# Patient Record
Sex: MALE | Race: WHITE | Employment: OTHER | URBAN - METROPOLITAN AREA
[De-identification: names, ages, dates, MRNs, and addresses within clinical notes are randomized per-mention and may not be internally consistent; named-entity substitution may affect disease eponyms.]

---

## 2023-03-17 ENCOUNTER — APPOINTMENT (OUTPATIENT)
Dept: GENERAL RADIOLOGY | Age: 78
DRG: 086 | End: 2023-03-17
Payer: MEDICARE

## 2023-03-17 ENCOUNTER — APPOINTMENT (OUTPATIENT)
Dept: CT IMAGING | Age: 78
DRG: 086 | End: 2023-03-17
Payer: MEDICARE

## 2023-03-17 ENCOUNTER — HOSPITAL ENCOUNTER (INPATIENT)
Age: 78
LOS: 6 days | Discharge: NURSING, REHAB OR LONG-TERM CARE | DRG: 086 | End: 2023-03-23
Attending: EMERGENCY MEDICINE | Admitting: SURGERY
Payer: MEDICARE

## 2023-03-17 DIAGNOSIS — I60.9 SAH (SUBARACHNOID HEMORRHAGE) (HCC): Primary | ICD-10-CM

## 2023-03-17 DIAGNOSIS — S01.01XA LACERATION OF SCALP, INITIAL ENCOUNTER: ICD-10-CM

## 2023-03-17 DIAGNOSIS — S00.03XA CONTUSION OF SCALP, INITIAL ENCOUNTER: ICD-10-CM

## 2023-03-17 DIAGNOSIS — R55 SYNCOPE, UNSPECIFIED SYNCOPE TYPE: ICD-10-CM
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
## 2023-03-17 DIAGNOSIS — R41.0 CONFUSION: ICD-10-CM
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
## 2023-03-17 LAB
ALBUMIN SERPL-MCNC: 4.3 G/DL (ref 3.5–5.2)
ALP SERPL-CCNC: 92 U/L (ref 40–129)
ALT SERPL-CCNC: 17 U/L (ref 0–40)
ANGLE (CLOT STRENGTH): 65.7 DEGREE (ref 59–74)
ANION GAP SERPL CALCULATED.3IONS-SCNC: 15 MMOL/L (ref 7–16)
AST SERPL-CCNC: 32 U/L (ref 0–39)
BACTERIA URNS QL MICRO: ABNORMAL /HPF
BASOPHILS # BLD: 0.03 E9/L (ref 0–0.2)
BASOPHILS NFR BLD: 0.3 % (ref 0–2)
BILIRUB SERPL-MCNC: 0.5 MG/DL (ref 0–1.2)
BILIRUB UR QL STRIP: NEGATIVE
BUN SERPL-MCNC: 31 MG/DL (ref 6–23)
BURR CELLS: ABNORMAL
CALCIUM SERPL-MCNC: 10.8 MG/DL (ref 8.6–10.2)
CHLORIDE SERPL-SCNC: 100 MMOL/L (ref 98–107)
CLARITY UR: CLEAR
CO2 SERPL-SCNC: 24 MMOL/L (ref 22–29)
COLOR UR: YELLOW
CREAT SERPL-MCNC: 1.4 MG/DL (ref 0.7–1.2)
EKG ATRIAL RATE: 88 BPM
EKG P AXIS: 48 DEGREES
EKG P-R INTERVAL: 186 MS
EKG Q-T INTERVAL: 408 MS
EKG QRS DURATION: 102 MS
EKG QTC CALCULATION (BAZETT): 493 MS
EKG R AXIS: 6 DEGREES
EKG T AXIS: 77 DEGREES
EKG VENTRICULAR RATE: 88 BPM
EOSINOPHIL # BLD: 0.01 E9/L (ref 0.05–0.5)
EOSINOPHIL NFR BLD: 0.1 % (ref 0–6)
EPL-TEG: 0.6 % (ref 0–15)
ERYTHROCYTE [DISTWIDTH] IN BLOOD BY AUTOMATED COUNT: 13.4 FL (ref 11.5–15)
G-TEG: 16.9 K D/SC (ref 4.5–11)
GLUCOSE SERPL-MCNC: 171 MG/DL (ref 74–99)
GLUCOSE UR STRIP-MCNC: NEGATIVE MG/DL
HCT VFR BLD AUTO: 38.1 % (ref 37–54)
HGB BLD-MCNC: 12.6 G/DL (ref 12.5–16.5)
HGB UR QL STRIP: ABNORMAL
HYALINE CASTS #/AREA URNS LPF: ABNORMAL /LPF (ref 0–2)
IMM GRANULOCYTES # BLD: 0.04 E9/L
IMM GRANULOCYTES NFR BLD: 0.3 % (ref 0–5)
INR BLD: 1.1
K (CLOTTING TIME): 0.9 MIN (ref 1–3)
KETONES UR STRIP-MCNC: NEGATIVE MG/DL
LEUKOCYTE ESTERASE UR QL STRIP: NEGATIVE
LY30 (FIBRINOLYSIS): 0.6 % (ref 0–8)
LYMPHOCYTES # BLD: 0.46 E9/L (ref 1.5–4)
LYMPHOCYTES NFR BLD: 3.8 % (ref 20–42)
MA (MAX AMPLITUDE): 77.1 MM (ref 50–70)
MCH RBC QN AUTO: 30.5 PG (ref 26–35)
MCHC RBC AUTO-ENTMCNC: 33.1 % (ref 32–34.5)
MCV RBC AUTO: 92.3 FL (ref 80–99.9)
MONOCYTES # BLD: 0.73 E9/L (ref 0.1–0.95)
MONOCYTES NFR BLD: 6.1 % (ref 2–12)
NEUTROPHILS # BLD: 10.69 E9/L (ref 1.8–7.3)
NEUTS SEG NFR BLD: 89.4 % (ref 43–80)
NITRITE UR QL STRIP: NEGATIVE
OVALOCYTES: ABNORMAL
PH UR STRIP: 7 [PH] (ref 5–9)
PLATELET # BLD AUTO: 184 E9/L (ref 130–450)
PMV BLD AUTO: 11.6 FL (ref 7–12)
POIKILOCYTES: ABNORMAL
POTASSIUM SERPL-SCNC: 3.8 MMOL/L (ref 3.5–5)
PROT SERPL-MCNC: 7.5 G/DL (ref 6.4–8.3)
PROT UR STRIP-MCNC: ABNORMAL MG/DL
PROTHROMBIN TIME: 12.1 SEC (ref 9.3–12.4)
R (REACTION TIME): 3.2 MIN (ref 5–10)
RBC # BLD AUTO: 4.13 E12/L (ref 3.8–5.8)
RBC #/AREA URNS HPF: ABNORMAL /HPF (ref 0–2)
SODIUM SERPL-SCNC: 139 MMOL/L (ref 132–146)
SP GR UR STRIP: 1.01 (ref 1–1.03)
TROPONIN, HIGH SENSITIVITY: 103 NG/L (ref 0–11)
TROPONIN, HIGH SENSITIVITY: 45 NG/L (ref 0–11)
TROPONIN, HIGH SENSITIVITY: 54 NG/L (ref 0–11)
TROPONIN, HIGH SENSITIVITY: 75 NG/L (ref 0–11)
UROBILINOGEN UR STRIP-ACNC: 0.2 E.U./DL
WBC # BLD: 12 E9/L (ref 4.5–11.5)
WBC #/AREA URNS HPF: ABNORMAL /HPF (ref 0–5)

## 2023-03-17 PROCEDURE — 84484 ASSAY OF TROPONIN QUANT: CPT

## 2023-03-17 PROCEDURE — 36415 COLL VENOUS BLD VENIPUNCTURE: CPT

## 2023-03-17 PROCEDURE — 72170 X-RAY EXAM OF PELVIS: CPT

## 2023-03-17 PROCEDURE — 96365 THER/PROPH/DIAG IV INF INIT: CPT

## 2023-03-17 PROCEDURE — 2500000003 HC RX 250 WO HCPCS: Performed by: EMERGENCY MEDICINE

## 2023-03-17 PROCEDURE — 71045 X-RAY EXAM CHEST 1 VIEW: CPT

## 2023-03-17 PROCEDURE — 2580000003 HC RX 258

## 2023-03-17 PROCEDURE — 2580000003 HC RX 258: Performed by: EMERGENCY MEDICINE

## 2023-03-17 PROCEDURE — 12011 RPR F/E/E/N/L/M 2.5 CM/<: CPT

## 2023-03-17 PROCEDURE — 70450 CT HEAD/BRAIN W/O DYE: CPT

## 2023-03-17 PROCEDURE — 6360000002 HC RX W HCPCS: Performed by: EMERGENCY MEDICINE

## 2023-03-17 PROCEDURE — 90471 IMMUNIZATION ADMIN: CPT | Performed by: EMERGENCY MEDICINE

## 2023-03-17 PROCEDURE — 85384 FIBRINOGEN ACTIVITY: CPT

## 2023-03-17 PROCEDURE — 6370000000 HC RX 637 (ALT 250 FOR IP)

## 2023-03-17 PROCEDURE — 85576 BLOOD PLATELET AGGREGATION: CPT

## 2023-03-17 PROCEDURE — 85347 COAGULATION TIME ACTIVATED: CPT

## 2023-03-17 PROCEDURE — 96375 TX/PRO/DX INJ NEW DRUG ADDON: CPT

## 2023-03-17 PROCEDURE — 81001 URINALYSIS AUTO W/SCOPE: CPT

## 2023-03-17 PROCEDURE — 99285 EMERGENCY DEPT VISIT HI MDM: CPT

## 2023-03-17 PROCEDURE — 93005 ELECTROCARDIOGRAM TRACING: CPT | Performed by: EMERGENCY MEDICINE

## 2023-03-17 PROCEDURE — 90714 TD VACC NO PRESV 7 YRS+ IM: CPT | Performed by: EMERGENCY MEDICINE

## 2023-03-17 PROCEDURE — 0HQ1XZZ REPAIR FACE SKIN, EXTERNAL APPROACH: ICD-10-PCS | Performed by: SURGERY

## 2023-03-17 PROCEDURE — 80053 COMPREHEN METABOLIC PANEL: CPT

## 2023-03-17 PROCEDURE — 93010 ELECTROCARDIOGRAM REPORT: CPT | Performed by: INTERNAL MEDICINE

## 2023-03-17 PROCEDURE — 85610 PROTHROMBIN TIME: CPT

## 2023-03-17 PROCEDURE — 72125 CT NECK SPINE W/O DYE: CPT

## 2023-03-17 PROCEDURE — 99222 1ST HOSP IP/OBS MODERATE 55: CPT | Performed by: NEUROLOGICAL SURGERY

## 2023-03-17 PROCEDURE — 85025 COMPLETE CBC W/AUTO DIFF WBC: CPT

## 2023-03-17 PROCEDURE — 2060000000 HC ICU INTERMEDIATE R&B

## 2023-03-17 RX ORDER — SODIUM CHLORIDE 9 MG/ML
INJECTION, SOLUTION INTRAVENOUS CONTINUOUS
Status: DISCONTINUED | OUTPATIENT
Start: 2023-03-17 | End: 2023-03-22

## 2023-03-17 RX ORDER — HYDROCHLOROTHIAZIDE 25 MG/1
25 TABLET ORAL DAILY
Status: ON HOLD | COMMUNITY
End: 2023-03-22 | Stop reason: HOSPADM

## 2023-03-17 RX ORDER — NIFEDIPINE 60 MG/1
60 TABLET, FILM COATED, EXTENDED RELEASE ORAL DAILY
Status: ON HOLD | COMMUNITY
End: 2023-03-22 | Stop reason: HOSPADM

## 2023-03-17 RX ORDER — LEVETIRACETAM 5 MG/ML
500 INJECTION INTRAVASCULAR EVERY 12 HOURS
Status: DISCONTINUED | OUTPATIENT
Start: 2023-03-18 | End: 2023-03-23 | Stop reason: HOSPADM

## 2023-03-17 RX ORDER — 0.9 % SODIUM CHLORIDE 0.9 %
1000 INTRAVENOUS SOLUTION INTRAVENOUS ONCE
Status: COMPLETED | OUTPATIENT
Start: 2023-03-17 | End: 2023-03-17

## 2023-03-17 RX ORDER — SODIUM CHLORIDE 0.9 % (FLUSH) 0.9 %
10 SYRINGE (ML) INJECTION EVERY 12 HOURS SCHEDULED
Status: DISCONTINUED | OUTPATIENT
Start: 2023-03-17 | End: 2023-03-23 | Stop reason: HOSPADM

## 2023-03-17 RX ORDER — HYDRALAZINE HYDROCHLORIDE 20 MG/ML
10 INJECTION INTRAMUSCULAR; INTRAVENOUS EVERY 4 HOURS PRN
Status: DISCONTINUED | OUTPATIENT
Start: 2023-03-17 | End: 2023-03-23 | Stop reason: HOSPADM

## 2023-03-17 RX ORDER — LABETALOL HYDROCHLORIDE 5 MG/ML
10 INJECTION, SOLUTION INTRAVENOUS EVERY 4 HOURS PRN
Status: DISCONTINUED | OUTPATIENT
Start: 2023-03-17 | End: 2023-03-23 | Stop reason: HOSPADM

## 2023-03-17 RX ORDER — TAMSULOSIN HYDROCHLORIDE 0.4 MG/1
0.4 CAPSULE ORAL DAILY
COMMUNITY

## 2023-03-17 RX ORDER — ASPIRIN 81 MG/1
81 TABLET ORAL DAILY
Status: ON HOLD | COMMUNITY
End: 2023-03-22 | Stop reason: HOSPADM

## 2023-03-17 RX ORDER — SODIUM CHLORIDE 0.9 % (FLUSH) 0.9 %
10 SYRINGE (ML) INJECTION PRN
Status: DISCONTINUED | OUTPATIENT
Start: 2023-03-17 | End: 2023-03-23 | Stop reason: HOSPADM

## 2023-03-17 RX ORDER — LOSARTAN POTASSIUM 100 MG/1
100 TABLET ORAL DAILY
COMMUNITY

## 2023-03-17 RX ORDER — SODIUM CHLORIDE 9 MG/ML
INJECTION, SOLUTION INTRAVENOUS PRN
Status: DISCONTINUED | OUTPATIENT
Start: 2023-03-17 | End: 2023-03-23 | Stop reason: HOSPADM

## 2023-03-17 RX ORDER — LEVETIRACETAM 15 MG/ML
1500 INJECTION INTRAVASCULAR ONCE
Status: COMPLETED | OUTPATIENT
Start: 2023-03-17 | End: 2023-03-17

## 2023-03-17 RX ORDER — HYDRALAZINE HYDROCHLORIDE 20 MG/ML
10 INJECTION INTRAMUSCULAR; INTRAVENOUS EVERY 6 HOURS PRN
Status: DISCONTINUED | OUTPATIENT
Start: 2023-03-17 | End: 2023-03-17

## 2023-03-17 RX ORDER — ONDANSETRON 4 MG/1
4 TABLET, ORALLY DISINTEGRATING ORAL EVERY 8 HOURS PRN
Status: DISCONTINUED | OUTPATIENT
Start: 2023-03-17 | End: 2023-03-23 | Stop reason: HOSPADM

## 2023-03-17 RX ORDER — LEVOTHYROXINE SODIUM 0.03 MG/1
25 TABLET ORAL DAILY
COMMUNITY

## 2023-03-17 RX ORDER — ACETAMINOPHEN 325 MG/1
650 TABLET ORAL EVERY 6 HOURS
Status: DISCONTINUED | OUTPATIENT
Start: 2023-03-17 | End: 2023-03-23 | Stop reason: HOSPADM

## 2023-03-17 RX ORDER — ONDANSETRON 2 MG/ML
4 INJECTION INTRAMUSCULAR; INTRAVENOUS EVERY 6 HOURS PRN
Status: DISCONTINUED | OUTPATIENT
Start: 2023-03-17 | End: 2023-03-23 | Stop reason: HOSPADM

## 2023-03-17 RX ORDER — POLYETHYLENE GLYCOL 3350 17 G/17G
17 POWDER, FOR SOLUTION ORAL DAILY
Status: DISCONTINUED | OUTPATIENT
Start: 2023-03-17 | End: 2023-03-23 | Stop reason: HOSPADM

## 2023-03-17 RX ADMIN — CLOSTRIDIUM TETANI TOXOID ANTIGEN (FORMALDEHYDE INACTIVATED) AND CORYNEBACTERIUM DIPHTHERIAE TOXOID ANTIGEN (FORMALDEHYDE INACTIVATED) 0.5 ML: 5; 2 INJECTION, SUSPENSION INTRAMUSCULAR at 12:18

## 2023-03-17 RX ADMIN — SODIUM CHLORIDE 1000 ML: 9 INJECTION, SOLUTION INTRAVENOUS at 12:19

## 2023-03-17 RX ADMIN — LEVETIRACETAM 1500 MG: 15 INJECTION INTRAVENOUS at 15:30

## 2023-03-17 RX ADMIN — SODIUM CHLORIDE: 9 INJECTION, SOLUTION INTRAVENOUS at 17:36

## 2023-03-17 RX ADMIN — SODIUM CHLORIDE 2.5 MG/HR: 9 INJECTION, SOLUTION INTRAVENOUS at 15:34

## 2023-03-17 RX ADMIN — ACETAMINOPHEN 650 MG: 325 TABLET ORAL at 19:43

## 2023-03-17 ASSESSMENT — PAIN SCALES - GENERAL: PAINLEVEL_OUTOF10: 10

## 2023-03-17 ASSESSMENT — ENCOUNTER SYMPTOMS
ABDOMINAL PAIN: 0
PHOTOPHOBIA: 0
BACK PAIN: 0
TROUBLE SWALLOWING: 0
SHORTNESS OF BREATH: 0

## 2023-03-17 ASSESSMENT — PAIN DESCRIPTION - LOCATION: LOCATION: HEAD

## 2023-03-17 ASSESSMENT — PAIN DESCRIPTION - ORIENTATION: ORIENTATION: RIGHT

## 2023-03-17 NOTE — LETTER
[address] [place] [address] Dr Department Medicaid  CERTIFICATION OF NECESSITY  FOR NON-EMERGENCY TRANSPORTATION   BY GROUND AMBULANCE      Individual Information   1. Name: Gus Osgood 2. [address] [place] [address] Dr Medicaid Billing Number:    3. Address: [address] [facility] [address]      Transportation Provider Information   4. Provider Name:    5. [address] [place] [address] Dr Medicaid Provider Number:  National Provider Identifier (NPI):     Certification  7. Criteria:  During transport, this individual requires:  [x] Medical treatment or continuous     supervision by an EMT. [] The administration or regulation of oxygen by another person. [] Supervised protective restraint. 8. Period Beginning Date:    5. Length  [x] Not more than 1 day(s)  [] One Year     Additional Information Relevant to Certification   10. Comments or Explanations, If Necessary or Appropriate   FALL, subarachnoid hemorrhage,Muscle weakness     Certifying Practitioner Information   11. Name of Practitioner: Александр Harris MD   12. [address] [place] [address] Dr Medicaid Provider Number, If Applicable:  Brian 62 Provider Identifier (NPI):      Signature Information   14. Date of Signature:  13. Name of Person Signin. Signature and Professional Designation: : Александр Harris MD     [facility] [id number]  Rev. 2015       [address] [place] [address] Encounter Date/Time: 3/17/2023 [facility]5 [facility] Account: [de-identified]    MRN: [id number]    Patient: Gus Osgood    Contact Serial #: [id number]      ENCOUNTER          Patient Class: I Private Enc? No Unit RM BD: SEYZ 4S PICU 4504/4504-B   Hospital Service: CRYSTAL   Encounter DX: Confusion [R41.0]   ADM Provider: Александр Harris MD   Procedure:     ATT Provider: Александр Harris MD   REF Provider:        Admission DX: Confusion, SAH (subarachnoid hemorrhage) ([facility] [address] Contusion of scalp, initial encounter, Fall, initial encounter, Laceration of scalp, initial encounter and DX codes: R41.0, I60.9, S00.03XA, W19. Loistine Brunner, S01. 01XA      PATIENT                 Name: Gus Osgood : 1945

## 2023-03-17 NOTE — ED PROVIDER NOTES
disagreements were addressed in the HPI. REVIEW OF EXTERNAL NOTE :       No recent contributory notes in EMR    REVIEW OF SYSTEMS :      Positives and Pertinent negatives as per HPI. SURGICAL HISTORY   No past surgical history on file. [address]       Current Discharge Medication List        CONTINUE these medications which have NOT CHANGED    Details   aspirin 81 MG EC tablet Take 81 mg by mouth daily      levothyroxine (SYNTHROID) 25 MCG tablet Take 25 mcg by mouth Daily      losartan (COZAAR) 100 MG tablet Take 100 mg by mouth daily      NIFEdipine (ADALAT CC) 60 MG extended release tablet Take 60 mg by mouth daily      tamsulosin (FLOMAX) 0.4 MG capsule Take 0.4 mg by mouth daily      hydroCHLOROthiazide (HYDRODIURIL) 25 MG tablet Take 25 mg by mouth daily             ALLERGIES     Patient has no allergy information on record. FAMILYHISTORY     No family history on file. SOCIAL HISTORY          SCREENINGS        Dolores Coma Scale  Eye Opening: Spontaneous  Best Verbal Response: Confused  Best Motor Response: Obeys commands  Dolores Coma Scale Score: 14                CIWA Assessment  BP: (!) 112/52  Heart Rate: 77           PHYSICAL EXAM  1 or more Elements     ED Triage Vitals   BP Temp Temp src Pulse Resp SpO2 Height Weight   -- -- -- -- -- -- -- --             Constitutional/General: Oriented to person, disoriented to place, time and events  Head: Normocephalic, contusion/hematoma to the right frontal scalp with 1 cm laceration to the lateral left brow  Eyes: PERRL, EOMI, conjunctiva normal, sclera non icteric  ENT:  Oropharynx clear, handling secretions, no trismus, no asymmetry of the posterior oropharynx or uvular edema  Neck: Supple, full ROM, no stridor, no meningeal signs  Respiratory: Lungs clear to auscultation bilaterally, no wheezes, rales, or rhonchi. Not in respiratory distress  Cardiovascular:  Regular rate. Regular rhythm. No murmurs, no gallops, no rubs.  2+ distal

## 2023-03-17 NOTE — ED NOTES
To ED via EMS from home, pt was in the kitchen this am making breakfast, developed an acute onset of dizziness, fell on tile floor, on his way down he hit his head on the corner of the counter. Pt does not recall if he suffered and LOC. He is on blood thinners. Pt has a large hematoma to his right forehead and a ~1cm lac to his posterior left scalp. The posterior scalp lac has a slow ooze to it. Bandage over forehead hematoma. Pt has an abrasion to his right knee and has an abrasion to his right groin as well. Pt is A&Ox3, to self, place and situation. Confused to month/year. Daughter is bedside.       Nickie Santiago RN  03/17/23 6072

## 2023-03-17 NOTE — H&P
TRAUMA HISTORY & PHYSICAL  Surgical Resident/Advance Practice Nurse  3/17/2023  3:17 PM    PRIMARY SURVEY    CHIEF COMPLAINT:  Trauma consult. Injury occurred earlier this morning. Patient is amnestic to events, but supposedly suffered a fall in the kitchen preparing breakfast.  Patient's daughter is present at bedside and admits that patient appears to have delayed cognition. Patient on ASA 81. Denies any previous cardiac history. AIRWAY:   Airway Normal  EMS ETT Absent  Noisy respirations Absent  Retractions: Absent  Vomiting/bleeding: Absent    BREATHING:    Midaxillary breath sound left:  Normal  Midaxillary breath sound right:  Normal    Cough sound intensity:  fair   FiO2:  Room air    SMI 2250 mL. CIRCULATION:   Femerol pulse intensity: Strong  Palpebral conjunctiva: Red    Vitals:    03/17/23 1503   BP: (!) 149/89   Pulse:    Resp:    Temp:    SpO2:        Vitals:    03/17/23 1343 03/17/23 1446 03/17/23 1502 03/17/23 1503   BP:  (!) 143/74  (!) 149/89   Pulse: 70  90    Resp: 20  23    Temp:       SpO2:   100%         FAST EXAM: Deferred    Central Nervous System    GCS Initial 15 minutes   Eye  Motor  Verbal 4 - Opens eyes on own  6 - Follows simple motor commands  5 - Alert and oriented 4 - Opens eyes on own  6 - Follows simple motor commands  5 - Alert and oriented     Neuromuscular blockade: No  Pupil size:  Left 3 mm    Right 3 mm  Pupil reaction: Yes    Wiggles fingers: Left Yes Right Yes  Wiggles toes: Left Yes   Right Yes    Hand grasp:   Left  Present      Right  Present  Plantar flexion: Left  Present      Right   Present    Loss of consciousness:  Yes    History Obtained From:  Patient & EMS  Private Medical Doctor: No primary care provider on file. Pre-exisiting Medical History:  yes    Conditions: No past medical history on file.     Medications: ASA 81, Synthroid, losartan, nifedipine, tamsulosin, hydrochlorothiazide    Allergies: none    Social History:   Tobacco use:

## 2023-03-18 LAB
ANION GAP SERPL CALCULATED.3IONS-SCNC: 8 MMOL/L (ref 7–16)
BASOPHILS # BLD: 0.01 E9/L (ref 0–0.2)
BASOPHILS NFR BLD: 0.2 % (ref 0–2)
BUN SERPL-MCNC: 25 MG/DL (ref 6–23)
CALCIUM SERPL-MCNC: 9.3 MG/DL (ref 8.6–10.2)
CHLORIDE SERPL-SCNC: 106 MMOL/L (ref 98–107)
CO2 SERPL-SCNC: 25 MMOL/L (ref 22–29)
CREAT SERPL-MCNC: 1 MG/DL (ref 0.7–1.2)
EKG ATRIAL RATE: 60 BPM
EKG ATRIAL RATE: 67 BPM
EKG P AXIS: 63 DEGREES
EKG P AXIS: 7 DEGREES
EKG P-R INTERVAL: 174 MS
EKG P-R INTERVAL: 200 MS
EKG Q-T INTERVAL: 444 MS
EKG Q-T INTERVAL: 444 MS
EKG QRS DURATION: 116 MS
EKG QRS DURATION: 96 MS
EKG QTC CALCULATION (BAZETT): 444 MS
EKG QTC CALCULATION (BAZETT): 469 MS
EKG R AXIS: -29 DEGREES
EKG R AXIS: -30 DEGREES
EKG T AXIS: 16 DEGREES
EKG T AXIS: 24 DEGREES
EKG VENTRICULAR RATE: 60 BPM
EKG VENTRICULAR RATE: 67 BPM
EOSINOPHIL # BLD: 0.02 E9/L (ref 0.05–0.5)
EOSINOPHIL NFR BLD: 0.4 % (ref 0–6)
ERYTHROCYTE [DISTWIDTH] IN BLOOD BY AUTOMATED COUNT: 13.6 FL (ref 11.5–15)
GLUCOSE SERPL-MCNC: 85 MG/DL (ref 74–99)
HCT VFR BLD AUTO: 28.6 % (ref 37–54)
HGB BLD-MCNC: 9.5 G/DL (ref 12.5–16.5)
IMM GRANULOCYTES # BLD: 0.01 E9/L
IMM GRANULOCYTES NFR BLD: 0.2 % (ref 0–5)
LYMPHOCYTES # BLD: 0.69 E9/L (ref 1.5–4)
LYMPHOCYTES NFR BLD: 14.6 % (ref 20–42)
MAGNESIUM SERPL-MCNC: 2 MG/DL (ref 1.6–2.6)
MCH RBC QN AUTO: 30.7 PG (ref 26–35)
MCHC RBC AUTO-ENTMCNC: 33.2 % (ref 32–34.5)
MCV RBC AUTO: 92.6 FL (ref 80–99.9)
MONOCYTES # BLD: 0.51 E9/L (ref 0.1–0.95)
MONOCYTES NFR BLD: 10.8 % (ref 2–12)
NEUTROPHILS # BLD: 3.49 E9/L (ref 1.8–7.3)
NEUTS SEG NFR BLD: 73.8 % (ref 43–80)
PLATELET # BLD AUTO: 145 E9/L (ref 130–450)
PMV BLD AUTO: 11.7 FL (ref 7–12)
POTASSIUM SERPL-SCNC: 3.3 MMOL/L (ref 3.5–5)
RBC # BLD AUTO: 3.09 E12/L (ref 3.8–5.8)
SODIUM SERPL-SCNC: 139 MMOL/L (ref 132–146)
TROPONIN, HIGH SENSITIVITY: 300 NG/L (ref 0–11)
TROPONIN, HIGH SENSITIVITY: 316 NG/L (ref 0–11)
TROPONIN, HIGH SENSITIVITY: 335 NG/L (ref 0–11)
WBC # BLD: 4.7 E9/L (ref 4.5–11.5)

## 2023-03-18 PROCEDURE — 93005 ELECTROCARDIOGRAM TRACING: CPT

## 2023-03-18 PROCEDURE — 99222 1ST HOSP IP/OBS MODERATE 55: CPT | Performed by: SURGERY

## 2023-03-18 PROCEDURE — 93010 ELECTROCARDIOGRAM REPORT: CPT | Performed by: INTERNAL MEDICINE

## 2023-03-18 PROCEDURE — 2700000000 HC OXYGEN THERAPY PER DAY

## 2023-03-18 PROCEDURE — 2060000000 HC ICU INTERMEDIATE R&B

## 2023-03-18 PROCEDURE — 99232 SBSQ HOSP IP/OBS MODERATE 35: CPT | Performed by: NEUROLOGICAL SURGERY

## 2023-03-18 PROCEDURE — 93005 ELECTROCARDIOGRAM TRACING: CPT | Performed by: STUDENT IN AN ORGANIZED HEALTH CARE EDUCATION/TRAINING PROGRAM

## 2023-03-18 PROCEDURE — 6370000000 HC RX 637 (ALT 250 FOR IP)

## 2023-03-18 PROCEDURE — 36415 COLL VENOUS BLD VENIPUNCTURE: CPT

## 2023-03-18 PROCEDURE — 83735 ASSAY OF MAGNESIUM: CPT

## 2023-03-18 PROCEDURE — 80048 BASIC METABOLIC PNL TOTAL CA: CPT

## 2023-03-18 PROCEDURE — 84484 ASSAY OF TROPONIN QUANT: CPT

## 2023-03-18 PROCEDURE — 6360000002 HC RX W HCPCS

## 2023-03-18 PROCEDURE — 94660 CPAP INITIATION&MGMT: CPT

## 2023-03-18 PROCEDURE — 85025 COMPLETE CBC W/AUTO DIFF WBC: CPT

## 2023-03-18 RX ORDER — POTASSIUM CHLORIDE 20 MEQ/1
40 TABLET, EXTENDED RELEASE ORAL ONCE
Status: COMPLETED | OUTPATIENT
Start: 2023-03-18 | End: 2023-03-18

## 2023-03-18 RX ORDER — TAMSULOSIN HYDROCHLORIDE 0.4 MG/1
0.4 CAPSULE ORAL DAILY
Status: DISCONTINUED | OUTPATIENT
Start: 2023-03-18 | End: 2023-03-23 | Stop reason: HOSPADM

## 2023-03-18 RX ORDER — LEVOTHYROXINE SODIUM 0.05 MG/1
25 TABLET ORAL DAILY
Status: DISCONTINUED | OUTPATIENT
Start: 2023-03-18 | End: 2023-03-23 | Stop reason: HOSPADM

## 2023-03-18 RX ADMIN — ACETAMINOPHEN 650 MG: 325 TABLET ORAL at 21:04

## 2023-03-18 RX ADMIN — POTASSIUM BICARBONATE 40 MEQ: 782 TABLET, EFFERVESCENT ORAL at 08:53

## 2023-03-18 RX ADMIN — ACETAMINOPHEN 650 MG: 325 TABLET ORAL at 08:53

## 2023-03-18 RX ADMIN — POTASSIUM CHLORIDE 40 MEQ: 1500 TABLET, EXTENDED RELEASE ORAL at 13:47

## 2023-03-18 RX ADMIN — LEVETIRACETAM 500 MG: 5 INJECTION INTRAVENOUS at 04:18

## 2023-03-18 RX ADMIN — POTASSIUM BICARBONATE 40 MEQ: 782 TABLET, EFFERVESCENT ORAL at 21:04

## 2023-03-18 RX ADMIN — LEVETIRACETAM 500 MG: 5 INJECTION INTRAVENOUS at 15:22

## 2023-03-18 RX ADMIN — TAMSULOSIN HYDROCHLORIDE 0.4 MG: 0.4 CAPSULE ORAL at 08:53

## 2023-03-18 RX ADMIN — POLYETHYLENE GLYCOL 3350 17 G: 17 POWDER, FOR SOLUTION ORAL at 08:53

## 2023-03-18 ASSESSMENT — PAIN SCALES - GENERAL: PAINLEVEL_OUTOF10: 5

## 2023-03-18 ASSESSMENT — PAIN DESCRIPTION - LOCATION: LOCATION: HEAD

## 2023-03-18 NOTE — DISCHARGE INSTRUCTIONS
items in your cabinets so that the things you use a lot are on the lower shelves (about waist level). Keep a cordless phone and a flashlight with new batteries by your bed. If possible, put a phone in each of the main rooms of your house, or carry a cell phone in case you fall and cannot reach a phone. Or, you can wear a device around your neck or wrist. You push a button that sends a signal for help. Wear low-heeled shoes that fit well and give your feet good support. Use footwear with non-skid soles. Check the heels and soles of your shoes for wear. Repair or replace worn heels or soles. Do not wear socks without shoes on wood floors. Walk on the grass when the sidewalks are slippery. If you live in an area that gets snow and ice in the winter, sprinkle salt on slippery steps and sidewalks. Preventing falls in the bath  Install grab bars and non-skid mats inside and outside your shower or tub and near the toilet and sinks. Use shower chairs and bath benches. Use a hand-held shower head that will allow you to sit while showering. Get into a tub or shower by putting the weaker leg in first. Get out of a tub or shower with your strong side first.  Repair loose toilet seats and consider installing a raised toilet seat to make getting on and off the toilet easier. Keep your washroom door unlocked while you are in the shower.     Follow-up:  Trauma Clinic: [phone] option [address]  [place], [address] Shivam Lovell

## 2023-03-18 NOTE — DISCHARGE SUMMARY
medication exactly as prescribed. Store medication away from children and in a safe place. Do NOT share your medication with others. Do NOT take medication unless it is prescribed for you. Do NOT drink alcohol while taking opioids (I.e., Norco, Percocet, Oxycodone, etc). Discuss with the Trauma Clinic staff if the dose of medication you are taking does not control your pain and any side effects that you may be having. CALL 911 OR YOUR LOCAL EMERGENCY SERVICE:  --If you take too much medication  --If you have trouble breathing or shortness of breath  --A child has taken this medication. WORK:  You may not return to work until you receive follow-up with the Trauma Clinic or clearance by all consultants. Call the trauma clinic for any of the following or for questions/concerns;  --fever over 101F  --redness, swelling, hardness or warmth at the wound site(s). --Unrelieved nausea/vomiting  --Foul smelling or cloudy drainage at the wound site(s)  --Unrelieved pain or increase in pain  --Increase in shortness of breath    Follow-up:  Trauma Clinic: [phone] option [address] [facility] [place]    SPECIAL CONSIDERATIONS FOR OUR PATIENTS OVER THE AGE OF 65Y    Getting around your home safely can be a challenge if you have injuries or health problems that make it easy for you to fall. Loose rugs and furniture in walkways are among the dangers for many older people who have problems walking or who have poor eyesight. People who have conditions such as arthritis, osteoporosis, or dementia also must be careful not to fall. You can make your home safer with a few simple measures. Follow-up care is a key part of your treatment and safety. Be sure to make and go to all appointments, and call your doctor or nurse call line if you are having problems.  It's also a good idea to know your test results and keep a list of the medicines you

## 2023-03-18 NOTE — PLAN OF CARE
Problem: Skin/Tissue Integrity  Goal: Absence of new skin breakdown  Description: 1. Monitor for areas of redness and/or skin breakdown  2. Assess vascular access sites hourly  3. Every 4-6 hours minimum:  Change oxygen saturation probe site  4. Every 4-6 hours:  If on nasal continuous positive airway pressure, respiratory therapy assess nares and determine need for appliance change or resting period.   3/18/2023 0219 by Matthew Copeland RN  Outcome: Progressing  3/17/2023 1841 by Harish Whittaker, RN  Outcome: Progressing     Problem: ABCDS Injury Assessment  Goal: Absence of physical injury  3/18/2023 0219 by Matthew Copeland RN  Outcome: Jacy Silk  3/17/2023 1841 by Harish Whittaker, RN  Outcome: Progressing

## 2023-03-18 NOTE — FLOWSHEET NOTE
03/17/23 1715   Vital Signs   Temp 97.8 °F (36.6 °C)   Temp Source Temporal   Heart Rate 72   Resp 28   BP (!) 101/40   MAP (Calculated) 60   Level of Consciousness 1   MEWS Score 3   Pain Assessment   Pain Assessment Face, Legs, Activity, Cry, and Consolability (FLACC)   Faces, Legs, Activity, Cry, and Consolability (FLACC)   Face (F) 0   Legs (L) 0   Activity (A) 0   Cry (C) 0   Consolability (C) 0   FLACC Score  0   Oxygen Therapy   SpO2 98 %      Patient admitted to room 4504B from ED, Pt lethargic, alert only to self. Pt arrived on Cardene drip @5, drip was stopped, d/t b/p <631 systolic. Perfect served resident to notify of pt arrival on unit and vitals. Tele monitor placed on pt, Bed locked, in lowest position, side rails up 2/4, call light within reach. Will continue to monitor.

## 2023-03-19 LAB
ANION GAP SERPL CALCULATED.3IONS-SCNC: 7 MMOL/L (ref 7–16)
BASOPHILS # BLD: 0.01 E9/L (ref 0–0.2)
BASOPHILS NFR BLD: 0.2 % (ref 0–2)
BUN SERPL-MCNC: 20 MG/DL (ref 6–23)
CALCIUM SERPL-MCNC: 9 MG/DL (ref 8.6–10.2)
CHLORIDE SERPL-SCNC: 108 MMOL/L (ref 98–107)
CO2 SERPL-SCNC: 25 MMOL/L (ref 22–29)
CREAT SERPL-MCNC: 1 MG/DL (ref 0.7–1.2)
EOSINOPHIL # BLD: 0.07 E9/L (ref 0.05–0.5)
EOSINOPHIL NFR BLD: 1.6 % (ref 0–6)
ERYTHROCYTE [DISTWIDTH] IN BLOOD BY AUTOMATED COUNT: 13.5 FL (ref 11.5–15)
GLUCOSE SERPL-MCNC: 92 MG/DL (ref 74–99)
HCT VFR BLD AUTO: 28.2 % (ref 37–54)
HGB BLD-MCNC: 9.2 G/DL (ref 12.5–16.5)
IMM GRANULOCYTES # BLD: 0.01 E9/L
IMM GRANULOCYTES NFR BLD: 0.2 % (ref 0–5)
LYMPHOCYTES # BLD: 0.8 E9/L (ref 1.5–4)
LYMPHOCYTES NFR BLD: 18.7 % (ref 20–42)
MCH RBC QN AUTO: 30.8 PG (ref 26–35)
MCHC RBC AUTO-ENTMCNC: 32.6 % (ref 32–34.5)
MCV RBC AUTO: 94.3 FL (ref 80–99.9)
MONOCYTES # BLD: 0.39 E9/L (ref 0.1–0.95)
MONOCYTES NFR BLD: 9.1 % (ref 2–12)
NEUTROPHILS # BLD: 2.99 E9/L (ref 1.8–7.3)
NEUTS SEG NFR BLD: 70.2 % (ref 43–80)
PLATELET # BLD AUTO: 121 E9/L (ref 130–450)
PMV BLD AUTO: 11.5 FL (ref 7–12)
POTASSIUM SERPL-SCNC: 4.1 MMOL/L (ref 3.5–5)
RBC # BLD AUTO: 2.99 E12/L (ref 3.8–5.8)
SODIUM SERPL-SCNC: 140 MMOL/L (ref 132–146)
TROPONIN, HIGH SENSITIVITY: 336 NG/L (ref 0–11)
WBC # BLD: 4.3 E9/L (ref 4.5–11.5)

## 2023-03-19 PROCEDURE — 2580000003 HC RX 258

## 2023-03-19 PROCEDURE — 36415 COLL VENOUS BLD VENIPUNCTURE: CPT

## 2023-03-19 PROCEDURE — 2060000000 HC ICU INTERMEDIATE R&B

## 2023-03-19 PROCEDURE — 99231 SBSQ HOSP IP/OBS SF/LOW 25: CPT | Performed by: SURGERY

## 2023-03-19 PROCEDURE — 6360000002 HC RX W HCPCS

## 2023-03-19 PROCEDURE — 84484 ASSAY OF TROPONIN QUANT: CPT

## 2023-03-19 PROCEDURE — 80048 BASIC METABOLIC PNL TOTAL CA: CPT

## 2023-03-19 PROCEDURE — 85025 COMPLETE CBC W/AUTO DIFF WBC: CPT

## 2023-03-19 PROCEDURE — 2700000000 HC OXYGEN THERAPY PER DAY

## 2023-03-19 PROCEDURE — 6370000000 HC RX 637 (ALT 250 FOR IP)

## 2023-03-19 PROCEDURE — 94660 CPAP INITIATION&MGMT: CPT

## 2023-03-19 RX ADMIN — ACETAMINOPHEN 650 MG: 325 TABLET ORAL at 16:31

## 2023-03-19 RX ADMIN — LEVOTHYROXINE SODIUM 25 MCG: 0.05 TABLET ORAL at 08:32

## 2023-03-19 RX ADMIN — POLYETHYLENE GLYCOL 3350 17 G: 17 POWDER, FOR SOLUTION ORAL at 08:32

## 2023-03-19 RX ADMIN — ACETAMINOPHEN 650 MG: 325 TABLET ORAL at 09:46

## 2023-03-19 RX ADMIN — LEVETIRACETAM 500 MG: 5 INJECTION INTRAVENOUS at 16:31

## 2023-03-19 RX ADMIN — TAMSULOSIN HYDROCHLORIDE 0.4 MG: 0.4 CAPSULE ORAL at 08:33

## 2023-03-19 RX ADMIN — ACETAMINOPHEN 650 MG: 325 TABLET ORAL at 04:22

## 2023-03-19 RX ADMIN — SODIUM CHLORIDE: 9 INJECTION, SOLUTION INTRAVENOUS at 04:27

## 2023-03-19 RX ADMIN — LEVETIRACETAM 500 MG: 5 INJECTION INTRAVENOUS at 04:28

## 2023-03-19 ASSESSMENT — PAIN DESCRIPTION - LOCATION
LOCATION: HEAD
LOCATION: HEAD

## 2023-03-19 ASSESSMENT — PAIN SCALES - GENERAL
PAINLEVEL_OUTOF10: 5
PAINLEVEL_OUTOF10: 3
PAINLEVEL_OUTOF10: 2

## 2023-03-19 ASSESSMENT — PAIN DESCRIPTION - DESCRIPTORS
DESCRIPTORS: ACHING
DESCRIPTORS: ACHING;DISCOMFORT;DULL;SORE

## 2023-03-19 NOTE — PLAN OF CARE
Problem: Skin/Tissue Integrity  Goal: Absence of new skin breakdown  Description: 1. Monitor for areas of redness and/or skin breakdown  2. Assess vascular access sites hourly  3. Every 4-6 hours minimum:  Change oxygen saturation probe site  4. Every 4-6 hours:  If on nasal continuous positive airway pressure, respiratory therapy assess nares and determine need for appliance change or resting period. Outcome: Progressing     Problem: Confusion  Goal: Confusion, delirium, dementia, or psychosis is improved or at baseline  Description: INTERVENTIONS:  1. Assess for possible contributors to thought disturbance, including medications, impaired vision or hearing, underlying metabolic abnormalities, dehydration, psychiatric diagnoses, and notify attending LIP  2. [place] high risk fall precautions, as indicated  3. Provide frequent short contacts to provide reality reorientation, refocusing and direction  4. Decrease environmental stimuli, including noise as appropriate  5. Monitor and intervene to maintain adequate nutrition, hydration, elimination, sleep and activity  6. If unable to ensure safety without constant attention obtain sitter and review sitter guidelines with assigned personnel  7.  Initiate Psychosocial CNS and Spiritual Care consult, as indicated  Outcome: Progressing     Problem: Pain  Goal: Verbalizes/displays adequate comfort level or baseline comfort level  Outcome: Progressing

## 2023-03-20 LAB
ANION GAP SERPL CALCULATED.3IONS-SCNC: 5 MMOL/L (ref 7–16)
BASOPHILS # BLD: 0.02 E9/L (ref 0–0.2)
BASOPHILS NFR BLD: 0.5 % (ref 0–2)
BUN SERPL-MCNC: 15 MG/DL (ref 6–23)
CALCIUM SERPL-MCNC: 8.8 MG/DL (ref 8.6–10.2)
CHLORIDE SERPL-SCNC: 105 MMOL/L (ref 98–107)
CO2 SERPL-SCNC: 27 MMOL/L (ref 22–29)
CREAT SERPL-MCNC: 0.9 MG/DL (ref 0.7–1.2)
EOSINOPHIL # BLD: 0.08 E9/L (ref 0.05–0.5)
EOSINOPHIL NFR BLD: 1.8 % (ref 0–6)
ERYTHROCYTE [DISTWIDTH] IN BLOOD BY AUTOMATED COUNT: 13.2 FL (ref 11.5–15)
GLUCOSE SERPL-MCNC: 100 MG/DL (ref 74–99)
HCT VFR BLD AUTO: 31.3 % (ref 37–54)
HGB BLD-MCNC: 10.1 G/DL (ref 12.5–16.5)
IMM GRANULOCYTES # BLD: 0.02 E9/L
IMM GRANULOCYTES NFR BLD: 0.5 % (ref 0–5)
LV EF: 48 %
LVEF MODALITY: NORMAL
LYMPHOCYTES # BLD: 0.43 E9/L (ref 1.5–4)
LYMPHOCYTES NFR BLD: 9.8 % (ref 20–42)
MCH RBC QN AUTO: 30.5 PG (ref 26–35)
MCHC RBC AUTO-ENTMCNC: 32.3 % (ref 32–34.5)
MCV RBC AUTO: 94.6 FL (ref 80–99.9)
MONOCYTES # BLD: 0.36 E9/L (ref 0.1–0.95)
MONOCYTES NFR BLD: 8.2 % (ref 2–12)
NEUTROPHILS # BLD: 3.49 E9/L (ref 1.8–7.3)
NEUTS SEG NFR BLD: 79.2 % (ref 43–80)
OVALOCYTES: ABNORMAL
PLATELET # BLD AUTO: 107 E9/L (ref 130–450)
PMV BLD AUTO: 12.1 FL (ref 7–12)
POIKILOCYTES: ABNORMAL
POLYCHROMASIA: ABNORMAL
POTASSIUM SERPL-SCNC: 4.2 MMOL/L (ref 3.5–5)
RBC # BLD AUTO: 3.31 E12/L (ref 3.8–5.8)
SODIUM SERPL-SCNC: 137 MMOL/L (ref 132–146)
WBC # BLD: 4.4 E9/L (ref 4.5–11.5)

## 2023-03-20 PROCEDURE — 6370000000 HC RX 637 (ALT 250 FOR IP)

## 2023-03-20 PROCEDURE — 2700000000 HC OXYGEN THERAPY PER DAY

## 2023-03-20 PROCEDURE — 2580000003 HC RX 258

## 2023-03-20 PROCEDURE — 97165 OT EVAL LOW COMPLEX 30 MIN: CPT

## 2023-03-20 PROCEDURE — 97161 PT EVAL LOW COMPLEX 20 MIN: CPT

## 2023-03-20 PROCEDURE — 93306 TTE W/DOPPLER COMPLETE: CPT

## 2023-03-20 PROCEDURE — 94660 CPAP INITIATION&MGMT: CPT

## 2023-03-20 PROCEDURE — 36415 COLL VENOUS BLD VENIPUNCTURE: CPT

## 2023-03-20 PROCEDURE — 6360000002 HC RX W HCPCS

## 2023-03-20 PROCEDURE — 97530 THERAPEUTIC ACTIVITIES: CPT

## 2023-03-20 PROCEDURE — APPSS45 APP SPLIT SHARED TIME 31-45 MINUTES: Performed by: PHYSICIAN ASSISTANT

## 2023-03-20 PROCEDURE — 2060000000 HC ICU INTERMEDIATE R&B

## 2023-03-20 PROCEDURE — 99223 1ST HOSP IP/OBS HIGH 75: CPT | Performed by: INTERNAL MEDICINE

## 2023-03-20 PROCEDURE — 97535 SELF CARE MNGMENT TRAINING: CPT

## 2023-03-20 PROCEDURE — 80048 BASIC METABOLIC PNL TOTAL CA: CPT

## 2023-03-20 PROCEDURE — 85025 COMPLETE CBC W/AUTO DIFF WBC: CPT

## 2023-03-20 RX ORDER — HEPARIN SODIUM 10000 [USP'U]/ML
5000 INJECTION, SOLUTION INTRAVENOUS; SUBCUTANEOUS EVERY 8 HOURS
Status: DISCONTINUED | OUTPATIENT
Start: 2023-03-20 | End: 2023-03-23 | Stop reason: HOSPADM

## 2023-03-20 RX ADMIN — ACETAMINOPHEN 650 MG: 325 TABLET ORAL at 16:51

## 2023-03-20 RX ADMIN — ACETAMINOPHEN 650 MG: 325 TABLET ORAL at 04:38

## 2023-03-20 RX ADMIN — LEVOTHYROXINE SODIUM 25 MCG: 0.05 TABLET ORAL at 04:41

## 2023-03-20 RX ADMIN — TAMSULOSIN HYDROCHLORIDE 0.4 MG: 0.4 CAPSULE ORAL at 09:31

## 2023-03-20 RX ADMIN — POLYETHYLENE GLYCOL 3350 17 G: 17 POWDER, FOR SOLUTION ORAL at 09:31

## 2023-03-20 RX ADMIN — HYDRALAZINE HYDROCHLORIDE 10 MG: 20 INJECTION INTRAMUSCULAR; INTRAVENOUS at 12:48

## 2023-03-20 RX ADMIN — HEPARIN SODIUM 5000 UNITS: 10000 INJECTION INTRAVENOUS; SUBCUTANEOUS at 16:51

## 2023-03-20 RX ADMIN — LEVETIRACETAM 500 MG: 5 INJECTION INTRAVENOUS at 16:51

## 2023-03-20 RX ADMIN — HEPARIN SODIUM 5000 UNITS: 10000 INJECTION INTRAVENOUS; SUBCUTANEOUS at 09:31

## 2023-03-20 RX ADMIN — ACETAMINOPHEN 650 MG: 325 TABLET ORAL at 09:31

## 2023-03-20 RX ADMIN — ACETAMINOPHEN 650 MG: 325 TABLET ORAL at 21:51

## 2023-03-20 RX ADMIN — SODIUM CHLORIDE: 9 INJECTION, SOLUTION INTRAVENOUS at 17:39

## 2023-03-20 RX ADMIN — HEPARIN SODIUM 5000 UNITS: 10000 INJECTION INTRAVENOUS; SUBCUTANEOUS at 21:51

## 2023-03-20 RX ADMIN — LEVETIRACETAM 500 MG: 5 INJECTION INTRAVENOUS at 04:40

## 2023-03-20 RX ADMIN — HYDRALAZINE HYDROCHLORIDE 10 MG: 20 INJECTION INTRAMUSCULAR; INTRAVENOUS at 17:35

## 2023-03-20 ASSESSMENT — PAIN SCALES - GENERAL
PAINLEVEL_OUTOF10: 2
PAINLEVEL_OUTOF10: 3
PAINLEVEL_OUTOF10: 0
PAINLEVEL_OUTOF10: 2
PAINLEVEL_OUTOF10: 2
PAINLEVEL_OUTOF10: 4

## 2023-03-20 ASSESSMENT — PAIN DESCRIPTION - DESCRIPTORS: DESCRIPTORS: DISCOMFORT

## 2023-03-20 ASSESSMENT — PAIN DESCRIPTION - LOCATION
LOCATION: GENERALIZED

## 2023-03-20 NOTE — PLAN OF CARE
Problem: Discharge Planning  Goal: Discharge to home or other facility with appropriate resources  Outcome: Progressing     Problem: Skin/Tissue Integrity  Goal: Absence of new skin breakdown  Description: 1. Monitor for areas of redness and/or skin breakdown  2. Assess vascular access sites hourly  3. Every 4-6 hours minimum:  Change oxygen saturation probe site  4. Every 4-6 hours:  If on nasal continuous positive airway pressure, respiratory therapy assess nares and determine need for appliance change or resting period. Outcome: Progressing     Problem: Confusion  Goal: Confusion, delirium, dementia, or psychosis is improved or at baseline  Description: INTERVENTIONS:  1. Assess for possible contributors to thought disturbance, including medications, impaired vision or hearing, underlying metabolic abnormalities, dehydration, psychiatric diagnoses, and notify attending LIP  2. [place] high risk fall precautions, as indicated  3. Provide frequent short contacts to provide reality reorientation, refocusing and direction  4. Decrease environmental stimuli, including noise as appropriate  5. Monitor and intervene to maintain adequate nutrition, hydration, elimination, sleep and activity  6. If unable to ensure safety without constant attention obtain sitter and review sitter guidelines with assigned personnel  7.  Initiate Psychosocial CNS and Spiritual Care consult, as indicated  Outcome: Progressing

## 2023-03-20 NOTE — ACP (ADVANCE CARE PLANNING)
Advance Care Planning   Healthcare Decision Maker:    Primary Decision Maker: [facility] - [phone]    Click here to complete Healthcare Decision Makers including selection of the Healthcare Decision Maker Relationship (ie \"Primary\").

## 2023-03-20 NOTE — CARE COORDINATION
Per notes- suffered a fall in the kitchen preparing breakfast.  Patient's daughter is present at bedside and admits that patient appears to have delayed cognition. ct head 3/17  Interval improvement with trace subarachnoid hemorrhage in right occipital lobe. Neurosurgery consult  and   pt/ot. Keppra IV. Met with patient who currently is alert and oriented to discuss role / transition of care. He lives in 1 story home alone. His PCP is Dr Kate Rodriguez and he uses [facility] [place]. He has cpap, walker and cane. No hhc or [facility] history await pt/ot to assist with discharge plan. Electronically signed by Nina Delgado RN on 3/20/2023 at 10:55 AM

## 2023-03-21 ENCOUNTER — APPOINTMENT (OUTPATIENT)
Dept: ULTRASOUND IMAGING | Age: 78
DRG: 086 | End: 2023-03-21
Payer: MEDICARE

## 2023-03-21 LAB
ALBUMIN SERPL-MCNC: 3 G/DL (ref 3.5–5.2)
ALP SERPL-CCNC: 64 U/L (ref 40–129)
ALT SERPL-CCNC: 18 U/L (ref 0–40)
ANION GAP SERPL CALCULATED.3IONS-SCNC: 6 MMOL/L (ref 7–16)
AST SERPL-CCNC: 31 U/L (ref 0–39)
BACTERIA URNS QL MICRO: ABNORMAL /HPF
BASOPHILS # BLD: 0 E9/L (ref 0–0.2)
BASOPHILS NFR BLD: 0.3 % (ref 0–2)
BILIRUB DIRECT SERPL-MCNC: <0.2 MG/DL (ref 0–0.3)
BILIRUB INDIRECT SERPL-MCNC: ABNORMAL MG/DL (ref 0–1)
BILIRUB SERPL-MCNC: 0.3 MG/DL (ref 0–1.2)
BILIRUB UR QL STRIP: NEGATIVE
BUN SERPL-MCNC: 18 MG/DL (ref 6–23)
CALCIUM SERPL-MCNC: 8.8 MG/DL (ref 8.6–10.2)
CHLORIDE SERPL-SCNC: 100 MMOL/L (ref 98–107)
CLARITY UR: CLEAR
CO2 SERPL-SCNC: 27 MMOL/L (ref 22–29)
COLOR UR: YELLOW
CREAT SERPL-MCNC: 0.9 MG/DL (ref 0.7–1.2)
EOSINOPHIL # BLD: 0.07 E9/L (ref 0.05–0.5)
EOSINOPHIL NFR BLD: 1.8 % (ref 0–6)
EPI CELLS #/AREA URNS HPF: ABNORMAL /HPF
ERYTHROCYTE [DISTWIDTH] IN BLOOD BY AUTOMATED COUNT: 13.2 FL (ref 11.5–15)
GLUCOSE SERPL-MCNC: 96 MG/DL (ref 74–99)
GLUCOSE UR STRIP-MCNC: NEGATIVE MG/DL
HCT VFR BLD AUTO: 34 % (ref 37–54)
HGB BLD-MCNC: 10.8 G/DL (ref 12.5–16.5)
HGB UR QL STRIP: NEGATIVE
KETONES UR STRIP-MCNC: NEGATIVE MG/DL
LEUKOCYTE ESTERASE UR QL STRIP: NEGATIVE
LYMPHOCYTES # BLD: 0.41 E9/L (ref 1.5–4)
LYMPHOCYTES NFR BLD: 10.5 % (ref 20–42)
MCH RBC QN AUTO: 30.1 PG (ref 26–35)
MCHC RBC AUTO-ENTMCNC: 31.8 % (ref 32–34.5)
MCV RBC AUTO: 94.7 FL (ref 80–99.9)
MONOCYTES # BLD: 0.11 E9/L (ref 0.1–0.95)
MONOCYTES NFR BLD: 2.6 % (ref 2–12)
NEUTROPHILS # BLD: 3.15 E9/L (ref 1.8–7.3)
NEUTS SEG NFR BLD: 85.1 % (ref 43–80)
NITRITE UR QL STRIP: NEGATIVE
PH UR STRIP: 6 [PH] (ref 5–9)
PLATELET # BLD AUTO: 115 E9/L (ref 130–450)
PMV BLD AUTO: 12 FL (ref 7–12)
POTASSIUM SERPL-SCNC: 3.9 MMOL/L (ref 3.5–5)
PROT SERPL-MCNC: 5.5 G/DL (ref 6.4–8.3)
PROT UR STRIP-MCNC: 30 MG/DL
RBC # BLD AUTO: 3.59 E12/L (ref 3.8–5.8)
RBC #/AREA URNS HPF: ABNORMAL /HPF (ref 0–2)
SODIUM SERPL-SCNC: 133 MMOL/L (ref 132–146)
SP GR UR STRIP: 1.02 (ref 1–1.03)
UROBILINOGEN UR STRIP-ACNC: 0.2 E.U./DL
WBC # BLD: 3.7 E9/L (ref 4.5–11.5)
WBC #/AREA URNS HPF: ABNORMAL /HPF (ref 0–5)

## 2023-03-21 PROCEDURE — 6370000000 HC RX 637 (ALT 250 FOR IP)

## 2023-03-21 PROCEDURE — 81001 URINALYSIS AUTO W/SCOPE: CPT

## 2023-03-21 PROCEDURE — 6360000002 HC RX W HCPCS

## 2023-03-21 PROCEDURE — 76705 ECHO EXAM OF ABDOMEN: CPT

## 2023-03-21 PROCEDURE — 97530 THERAPEUTIC ACTIVITIES: CPT

## 2023-03-21 PROCEDURE — 6370000000 HC RX 637 (ALT 250 FOR IP): Performed by: INTERNAL MEDICINE

## 2023-03-21 PROCEDURE — 36415 COLL VENOUS BLD VENIPUNCTURE: CPT

## 2023-03-21 PROCEDURE — 94664 DEMO&/EVAL PT USE INHALER: CPT

## 2023-03-21 PROCEDURE — 2580000003 HC RX 258

## 2023-03-21 PROCEDURE — 99233 SBSQ HOSP IP/OBS HIGH 50: CPT | Performed by: INTERNAL MEDICINE

## 2023-03-21 PROCEDURE — 94660 CPAP INITIATION&MGMT: CPT

## 2023-03-21 PROCEDURE — 2060000000 HC ICU INTERMEDIATE R&B

## 2023-03-21 PROCEDURE — 97535 SELF CARE MNGMENT TRAINING: CPT

## 2023-03-21 PROCEDURE — 80048 BASIC METABOLIC PNL TOTAL CA: CPT

## 2023-03-21 PROCEDURE — 85025 COMPLETE CBC W/AUTO DIFF WBC: CPT

## 2023-03-21 PROCEDURE — 2700000000 HC OXYGEN THERAPY PER DAY

## 2023-03-21 PROCEDURE — 94640 AIRWAY INHALATION TREATMENT: CPT

## 2023-03-21 PROCEDURE — 80076 HEPATIC FUNCTION PANEL: CPT

## 2023-03-21 RX ORDER — POTASSIUM CHLORIDE 20 MEQ/1
40 TABLET, EXTENDED RELEASE ORAL ONCE
Status: DISCONTINUED | OUTPATIENT
Start: 2023-03-21 | End: 2023-03-23 | Stop reason: HOSPADM

## 2023-03-21 RX ORDER — GUAIFENESIN 400 MG/1
400 TABLET ORAL 4 TIMES DAILY PRN
Status: DISCONTINUED | OUTPATIENT
Start: 2023-03-21 | End: 2023-03-23 | Stop reason: HOSPADM

## 2023-03-21 RX ORDER — METOPROLOL SUCCINATE 25 MG/1
25 TABLET, EXTENDED RELEASE ORAL DAILY
Status: DISCONTINUED | OUTPATIENT
Start: 2023-03-21 | End: 2023-03-23 | Stop reason: HOSPADM

## 2023-03-21 RX ORDER — IPRATROPIUM BROMIDE AND ALBUTEROL SULFATE 2.5; .5 MG/3ML; MG/3ML
1 SOLUTION RESPIRATORY (INHALATION)
Status: DISCONTINUED | OUTPATIENT
Start: 2023-03-21 | End: 2023-03-23 | Stop reason: HOSPADM

## 2023-03-21 RX ADMIN — LEVOTHYROXINE SODIUM 25 MCG: 0.05 TABLET ORAL at 05:31

## 2023-03-21 RX ADMIN — TAMSULOSIN HYDROCHLORIDE 0.4 MG: 0.4 CAPSULE ORAL at 12:23

## 2023-03-21 RX ADMIN — Medication 10 ML: at 09:15

## 2023-03-21 RX ADMIN — IPRATROPIUM BROMIDE AND ALBUTEROL SULFATE 1 AMPULE: .5; 2.5 SOLUTION RESPIRATORY (INHALATION) at 13:12

## 2023-03-21 RX ADMIN — IPRATROPIUM BROMIDE AND ALBUTEROL SULFATE 1 AMPULE: .5; 2.5 SOLUTION RESPIRATORY (INHALATION) at 08:35

## 2023-03-21 RX ADMIN — SODIUM CHLORIDE: 9 INJECTION, SOLUTION INTRAVENOUS at 16:49

## 2023-03-21 RX ADMIN — ACETAMINOPHEN 650 MG: 325 TABLET ORAL at 03:08

## 2023-03-21 RX ADMIN — IPRATROPIUM BROMIDE AND ALBUTEROL SULFATE 1 AMPULE: .5; 2.5 SOLUTION RESPIRATORY (INHALATION) at 19:55

## 2023-03-21 RX ADMIN — ACETAMINOPHEN 650 MG: 325 TABLET ORAL at 16:43

## 2023-03-21 RX ADMIN — ACETAMINOPHEN 650 MG: 325 TABLET ORAL at 22:17

## 2023-03-21 RX ADMIN — LEVETIRACETAM 500 MG: 5 INJECTION INTRAVENOUS at 03:07

## 2023-03-21 RX ADMIN — LEVETIRACETAM 500 MG: 5 INJECTION INTRAVENOUS at 16:22

## 2023-03-21 RX ADMIN — HEPARIN SODIUM 5000 UNITS: 10000 INJECTION INTRAVENOUS; SUBCUTANEOUS at 05:31

## 2023-03-21 RX ADMIN — METOPROLOL SUCCINATE 25 MG: 25 TABLET, EXTENDED RELEASE ORAL at 16:20

## 2023-03-21 RX ADMIN — HEPARIN SODIUM 5000 UNITS: 10000 INJECTION INTRAVENOUS; SUBCUTANEOUS at 16:00

## 2023-03-21 RX ADMIN — ACETAMINOPHEN 650 MG: 325 TABLET ORAL at 12:21

## 2023-03-21 RX ADMIN — IPRATROPIUM BROMIDE AND ALBUTEROL SULFATE 1 AMPULE: .5; 2.5 SOLUTION RESPIRATORY (INHALATION) at 17:06

## 2023-03-21 RX ADMIN — Medication 10 ML: at 22:17

## 2023-03-21 RX ADMIN — HEPARIN SODIUM 5000 UNITS: 10000 INJECTION INTRAVENOUS; SUBCUTANEOUS at 22:18

## 2023-03-21 ASSESSMENT — PAIN - FUNCTIONAL ASSESSMENT: PAIN_FUNCTIONAL_ASSESSMENT: ACTIVITIES ARE NOT PREVENTED

## 2023-03-21 ASSESSMENT — PAIN DESCRIPTION - DESCRIPTORS
DESCRIPTORS: ACHING;DISCOMFORT;NAGGING
DESCRIPTORS: ACHING

## 2023-03-21 ASSESSMENT — LIFESTYLE VARIABLES
HOW OFTEN DO YOU HAVE A DRINK CONTAINING ALCOHOL: NEVER
HOW MANY STANDARD DRINKS CONTAINING ALCOHOL DO YOU HAVE ON A TYPICAL DAY: PATIENT DOES NOT DRINK

## 2023-03-21 ASSESSMENT — PAIN SCALES - GENERAL
PAINLEVEL_OUTOF10: 2
PAINLEVEL_OUTOF10: 1

## 2023-03-21 ASSESSMENT — PAIN DESCRIPTION - LOCATION
LOCATION: GENERALIZED
LOCATION: HEAD

## 2023-03-21 ASSESSMENT — PAIN DESCRIPTION - ORIENTATION: ORIENTATION: RIGHT

## 2023-03-21 NOTE — CONSULTS
CTS Consult    Patient name: Allegra Mills    Reason for consult: AS    Referring Physician: Dr. Evelin Pelayo    Primary Care Physician: No primary care provider on file. Date of service: 3/21/2023    Chief Complaint: Fall    HPI: [de-identified]year old man who had a fall that he does not remember. Apparently this has happened a few times. He had an echo showing severe AS. He was found to have extensive bruising on his face and a SAH. He is being evaluated for ARU. Currently he denies CP, SOB at rest, BAKER, N/V, F/C, orthopnea, PND and syncope.     Allergies: Not on File    Home medications:    Current Facility-Administered Medications   Medication Dose Route Frequency Provider Last Rate Last Admin    ipratropium-albuterol (DUONEB) nebulizer solution 1 ampule  1 ampule Inhalation Q4H WA Ganga Hines MD   1 ampule at 03/21/23 6880    guaiFENesin tablet 400 mg  400 mg Oral 4x Daily PRN Ganga Hines MD        potassium chloride (KLOR-CON M) extended release tablet 40 mEq  40 mEq Oral Once Ganga Hines MD        heparin (porcine) injection 5,000 Units  5,000 Units SubCUTAneous Q8H Ganga Hines MD   5,000 Units at 03/21/23 0531    perflutren lipid microspheres (DEFINITY) injection 1.5 mL  1.5 mL IntraVENous ONCE PRN NANETTE Felix        tamsulosin (FLOMAX) capsule 0.4 mg  0.4 mg Oral Daily Ganga Hines MD   0.4 mg at 03/20/23 0931    levothyroxine (SYNTHROID) tablet 25 mcg  25 mcg Oral Daily Ganga Hines MD   25 mcg at 03/21/23 0531    0.9 % sodium chloride infusion   IntraVENous Continuous Ganga Hines MD 50 mL/hr at 03/20/23 1739 New Bag at 03/20/23 1739    sodium chloride flush 0.9 % injection 10 mL  10 mL IntraVENous 2 times per day Ehsan Bagent, DO        sodium chloride flush 0.9 % injection 10 mL  10 mL IntraVENous PRN Ehsan Bagent, DO        0.9 % sodium chloride infusion   IntraVENous PRN Ehsan Bagent, DO        ondansetron (ZOFRAN-ODT) disintegrating tablet 4 mg  4 mg Oral Q8H PRN Ehsan Bagent, DO        Or    ondansetron (ZOFRAN) injection 4 mg  4 mg
Inpatient Cardiology Consultation      Reason for Consult:  elevated troponin    Requesting Physician:  Efrain Barton CNP    Date of Consultation: 3/20/2023    HISTORY OF PRESENT ILLNESS:   Mr. Husam Worthington is an [de-identified] yo male who is new to [facility]. We are asked to see him regarding elevated troponin    PMH: HTN, hypothyroidism    He was admitted on 3/17/23 after a fall at home. He suffered a scalp laceration, SAH and SDH. Neurosurgery is seeing the patient with no plans for surgical intervention. Aspirin is on hold. Troponins were cycled showing elevation over several sets (45/54/75/103/300/316/335) prompting cardiology evaluation. EKGs show NSR with frequent PVCs, non specific ST-T changes with no prior tracings for comparison. ER: /83, HR 86, pulse ox 98% on RA. CT head showed subarachnoid and subdural blood in the right occipital and temporal lobes with 4 mm left midline shift. He was on cardene drip intially, which was then discontinued when SBP was < 140 mmHg. He was started on keppra for seizure prophylaxis. He denies any prior cardiac history. He lives alone in a single story home where he normally performs all ADLs on his own without limitation. He denies any history of chest discomfort, sob/graham, orthopnea, PND or LE edema. He does not remember the events of his fall. States he remembers entering the kitchen but does not recall feeling lightheaded, hitting his head or hitting the ground. Since admission, telemetry shows NSR with PACs; no evidence of arrhythmia, heart block, pauses, etc.       Please note: past medical records were reviewed per electronic medical record - see detailed reports under Past Medical/ Surgical History. Past Medical History:    HTN  Hypothyroidism on levothyroxine   ANGUS on CPAP per patient report    Past Surgical History:    No past surgical history on file.     Medications Prior to admit:  Prior to Admission medications    Medication Sig Start Date End Date
°C) (Temporal)   Resp 28   SpO2 98%     Review of Systems   Constitutional:  Negative for fever and unexpected weight change. HENT:  Negative for trouble swallowing. Eyes:  Negative for photophobia and visual disturbance. Respiratory:  Negative for shortness of breath. Cardiovascular:  Negative for chest pain. Gastrointestinal:  Negative for abdominal pain. Endocrine: Negative for heat intolerance. Genitourinary:  Negative for flank pain. Musculoskeletal:  Negative for back pain, gait problem, myalgias and neck pain. Skin:  Negative for wound. Neurological:  Negative for weakness, numbness and headaches. Psychiatric/Behavioral:  Negative for confusion. Physical Exam  Constitutional:       Appearance: He is well-developed. Comments: Sleepy but able to be aroused   HENT:      Head: Normocephalic. Eyes:      Pupils: Pupils are equal, round, and reactive to light. Cardiovascular:      Rate and Rhythm: Normal rate. Pulmonary:      Effort: Pulmonary effort is normal.   Abdominal:      General: There is no distension. Musculoskeletal:      Cervical back: Neck supple. Skin:     General: Skin is warm and dry. Neurological:      Comments: Alert and oriented to person and place but not to year or month  Moving all extremities to command  Sensation intact to light touch     Psychiatric:         Thought Content: Thought content normal.          Assessment:   Small acute subarachnoid hemorrhage    Plan:  -Repeat head CT scan  -No surgical intervention   -Serial neurological exams  -Hold aspirin. No AP/AC  -Keppra      Electronically signed by Luiz Asif PA-C on 3/17/2023 at 6:48 PM     I have interviewed and examined the patient and agree with above. He has tentorial SDH and SAH and No surgical intervention is planned. Will follow with serial imaging. I have spent over 50 mins on medical decision making and in discussing his condition with him.      Zaheer Justin MD

## 2023-03-21 NOTE — CARE COORDINATION
Per notes- suffered a fall in the kitchen preparing breakfast subarachnoid hemorrhage in right occipital lobe. Neurosurgery consult. Pt eval 10/24 . Met with patient and discussed pt eval and SBIRT completed. Call placed to Daughter Melba Ma if ARU does not accept 1. Carson Jones [address] 2. [place].  for [facility] @ [address]. Electronically signed by Pierre Castro RN on 3/21/2023 at 12:08 PM    Call back from [facility]- referral given await aru determination Electronically signed by Pierre Castro RN on 3/21/2023 at 12:16 PM

## 2023-03-21 NOTE — CARE COORDINATION
Reviewed chart with Dr. Prieto Parent. Patient will need 24 hour care at discharge. Will contact family to see if family can provide 24 hour care for patient. If unable to have adequate discharge plan then the recommendation will be a [facility].

## 2023-03-21 NOTE — CARE COORDINATION
Spoke with the patient at the bedside. Discussed ARU with patient. Patient is agreeable to ARU but will need to speak to patient family. Will review therapy evals and discuss with Dr. Iron Smith.

## 2023-03-22 DIAGNOSIS — I60.9 SAH (SUBARACHNOID HEMORRHAGE) (HCC): Primary | ICD-10-CM

## 2023-03-22 LAB
ANION GAP SERPL CALCULATED.3IONS-SCNC: 4 MMOL/L (ref 7–16)
BASOPHILS # BLD: 0 E9/L (ref 0–0.2)
BASOPHILS NFR BLD: 0.3 % (ref 0–2)
BUN SERPL-MCNC: 18 MG/DL (ref 6–23)
CALCIUM SERPL-MCNC: 8.6 MG/DL (ref 8.6–10.2)
CHLORIDE SERPL-SCNC: 102 MMOL/L (ref 98–107)
CO2 SERPL-SCNC: 25 MMOL/L (ref 22–29)
CREAT SERPL-MCNC: 0.8 MG/DL (ref 0.7–1.2)
EOSINOPHIL # BLD: 0.03 E9/L (ref 0.05–0.5)
EOSINOPHIL NFR BLD: 0.9 % (ref 0–6)
ERYTHROCYTE [DISTWIDTH] IN BLOOD BY AUTOMATED COUNT: 13.2 FL (ref 11.5–15)
GLUCOSE SERPL-MCNC: 102 MG/DL (ref 74–99)
HCT VFR BLD AUTO: 28 % (ref 37–54)
HGB BLD-MCNC: 9.1 G/DL (ref 12.5–16.5)
LYMPHOCYTES # BLD: 0.12 E9/L (ref 1.5–4)
LYMPHOCYTES NFR BLD: 4.4 % (ref 20–42)
MCH RBC QN AUTO: 30.5 PG (ref 26–35)
MCHC RBC AUTO-ENTMCNC: 32.5 % (ref 32–34.5)
MCV RBC AUTO: 94 FL (ref 80–99.9)
MONOCYTES # BLD: 0.12 E9/L (ref 0.1–0.95)
MONOCYTES NFR BLD: 4.3 % (ref 2–12)
NEUTROPHILS # BLD: 2.61 E9/L (ref 1.8–7.3)
NEUTS SEG NFR BLD: 90.4 % (ref 43–80)
OVALOCYTES: ABNORMAL
PLATELET # BLD AUTO: 108 E9/L (ref 130–450)
PMV BLD AUTO: 11.9 FL (ref 7–12)
POIKILOCYTES: ABNORMAL
POTASSIUM SERPL-SCNC: 3.8 MMOL/L (ref 3.5–5)
RBC # BLD AUTO: 2.98 E12/L (ref 3.8–5.8)
SODIUM SERPL-SCNC: 131 MMOL/L (ref 132–146)
WBC # BLD: 2.9 E9/L (ref 4.5–11.5)

## 2023-03-22 PROCEDURE — 6360000002 HC RX W HCPCS

## 2023-03-22 PROCEDURE — 99232 SBSQ HOSP IP/OBS MODERATE 35: CPT | Performed by: INTERNAL MEDICINE

## 2023-03-22 PROCEDURE — 2700000000 HC OXYGEN THERAPY PER DAY

## 2023-03-22 PROCEDURE — 94640 AIRWAY INHALATION TREATMENT: CPT

## 2023-03-22 PROCEDURE — 80048 BASIC METABOLIC PNL TOTAL CA: CPT

## 2023-03-22 PROCEDURE — 2580000003 HC RX 258

## 2023-03-22 PROCEDURE — 2060000000 HC ICU INTERMEDIATE R&B

## 2023-03-22 PROCEDURE — 36415 COLL VENOUS BLD VENIPUNCTURE: CPT

## 2023-03-22 PROCEDURE — 6370000000 HC RX 637 (ALT 250 FOR IP)

## 2023-03-22 PROCEDURE — 97535 SELF CARE MNGMENT TRAINING: CPT

## 2023-03-22 PROCEDURE — 6370000000 HC RX 637 (ALT 250 FOR IP): Performed by: INTERNAL MEDICINE

## 2023-03-22 PROCEDURE — 94660 CPAP INITIATION&MGMT: CPT

## 2023-03-22 PROCEDURE — 85025 COMPLETE CBC W/AUTO DIFF WBC: CPT

## 2023-03-22 RX ORDER — LEVETIRACETAM 500 MG/1
500 TABLET ORAL 2 TIMES DAILY
Qty: 8 TABLET | Refills: 0 | Status: SHIPPED | OUTPATIENT
Start: 2023-03-22 | End: 2023-03-26

## 2023-03-22 RX ORDER — METOPROLOL SUCCINATE 25 MG/1
25 TABLET, EXTENDED RELEASE ORAL DAILY
Qty: 30 TABLET | Refills: 3 | DISCHARGE
Start: 2023-03-23

## 2023-03-22 RX ADMIN — ACETAMINOPHEN 650 MG: 325 TABLET ORAL at 11:35

## 2023-03-22 RX ADMIN — METOPROLOL SUCCINATE 25 MG: 25 TABLET, EXTENDED RELEASE ORAL at 09:38

## 2023-03-22 RX ADMIN — Medication 10 ML: at 09:38

## 2023-03-22 RX ADMIN — IPRATROPIUM BROMIDE AND ALBUTEROL SULFATE 1 AMPULE: .5; 2.5 SOLUTION RESPIRATORY (INHALATION) at 15:33

## 2023-03-22 RX ADMIN — HEPARIN SODIUM 5000 UNITS: 10000 INJECTION INTRAVENOUS; SUBCUTANEOUS at 09:38

## 2023-03-22 RX ADMIN — ACETAMINOPHEN 650 MG: 325 TABLET ORAL at 15:49

## 2023-03-22 RX ADMIN — LEVETIRACETAM 500 MG: 5 INJECTION INTRAVENOUS at 03:30

## 2023-03-22 RX ADMIN — IPRATROPIUM BROMIDE AND ALBUTEROL SULFATE 1 AMPULE: .5; 2.5 SOLUTION RESPIRATORY (INHALATION) at 07:59

## 2023-03-22 RX ADMIN — LEVETIRACETAM 500 MG: 5 INJECTION INTRAVENOUS at 15:55

## 2023-03-22 RX ADMIN — HEPARIN SODIUM 5000 UNITS: 10000 INJECTION INTRAVENOUS; SUBCUTANEOUS at 15:56

## 2023-03-22 RX ADMIN — IPRATROPIUM BROMIDE AND ALBUTEROL SULFATE 1 AMPULE: .5; 2.5 SOLUTION RESPIRATORY (INHALATION) at 12:55

## 2023-03-22 RX ADMIN — LEVOTHYROXINE SODIUM 25 MCG: 0.05 TABLET ORAL at 05:20

## 2023-03-22 RX ADMIN — IPRATROPIUM BROMIDE AND ALBUTEROL SULFATE 1 AMPULE: .5; 2.5 SOLUTION RESPIRATORY (INHALATION) at 19:13

## 2023-03-22 RX ADMIN — POLYETHYLENE GLYCOL 3350 17 G: 17 POWDER, FOR SOLUTION ORAL at 09:38

## 2023-03-22 RX ADMIN — TAMSULOSIN HYDROCHLORIDE 0.4 MG: 0.4 CAPSULE ORAL at 09:38

## 2023-03-22 ASSESSMENT — PAIN DESCRIPTION - DESCRIPTORS
DESCRIPTORS: ACHING;DULL;SORE
DESCRIPTORS: ACHING;DISCOMFORT;DULL;SORE
DESCRIPTORS: DISCOMFORT;DULL;SORE
DESCRIPTORS: DISCOMFORT;DULL;SORE
DESCRIPTORS: DULL;DISCOMFORT;SORE

## 2023-03-22 ASSESSMENT — PAIN DESCRIPTION - PAIN TYPE
TYPE: ACUTE PAIN

## 2023-03-22 ASSESSMENT — PAIN SCALES - GENERAL
PAINLEVEL_OUTOF10: 10
PAINLEVEL_OUTOF10: 2
PAINLEVEL_OUTOF10: 10
PAINLEVEL_OUTOF10: 2
PAINLEVEL_OUTOF10: 0
PAINLEVEL_OUTOF10: 2
PAINLEVEL_OUTOF10: 0

## 2023-03-22 ASSESSMENT — PAIN DESCRIPTION - ORIENTATION
ORIENTATION: RIGHT;LEFT
ORIENTATION: RIGHT
ORIENTATION: RIGHT;LEFT
ORIENTATION: RIGHT

## 2023-03-22 ASSESSMENT — PAIN DESCRIPTION - ONSET
ONSET: ON-GOING

## 2023-03-22 ASSESSMENT — PAIN DESCRIPTION - LOCATION
LOCATION: HEAD
LOCATION: HEAD
LOCATION: FACE
LOCATION: FACE
LOCATION: HEAD

## 2023-03-22 ASSESSMENT — PAIN DESCRIPTION - FREQUENCY
FREQUENCY: CONTINUOUS

## 2023-03-22 ASSESSMENT — PAIN - FUNCTIONAL ASSESSMENT
PAIN_FUNCTIONAL_ASSESSMENT: ACTIVITIES ARE NOT PREVENTED

## 2023-03-22 NOTE — CARE COORDINATION
Per notes- suffered a fall - subarachnoid hemorrhage in right occipital lobe. Neurosurgery consulted. . Pt eval 10/24 ARU recommending [facility]. Spoke with Enmanuel Simon yesterday and Has been accepted to [facility]. PASRR completed and placed in envelope Envelope and ambulance form in soft chart. Will need covid day of discharge. Message to [facility] regarding precert. cm/sw to follow. Electronically signed by Dinah Trujillo RN on 3/22/2023 at 9:43 AM    Message from [facility]- she has insurance auth for [facility]. Electronically signed by Dinah Trujillo RN on 3/22/2023 at 10:21 AM    Per [facility]- precert good thru 2/33. Electronically signed by Dinah Trujillo RN on 3/22/2023 at 1:10 PM

## 2023-03-22 NOTE — CARE COORDINATION
Spoke with daughter Yolanda [facility]. Yolanda [facility] will not be able to provide 24 hour around the clock care. Yolanda [facility] would like to proceed with a [facility] for patient's rehab needs. CM on unit aware.

## 2023-03-22 NOTE — DISCHARGE INSTR - COC
Last BM: 3/22/2023    Intake/Output Summary (Last 24 hours) at 3/22/2023 1655  Last data filed at 3/22/2023 1635  Gross per 24 hour   Intake 1535.32 ml   Output 920 ml   Net 615.32 ml     I/O last 3 completed shifts: In: 500 [I.V.:400; IV Piggyback:100]  Out: 700 [Urine:250; Other:450]    Safety Concerns:     History of Falls (last 30 days)    Impairments/Disabilities:      Vision, pt has glasses, generalized weakness    Nutrition Therapy:  Current Nutrition Therapy:   ADULT DIET; Regular; 4 carb choices (60 gm/meal); High calorie, high protein oral supplement TID with meals  Routes of Feeding: Oral  Liquids: Thin Liquids  Daily Fluid Restriction: yes - amount 1800  Last Modified Barium Swallow with Video (Video Swallowing Test): not done    Treatments at the Time of Hospital Discharge:   Respiratory Treatments:   ipratropium-albuterol (DUONEB) nebulizer solution 1 ampule Dose: 1 ampule : Inhalation : EVERY 4 HOURS WHILE AWAKE  Oxygen Therapy:  is on oxygen at 2 L/min per nasal cannula.   Ventilator:    - No ventilator support    Rehab Therapies: Physical Therapy and Occupational Therapy  Weight Bearing Status/Restrictions: No weight bearing restrictions  Other Medical Equipment (for information only, NOT a DME order):  N/A  Other Treatments: Zio Patch on for 14 days    Patient's personal belongings (please select all that are sent with patient):  Glasses, shirt    RN SIGNATURE:  Electronically signed by Jessica Tripathi RN on 3/23/23 at 1:03 PM EDT    CASE MANAGEMENT/SOCIAL WORK SECTION    Inpatient Status Date: ***    Readmission Risk Assessment Score:  Readmission Risk              Risk of Unplanned Readmission:  10           Discharging to Facility/ Agency   Name: [facility]  Address:  Phone:  Fax:    Dialysis Facility (if applicable)   Name:  Address:  Dialysis Schedule:  Phone:  Fax:    / signature: {Esignature:[id number]}    PHYSICIAN SECTION    Prognosis:

## 2023-03-23 VITALS
HEART RATE: 71 BPM | RESPIRATION RATE: 18 BRPM | WEIGHT: 128 LBS | SYSTOLIC BLOOD PRESSURE: 124 MMHG | DIASTOLIC BLOOD PRESSURE: 60 MMHG | TEMPERATURE: 98.7 F | BODY MASS INDEX: 18.32 KG/M2 | HEIGHT: 70 IN | OXYGEN SATURATION: 93 %

## 2023-03-23 LAB
ANION GAP SERPL CALCULATED.3IONS-SCNC: 8 MMOL/L (ref 7–16)
ANISOCYTOSIS: ABNORMAL
BASOPHILS # BLD: 0.01 E9/L (ref 0–0.2)
BASOPHILS NFR BLD: 0.3 % (ref 0–2)
BUN SERPL-MCNC: 18 MG/DL (ref 6–23)
BURR CELLS: ABNORMAL
CALCIUM SERPL-MCNC: 8.7 MG/DL (ref 8.6–10.2)
CHLORIDE SERPL-SCNC: 98 MMOL/L (ref 98–107)
CO2 SERPL-SCNC: 25 MMOL/L (ref 22–29)
CREAT SERPL-MCNC: 0.8 MG/DL (ref 0.7–1.2)
EOSINOPHIL # BLD: 0.04 E9/L (ref 0.05–0.5)
EOSINOPHIL NFR BLD: 1.2 % (ref 0–6)
ERYTHROCYTE [DISTWIDTH] IN BLOOD BY AUTOMATED COUNT: 13.2 FL (ref 11.5–15)
GLUCOSE SERPL-MCNC: 106 MG/DL (ref 74–99)
HCT VFR BLD AUTO: 30.3 % (ref 37–54)
HGB BLD-MCNC: 10 G/DL (ref 12.5–16.5)
IMM GRANULOCYTES # BLD: 0.02 E9/L
IMM GRANULOCYTES NFR BLD: 0.6 % (ref 0–5)
LYMPHOCYTES # BLD: 0.2 E9/L (ref 1.5–4)
LYMPHOCYTES NFR BLD: 6 % (ref 20–42)
MCH RBC QN AUTO: 30.5 PG (ref 26–35)
MCHC RBC AUTO-ENTMCNC: 33 % (ref 32–34.5)
MCV RBC AUTO: 92.4 FL (ref 80–99.9)
MONOCYTES # BLD: 0.25 E9/L (ref 0.1–0.95)
MONOCYTES NFR BLD: 7.4 % (ref 2–12)
NEUTROPHILS # BLD: 2.84 E9/L (ref 1.8–7.3)
NEUTS SEG NFR BLD: 84.5 % (ref 43–80)
OVALOCYTES: ABNORMAL
PLATELET # BLD AUTO: 115 E9/L (ref 130–450)
PMV BLD AUTO: 12 FL (ref 7–12)
POIKILOCYTES: ABNORMAL
POLYCHROMASIA: ABNORMAL
POTASSIUM SERPL-SCNC: 4 MMOL/L (ref 3.5–5)
RBC # BLD AUTO: 3.28 E12/L (ref 3.8–5.8)
SARS-COV-2 RDRP RESP QL NAA+PROBE: NOT DETECTED
SODIUM SERPL-SCNC: 131 MMOL/L (ref 132–146)
WBC # BLD: 3.4 E9/L (ref 4.5–11.5)

## 2023-03-23 PROCEDURE — 6370000000 HC RX 637 (ALT 250 FOR IP)

## 2023-03-23 PROCEDURE — 87635 SARS-COV-2 COVID-19 AMP PRB: CPT

## 2023-03-23 PROCEDURE — 2580000003 HC RX 258

## 2023-03-23 PROCEDURE — 94640 AIRWAY INHALATION TREATMENT: CPT

## 2023-03-23 PROCEDURE — 80048 BASIC METABOLIC PNL TOTAL CA: CPT

## 2023-03-23 PROCEDURE — 85025 COMPLETE CBC W/AUTO DIFF WBC: CPT

## 2023-03-23 PROCEDURE — 6360000002 HC RX W HCPCS

## 2023-03-23 PROCEDURE — 94660 CPAP INITIATION&MGMT: CPT

## 2023-03-23 PROCEDURE — 93242 EXT ECG>48HR<7D RECORDING: CPT

## 2023-03-23 PROCEDURE — 36415 COLL VENOUS BLD VENIPUNCTURE: CPT

## 2023-03-23 PROCEDURE — 2700000000 HC OXYGEN THERAPY PER DAY

## 2023-03-23 RX ADMIN — ACETAMINOPHEN 650 MG: 325 TABLET ORAL at 05:58

## 2023-03-23 RX ADMIN — IPRATROPIUM BROMIDE AND ALBUTEROL SULFATE 1 AMPULE: .5; 2.5 SOLUTION RESPIRATORY (INHALATION) at 12:21

## 2023-03-23 RX ADMIN — HEPARIN SODIUM 5000 UNITS: 10000 INJECTION INTRAVENOUS; SUBCUTANEOUS at 00:08

## 2023-03-23 RX ADMIN — HEPARIN SODIUM 5000 UNITS: 10000 INJECTION INTRAVENOUS; SUBCUTANEOUS at 05:58

## 2023-03-23 RX ADMIN — TAMSULOSIN HYDROCHLORIDE 0.4 MG: 0.4 CAPSULE ORAL at 10:24

## 2023-03-23 RX ADMIN — LEVOTHYROXINE SODIUM 25 MCG: 0.05 TABLET ORAL at 05:59

## 2023-03-23 RX ADMIN — IPRATROPIUM BROMIDE AND ALBUTEROL SULFATE 1 AMPULE: .5; 2.5 SOLUTION RESPIRATORY (INHALATION) at 08:27

## 2023-03-23 RX ADMIN — ACETAMINOPHEN 650 MG: 325 TABLET ORAL at 10:24

## 2023-03-23 RX ADMIN — Medication 10 ML: at 10:24

## 2023-03-23 RX ADMIN — LEVETIRACETAM 500 MG: 5 INJECTION INTRAVENOUS at 04:17

## 2023-03-23 ASSESSMENT — PAIN DESCRIPTION - LOCATION
LOCATION: GENERALIZED
LOCATION: GENERALIZED

## 2023-03-23 ASSESSMENT — PAIN SCALES - GENERAL
PAINLEVEL_OUTOF10: 0
PAINLEVEL_OUTOF10: 2
PAINLEVEL_OUTOF10: 0
PAINLEVEL_OUTOF10: 4

## 2023-03-23 ASSESSMENT — PAIN DESCRIPTION - PAIN TYPE
TYPE: ACUTE PAIN
TYPE: ACUTE PAIN

## 2023-03-23 ASSESSMENT — PAIN - FUNCTIONAL ASSESSMENT
PAIN_FUNCTIONAL_ASSESSMENT: PREVENTS OR INTERFERES SOME ACTIVE ACTIVITIES AND ADLS
PAIN_FUNCTIONAL_ASSESSMENT: PREVENTS OR INTERFERES SOME ACTIVE ACTIVITIES AND ADLS

## 2023-03-23 ASSESSMENT — PAIN DESCRIPTION - ONSET
ONSET: ON-GOING
ONSET: ON-GOING

## 2023-03-23 ASSESSMENT — PAIN DESCRIPTION - FREQUENCY
FREQUENCY: CONTINUOUS
FREQUENCY: CONTINUOUS

## 2023-03-23 ASSESSMENT — PAIN DESCRIPTION - DESCRIPTORS
DESCRIPTORS: ACHING;DISCOMFORT;SORE
DESCRIPTORS: ACHING;SORE;DISCOMFORT

## 2023-03-23 NOTE — PLAN OF CARE
Applied Zio XT monitor for 14 days. Serial number [id number]. Instructed patient to avoid showering in the first 24 hours. Press the button on the monitor when you feel a symptom and write it in your diary. Do not submerge in water. No lotion or powder near the patch. Please return the monitor in the box provided.  Patient verbalized understanding

## 2023-03-23 NOTE — CARE COORDINATION
Discharge order noted. Needs covid and zio patch. Ambulance transport via [facility] set up for 2 PM. Bedside RN, Facility liaison ntfd and daughter Kyle Cruz and patient ntfd. PASRR completed and placed in envelope Envelope and ambulance form in soft chart. Electronically signed by Russell Weaver RN on 3/23/2023 at 10:23 AM

## 2023-03-23 NOTE — PROGRESS NOTES
Cardiology consult placed via perfect serve.  Electronically signed by Harleen Villagran RN on 3/20/2023 at 11:10 AM
Charge Nurse, Beata Suresh, called [facility]  to see if they could receive patient tonight before we arranged transportation. They are unable to admit him at this time. Keith Alvarez requested we call tomorrow morning to facilitate discharge.      Electronically signed by Samara Hudson RN on 3/22/2023 at 5:34 PM
Comprehensive Nutrition Assessment    Type and Reason for Visit:  Initial, Positive Nutrition Screen, Consult    Nutrition Recommendations/Plan:   Continue Diet. Will Start ONS and monitor. Malnutrition Assessment:  Malnutrition Status:  Insufficient data (03/20/23 1254)    Context:  Chronic Illness     Findings of the 6 clinical characteristics of malnutrition:  Energy Intake:  75% or less estimated energy requirements for 1 month or longer  Weight Loss:  Unable to assess (2/2 poor EMR wt hx pta)     Body Fat Loss:  Unable to assess (RUBEN 2/2 pt SIMONE for CT at this time)     Muscle Mass Loss:  Unable to assess    Fluid Accumulation:  No significant fluid accumulation     Strength:  Not Performed    Nutrition Assessment:    Pt adm w/ AMS/weakness and +head laceration s/p GLF just pta. PMHx HTN, Hypothyroid; Adm w/ SAH/SDH and JUVENAL (resolved). Pt at nutritional risk d/t increased needs for wound healing as well as w/ poor intake and subjective wt loss 2/2 chronic poor appetite now w/ SAH. Will Start ONS and monitor. Nutrition Related Findings:    A&O, confused at times, dentition WNL, Abd/BS WDL, +2 edema, I/O's WNL, +hyperglycemia Wound Type: Open Wounds (lac x 1 to posterior head)       Current Nutrition Intake & Therapies:    Average Meal Intake: 51-75%  Average Supplements Intake: None Ordered  ADULT DIET; Regular    Anthropometric Measures:  Height: 5' 10\" (177.8 cm)  Ideal Body Weight (IBW): 166 lbs (75 kg)    Admission Body Weight: 128 lb (58.1 kg) (unknown method 3/17)  Current Body Weight: 128 lb (58.1 kg) (unknown method 3/17, UTO CBW 2/2 SIMONE for CT at this time),   IBW.  Weight Source: Not Specified  Current BMI (kg/m2): 18.4  Usual Body Weight:  (UTO UBW 2/2 poor EMR wt hx pta; noted subjective wt loss per pt's dtr however unquantifiable amount; unable to assess/confirm at this time d/t poor EMR wt hx)                       BMI Categories: Underweight (BMI less than 22) age over
Consult received. Dr. Dixon Kelley to [facility] for ARU.
Department of Neurosurgery   Progress Note  Attending    CHIEF COMPLAINT: c/o headache    SUBJECTIVE:  no new events over night    ROS:  HEENT: positive for headache  CVS: negative for chest pain  Resp: negative for SOB  GI: negative for reflux  OBJECTIVE  Physical  VITALS:  BP (!) 122/53   Pulse 63   Temp 98.6 °F (37 °C) (Axillary)   Resp 16   Ht 5' 10\" (1.778 m)   Wt 128 lb (58.1 kg)   SpO2 99%   BMI 18.37 kg/m²   NEUROLOGIC:  Mental Status Exam:  Level of Alertness:   awake  Orientation:   person, place  Motor Exam:  Motor exam is symmetrical 5 out of 5 all extremities bilaterally  Sensory:  Sensory intact    Data  CBC:   Lab Results   Component Value Date/Time    WBC 4.7 03/18/2023 04:49 AM    RBC 3.09 03/18/2023 04:49 AM    HGB 9.5 03/18/2023 04:49 AM    HCT 28.6 03/18/2023 04:49 AM    MCV 92.6 03/18/2023 04:49 AM    MCH 30.7 03/18/2023 04:49 AM    MCHC 33.2 03/18/2023 04:49 AM    RDW 13.6 03/18/2023 04:49 AM     03/18/2023 04:49 AM    MPV 11.7 03/18/2023 04:49 AM     BMP:    Lab Results   Component Value Date/Time     03/18/2023 04:49 AM    K 3.3 03/18/2023 04:49 AM     03/18/2023 04:49 AM    CO2 25 03/18/2023 04:49 AM    BUN 25 03/18/2023 04:49 AM    LABALBU 4.3 03/17/2023 12:12 PM    CREATININE 1.0 03/18/2023 04:49 AM    CALCIUM 9.3 03/18/2023 04:49 AM    LABGLOM >60 03/18/2023 04:49 AM    GLUCOSE 85 03/18/2023 04:49 AM     Current Inpatient Medications  Current Facility-Administered Medications: tamsulosin (FLOMAX) capsule 0.4 mg, 0.4 mg, Oral, Daily  levothyroxine (SYNTHROID) tablet 25 mcg, 25 mcg, Oral, Daily  potassium bicarb-citric acid (EFFER-K) effervescent tablet 40 mEq, 40 mEq, Oral, BID  niCARdipine (CARDENE) 25 mg in sodium chloride 0.9 % 250 mL infusion ([id number]), 2.5-15 mg/hr, IntraVENous, Continuous  0.9 % sodium chloride infusion, , IntraVENous, Continuous  sodium chloride flush 0.9 % injection 10 mL, 10 mL, IntraVENous, 2 times per day  sodium chloride flush 0.9 %
Electronic DONAVAN completed. Nurse to nurse report called to MARIAM Leach at [facility].
I called [facility] to get updated  time, I am told they are on their way and he will be picked up soon.
Message left with EKG department to get eliceo patch placed for d/c today.
Notified Luís that discharge order has been written and insurance auth obtained . Nurse states that he was not on the list of patients accepted to come tonight and ask that we make arrangements in the morning .
Occupational Therapy  OT BEDSIDE TREATMENT NOTE   [address] [facility] [address] [facility]  [address] [place] [address]      Date:3/22/2023  Patient Name: Sammy Campos  MRN: [id number]  : 1945  Room: [address] [place] [address]     Evaluating OT: ARMINDA Abrams, OTR/L  # [id number]     Referring Provider:  Stephanie Post DO  Specific Provider Orders:  Eviea Handler and Treat\"  3-17-23     Diagnosis: Confusion [R41.0]  SAH (subarachnoid hemorrhage) ([facility] [address] [I60.9]  Contusion of scalp, initial encounter [N84.42NK]  Fall, initial encounter [W19. XXXA]  Laceration of scalp, initial encounter [S01.01XA]     Pt was admitted after reportedly falling in his kitchen striking his head. Pertinent Medical History:  Pt has no past medical history on file. ,  has no past surgical history on file.      Surgeries this admission: None      Precautions:  Fall Risk, O2  Cognition     Assessment of current deficits   [x] Functional mobility             [x]ADLs           [x] Strength                  [x]Cognition   [x] Functional transfers           [x] IADLs         [x] Safety Awareness   [x]Endurance   [x] Fine Coordination              [x] Balance     [] Vision/perception    []Sensation     [x]Gross Motor Coordination  [] ROM           [] Delirium                  [x] Motor Control         OT PLAN OF CARE   OT POC based on physician orders, patient diagnosis and results of clinical assessment     Frequency/Duration 1-3 days/wk for 2 weeks PRN   Specific OT Treatment to include:      * Instruction/training on adapted ADL techniques and AE recommendations to increase functional independence within precautions       * Training on energy conservation strategies, correct breathing pattern and techniques to improve independence/tolerance for self-care routine  * Functional transfer/mobility training/DME recommendations for increased independence, safety, and fall prevention  * Patient/Family education to increase follow
Occupational Therapy  OT BEDSIDE TREATMENT NOTE   [address] [facility] [place] [address]  [address] [place] [address]      Date:3/21/2023  Patient Name: Dahlia Cueva  MRN: [id number]  : 1945  Room: [address] [place] [address]     Evaluating OT: ARMINDA Pina, OTR/L  # [id number]     Referring Provider:  Cornelia Villegas DO  Specific Provider Orders:  Yisel Nicholson and Treat\"  3-17-23     Diagnosis: Confusion [R41.0]  SAH (subarachnoid hemorrhage) ([facility] [address] [I60.9]  Contusion of scalp, initial encounter [N24.96LK]  Fall, initial encounter [W19. XXXA]  Laceration of scalp, initial encounter [S01.01XA]     Pt was admitted after reportedly falling in his kitchen striking his head. Pertinent Medical History:  Pt has no past medical history on file. ,  has no past surgical history on file.      Surgeries this admission: None      Precautions:  Fall Risk  Cognition     Assessment of current deficits   [x] Functional mobility             [x]ADLs           [x] Strength                  [x]Cognition   [x] Functional transfers           [x] IADLs         [x] Safety Awareness   [x]Endurance   [x] Fine Coordination              [x] Balance     [] Vision/perception    []Sensation     [x]Gross Motor Coordination  [] ROM           [] Delirium                  [x] Motor Control         OT PLAN OF CARE   OT POC based on physician orders, patient diagnosis and results of clinical assessment     Frequency/Duration 1-3 days/wk for 2 weeks PRN   Specific OT Treatment to include:      * Instruction/training on adapted ADL techniques and AE recommendations to increase functional independence within precautions       * Training on energy conservation strategies, correct breathing pattern and techniques to improve independence/tolerance for self-care routine  * Functional transfer/mobility training/DME recommendations for increased independence, safety, and fall prevention  * Patient/Family education to increase follow
PCP is Ángela Hendricks MD  Office notified of admission.       Electronically signed by Vashti Amador RN on 3/20/2023 at 12:20 PM
Physical Therapy  Physical Therapy Initial Assessment     Name: Husam Burger  : 1945  MRN: [id number]      Date of Service: 3/20/2023    Evaluating PT:  Saul Nation PT, DPT [id number]    Room #:  8386/7556-R  Diagnosis:  Confusion [R41.0]  SAH (subarachnoid hemorrhage) ([facility] [address] [I60.9]  Contusion of scalp, initial encounter [X86.82YH]  Fall, initial encounter [W19. XXXA]  Laceration of scalp, initial encounter [S01.01XA]  PMHx/PSHx:  Inguinal hernia repair, bilateral knee and left elbow surgery  Procedure/Surgery:  None  Precautions:  Falls, risk of elopement, O2  Equipment Needs:  TBD  Equipment Owned:  CaneJUAN JOSÉ    SUBJECTIVE:    Pt lives alone in a 1 story home with 0 stair(s) to enter. Bed is on 1st floor and bath is on 1st floor. Pt ambulated with no AD PTA. OBJECTIVE:   Initial Evaluation  Date: 3/20/2023 Treatment Short Term/ Long Term   Goals   AM-PAC 6 Clicks 89/75     Was pt agreeable to Eval/treatment? Yes     Does pt have pain? Yes; \"everything\"     Bed Mobility  Rolling: NT  Supine to sit: ModA  Sit to supine: ModA  Scooting: ModA (seated)  Rolling: Independent  Supine to sit: Independent  Sit to supine: Independent  Scooting: Independent   Transfers Sit to stand: MaxA  Stand to sit: ModA  Stand pivot: NT  Sit to stand: Mod I  Stand to sit: Mod I  Stand pivot: Mod I with AAD   Ambulation    3 feet (side steps) with [facility] ModA  150 feet with AAD Mod I   Stair negotiation: ascended and descended  NT  TBD   ROM BUE:  See OT note  BLE:  WFL     Strength BUE:  See OT note  BLE:  L knee ext 4+/5; grossly 5/5 elsewhere     Balance Sitting EOB:  SBA  Dynamic Standing:  ModA with [facility]  Sitting EOB:  Independent  Dynamic Standing: Mod I with AAD     Pt is A & O x 4  Sensation:  No reports of numbness/tingling to extremities  Edema:  Unremarkable    Vitals:   HR 63, SpO2 100% sitting EOB.     Patient education  Pt educated on role of PT, safety during functional mobility, use of call light for
Physician Progress Note      Charlie Stager  CSN #:                  [id number]  :                       1945  ADMIT DATE:       3/17/2023 11:36 AM  DISCH DATE:  RESPONDING  PROVIDER #:        Roberta Valdez MD          QUERY TEXT:    Pt admitted with [address] Pt noted to have 4 mm of leftward midline shift per CT   Head. If clinically significant, please document in progress notes and   discharge summary if you are evaluating/treating any of the following: The medical record reflects the following:  Risk Factors: Fall,  Clinical Indicators: 3/17 CT Head: Thin subdural hematoma along the right   tentorium is also noted. Approximate 4 mm of leftward midline shift. Per H&P:   ground level fall, SAH w 4mm shift. Treatment: Keppra, Serial neurological exams    Thank you,  Santi Pratt RN [facility]  [phone]  Options provided:  -- Cerebral edema  -- Brain compression  -- Cerebral edema and Brain compression  -- Traumatic brain compression  -- Other - I will add my own diagnosis  -- Disagree - Not applicable / Not valid  -- Disagree - Clinically unable to determine / Unknown  -- Refer to Clinical Documentation Reviewer    PROVIDER RESPONSE TEXT:    Provider disagreed with this query.     Query created by: Spencer Ferguson on 3/20/2023 7:59 AM      Electronically signed by:  Roberta Valdez MD 3/20/2023 8:33 AM
Pt ate his breakfast at 0800 this morning. I spoke with US he needs to be NPO for six hours.  He is to not eat lunch and they will send for him around 2-3pm.
Refuses cpap
TRAUMA SURGERY  DAILY PROGRESS NOTE  3/20/2023    Chief Complaint   Patient presents with    Fall     Fall from standing, +hematoma on forehead, -LOC, normally A&Ox4, A&Ox2 in triage. On ASA       Subjective:  Laying in bed. Comfortable. Aox3. Still with very flat affect. No complaints. Objective:  BP (!) 101/54   Pulse 65   Temp 97.4 °F (36.3 °C) (Axillary)   Resp 16   Ht 5' 10\" (1.778 m)   Wt 128 lb (58.1 kg)   SpO2 96%   BMI 18.37 kg/m²     GENERAL:  Laying in bed, awake, alert, cooperative, no apparent distress. HEAD: Normocephalic, atraumatic. Hematoma much smaller   EYES: No sclera icterus, pupils equal  LUNGS:  No increased work of breathing. On 2LNC - states he wears O2 at baseline  CARDIOVASCULAR:  regular rat e  ABDOMEN:  Soft, non-tender, non-distended  EXTREMITIES: No edema or swelling  SKIN: Warm and dry    Assessment/Plan:  [de-identified] y.o. male s/p ground level fall resulting in [address], stable on repeat    SAH - stable on repeat imagine, NSY consulted, recs appreciated. Continue keppra BID x 7 days, SBP < 140. elevated troponin- stable, negative EKG, asymptomatic   Home amlodipine   Incentive spirometry   general diet   JUVENAL - resolved tcontinue to monitor Cr, BUN, UOP. Continue home flomax   home synthroid   Awaiting  PTOT   Heme: anemia - hgb  stable, 10.1 from 9. from 12.6; likely was hemo-concentrated with some dilutional effect; monitor platelets 382 this AM     Bowel regimen: senna, glycolax   Pain control/Sedation: tylenol   DVT prophylaxis: PCDS, start heparin  GI: diet   Glucose protocol:   Mouth/Eye care: per patient, .    Duong: none     Code status:    Full Code     Patient/Family update:  As available      Disposition: pending PT OT     Electronically signed by Lillie Munson MD on 3/20/2023 at 7:06 AM      Attending Attestation   I saw and examined the patient, I agree with resident note      Hx taken from patient    I personally reviewed the labs, trop elevated no real sx but will ask cards to
TRAUMA SURGERY  DAILY PROGRESS NOTE  3/21/2023    Chief Complaint   Patient presents with    Fall     Fall from standing, +hematoma on forehead, -LOC, normally A&Ox4, A&Ox2 in triage. On ASA       Subjective:  Uncomfortable this AM. Having some complaints of vague, cramping abdominal pain. Denies any fever or chills. Still having bowel movements and passing flatus. No diarrhea. Reports frequent urination. Objective:  BP (!) 146/62   Pulse 71   Temp 99.9 °F (37.7 °C) (Oral)   Resp 15   Ht 5' 10\" (1.778 m)   Wt 128 lb (58.1 kg)   SpO2 97%   BMI 18.37 kg/m²     GENERAL:  Laying in bed, awake, uncomfortable appearing   HEAD: Normocephalic, atraumatic. Hematoma much smaller, yellow green ecchymossi  EYES: No sclera icterus, pupils equal  LUNGS:  No increased work of breathing. On [facility] - states he wears O2 at baseline. Productive cough  CARDIOVASCULAR:  regular rate systolic ejection murmur 3/6   ABDOMEN:  Soft, suprapubic-tenderness, non-distended  EXTREMITIES: No edema or swelling  SKIN: Warm and dry    Assessment/Plan:  [de-identified] y.o. male s/p ground level fall resulting in SAH, stable on repeat    SAH - stable on repeat imagine, NSY consulted, recs appreciated. Continue keppra BID x 7 days, SBP < 140. elevated troponin- stable, negative EKG, asymptomatic  ECHO showing EF 45-50% and severe Aortic stenosis; cardiology recs appreciated   Orthostatics ordered    Amlodipine discontinued; losartan restarted   14 day monitor   Incentive spirometry   general diet   JUVENAL - resolved continue to monitor Cr, BUN, UOP. UA ordered   Continue home flomax   home synthroid   10/24 - dispo pending  Heme: anemia - hgb  stable, 10.7 from 10.1 from 12.6; likely was hemo-concentrated with some dilutional effect; monitor platelets 703 this AM     Bowel regimen: senna, glycolax   Pain control/Sedation: tylenol   DVT prophylaxis: PCDS, heparin  GI: diet   Glucose protocol:   Mouth/Eye care: per patient, .    Duong: none     Code status:
TRAUMA SURGERY  DAILY PROGRESS NOTE  3/22/2023    Chief Complaint   Patient presents with    Fall     Fall from standing, +hematoma on forehead, -LOC, normally A&Ox4, A&Ox2 in triage. On ASA       Subjective:  No complaints this morning, answers questions appropriately. Denies any pain    Objective:  /60   Pulse 74   Temp 98.3 °F (36.8 °C) (Oral)   Resp 19   Ht 5' 10\" (1.778 m)   Wt 128 lb (58.1 kg)   SpO2 98%   BMI 18.37 kg/m²     GENERAL:  Laying in bed, awake, uncomfortable appearing   HEAD: Small forehead hematoma resolving,   EYES: No sclera icterus, pupils equal   LUNGS:  No increased work of breathing. On 2LNC - home O2 at baseline. CARDIOVASCULAR:  regular rate   ABDOMEN:  Soft, nontender, non-distended  EXTREMITIES: No edema or swelling  SKIN: Warm and dry    Assessment/Plan:  [de-identified] y.o. male s/p ground level fall resulting in SAH, stable on repeat    SAH - stable on repeat imagine, NSY consulted, Continue keppra BID x 7 days, SBP < 140. elevated troponin- stable, negative EKG, asymptomatic  ECHO showing EF 45-50% and severe Aortic stenosis; cardiology recs appreciated   toprol, normotensive   14 day monitor   CT surgery evaluated: outpatient follow up  Incentive spirometry   general diet, right upper quadrant ultrasound 3/21 unremarkable   Normal LFTs  JUVENAL - resolved continue to monitor Cr, BUN, UOP. UA negative  RUQ US negative,   Continue home flomax   home synthroid   10/24 - dispo pending  Heme: anemia - hgb stable     Bowel regimen: senna, glycolax   Pain control/Sedation: tylenol   DVT prophylaxis: PCDS, heparin  GI: diet   Glucose protocol:   Mouth/Eye care: per patient, .    Duong: none     Code status:    Full Code     Patient/Family update:  As available      Disposition: placement, likely [facility]    Electronically signed by Elden Kayser, DO on 3/22/2023 at 8:25 AM    Attending Attestation   I saw and examined the patient, I agree with resident note      Hx taken from patient     DC
TRAUMA SURGERY  DAILY PROGRESS NOTE  3/23/2023    Chief Complaint   Patient presents with    Fall     Fall from standing, +hematoma on forehead, -LOC, normally A&Ox4, A&Ox2 in triage. On ASA       Subjective:  No acute events overnight. Still ok,     Objective:  BP (!) 105/46   Pulse 71   Temp 97.9 °F (36.6 °C) (Temporal)   Resp 18   Ht 5' 10\" (1.778 m)   Wt 128 lb (58.1 kg)   SpO2 97%   BMI 18.37 kg/m²     GENERAL:  Laying in bed, awake, uncomfortable appearing   HEAD: Small forehead hematoma resolving,   EYES: No sclera icterus, pupils equal   LUNGS:  No increased work of breathing. On 2LNC - home O2 at baseline. CARDIOVASCULAR:  regular rate   ABDOMEN:  Soft, nontender, non-distended  EXTREMITIES: No edema or swelling  SKIN: Warm and dry    Assessment/Plan:  [de-identified] y.o. male s/p ground level fall resulting in SAH, stable on repeat    SAH - stable on repeat imagine, NSY consulted, Continue keppra BID x 7 days, SBP < 140. elevated troponin- stable, negative EKG, asymptomatic  ECHO showing EF 45-50% and severe Aortic stenosis; cardiology recs appreciated   toprol, normotensive   14 day monitor ZIO patch placed per cardiology tech. CT surgery evaluated: outpatient follow up  Patient will need outpatient heart catheterization  Incentive spirometry   general diet, right upper quadrant ultrasound 3/21 unremarkable   Normal LFTs  JUVENAL - resolved continue to monitor Cr, BUN, UOP. UA negative  RUQ US negative,   Continue home flomax   home synthroid   10/24 - dispo pending  Heme: anemia - hgb stable     Bowel regimen: senna, glycolax   Pain control/Sedation: tylenol   DVT prophylaxis: PCDS, heparin  GI: diet   Glucose protocol:   Mouth/Eye care: per patient, .    Duong: none     Code status:    Full Code     Patient/Family update:  As available      Disposition: Most likely discharge today patient will need his heart monitor set up    Electronically signed by Elena Choudhary MD on 3/23/2023 at 7:39
This RN spoke the the daughter, Mary Lou Brizuela, and to the patient about potential psychiatric consult for depression. Pt seems flat in affect and is agreeable to talking with psychiatry.      Electronically signed by Génesis Marte RN on 3/22/2023 at 1:27 PM
Ulysses Genta just called and pushed transport back to 3-330 PM
IntraVENous PRN Ehsan Bagent, DO        0.9 % sodium chloride infusion   IntraVENous PRN Ehsan Bagent, DO        ondansetron (ZOFRAN-ODT) disintegrating tablet 4 mg  4 mg Oral Q8H PRN Ehsan Bagent, DO        Or    ondansetron (ZOFRAN) injection 4 mg  4 mg IntraVENous Q6H PRN Ehsan Bagent, DO        acetaminophen (TYLENOL) tablet 650 mg  650 mg Oral Q6H Ehsan Bagent, DO   650 mg at 03/21/23 0308    polyethylene glycol (GLYCOLAX) packet 17 g  17 g Oral Daily Ehsan Bagent, DO   17 g at 03/20/23 0931    labetalol (NORMODYNE;TRANDATE) injection 10 mg  10 mg IntraVENous Q4H PRN Ehsan Bagent, DO        hydrALAZINE (APRESOLINE) injection 10 mg  10 mg IntraVENous Q4H PRN Ehsan Bagent, DO   10 mg at 03/20/23 1735    levETIRAcetam (KEPPRA) 500 mg/100 mL IVPB  500 mg IntraVENous Q12H Ehsan Bagent, DO   Stopped at 03/21/23 0322      sodium chloride 50 mL/hr at 03/20/23 1739    sodium chloride         Physical Exam:  BP (!) 142/50   Pulse 79   Temp 99 °F (37.2 °C) (Oral)   Resp 20   Ht 5' 10\" (1.778 m)   Wt 128 lb (58.1 kg)   SpO2 97%   BMI 18.37 kg/m²   Wt Readings from Last 3 Encounters:   03/17/23 128 lb (58.1 kg)     Appearance: Awake, alert, no acute respiratory distress, patient appears depressed but denies any suicidal ideation. Skin: Intact, no rash  Head: Normocephalic, hematoma over the right frontal area. ENMT: MMM, no rhinorrhea  Neck: Supple, no carotid bruits  Lungs: Clear to auscultation bilaterally. No wheezes, rales, or rhonchi. Cardiac: Regular rate and rhythm, +S1S2, ESM 4/6 apparent over right upper sternal border with radiation to neck.   Abdomen: Soft, +bowel sounds  Extremities: Moves all extremities x 4, no lower extremity edema  Neurologic: No focal motor deficits apparent, normal mood and affect  Intake/Output:    Intake/Output Summary (Last 24 hours) at 3/21/2023 1215  Last data filed at 3/20/2023 1450  Gross per 24 hour   Intake 600.82 ml   Output 100 ml   Net 500.82 ml     No intake/output data
effort was normal with no retractions or use of accessory muscles. No chest wall tenderness  Cardiovascular: Extremities warm, well perfused, regular rate. Abdomen:  Soft and non distended. No tenderness, guarding, rebound, or rigidity   Extremities: Moves all 4 extremeties, No pedal edema     Spine:   Spine Tenderness ROM   Cervical 0/10 Normal   Thoracic 0 /10 Normal   Lumbar 0 /10 Normal     Musculoskeletal:    Joint Tenderness Swelling ROM   Right shoulder Absent absent normal   Left shoulder absent absent normal   Right elbow absent absent normal   Left elbow absent absent normal   Right wrist absent absent normal   Left wrist absent absent normal   Right hand grasp Absent absent normal   Left hand grasp absent absent normal   Right hip absent absent normal   Left hip absent absent normal   Right knee Absent absent normal   Left knee absent absent normal   Right ankle absent absent normal   Left ankle absent absent normal   Right foot Absent absent normal   Left foot absent absent normal       CONSULTS: Neurosurgery    PROCEDURES:     INJURIES:      Principal Problem:    SAH (subarachnoid hemorrhage) ([facility])  Active Problems:    Fall at home  Resolved Problems:    * No resolved hospital problems. *        Assessment/Plan:       Neuro:  GCS 15, no acute issues    SAH - stable on repeat imagine, NSY consulted, recs appreciated. Continue keppra BID x 7 days, SBP < 140. CV: elevated troponin- continue to trend and monitor for symptoms. EKG with PACs   Home amlodipine   Pulm: tolerating room air    GI: ok for general diet   Renal: JUVENAL - improved this AM, continue to monitor Cr, BUN, UOP. Hypokalemia - replaced.  Continue home flomax   ID: afebrile, no acute issues     Endocrine: no acute issues, home synthroid   MSK: PTOT   Heme: anemia - hgb  6.5 from 12.6; likely was hemo-concentrated with some dilutional effect     Bowel regime: senna, glycolax   Pain control/Sedation: tylenol   DVT prophylaxis: PCDS    GI: diet
related microvascular white matter ischemic disease. There is less conspicuous subarachnoid hemorrhage in the posterior right temporal lobe and right occipital lobe. There is trace residual subarachnoid hemorrhage in the right occipital lobe. There is no acute intracranial hemorrhage, mass effect or midline shift. No abnormal extra-axial fluid collection. The gray-white differentiation is maintained without evidence of an acute infarct. There is no evidence of hydrocephalus. ORBITS: The visualized portion of the orbits demonstrate no acute abnormality. SINUSES: The visualized paranasal sinuses and mastoid air cells demonstrate no acute abnormality. SOFT TISSUES/SKULL:  Stable moderate right forehead/prefrontal scalp swelling hematoma. Persistent small left parietal scalp swelling and hematoma. No acute intracranial abnormality. Interval improvement with trace subarachnoid hemorrhage in right occipital lobe. Chronic parenchymal change including atrophy and white matter ischemia. CT Head W/O Contrast    Result Date: 3/17/2023  EXAM: CT Head Without Intravenous Contrast EXAM DATE/TIME: 3/17/2023 1:47 pm CLINICAL HISTORY: ORDERING SYSTEM PROVIDED  fall  TECHNOLOGIST PROVIDED HISTORY:  Reason for exam:->fall  Has a \"code stroke\" or \"stroke alert\" been called? ->No  Decision Support Exception - unselect if not a suspected or confirmed emergency medical condition->Emergency Medical Condition (MA)  What reading provider will be dictating this exam?->CRC TECHNIQUE: Axial computed tomography images of the head/brain without intravenous contrast.  This CT exam was performed using one or more of the following dose reduction techniques:  automated exposure control, adjustment of the mA and/or kV according to patient size, and/or use of iterative reconstruction technique. COMPARISON: No relevant prior studies available.  FINDINGS: Brain:  Hyperdensity is noted within the sulci of the inferior right occipital lobe and
Mild to moderate mitral regurgitation is present. Trace to mild aortic regurgitation is noted. There is severe aortic stenosis with valve area of 0.8 sq cm. Aortic valve   peak velocity 4.1 m/sec, mean gradient 44 mmHg, DVI 0.22. Physiologic and/or trace tricuspid regurgitation. RVSP is 41 mmHg. Pulmonary hypertension is mild . No evidence for hemodynamically significant pericardial effusion. Assessment:  Elevated troponin without chest pain or EKG changes secondary to subarachnoid and subdural hematoma. Status post fall with traumatic right frontal and temporal subdural hematoma and subarachnoid hemorrhage  VHD: Severe symptomatic aortic stenosis with possible syncope causing head injury and IC bleed. History of recurrent falls with dizziness secondary to severe aortic stenosis. Has mild LV dysfunction, rule out ischemic heart disease. Work-up as an outpatient  Status post fall secondary to syncope  History of hypertension, well controlled current blood pressure 118/61. Thyroidism HRT  Obstructive sleep apnea on CPAP  Depression  Short runs of SVT versus atrial tachycardia, nonsustained. Mild hyponatremia  Mild pancytopenia, stable        Plan:     Echocardiogram results were reviewed, discussed with the patient's daughter and the patient. Needs cardiac catheterization once he is cleared by the neurosurgery. This can be done as an outpatient. Continue Toprol-XL 25 mg p.o. daily and restart low-dose losartan 25 mg p.o. daily if his blood pressure remained stable. Discontinue nifedipine and hydrochlorothiazide upon discharge. Toprol was added for SVT. Arrange for 14-day monitor to rule out arrhythmias contributing to his falls/syncope. Remainder of subdural hematoma/subarachnoid hemorrhages as per neurosurgery  Avoid vasodilators such as hydralazine and nitroglycerin  Avoid dehydration. Start OT PT evaluation  Consider psychiatric consult for underlying depression.   Patient is stable for
time      Patient and/or family were instructed on functional diagnosis, prognosis/goals and OT plan of care. Demonstrated Fair(+) understanding. Eval Complexity: Low    Time In: 1434  Time Out: 1515  Total Treatment Time: 26 minutes    Min Units   OT Eval Low 97165  X  1   OT Eval Medium 44140      OT Eval High 68212      OT Re-Eval [id number]       Therapeutic Ex 97261       Therapeutic Activities 42597       ADL/Self Care 98072  26  2   Orthotic Management 09009       Manual 18323     Neuro Re-Ed 02080       Non-Billable Time              Evaluation Time additionally includes thorough review of current medical information, gathering information on past medical history/social history and prior level of function, completion of standardized testing/informal observation of tasks, assessment of data and education on plan of care and goals.             Osiel Powell, MOT, OTR/L  # [id number]

## 2023-03-24 ENCOUNTER — TELEPHONE (OUTPATIENT)
Dept: CARDIOLOGY CLINIC | Age: 78
End: 2023-03-24

## 2023-03-24 NOTE — TELEPHONE ENCOUNTER
Magda Jefferson from [facility] called to sched HFU with , Pt was consulted during IP SEY 3/17-3/23/2023. Please advise. Thank you.

## 2023-03-24 NOTE — TELEPHONE ENCOUNTER
Per Dr. Nirmal Payton note from 3/22/23, patient needs (2) week F/U in valve clinic and one month in our office. Rosaline Cason at [facility]. F/U scheduled for 5/4/23 at 3:20 p.m.   Rosaline Cason was given valve clinic phone number to call to schedule.

## 2023-04-19 ENCOUNTER — OFFICE VISIT (OUTPATIENT)
Dept: NEUROSURGERY | Age: 78
End: 2023-04-19
Payer: MEDICARE

## 2023-04-19 ENCOUNTER — HOSPITAL ENCOUNTER (OUTPATIENT)
Dept: CT IMAGING | Age: 78
Discharge: HOME | End: 2023-04-21
Payer: MEDICARE

## 2023-04-19 VITALS
SYSTOLIC BLOOD PRESSURE: 136 MMHG | TEMPERATURE: 97.2 F | DIASTOLIC BLOOD PRESSURE: 76 MMHG | OXYGEN SATURATION: 96 % | HEART RATE: 64 BPM

## 2023-04-19 DIAGNOSIS — I60.9 SAH (SUBARACHNOID HEMORRHAGE) (HCC): ICD-10-CM

## 2023-04-19 DIAGNOSIS — S06.5XAA SUBDURAL HEMATOMA (HCC): Primary | ICD-10-CM

## 2023-04-19 PROCEDURE — 1123F ACP DISCUSS/DSCN MKR DOCD: CPT | Performed by: PHYSICIAN ASSISTANT

## 2023-04-19 PROCEDURE — 70450 CT HEAD/BRAIN W/O DYE: CPT

## 2023-04-19 PROCEDURE — 99213 OFFICE O/P EST LOW 20 MIN: CPT | Performed by: PHYSICIAN ASSISTANT

## 2023-04-19 NOTE — PROGRESS NOTES
Patient is here for follow up from a hospital consult for: SAH/SDH. C/o headache. Denies any other weakness, numbness or vision change. Physical exam  Alert and Oriented X3  PERRLA, EOMI  BURTON 5/5  Sensation intact to LT and PP  Reflexes are 2+ and symmetric    A/P: patient is here for follow up for: SAH/SDH. Head CT shows resolution of hemorrhage. No restrictions.   F/u PRN

## 2023-05-02 ENCOUNTER — TELEPHONE (OUTPATIENT)
Dept: CARDIOLOGY CLINIC | Age: 78
End: 2023-05-02

## 2023-05-02 NOTE — TELEPHONE ENCOUNTER
There is not a communication release formed completed. Daughter Alana Sayer requesting a call or information of OV Summary sent after appt on 5/4/2023. Please advise.

## 2023-05-02 NOTE — TELEPHONE ENCOUNTER
Spoke with daughter. Dr. Meaghan Mcconnell office visit has not happened yet. He saw CTS last month and that is the visit she is referring to. She will call Dr. Sapphire Grimaldo office.

## 2023-05-04 ENCOUNTER — OFFICE VISIT (OUTPATIENT)
Dept: CARDIOLOGY CLINIC | Age: 78
End: 2023-05-04

## 2023-05-04 VITALS
WEIGHT: 176.1 LBS | HEIGHT: 70 IN | HEART RATE: 68 BPM | DIASTOLIC BLOOD PRESSURE: 80 MMHG | BODY MASS INDEX: 25.21 KG/M2 | SYSTOLIC BLOOD PRESSURE: 140 MMHG

## 2023-05-04 DIAGNOSIS — I10 HYPERTENSION, UNSPECIFIED TYPE: Primary | ICD-10-CM

## 2023-05-04 NOTE — PATIENT INSTRUCTIONS
Follow up at [facility] valve clinic for TAVR, referral was already placed. Advised to stay well hydrated. Continue Losartan 100 mg po daily and Toprol XL 25 mg po daily  Continue Flomax for BPH, patient advised to about the orthostatic hypotension associated with Flomax  Follow up with me in 3 months.

## 2023-05-04 NOTE — PROGRESS NOTES
OUTPATIENT CARDIOLOGY FOLLOW-UP    Name: Gus Osgood    Age: [de-identified] y.o. Primary Care Physician: Sam White MD    Date of Service: 5/4/2023    Chief Complaint:   Chief Complaint   Patient presents with    Hypertension       Interim History:   Mr. Bradly Camarena is a 27-year-old gentleman with history of hypertension, hypothyroidism on HRT, obstructive sleep apnea on CPAP compliant with the CPAP use presented to the emergency room on 3/17/2023 following a fall. Patient's daughter found him on the floor confused with a hematoma over the right frontal area. Normally, patient was alert oriented and was living independently. His daughter tried to call him and he did not respond. Subsequently, patient's daughter went his home to check on him and found him on the floor. Patient was seen as a new consult on 3/20/2023 for an elevated troponin. Patient was also diagnosed with subdural hematoma subarachnoid hemorrhage. He was diagnosed with severe symptomatic aortic stenosis at mitral contributor to syncope. CT surgery was consulted and patient was recommended TAVR due to increased frailty and history of cerebral hemorrhage. Patient was discharged home on 3/23/2023 and denies any further hospitalizations or ER visits. He is compliant with medications, as well as salt and fluid intake. He does not take any over-the-counter arthritis medications. He denies any further episodes of syncope, falls, chest pain or dyspnea. Now, he is confined to wheelchair. No new cardiac complaints since last cardiology evaluation. He denies recent chest pain, SOB, palpitations, lightheadedness, dizziness, syncope, PND, or orthopnea. SR on EKG.     Review of Systems:   Cardiac: As per HPI  General: No fever, chills  Pulmonary: As per HPI  HEENT: No visual disturbances, difficult swallowing  GI: No nausea, vomiting  Endocrine: No thyroid disease or DM  Musculoskeletal: BURTON x 4, no focal motor deficits  Skin: Intact, no

## 2023-05-08 ENCOUNTER — TELEPHONE (OUTPATIENT)
Dept: CARDIOLOGY CLINIC | Age: 78
End: 2023-05-08

## 2023-05-08 RX ORDER — METOPROLOL SUCCINATE 25 MG/1
25 TABLET, EXTENDED RELEASE ORAL DAILY
Qty: 90 TABLET | Refills: 3 | Status: SHIPPED | OUTPATIENT
Start: 2023-05-08

## 2023-05-08 NOTE — TELEPHONE ENCOUNTER
Spoke with patient's daughter last week and today regarding Keppra. Dr. Meka Cruz is not the prescriber of this medication. Toprol e-scribed.

## 2023-05-08 NOTE — TELEPHONE ENCOUNTER
Pt's daughter Charline Miller called requesting refill and medication question about the Keppra. He has not had the Keppra for a wk and she was unsure who she would need to contact to refill, if he still needing to be prescribed medication. Refill Toprol 25mg  [facility] [place], [place] [phone]. Please advise.  Thank you

## 2023-07-21 ENCOUNTER — HOSPITAL ENCOUNTER (INPATIENT)
Age: 78
LOS: 3 days | Discharge: NURSING, REHAB OR LONG-TERM CARE | DRG: 698 | End: 2023-07-25
Attending: EMERGENCY MEDICINE | Admitting: INTERNAL MEDICINE
Payer: MEDICARE

## 2023-07-21 DIAGNOSIS — R41.82 ALTERED MENTAL STATUS, UNSPECIFIED ALTERED MENTAL STATUS TYPE: ICD-10-CM

## 2023-07-21 DIAGNOSIS — N30.00 ACUTE CYSTITIS WITHOUT HEMATURIA: Primary | ICD-10-CM

## 2023-07-21 PROCEDURE — 99285 EMERGENCY DEPT VISIT HI MDM: CPT

## 2023-07-21 ASSESSMENT — PAIN - FUNCTIONAL ASSESSMENT: PAIN_FUNCTIONAL_ASSESSMENT: NONE - DENIES PAIN

## 2023-07-22 ENCOUNTER — APPOINTMENT (OUTPATIENT)
Dept: GENERAL RADIOLOGY | Age: 78
DRG: 698 | End: 2023-07-22
Payer: MEDICARE

## 2023-07-22 ENCOUNTER — APPOINTMENT (OUTPATIENT)
Dept: CT IMAGING | Age: 78
DRG: 698 | End: 2023-07-22
Payer: MEDICARE

## 2023-07-22 PROBLEM — N39.0 URINARY TRACT INFECTION IN ELDERLY PATIENT: Status: ACTIVE | Noted: 2023-07-22

## 2023-07-22 LAB
ALBUMIN SERPL-MCNC: 3.9 G/DL (ref 3.5–5.2)
ALP SERPL-CCNC: 71 U/L (ref 40–129)
ALT SERPL-CCNC: 13 U/L (ref 0–40)
ANION GAP SERPL CALCULATED.3IONS-SCNC: 12 MMOL/L (ref 7–16)
AST SERPL-CCNC: 33 U/L (ref 0–39)
BACTERIA URNS QL MICRO: ABNORMAL
BASOPHILS # BLD: 0.01 K/UL (ref 0–0.2)
BASOPHILS NFR BLD: 0 % (ref 0–2)
BILIRUB SERPL-MCNC: 0.8 MG/DL (ref 0–1.2)
BILIRUB UR QL STRIP: NEGATIVE
BUN SERPL-MCNC: 26 MG/DL (ref 6–23)
CALCIUM SERPL-MCNC: 9.7 MG/DL (ref 8.6–10.2)
CHLORIDE SERPL-SCNC: 104 MMOL/L (ref 98–107)
CHP ED QC CHECK: NORMAL
CLARITY UR: ABNORMAL
CO2 SERPL-SCNC: 25 MMOL/L (ref 22–29)
COLOR UR: YELLOW
CREAT SERPL-MCNC: 1.1 MG/DL (ref 0.7–1.2)
EOSINOPHIL # BLD: 0 K/UL (ref 0.05–0.5)
EOSINOPHILS RELATIVE PERCENT: 0 % (ref 0–6)
ERYTHROCYTE [DISTWIDTH] IN BLOOD BY AUTOMATED COUNT: 13.6 % (ref 11.5–15)
GFR SERPL CREATININE-BSD FRML MDRD: >60 ML/MIN/1.73M2
GLUCOSE BLD-MCNC: 111 MG/DL
GLUCOSE SERPL-MCNC: 131 MG/DL (ref 74–99)
GLUCOSE UR STRIP-MCNC: NEGATIVE MG/DL
HCT VFR BLD AUTO: 37.3 % (ref 37–54)
HGB BLD-MCNC: 11.6 G/DL (ref 12.5–16.5)
HGB UR QL STRIP.AUTO: ABNORMAL
IMM GRANULOCYTES # BLD AUTO: 0.08 K/UL (ref 0–0.58)
IMM GRANULOCYTES NFR BLD: 1 % (ref 0–5)
KETONES UR STRIP-MCNC: NEGATIVE MG/DL
LEUKOCYTE ESTERASE UR QL STRIP: ABNORMAL
LYMPHOCYTES NFR BLD: 0.77 K/UL (ref 1.5–4)
LYMPHOCYTES RELATIVE PERCENT: 8 % (ref 20–42)
MCH RBC QN AUTO: 28.8 PG (ref 26–35)
MCHC RBC AUTO-ENTMCNC: 31.1 G/DL (ref 32–34.5)
MCV RBC AUTO: 92.6 FL (ref 80–99.9)
METER GLUCOSE: 111 MG/DL (ref 74–99)
MONOCYTES NFR BLD: 0.87 K/UL (ref 0.1–0.95)
MONOCYTES NFR BLD: 9 % (ref 2–12)
NEUTROPHILS NFR BLD: 83 % (ref 43–80)
NEUTS SEG NFR BLD: 8.53 K/UL (ref 1.8–7.3)
NITRITE UR QL STRIP: NEGATIVE
PH UR STRIP: 5.5 [PH] (ref 5–9)
PLATELET # BLD AUTO: 144 K/UL (ref 130–450)
PMV BLD AUTO: 10.8 FL (ref 7–12)
POTASSIUM SERPL-SCNC: 4 MMOL/L (ref 3.5–5)
PROT SERPL-MCNC: 7.1 G/DL (ref 6.4–8.3)
PROT UR STRIP-MCNC: 100 MG/DL
RBC # BLD AUTO: 4.03 M/UL (ref 3.8–5.8)
RBC #/AREA URNS HPF: ABNORMAL /HPF
SARS-COV-2 RDRP RESP QL NAA+PROBE: NOT DETECTED
SODIUM SERPL-SCNC: 141 MMOL/L (ref 132–146)
SP GR UR STRIP: 1.02 (ref 1–1.03)
SPECIMEN DESCRIPTION: NORMAL
TROPONIN I SERPL HS-MCNC: 391 NG/L (ref 0–11)
TROPONIN I SERPL HS-MCNC: 430 NG/L (ref 0–11)
TROPONIN I SERPL HS-MCNC: 631 NG/L (ref 0–11)
TROPONIN I SERPL HS-MCNC: 647 NG/L (ref 0–11)
UROBILINOGEN UR STRIP-ACNC: 0.2 EU/DL (ref 0–1)
WBC #/AREA URNS HPF: ABNORMAL /HPF
WBC OTHER # BLD: 10.3 K/UL (ref 4.5–11.5)

## 2023-07-22 PROCEDURE — 85027 COMPLETE CBC AUTOMATED: CPT

## 2023-07-22 PROCEDURE — 88112 CYTOPATH CELL ENHANCE TECH: CPT

## 2023-07-22 PROCEDURE — 87635 SARS-COV-2 COVID-19 AMP PRB: CPT

## 2023-07-22 PROCEDURE — 6370000000 HC RX 637 (ALT 250 FOR IP): Performed by: INTERNAL MEDICINE

## 2023-07-22 PROCEDURE — 81001 URINALYSIS AUTO W/SCOPE: CPT

## 2023-07-22 PROCEDURE — 71045 X-RAY EXAM CHEST 1 VIEW: CPT

## 2023-07-22 PROCEDURE — 80053 COMPREHEN METABOLIC PANEL: CPT

## 2023-07-22 PROCEDURE — 2700000000 HC OXYGEN THERAPY PER DAY

## 2023-07-22 PROCEDURE — 82947 ASSAY GLUCOSE BLOOD QUANT: CPT

## 2023-07-22 PROCEDURE — P9612 CATHETERIZE FOR URINE SPEC: HCPCS

## 2023-07-22 PROCEDURE — 99222 1ST HOSP IP/OBS MODERATE 55: CPT | Performed by: NURSE PRACTITIONER

## 2023-07-22 PROCEDURE — 6360000002 HC RX W HCPCS: Performed by: INTERNAL MEDICINE

## 2023-07-22 PROCEDURE — 93005 ELECTROCARDIOGRAM TRACING: CPT

## 2023-07-22 PROCEDURE — 1200000000 HC SEMI PRIVATE

## 2023-07-22 PROCEDURE — 2580000003 HC RX 258

## 2023-07-22 PROCEDURE — 6360000002 HC RX W HCPCS

## 2023-07-22 PROCEDURE — 36415 COLL VENOUS BLD VENIPUNCTURE: CPT

## 2023-07-22 PROCEDURE — 84484 ASSAY OF TROPONIN QUANT: CPT

## 2023-07-22 PROCEDURE — 2580000003 HC RX 258: Performed by: INTERNAL MEDICINE

## 2023-07-22 PROCEDURE — 6370000000 HC RX 637 (ALT 250 FOR IP)

## 2023-07-22 PROCEDURE — 87077 CULTURE AEROBIC IDENTIFY: CPT

## 2023-07-22 PROCEDURE — 87086 URINE CULTURE/COLONY COUNT: CPT

## 2023-07-22 PROCEDURE — 70450 CT HEAD/BRAIN W/O DYE: CPT

## 2023-07-22 RX ORDER — LOSARTAN POTASSIUM 50 MG/1
100 TABLET ORAL DAILY
Status: DISCONTINUED | OUTPATIENT
Start: 2023-07-22 | End: 2023-07-26 | Stop reason: HOSPADM

## 2023-07-22 RX ORDER — LEVETIRACETAM 500 MG/1
500 TABLET ORAL 2 TIMES DAILY
Status: DISCONTINUED | OUTPATIENT
Start: 2023-07-22 | End: 2023-07-22

## 2023-07-22 RX ORDER — LEVOTHYROXINE SODIUM 0.03 MG/1
25 TABLET ORAL DAILY
Status: DISCONTINUED | OUTPATIENT
Start: 2023-07-22 | End: 2023-07-26 | Stop reason: HOSPADM

## 2023-07-22 RX ORDER — SODIUM CHLORIDE 9 MG/ML
INJECTION, SOLUTION INTRAVENOUS CONTINUOUS
Status: DISCONTINUED | OUTPATIENT
Start: 2023-07-22 | End: 2023-07-25

## 2023-07-22 RX ORDER — ASPIRIN 81 MG/1
324 TABLET, CHEWABLE ORAL ONCE
Status: COMPLETED | OUTPATIENT
Start: 2023-07-22 | End: 2023-07-22

## 2023-07-22 RX ORDER — ENOXAPARIN SODIUM 100 MG/ML
1 INJECTION SUBCUTANEOUS ONCE
Status: COMPLETED | OUTPATIENT
Start: 2023-07-22 | End: 2023-07-22

## 2023-07-22 RX ORDER — METOPROLOL SUCCINATE 25 MG/1
25 TABLET, EXTENDED RELEASE ORAL DAILY
Status: DISCONTINUED | OUTPATIENT
Start: 2023-07-22 | End: 2023-07-26 | Stop reason: HOSPADM

## 2023-07-22 RX ORDER — ACETAMINOPHEN 500 MG
500 TABLET ORAL EVERY 4 HOURS PRN
Status: DISCONTINUED | OUTPATIENT
Start: 2023-07-22 | End: 2023-07-26 | Stop reason: HOSPADM

## 2023-07-22 RX ORDER — PROCHLORPERAZINE EDISYLATE 5 MG/ML
10 INJECTION INTRAMUSCULAR; INTRAVENOUS EVERY 6 HOURS PRN
Status: DISCONTINUED | OUTPATIENT
Start: 2023-07-22 | End: 2023-07-26 | Stop reason: HOSPADM

## 2023-07-22 RX ORDER — TAMSULOSIN HYDROCHLORIDE 0.4 MG/1
0.4 CAPSULE ORAL DAILY
Status: DISCONTINUED | OUTPATIENT
Start: 2023-07-22 | End: 2023-07-26 | Stop reason: HOSPADM

## 2023-07-22 RX ADMIN — LEVOTHYROXINE SODIUM 25 MCG: 0.03 TABLET ORAL at 09:36

## 2023-07-22 RX ADMIN — METOPROLOL SUCCINATE 25 MG: 25 TABLET, EXTENDED RELEASE ORAL at 09:36

## 2023-07-22 RX ADMIN — SODIUM CHLORIDE: 9 INJECTION, SOLUTION INTRAVENOUS at 09:45

## 2023-07-22 RX ADMIN — ASPIRIN 81 MG 324 MG: 81 TABLET ORAL at 06:24

## 2023-07-22 RX ADMIN — TAMSULOSIN HYDROCHLORIDE 0.4 MG: 0.4 CAPSULE ORAL at 09:36

## 2023-07-22 RX ADMIN — ENOXAPARIN SODIUM 80 MG: 100 INJECTION SUBCUTANEOUS at 06:25

## 2023-07-22 RX ADMIN — LOSARTAN POTASSIUM 100 MG: 50 TABLET, FILM COATED ORAL at 09:36

## 2023-07-22 RX ADMIN — WATER 1000 MG: 1 INJECTION INTRAMUSCULAR; INTRAVENOUS; SUBCUTANEOUS at 05:27

## 2023-07-22 RX ADMIN — WATER 1000 MG: 1 INJECTION INTRAMUSCULAR; INTRAVENOUS; SUBCUTANEOUS at 09:37

## 2023-07-22 RX ADMIN — ACETAMINOPHEN 500 MG: 500 TABLET ORAL at 20:44

## 2023-07-22 NOTE — CONSULTS
Palliative Care Department  [phone]  Palliative Care Initial Consult  Provider Bowen Lyons, APRN - CNP    Inocencia Chicas  [id number]  Hospital Day: 2  Date of Initial Consult: 7/22/2023  Referring Provider: River Ferrer MD  Palliative Medicine was consulted for assistance with: Family support and CODE STATUS discussion    HPI:   Inocencia Chicas is a 78 y.o. with a medical history of BPH, hypothyroidism and aortic stenosis who was admitted on 7/21/2023 from home with a CHIEF COMPLAINT of combative behavior. Patient recently had Duong catheter removed at home and since has not been able to void. Patient was brought to ED for combative and agitated behavior. ED work-up suggestive of UTI. Patient was admitted for further medical management and palliative medicine was consulted for family support and CODE STATUS discussion. ASSESSMENT/PLAN:     Pertinent Hospital Diagnoses     UTI  Dementia     Palliative Care Encounter / Counseling Regarding Goals of Care  Please see detailed goals of care discussion as below  At this time, Inocencia Chicas, Does have capacity for medical decision-making. Capacity is time limited and situation/question specific  During encounter Gatito Garcia was surrogate medical decision-maker  Outcome of goals of care meeting:   Change code status to limited-DNR-CCA/DNI  Consult hospice  Code status Limited DNR-CCA/DNI  Advanced Directives: no POA or living will in epic  Surrogate/Legal NOK:  Ann Martin (child) [phone]    Spiritual assessment: no spiritual distress identified  Bereavement and grief: to be determined  Referrals to: hospice  SUBJECTIVE:     Current medical issues leading to Palliative Medicine involvement include   Active Hospital Problems    Diagnosis Date Noted    Urinary tract infection in elderly patient [N39.0] 07/22/2023       Details of Conversation:    Chart reviewed. Update received from nursing. Patient seen resting in bed.  Alert and oriented and able to hold

## 2023-07-22 NOTE — ED PROVIDER NOTES
[facility]        Pt Name: Bernice Sheldon  MRN: [id number]  [address] 1945  Date of evaluation: 7/21/2023  Provider: Bernadette Jimenez DO  Attending Provider: Bushra Restrepo MD  PCP: Irma Shukla MD  Note Started: 11:51 PM EDT 7/21/23    CHIEF COMPLAINT       Chief Complaint   Patient presents with    Altered Mental Status     Patient from home with family and had become \" combative with daughter\" upon arrival patient is calm and appropriate. Per ems he had a cath removed and has had issue with urinary frequency and only going a small amount at a time         HISTORY OF PRESENT ILLNESS: 1 or more Elements   History From: EMS/the patient        Bernice Sheldon is a 78 y.o. male with a past medical history of dementia, prior subarachnoid hemorrhage presenting for altered mental status. EMS reports that the patient became combative with the daughter this evening. Patient's daughter is concerned for possible UTI given his increasing agitation and combative nature, they also report that the patient has had increased urinary frequency with only a small amount of urine coming out. He had a catheter removed earlier this week. Patient was hemodynamically stable in route to the hospital with a normal blood glucose. For EMS, patient was alert and oriented x4. EMS also reports that the patient's daughter is interested in also hospice care as the patient has significant cardiac history and has been evaluated by the [facility] clinic and deemed nonoperable. Currently, the patient has no complaints but does report increasing urinary frequency and and urgency. Nursing Notes were all reviewed and agreed with or any disagreements were addressed in the HPI. REVIEW OF EXTERNAL NOTE :       None    REVIEW OF SYSTEMS :           Positives and Pertinent negatives as per HPI. SURGICAL HISTORY   History reviewed.  No pertinent surgical

## 2023-07-22 NOTE — CONSULTS
[address]  [phone]    INPATIENT CONSULTATION RECORD FOR  7/22/2023      REASON FOR CONSULTATION: Traumatic catheter associated gross hematuria/BPH/UTI      HISTORY OF PRESENT ILLNESS:      The patient is a 78 y.o. male patient who was admitted through the emergency room today with aggressive behavior. He is currently calm and comfortable. He is somewhat limited in his history. I believe he is known to our practice having seen my father in the past.  He apparently was to have a TURP last month which was canceled for some reason I believe due to a cardiology evaluation. He had a catheter in until recently. This was removed. He has been having traumatic catheter associated gross hematuria since then. He is not passing clots. He is comfortable. He would like to avoid replacement of the Duong. He denies dysuria or previous UTIs. Urine culture is pending. He has been started on Rocephin empirically. He has been taking Flomax for some time without side effects. He denies flank or abdominal pain. He denies nausea or vomiting. Past Medical History:  BPH  Hypothyroidism  Aortic stenosis    Past Surgical History:  Colonoscopy on 3/22/2016    Medications Prior to Admission:    Medications Prior to Admission: metoprolol succinate (TOPROL XL) 25 MG extended release tablet, Take 1 tablet by mouth daily  [DISCONTINUED] levETIRAcetam (KEPPRA) 500 MG tablet, Take 1 tablet by mouth 2 times daily for 4 days  levothyroxine (SYNTHROID) 25 MCG tablet, Take 1 tablet by mouth Daily  losartan (COZAAR) 100 MG tablet, Take 1 tablet by mouth daily  tamsulosin (FLOMAX) 0.4 MG capsule, Take 1 capsule by mouth daily    Allergies:    Hydrocodone-acetaminophen and Quinapril    Social History: The reports that he has never smoked. He has never used smokeless tobacco. He reports that he does not drink alcohol and does not use drugs. He is a . He lives at home alone.   His daughter

## 2023-07-23 LAB
EKG ATRIAL RATE: 84 BPM
EKG P AXIS: 26 DEGREES
EKG P-R INTERVAL: 180 MS
EKG Q-T INTERVAL: 366 MS
EKG QRS DURATION: 106 MS
EKG QTC CALCULATION (BAZETT): 432 MS
EKG R AXIS: -12 DEGREES
EKG T AXIS: 2 DEGREES
EKG VENTRICULAR RATE: 84 BPM

## 2023-07-23 PROCEDURE — 1200000000 HC SEMI PRIVATE

## 2023-07-23 PROCEDURE — 93010 ELECTROCARDIOGRAM REPORT: CPT | Performed by: INTERNAL MEDICINE

## 2023-07-23 PROCEDURE — 6370000000 HC RX 637 (ALT 250 FOR IP): Performed by: INTERNAL MEDICINE

## 2023-07-23 PROCEDURE — 2580000003 HC RX 258: Performed by: INTERNAL MEDICINE

## 2023-07-23 PROCEDURE — 2700000000 HC OXYGEN THERAPY PER DAY

## 2023-07-23 PROCEDURE — 6360000002 HC RX W HCPCS: Performed by: INTERNAL MEDICINE

## 2023-07-23 RX ADMIN — SODIUM CHLORIDE: 9 INJECTION, SOLUTION INTRAVENOUS at 06:39

## 2023-07-23 RX ADMIN — LOSARTAN POTASSIUM 100 MG: 50 TABLET, FILM COATED ORAL at 08:29

## 2023-07-23 RX ADMIN — METOPROLOL SUCCINATE 25 MG: 25 TABLET, EXTENDED RELEASE ORAL at 08:29

## 2023-07-23 RX ADMIN — TAMSULOSIN HYDROCHLORIDE 0.4 MG: 0.4 CAPSULE ORAL at 08:29

## 2023-07-23 RX ADMIN — WATER 1000 MG: 1 INJECTION INTRAMUSCULAR; INTRAVENOUS; SUBCUTANEOUS at 06:28

## 2023-07-23 RX ADMIN — LEVOTHYROXINE SODIUM 25 MCG: 0.03 TABLET ORAL at 06:28

## 2023-07-23 NOTE — PLAN OF CARE
Problem: Safety - Adult  Goal: Free from fall injury  7/23/2023 0920 by Jovanny Barrera RN  Outcome: Progressing  7/23/2023 0514 by Joe Mcbride RN  Outcome: Progressing  Flowsheets (Taken 7/22/2023 1945)  Free From Fall Injury: Instruct family/caregiver on patient safety     Problem: Skin/Tissue Integrity  Goal: Absence of new skin breakdown  Description: 1. Monitor for areas of redness and/or skin breakdown  2. Assess vascular access sites hourly  3. Every 4-6 hours minimum:  Change oxygen saturation probe site  4. Every 4-6 hours:  If on nasal continuous positive airway pressure, respiratory therapy assess nares and determine need for appliance change or resting period.   7/23/2023 0920 by Jovanny Barrera RN  Outcome: Progressing  7/23/2023 0514 by Joe Mcbride RN  Outcome: Progressing

## 2023-07-23 NOTE — PLAN OF CARE
Problem: Safety - Adult  Goal: Free from fall injury  Outcome: Progressing  Flowsheets (Taken 7/22/2023 1945)  Free From Fall Injury: Instruct family/caregiver on patient safety     Problem: Skin/Tissue Integrity  Goal: Absence of new skin breakdown  Description: 1. Monitor for areas of redness and/or skin breakdown  2. Assess vascular access sites hourly  3. Every 4-6 hours minimum:  Change oxygen saturation probe site  4. Every 4-6 hours:  If on nasal continuous positive airway pressure, respiratory therapy assess nares and determine need for appliance change or resting period.   Outcome: Progressing

## 2023-07-24 LAB
ALBUMIN SERPL-MCNC: 2.7 G/DL (ref 3.5–5.2)
ALP SERPL-CCNC: 69 U/L (ref 40–129)
ALT SERPL-CCNC: 13 U/L (ref 0–40)
ANION GAP SERPL CALCULATED.3IONS-SCNC: 10 MMOL/L (ref 7–16)
AST SERPL-CCNC: 26 U/L (ref 0–39)
BASOPHILS # BLD: 0.02 K/UL (ref 0–0.2)
BASOPHILS NFR BLD: 0 % (ref 0–2)
BILIRUB SERPL-MCNC: 0.5 MG/DL (ref 0–1.2)
BUN SERPL-MCNC: 18 MG/DL (ref 6–23)
CALCIUM SERPL-MCNC: 9 MG/DL (ref 8.6–10.2)
CHLORIDE SERPL-SCNC: 102 MMOL/L (ref 98–107)
CO2 SERPL-SCNC: 24 MMOL/L (ref 22–29)
CREAT SERPL-MCNC: 0.8 MG/DL (ref 0.7–1.2)
EOSINOPHIL # BLD: 0.01 K/UL (ref 0.05–0.5)
EOSINOPHILS RELATIVE PERCENT: 0 % (ref 0–6)
ERYTHROCYTE [DISTWIDTH] IN BLOOD BY AUTOMATED COUNT: 13.2 % (ref 11.5–15)
GFR SERPL CREATININE-BSD FRML MDRD: >60 ML/MIN/1.73M2
GLUCOSE SERPL-MCNC: 96 MG/DL (ref 74–99)
HCT VFR BLD AUTO: 32.5 % (ref 37–54)
HGB BLD-MCNC: 10.4 G/DL (ref 12.5–16.5)
IMM GRANULOCYTES # BLD AUTO: 0.06 K/UL (ref 0–0.58)
IMM GRANULOCYTES NFR BLD: 1 % (ref 0–5)
LYMPHOCYTES NFR BLD: 0.78 K/UL (ref 1.5–4)
LYMPHOCYTES RELATIVE PERCENT: 9 % (ref 20–42)
MCH RBC QN AUTO: 28.8 PG (ref 26–35)
MCHC RBC AUTO-ENTMCNC: 32 G/DL (ref 32–34.5)
MCV RBC AUTO: 90 FL (ref 80–99.9)
MICROORGANISM SPEC CULT: ABNORMAL
MICROORGANISM SPEC CULT: ABNORMAL
MONOCYTES NFR BLD: 0.68 K/UL (ref 0.1–0.95)
MONOCYTES NFR BLD: 8 % (ref 2–12)
NEUTROPHILS NFR BLD: 82 % (ref 43–80)
NEUTS SEG NFR BLD: 6.82 K/UL (ref 1.8–7.3)
PLATELET # BLD AUTO: 151 K/UL (ref 130–450)
PMV BLD AUTO: 12 FL (ref 7–12)
POTASSIUM SERPL-SCNC: 3.8 MMOL/L (ref 3.5–5)
PROT SERPL-MCNC: 5.9 G/DL (ref 6.4–8.3)
RBC # BLD AUTO: 3.61 M/UL (ref 3.8–5.8)
SODIUM SERPL-SCNC: 136 MMOL/L (ref 132–146)
SPECIMEN DESCRIPTION: ABNORMAL
WBC OTHER # BLD: 8.4 K/UL (ref 4.5–11.5)

## 2023-07-24 PROCEDURE — 6370000000 HC RX 637 (ALT 250 FOR IP): Performed by: INTERNAL MEDICINE

## 2023-07-24 PROCEDURE — 99232 SBSQ HOSP IP/OBS MODERATE 35: CPT | Performed by: NURSE PRACTITIONER

## 2023-07-24 PROCEDURE — 2700000000 HC OXYGEN THERAPY PER DAY

## 2023-07-24 PROCEDURE — 2580000003 HC RX 258: Performed by: INTERNAL MEDICINE

## 2023-07-24 PROCEDURE — 6360000002 HC RX W HCPCS: Performed by: INTERNAL MEDICINE

## 2023-07-24 PROCEDURE — 1200000000 HC SEMI PRIVATE

## 2023-07-24 PROCEDURE — 85027 COMPLETE CBC AUTOMATED: CPT

## 2023-07-24 PROCEDURE — 36415 COLL VENOUS BLD VENIPUNCTURE: CPT

## 2023-07-24 PROCEDURE — 80053 COMPREHEN METABOLIC PANEL: CPT

## 2023-07-24 RX ADMIN — METOPROLOL SUCCINATE 25 MG: 25 TABLET, EXTENDED RELEASE ORAL at 08:13

## 2023-07-24 RX ADMIN — LOSARTAN POTASSIUM 100 MG: 50 TABLET, FILM COATED ORAL at 08:13

## 2023-07-24 RX ADMIN — WATER 1000 MG: 1 INJECTION INTRAMUSCULAR; INTRAVENOUS; SUBCUTANEOUS at 08:14

## 2023-07-24 RX ADMIN — TAMSULOSIN HYDROCHLORIDE 0.4 MG: 0.4 CAPSULE ORAL at 08:13

## 2023-07-24 NOTE — DISCHARGE INSTR - COC
No  Urinary Catheter: Insertion Date: 7/22/23 and Indication for Use of Catheter: Urology/Urologist seeing this patient or inserted indwelling catheter   Colostomy/Ileostomy/Ileal Conduit: No       Date of Last BM: 7/24/23    Intake/Output Summary (Last 24 hours) at 7/24/2023 1619  Last data filed at 7/24/2023 0753  Gross per 24 hour   Intake --   Output 1250 ml   Net -1250 ml     I/O last 3 completed shifts: In: 1115.1 [P.O.:240; I.V.:695.1; Other:180]  Out: 1470 [Urine:1470]    Safety Concerns:     Sundowners Sundrome, History of Falls (last 30 days), and At Risk for Falls    Impairments/Disabilities:      None    Nutrition Therapy:  Current Nutrition Therapy:   - Oral Diet:  General    Routes of Feeding: Oral  Liquids: No Restrictions  Daily Fluid Restriction: no  Last Modified Barium Swallow with Video (Video Swallowing Test): not done    Treatments at the Time of Hospital Discharge:   Respiratory Treatments: None  Oxygen Therapy:  is on oxygen at 3 L/min per nasal cannula. Ventilator:    - No ventilator support    Rehab Therapies: Physical Therapy and Occupational Therapy  Weight Bearing Status/Restrictions: No weight bearing restrictions  Other Medical Equipment (for information only, NOT a DME order):  walker  Other Treatments: none    Patient's personal belongings (please select all that are sent with patient):  Glasses    RN SIGNATURE:  Electronically signed by Zuly Marie RN on 7/25/23 at 4:34 PM EDT    CASE MANAGEMENT/SOCIAL WORK SECTION    Inpatient Status Date: 7/22/23    Readmission Risk Assessment Score:  Readmission Risk              Risk of Unplanned Readmission:  11           Discharging to Facility/ Agency   Name: [address] [place] [address]  Address:  [address] [place] [address].    Phone:  [phone]  Fax:    Dialysis Facility (if applicable)   Name:  Address:  Dialysis Schedule:  Phone:  Fax:    / signature: Electronically signed by FENG Hernández on 7/24/2023 at 4:20

## 2023-07-24 NOTE — ACP (ADVANCE CARE PLANNING)
Advance Care Planning   The patient has the following advanced directives on file:  Advance Directives       Power of [address] Will ACP-Advance Directive ACP-Power of     Not on File Not on File Not on File Not on File            The patient has appointed the following active healthcare agents:    Primary Decision Maker: pedro aceves - Child - [phone]    The Patient has the following current code status:    Code Status: Armando Higuera, FENG  7/24/2023

## 2023-07-24 NOTE — PLAN OF CARE
Problem: Safety - Adult  Goal: Free from fall injury  7/23/2023 2131 by Bravo Balderas RN  Outcome: Progressing  7/23/2023 0920 by Brien Hernandez RN  Outcome: Progressing     Problem: Skin/Tissue Integrity  Goal: Absence of new skin breakdown  Description: 1. Monitor for areas of redness and/or skin breakdown  2. Assess vascular access sites hourly  3. Every 4-6 hours minimum:  Change oxygen saturation probe site  4. Every 4-6 hours:  If on nasal continuous positive airway pressure, respiratory therapy assess nares and determine need for appliance change or resting period.   7/23/2023 2131 by Bravo Balderas RN  Outcome: Progressing  7/23/2023 0920 by Brien Hernandez RN  Outcome: Progressing

## 2023-07-24 NOTE — CARE COORDINATION
Social Work/Case Management Transition of Care Planning (Karl Galvez [place] [phone]): Patient presented to the hospital after patient became confused and agitated at home. He had a samuel catheter removed and was not able to void much. Patient was found to have a UTI. US retroperitoneal is pending. Patient is on IV Rocephin q24. Urology has been consulted. Samuel was replaced due to retention. Palliative care was consulted. Patient is a DNR-CCA/DNI. Daughter has requested a hospice consult for information only. Met with patient at bedside and called his daughter, Eleazar Camejo. Patient resides in a 1 story home with 4 ROSLYN. He lives with his daughter and her . Daughter assists with all aspects of care. They have private duty caregivers in the home through [facility]. Patient has a FWW and a wheelchair for distances. There is a built in shower seat in the shower. He is on room air during the day and CPAP at  through [address]. PCP is Dr. Chiara Torres. [facility]acy [facility] in [address] [place] [address]  No [facility] history. AUDELIA history at [facility]. Plan is for AUDELIA placement at a facility with a memory care unit upon discharge. Daughter choiced 1. [facility] 2. [facility] the [place]. [facility] does not have a memory care unit. Referral information given to Silvia of [facility]. CM/SW will follow. FENG Narayanan  7/24/2023    The Plan for Transition of Care is related to the following treatment goals: AUDELIA    The Patient and/or patient representative was provided with a choice of provider and agrees   with the discharge plan. [x] Yes [] No    Freedom of choice list was provided with basic dialogue that supports the patient's individualized plan of care/goals, treatment preferences and shares the quality data associated with the providers.  [] Yes [x] No  Choice made without list    Case Management Assessment  Initial Evaluation    Date/Time of Evaluation: 7/24/2023 2:41 PM  Assessment Completed by: Roly James

## 2023-07-25 ENCOUNTER — APPOINTMENT (OUTPATIENT)
Dept: ULTRASOUND IMAGING | Age: 78
DRG: 698 | End: 2023-07-25
Payer: MEDICARE

## 2023-07-25 VITALS
WEIGHT: 175 LBS | RESPIRATION RATE: 18 BRPM | DIASTOLIC BLOOD PRESSURE: 72 MMHG | OXYGEN SATURATION: 96 % | SYSTOLIC BLOOD PRESSURE: 131 MMHG | HEART RATE: 61 BPM | BODY MASS INDEX: 25.05 KG/M2 | TEMPERATURE: 98.2 F | HEIGHT: 70 IN

## 2023-07-25 LAB — NON-GYN CYTOLOGY REPORT: NORMAL

## 2023-07-25 PROCEDURE — 2580000003 HC RX 258: Performed by: INTERNAL MEDICINE

## 2023-07-25 PROCEDURE — 97530 THERAPEUTIC ACTIVITIES: CPT

## 2023-07-25 PROCEDURE — 6370000000 HC RX 637 (ALT 250 FOR IP): Performed by: INTERNAL MEDICINE

## 2023-07-25 PROCEDURE — 97535 SELF CARE MNGMENT TRAINING: CPT

## 2023-07-25 PROCEDURE — 6360000002 HC RX W HCPCS: Performed by: INTERNAL MEDICINE

## 2023-07-25 PROCEDURE — 76770 US EXAM ABDO BACK WALL COMP: CPT

## 2023-07-25 PROCEDURE — 97161 PT EVAL LOW COMPLEX 20 MIN: CPT

## 2023-07-25 PROCEDURE — 2700000000 HC OXYGEN THERAPY PER DAY

## 2023-07-25 PROCEDURE — 97165 OT EVAL LOW COMPLEX 30 MIN: CPT

## 2023-07-25 RX ORDER — CIPROFLOXACIN 500 MG/1
500 TABLET, FILM COATED ORAL ONCE
Qty: 1 TABLET | Refills: 0 | DISCHARGE
Start: 2023-07-25 | End: 2023-07-25

## 2023-07-25 RX ORDER — CIPROFLOXACIN 500 MG/1
500 TABLET, FILM COATED ORAL ONCE
Status: COMPLETED | OUTPATIENT
Start: 2023-07-25 | End: 2023-07-25

## 2023-07-25 RX ADMIN — LEVOTHYROXINE SODIUM 25 MCG: 0.03 TABLET ORAL at 05:31

## 2023-07-25 RX ADMIN — WATER 1000 MG: 1 INJECTION INTRAMUSCULAR; INTRAVENOUS; SUBCUTANEOUS at 07:32

## 2023-07-25 RX ADMIN — CIPROFLOXACIN 500 MG: 500 TABLET, FILM COATED ORAL at 14:12

## 2023-07-25 RX ADMIN — METOPROLOL SUCCINATE 25 MG: 25 TABLET, EXTENDED RELEASE ORAL at 08:08

## 2023-07-25 RX ADMIN — TAMSULOSIN HYDROCHLORIDE 0.4 MG: 0.4 CAPSULE ORAL at 08:07

## 2023-07-25 RX ADMIN — LOSARTAN POTASSIUM 100 MG: 50 TABLET, FILM COATED ORAL at 08:08

## 2023-07-25 NOTE — PLAN OF CARE
Problem: Safety - Adult  Goal: Free from fall injury  7/25/2023 1626 by Arleth Verma RN  Outcome: Adequate for Discharge  7/25/2023 1626 by Arleth Verma RN  Outcome: Progressing  7/25/2023 1122 by Arleth Verma RN  Outcome: Progressing     Problem: Skin/Tissue Integrity  Goal: Absence of new skin breakdown  Description: 1. Monitor for areas of redness and/or skin breakdown  2. Assess vascular access sites hourly  3. Every 4-6 hours minimum:  Change oxygen saturation probe site  4. Every 4-6 hours:  If on nasal continuous positive airway pressure, respiratory therapy assess nares and determine need for appliance change or resting period.   7/25/2023 1626 by Arleth Verma RN  Outcome: Adequate for Discharge  7/25/2023 1626 by Arleth Verma RN  Outcome: Progressing  7/25/2023 1122 by Arleth Verma RN  Outcome: Progressing

## 2023-07-25 NOTE — PLAN OF CARE
Problem: Safety - Adult  Goal: Free from fall injury  7/25/2023 0225 by Ancelmo Reese RN  Outcome: Progressing     Problem: Skin/Tissue Integrity  Goal: Absence of new skin breakdown  Description: 1. Monitor for areas of redness and/or skin breakdown  2. Assess vascular access sites hourly  3. Every 4-6 hours minimum:  Change oxygen saturation probe site  4. Every 4-6 hours:  If on nasal continuous positive airway pressure, respiratory therapy assess nares and determine need for appliance change or resting period.   7/25/2023 0225 by Ancelmo Reese RN  Outcome: Progressing

## 2023-07-25 NOTE — PLAN OF CARE
Problem: Safety - Adult  Goal: Free from fall injury  7/25/2023 1122 by Arelis Cullen RN  Outcome: Progressing  7/25/2023 0225 by Alaina Biggs RN  Outcome: Progressing     Problem: Skin/Tissue Integrity  Goal: Absence of new skin breakdown  Description: 1. Monitor for areas of redness and/or skin breakdown  2. Assess vascular access sites hourly  3. Every 4-6 hours minimum:  Change oxygen saturation probe site  4. Every 4-6 hours:  If on nasal continuous positive airway pressure, respiratory therapy assess nares and determine need for appliance change or resting period.   7/25/2023 1122 by Arelis Cullen RN  Outcome: Progressing  7/25/2023 0225 by Alaina Biggs RN  Outcome: Progressing

## 2023-07-25 NOTE — H&P
Care Coordination  The patient is set up to go to [facility] at the [facility] at 8 pm this evening by [facility] ambulance. Left a detailed voice message for the patients daughter Javad Foster at phone is [phone]. He is going on po cipro. Envelope done with ambulance and hens done.  Will follow

## 2023-07-25 NOTE — CARE COORDINATION
Care Coordination  The patient was admitted due to a uti and is on Iv Rocephin 1 gm iv q24 hrs till 7/27/23. [facility] has accepted the patient and has started the auth this am 7/25/23. Spoke to the daughter Louisa Rosa In the room who is the poa she is in agreement with [facility] the [facility]. Ambulance envelope done and hens started and will need completed upon discharge.

## 2023-07-26 NOTE — DISCHARGE SUMMARY
Physician Discharge Summary     Patient ID:  Maryhcuy Lacey  [id number]  22 y.o.  1945    Admit date: 7/21/2023    Discharge date and time: 7/25/2023 11:01 PM     Admission Diagnoses: Acute cystitis without hematuria [N30.00]  Altered mental status, unspecified altered mental status type [R41.82]  Urinary tract infection in elderly patient [N39.0]    Discharge Diagnoses:   Catheter associated UTI  Urosepsis    Patient Active Problem List   Diagnosis    SAH (subarachnoid hemorrhage) ([address])    Fall at home    Urinary tract infection in elderly patient       Consults: Urology    Procedures: Reinsertion of Duong cath    Hospital Course:   49-year-old  man with a known history of acute on chronic urinary retention from BPH  He had a Duong catheter removed recently since then he was not feeling well  Presented with altered mental status from dehydration and sepsis  Responded well to IV fluids  Duong was reinserted  UTI was treated with the Rocephin  Cultures showed Serratia  Rocephin was changed to Cipro orally  Discharged to subacute rehab with Duong in place  Outpatient urology follow-up recommended for possible TURP    Discharge Exam:  See progress note from today    Disposition: Subacute rehab  Stable at the time of discharge  Patient Instructions:   Discharge Medication List as of 7/25/2023  5:19 PM        START taking these medications    Details   ciprofloxacin (CIPRO) 500 MG tablet Take 1 tablet by mouth once for 1 dose, Disp-1 tablet, R-0DC to SNF           CONTINUE these medications which have NOT CHANGED    Details   metoprolol succinate (TOPROL XL) 25 MG extended release tablet Take 1 tablet by mouth daily, Disp-90 tablet, R-3Normal      levothyroxine (SYNTHROID) 25 MCG tablet Take 1 tablet by mouth DailyHistorical Med      losartan (COZAAR) 100 MG tablet Take 1 tablet by mouth dailyHistorical Med      tamsulosin (FLOMAX) 0.4 MG capsule Take 1 capsule by mouth dailyHistorical Med           STOP

## 2023-08-04 ENCOUNTER — TELEPHONE (OUTPATIENT)
Dept: PHARMACY | Facility: CLINIC | Age: 78
End: 2023-08-04

## 2023-08-04 RX ORDER — ROSUVASTATIN CALCIUM 10 MG/1
TABLET, COATED ORAL
COMMUNITY
Start: 2023-07-09

## 2023-08-04 NOTE — TELEPHONE ENCOUNTER
[facility] HEALTH CLINICAL PHARMACY REVIEW: ADHERENCE  Identified care gap per [employer]: fills at [facility]: ACE/ARB adherence    Patient also appears to be prescribed: Statin    ASSESSMENT  ACE/ARB ADHERENCE    Insurance Records claims through 07/24/2023 (Prior Year [address] = not reported; YTD [address] = 72%; Potential Fail Date: 08/11/2023):   losartan 100mg daily last filled on 04/30/2023 for 14 day supply. Next refill due: 05/14/2023     Per [employer] Portal: Likely has at least 1 refill remaining per Clinical Profile    Per Reconciled Dispense Report as of 08/04/2023:   Dispensed Days Supply Quantity Provider Pharmacy   Losartan Pot 100 Mg Tab Solc 04/30/2023 14 14 each Lydia Hawkins MD [id number] [place]. .. LOSARTAN POTASSIUM 100 MG TAB 03/24/2023 30 30 each Lydia Hawkins [facility] .. Losartan Pot 100 Mg Tab Solc 02/28/2023 90 90 each Robel Coleman MD [id number] [place]. .. Losartan Pot 100 Mg Tab Solc 11/28/2022 90 90 each MD [id number] [place]. .. Losartan Pot 100 Mg Tab Solc 08/29/2022 90 90 each MD [id number] [place]. .. Losartan Pot 100 Mg Tab Camb 06/10/2022 90 90 each MD [id number] [place]. .. Losartan Pot 100 Mg Tab Auro 04/03/2022 90 90 each Robel Coleman MD [id number] [place]. .. BP Readings from Last 3 Encounters:   07/25/23 131/72   05/04/23 (!) 140/80   04/19/23 136/76     Lab Results   Component Value Date/Time    LABGLOM >60 07/24/2023 04:55 AM     Lab Results   Component Value Date    CREATININE 0.8 07/24/2023     Estimated Creatinine Clearance: 76 mL/min (based on SCr of 0.8 mg/dL). Lab Results   Component Value Date    K 3.8 07/24/2023      STATIN ADHERENCE    Insurance Records claims through 07/24/2023 (Prior Year [address] = not reported; YTD [address] = FIRST FILL; Potential Fail Date: 11/11/2023):   rosuvastatin 10mg daily last filled on 07/09/2023 for 90 day supply.  Next

## 2023-08-09 ENCOUNTER — OUTSIDE SERVICES (OUTPATIENT)
Dept: PRIMARY CARE CLINIC | Age: 78
End: 2023-08-09

## 2023-08-09 DIAGNOSIS — N13.9 ACUTE UNILATERAL OBSTRUCTIVE UROPATHY: ICD-10-CM

## 2023-08-09 DIAGNOSIS — E03.9 HYPOTHYROIDISM, UNSPECIFIED TYPE: ICD-10-CM

## 2023-08-09 DIAGNOSIS — N39.0 URINARY TRACT INFECTION WITHOUT HEMATURIA, SITE UNSPECIFIED: ICD-10-CM

## 2023-08-09 DIAGNOSIS — I10 PRIMARY HYPERTENSION: ICD-10-CM

## 2023-08-09 DIAGNOSIS — R53.1 WEAKNESS: ICD-10-CM

## 2023-08-09 DIAGNOSIS — N40.1 BENIGN PROSTATIC HYPERPLASIA WITH LOWER URINARY TRACT SYMPTOMS, SYMPTOM DETAILS UNSPECIFIED: ICD-10-CM

## 2023-08-09 DIAGNOSIS — N30.00 ACUTE CYSTITIS WITHOUT HEMATURIA: Primary | ICD-10-CM

## 2023-08-11 ENCOUNTER — CARE COORDINATION (OUTPATIENT)
Dept: CASE MANAGEMENT | Age: 78
End: 2023-08-11

## 2023-08-11 NOTE — CARE COORDINATION
[address] Discharge Call    2023    Patient: Philip Villagomez Patient : 1945   MRN: [id number]  Reason for Admission: CAUTI  Discharge Date: 23 RARS: Readmission Risk Score: 12         Discharge Facility:  [facility] at the [address] Course:    to  [facility] with a CAUTI and a fall. Catheter was recently removed and was replaced during this hospital stay. Recommended appt with Urology for possible TURP   to 8/10 [facility] the [facility] to home with [address] made:  none    Sig Hx:   HTN, Valve disease, hypothyroid, ANGUS    DME:     Conversation:   Left HIPPA compliant message regarding the nature of the call and a request for a return call with my contact information      BRIDGET Malhotra, [phone]  [facility] Transition Nurse         Follow up plan: Will re-attempt             Care Transitions Post Acute Facility Transition               Care Transitions Interventions         No future appointments.     BRIDGET Malhotra, RN   [address] Transition Nurse  [phone]

## 2023-08-14 ENCOUNTER — CARE COORDINATION (OUTPATIENT)
Dept: CASE MANAGEMENT | Age: 78
End: 2023-08-14

## 2023-08-14 DIAGNOSIS — N39.0 URINARY TRACT INFECTION IN ELDERLY PATIENT: Primary | ICD-10-CM

## 2023-08-14 PROCEDURE — 1111F DSCHRG MED/CURRENT MED MERGE: CPT | Performed by: INTERNAL MEDICINE

## 2023-08-14 NOTE — CARE COORDINATION
provided:  Education of patient/family/caregiver/guardian to support self-management-home safety. Pt does not want to go to hospital.     Offered patient enrollment in the Remote Patient Monitoring (RPM) program for in-home monitoring: NA.    Care Transitions 24 Hour Call    Do you have all of your prescriptions and are they filled?: Yes  Have you scheduled your follow up appointment?: Yes  How are you going to get to your appointment?: Car - family or friend to transport  Were you discharged with any Home Care or Post Acute Services: Yes  Post Acute Services: Home Health (Comment: called [facility] for the name)  Do you feel like you have everything you need to keep you well at home?: No  Care Transitions Interventions         Discussed follow-up appointments. If no appointment was previously scheduled, appointment scheduling offered: Yes. Is follow up appointment scheduled within 7 days of discharge? Yes. Follow Up  No future appointments. Care Transition Nurse provided contact information. Plan for follow-up call in 1-2 days based on severity of symptoms and risk factors.   Plan for next call:  awaitng U review      BRIDGET Toussaint, RN   [address] Transition Nurse  [phone]

## 2023-08-15 ENCOUNTER — APPOINTMENT (OUTPATIENT)
Dept: CT IMAGING | Age: 78
DRG: 698 | End: 2023-08-15
Payer: MEDICARE

## 2023-08-15 ENCOUNTER — HOSPITAL ENCOUNTER (INPATIENT)
Age: 78
LOS: 8 days | Discharge: NURSING, REHAB OR LONG-TERM CARE | DRG: 698 | End: 2023-08-23
Attending: EMERGENCY MEDICINE | Admitting: HOSPITALIST
Payer: MEDICARE

## 2023-08-15 ENCOUNTER — APPOINTMENT (OUTPATIENT)
Dept: GENERAL RADIOLOGY | Age: 78
DRG: 698 | End: 2023-08-15
Payer: MEDICARE

## 2023-08-15 DIAGNOSIS — T83.511A URINARY TRACT INFECTION ASSOCIATED WITH CATHETERIZATION OF URINARY TRACT, UNSPECIFIED INDWELLING URINARY CATHETER TYPE, INITIAL ENCOUNTER (HCC): Primary | ICD-10-CM

## 2023-08-15 DIAGNOSIS — R50.9 FEVER, UNSPECIFIED FEVER CAUSE: ICD-10-CM

## 2023-08-15 DIAGNOSIS — N39.0 URINARY TRACT INFECTION ASSOCIATED WITH CATHETERIZATION OF URINARY TRACT, UNSPECIFIED INDWELLING URINARY CATHETER TYPE, INITIAL ENCOUNTER (HCC): Primary | ICD-10-CM

## 2023-08-15 DIAGNOSIS — N12 PYELONEPHRITIS: ICD-10-CM

## 2023-08-15 LAB
ALBUMIN SERPL-MCNC: 3.5 G/DL (ref 3.5–5.2)
ALP SERPL-CCNC: 102 U/L (ref 40–129)
ALT SERPL-CCNC: 16 U/L (ref 0–40)
ANION GAP SERPL CALCULATED.3IONS-SCNC: 14 MMOL/L (ref 7–16)
AST SERPL-CCNC: 26 U/L (ref 0–39)
BACTERIA URNS QL MICRO: ABNORMAL
BASOPHILS # BLD: 0.01 K/UL (ref 0–0.2)
BASOPHILS NFR BLD: 0 % (ref 0–2)
BILIRUB SERPL-MCNC: 0.8 MG/DL (ref 0–1.2)
BILIRUB UR QL STRIP: NEGATIVE
BUN SERPL-MCNC: 29 MG/DL (ref 6–23)
CALCIUM SERPL-MCNC: 10.1 MG/DL (ref 8.6–10.2)
CHLORIDE SERPL-SCNC: 100 MMOL/L (ref 98–107)
CLARITY UR: ABNORMAL
CO2 SERPL-SCNC: 23 MMOL/L (ref 22–29)
COLOR UR: YELLOW
CREAT SERPL-MCNC: 1.1 MG/DL (ref 0.7–1.2)
EOSINOPHIL # BLD: 0 K/UL (ref 0.05–0.5)
EOSINOPHILS RELATIVE PERCENT: 0 % (ref 0–6)
ERYTHROCYTE [DISTWIDTH] IN BLOOD BY AUTOMATED COUNT: 14 % (ref 11.5–15)
GFR SERPL CREATININE-BSD FRML MDRD: >60 ML/MIN/1.73M2
GLUCOSE SERPL-MCNC: 125 MG/DL (ref 74–99)
GLUCOSE UR STRIP-MCNC: NEGATIVE MG/DL
HCT VFR BLD AUTO: 36.5 % (ref 37–54)
HGB BLD-MCNC: 11.8 G/DL (ref 12.5–16.5)
HGB UR QL STRIP.AUTO: ABNORMAL
IMM GRANULOCYTES # BLD AUTO: 0.03 K/UL (ref 0–0.58)
IMM GRANULOCYTES NFR BLD: 0 % (ref 0–5)
KETONES UR STRIP-MCNC: NEGATIVE MG/DL
LACTATE BLDV-SCNC: 1.6 MMOL/L (ref 0.5–1.9)
LEUKOCYTE ESTERASE UR QL STRIP: ABNORMAL
LIPASE SERPL-CCNC: 30 U/L (ref 13–60)
LYMPHOCYTES NFR BLD: 0.3 K/UL (ref 1.5–4)
LYMPHOCYTES RELATIVE PERCENT: 4 % (ref 20–42)
MCH RBC QN AUTO: 28.2 PG (ref 26–35)
MCHC RBC AUTO-ENTMCNC: 32.3 G/DL (ref 32–34.5)
MCV RBC AUTO: 87.3 FL (ref 80–99.9)
MONOCYTES NFR BLD: 0.55 K/UL (ref 0.1–0.95)
MONOCYTES NFR BLD: 7 % (ref 2–12)
NEUTROPHILS NFR BLD: 89 % (ref 43–80)
NEUTS SEG NFR BLD: 6.85 K/UL (ref 1.8–7.3)
NITRITE UR QL STRIP: POSITIVE
PH UR STRIP: 6 [PH] (ref 5–9)
PLATELET # BLD AUTO: 196 K/UL (ref 130–450)
PMV BLD AUTO: 11.4 FL (ref 7–12)
POTASSIUM SERPL-SCNC: 4 MMOL/L (ref 3.5–5)
PROT SERPL-MCNC: 7.3 G/DL (ref 6.4–8.3)
PROT UR STRIP-MCNC: 100 MG/DL
RBC # BLD AUTO: 4.18 M/UL (ref 3.8–5.8)
RBC # BLD: ABNORMAL 10*6/UL
RBC #/AREA URNS HPF: ABNORMAL /HPF
SODIUM SERPL-SCNC: 137 MMOL/L (ref 132–146)
SP GR UR STRIP: 1.02 (ref 1–1.03)
UROBILINOGEN UR STRIP-ACNC: 0.2 EU/DL (ref 0–1)
WBC #/AREA URNS HPF: ABNORMAL /HPF
WBC OTHER # BLD: 7.7 K/UL (ref 4.5–11.5)

## 2023-08-15 PROCEDURE — 99285 EMERGENCY DEPT VISIT HI MDM: CPT

## 2023-08-15 PROCEDURE — 81001 URINALYSIS AUTO W/SCOPE: CPT

## 2023-08-15 PROCEDURE — 6360000004 HC RX CONTRAST MEDICATION: Performed by: RADIOLOGY

## 2023-08-15 PROCEDURE — 87184 SC STD DISK METHOD PER PLATE: CPT

## 2023-08-15 PROCEDURE — 87077 CULTURE AEROBIC IDENTIFY: CPT

## 2023-08-15 PROCEDURE — 87086 URINE CULTURE/COLONY COUNT: CPT

## 2023-08-15 PROCEDURE — 2700000000 HC OXYGEN THERAPY PER DAY

## 2023-08-15 PROCEDURE — 80053 COMPREHEN METABOLIC PANEL: CPT

## 2023-08-15 PROCEDURE — 6370000000 HC RX 637 (ALT 250 FOR IP)

## 2023-08-15 PROCEDURE — 85025 COMPLETE CBC W/AUTO DIFF WBC: CPT

## 2023-08-15 PROCEDURE — 71045 X-RAY EXAM CHEST 1 VIEW: CPT

## 2023-08-15 PROCEDURE — 6360000002 HC RX W HCPCS

## 2023-08-15 PROCEDURE — 87040 BLOOD CULTURE FOR BACTERIA: CPT

## 2023-08-15 PROCEDURE — 74177 CT ABD & PELVIS W/CONTRAST: CPT

## 2023-08-15 PROCEDURE — 83605 ASSAY OF LACTIC ACID: CPT

## 2023-08-15 PROCEDURE — 2580000003 HC RX 258: Performed by: NURSE PRACTITIONER

## 2023-08-15 PROCEDURE — 83690 ASSAY OF LIPASE: CPT

## 2023-08-15 PROCEDURE — 2060000000 HC ICU INTERMEDIATE R&B

## 2023-08-15 PROCEDURE — 2580000003 HC RX 258

## 2023-08-15 PROCEDURE — 87154 CUL TYP ID BLD PTHGN 6+ TRGT: CPT

## 2023-08-15 PROCEDURE — 96365 THER/PROPH/DIAG IV INF INIT: CPT

## 2023-08-15 RX ORDER — SODIUM CHLORIDE 0.9 % (FLUSH) 0.9 %
10 SYRINGE (ML) INJECTION PRN
Status: DISCONTINUED | OUTPATIENT
Start: 2023-08-15 | End: 2023-08-24 | Stop reason: HOSPADM

## 2023-08-15 RX ORDER — ACETAMINOPHEN 500 MG
1000 TABLET ORAL ONCE
Status: COMPLETED | OUTPATIENT
Start: 2023-08-15 | End: 2023-08-15

## 2023-08-15 RX ORDER — LOSARTAN POTASSIUM 100 MG/1
TABLET ORAL
COMMUNITY

## 2023-08-15 RX ORDER — ONDANSETRON 4 MG/1
4 TABLET, ORALLY DISINTEGRATING ORAL EVERY 8 HOURS PRN
Status: DISCONTINUED | OUTPATIENT
Start: 2023-08-15 | End: 2023-08-24 | Stop reason: HOSPADM

## 2023-08-15 RX ORDER — SODIUM CHLORIDE 0.9 % (FLUSH) 0.9 %
10 SYRINGE (ML) INJECTION EVERY 12 HOURS SCHEDULED
Status: DISCONTINUED | OUTPATIENT
Start: 2023-08-15 | End: 2023-08-24 | Stop reason: HOSPADM

## 2023-08-15 RX ORDER — POTASSIUM CHLORIDE 7.45 MG/ML
10 INJECTION INTRAVENOUS PRN
Status: DISCONTINUED | OUTPATIENT
Start: 2023-08-15 | End: 2023-08-24 | Stop reason: HOSPADM

## 2023-08-15 RX ORDER — SODIUM CHLORIDE 9 MG/ML
INJECTION, SOLUTION INTRAVENOUS CONTINUOUS
Status: DISCONTINUED | OUTPATIENT
Start: 2023-08-15 | End: 2023-08-19

## 2023-08-15 RX ORDER — CITALOPRAM 20 MG/1
10 TABLET ORAL DAILY
Status: DISCONTINUED | OUTPATIENT
Start: 2023-08-16 | End: 2023-08-24 | Stop reason: HOSPADM

## 2023-08-15 RX ORDER — ACETAMINOPHEN 650 MG/1
650 SUPPOSITORY RECTAL EVERY 6 HOURS PRN
Status: DISCONTINUED | OUTPATIENT
Start: 2023-08-15 | End: 2023-08-24 | Stop reason: HOSPADM

## 2023-08-15 RX ORDER — LEVOTHYROXINE SODIUM 0.03 MG/1
25 TABLET ORAL DAILY
Status: DISCONTINUED | OUTPATIENT
Start: 2023-08-16 | End: 2023-08-24 | Stop reason: HOSPADM

## 2023-08-15 RX ORDER — ENOXAPARIN SODIUM 100 MG/ML
40 INJECTION SUBCUTANEOUS DAILY
Status: DISCONTINUED | OUTPATIENT
Start: 2023-08-16 | End: 2023-08-16

## 2023-08-15 RX ORDER — BUSPIRONE HYDROCHLORIDE 7.5 MG/1
7.5 TABLET ORAL NIGHTLY
COMMUNITY
Start: 2023-05-30 | End: 2023-08-15 | Stop reason: ALTCHOICE

## 2023-08-15 RX ORDER — ONDANSETRON 2 MG/ML
4 INJECTION INTRAMUSCULAR; INTRAVENOUS EVERY 6 HOURS PRN
Status: DISCONTINUED | OUTPATIENT
Start: 2023-08-15 | End: 2023-08-24 | Stop reason: HOSPADM

## 2023-08-15 RX ORDER — LOSARTAN POTASSIUM 50 MG/1
100 TABLET ORAL DAILY
Status: DISCONTINUED | OUTPATIENT
Start: 2023-08-16 | End: 2023-08-19

## 2023-08-15 RX ORDER — POTASSIUM CHLORIDE 20 MEQ/1
40 TABLET, EXTENDED RELEASE ORAL PRN
Status: DISCONTINUED | OUTPATIENT
Start: 2023-08-15 | End: 2023-08-24 | Stop reason: HOSPADM

## 2023-08-15 RX ORDER — MAGNESIUM SULFATE IN WATER 40 MG/ML
2000 INJECTION, SOLUTION INTRAVENOUS PRN
Status: DISCONTINUED | OUTPATIENT
Start: 2023-08-15 | End: 2023-08-24 | Stop reason: HOSPADM

## 2023-08-15 RX ORDER — SENNOSIDES A AND B 8.6 MG/1
1 TABLET, FILM COATED ORAL DAILY PRN
Status: DISCONTINUED | OUTPATIENT
Start: 2023-08-15 | End: 2023-08-24 | Stop reason: HOSPADM

## 2023-08-15 RX ORDER — TAMSULOSIN HYDROCHLORIDE 0.4 MG/1
0.4 CAPSULE ORAL DAILY
COMMUNITY

## 2023-08-15 RX ORDER — SODIUM CHLORIDE 9 MG/ML
INJECTION, SOLUTION INTRAVENOUS PRN
Status: DISCONTINUED | OUTPATIENT
Start: 2023-08-15 | End: 2023-08-24 | Stop reason: HOSPADM

## 2023-08-15 RX ORDER — TAMSULOSIN HYDROCHLORIDE 0.4 MG/1
0.4 CAPSULE ORAL DAILY
Status: DISCONTINUED | OUTPATIENT
Start: 2023-08-16 | End: 2023-08-24 | Stop reason: HOSPADM

## 2023-08-15 RX ORDER — LEVOTHYROXINE SODIUM 0.03 MG/1
TABLET ORAL
COMMUNITY

## 2023-08-15 RX ORDER — ACETAMINOPHEN 325 MG/1
650 TABLET ORAL EVERY 6 HOURS PRN
Status: DISCONTINUED | OUTPATIENT
Start: 2023-08-15 | End: 2023-08-24 | Stop reason: HOSPADM

## 2023-08-15 RX ORDER — 0.9 % SODIUM CHLORIDE 0.9 %
500 INTRAVENOUS SOLUTION INTRAVENOUS ONCE
Status: COMPLETED | OUTPATIENT
Start: 2023-08-15 | End: 2023-08-15

## 2023-08-15 RX ORDER — METOPROLOL SUCCINATE 25 MG/1
25 TABLET, EXTENDED RELEASE ORAL DAILY
Status: DISCONTINUED | OUTPATIENT
Start: 2023-08-16 | End: 2023-08-24 | Stop reason: HOSPADM

## 2023-08-15 RX ORDER — ROSUVASTATIN CALCIUM 10 MG/1
10 TABLET, COATED ORAL NIGHTLY
Status: DISCONTINUED | OUTPATIENT
Start: 2023-08-15 | End: 2023-08-24 | Stop reason: HOSPADM

## 2023-08-15 RX ORDER — ACETAMINOPHEN 325 MG/1
650 TABLET ORAL ONCE
Status: CANCELLED | OUTPATIENT
Start: 2023-08-15 | End: 2023-08-15

## 2023-08-15 RX ORDER — CITALOPRAM HYDROBROMIDE 10 MG/1
TABLET ORAL
COMMUNITY

## 2023-08-15 RX ADMIN — CEFEPIME 2000 MG: 2 INJECTION, POWDER, FOR SOLUTION INTRAVENOUS at 13:36

## 2023-08-15 RX ADMIN — SODIUM CHLORIDE: 9 INJECTION, SOLUTION INTRAVENOUS at 23:52

## 2023-08-15 RX ADMIN — Medication 10 ML: at 23:49

## 2023-08-15 RX ADMIN — IOPAMIDOL 75 ML: 755 INJECTION, SOLUTION INTRAVENOUS at 14:58

## 2023-08-15 RX ADMIN — SODIUM CHLORIDE 500 ML: 9 INJECTION, SOLUTION INTRAVENOUS at 13:36

## 2023-08-15 RX ADMIN — ACETAMINOPHEN 1000 MG: 500 TABLET ORAL at 17:39

## 2023-08-15 ASSESSMENT — PAIN - FUNCTIONAL ASSESSMENT
PAIN_FUNCTIONAL_ASSESSMENT: NONE - DENIES PAIN
PAIN_FUNCTIONAL_ASSESSMENT: NONE - DENIES PAIN

## 2023-08-15 NOTE — ED NOTES
Daughter - Russel Bonilla [phone] - she is POA. Full DNR. (On file with hospital) - per daughter. All decisions made by her. Please call her if any questions. Pt wears a Bipap at night. Daughter states that he has not worn in the last three days.       Naga Eason, MARLENY  08/15/23 [address], RN  08/15/23 2588

## 2023-08-16 PROBLEM — I10 BENIGN ESSENTIAL HYPERTENSION: Status: ACTIVE | Noted: 2023-08-16

## 2023-08-16 PROBLEM — A41.9 SEPSIS (HCC): Status: ACTIVE | Noted: 2023-08-16

## 2023-08-16 PROBLEM — E03.9 HYPOTHYROIDISM: Status: ACTIVE | Noted: 2023-08-16

## 2023-08-16 PROBLEM — G47.30 SLEEP APNEA: Status: ACTIVE | Noted: 2023-08-16

## 2023-08-16 PROBLEM — R50.9 FEVER: Status: ACTIVE | Noted: 2023-08-16

## 2023-08-16 PROBLEM — N40.0 BENIGN PROSTATIC HYPERPLASIA WITHOUT URINARY OBSTRUCTION: Status: ACTIVE | Noted: 2021-09-03

## 2023-08-16 LAB
ALBUMIN SERPL-MCNC: 2.6 G/DL (ref 3.5–5.2)
ALP SERPL-CCNC: 71 U/L (ref 40–129)
ALT SERPL-CCNC: 12 U/L (ref 0–40)
ANION GAP SERPL CALCULATED.3IONS-SCNC: 10 MMOL/L (ref 7–16)
AST SERPL-CCNC: 19 U/L (ref 0–39)
BASOPHILS # BLD: 0.01 K/UL (ref 0–0.2)
BASOPHILS NFR BLD: 0 % (ref 0–2)
BILIRUB SERPL-MCNC: 0.5 MG/DL (ref 0–1.2)
BUN SERPL-MCNC: 23 MG/DL (ref 6–23)
CALCIUM SERPL-MCNC: 9.4 MG/DL (ref 8.6–10.2)
CHLORIDE SERPL-SCNC: 107 MMOL/L (ref 98–107)
CO2 SERPL-SCNC: 22 MMOL/L (ref 22–29)
CREAT SERPL-MCNC: 0.8 MG/DL (ref 0.7–1.2)
EOSINOPHIL # BLD: 0.01 K/UL (ref 0.05–0.5)
EOSINOPHILS RELATIVE PERCENT: 0 % (ref 0–6)
ERYTHROCYTE [DISTWIDTH] IN BLOOD BY AUTOMATED COUNT: 13.9 % (ref 11.5–15)
GFR SERPL CREATININE-BSD FRML MDRD: >60 ML/MIN/1.73M2
GLUCOSE SERPL-MCNC: 100 MG/DL (ref 74–99)
HCT VFR BLD AUTO: 34.2 % (ref 37–54)
HGB BLD-MCNC: 11 G/DL (ref 12.5–16.5)
IMM GRANULOCYTES # BLD AUTO: 0.03 K/UL (ref 0–0.58)
IMM GRANULOCYTES NFR BLD: 0 % (ref 0–5)
LYMPHOCYTES NFR BLD: 0.4 K/UL (ref 1.5–4)
LYMPHOCYTES RELATIVE PERCENT: 5 % (ref 20–42)
MAGNESIUM SERPL-MCNC: 2 MG/DL (ref 1.6–2.6)
MCH RBC QN AUTO: 28 PG (ref 26–35)
MCHC RBC AUTO-ENTMCNC: 32.2 G/DL (ref 32–34.5)
MCV RBC AUTO: 87 FL (ref 80–99.9)
MONOCYTES NFR BLD: 0.68 K/UL (ref 0.1–0.95)
MONOCYTES NFR BLD: 9 % (ref 2–12)
NEUTROPHILS NFR BLD: 85 % (ref 43–80)
NEUTS SEG NFR BLD: 6.51 K/UL (ref 1.8–7.3)
PLATELET # BLD AUTO: 153 K/UL (ref 130–450)
PMV BLD AUTO: 11.2 FL (ref 7–12)
POTASSIUM SERPL-SCNC: 3.5 MMOL/L (ref 3.5–5)
PROT SERPL-MCNC: 6.2 G/DL (ref 6.4–8.3)
RBC # BLD AUTO: 3.93 M/UL (ref 3.8–5.8)
RBC # BLD: ABNORMAL 10*6/UL
SODIUM SERPL-SCNC: 139 MMOL/L (ref 132–146)
WBC OTHER # BLD: 7.6 K/UL (ref 4.5–11.5)

## 2023-08-16 PROCEDURE — 97165 OT EVAL LOW COMPLEX 30 MIN: CPT

## 2023-08-16 PROCEDURE — 2700000000 HC OXYGEN THERAPY PER DAY

## 2023-08-16 PROCEDURE — 6370000000 HC RX 637 (ALT 250 FOR IP): Performed by: NURSE PRACTITIONER

## 2023-08-16 PROCEDURE — 6370000000 HC RX 637 (ALT 250 FOR IP): Performed by: HOSPITALIST

## 2023-08-16 PROCEDURE — 2060000000 HC ICU INTERMEDIATE R&B

## 2023-08-16 PROCEDURE — 2580000003 HC RX 258: Performed by: NURSE PRACTITIONER

## 2023-08-16 PROCEDURE — 6360000002 HC RX W HCPCS: Performed by: NURSE PRACTITIONER

## 2023-08-16 PROCEDURE — 85025 COMPLETE CBC W/AUTO DIFF WBC: CPT

## 2023-08-16 PROCEDURE — 80053 COMPREHEN METABOLIC PANEL: CPT

## 2023-08-16 PROCEDURE — 36415 COLL VENOUS BLD VENIPUNCTURE: CPT

## 2023-08-16 PROCEDURE — 83735 ASSAY OF MAGNESIUM: CPT

## 2023-08-16 PROCEDURE — 94660 CPAP INITIATION&MGMT: CPT

## 2023-08-16 RX ORDER — DONEPEZIL HYDROCHLORIDE 10 MG/1
10 TABLET, FILM COATED ORAL NIGHTLY
Status: DISCONTINUED | OUTPATIENT
Start: 2023-08-16 | End: 2023-08-24 | Stop reason: HOSPADM

## 2023-08-16 RX ADMIN — Medication 10 ML: at 19:56

## 2023-08-16 RX ADMIN — DONEPEZIL HYDROCHLORIDE 10 MG: 10 TABLET, FILM COATED ORAL at 19:55

## 2023-08-16 RX ADMIN — CEFEPIME 1000 MG: 1 INJECTION, POWDER, FOR SOLUTION INTRAMUSCULAR; INTRAVENOUS at 14:06

## 2023-08-16 RX ADMIN — METOPROLOL SUCCINATE 25 MG: 25 TABLET, EXTENDED RELEASE ORAL at 09:53

## 2023-08-16 RX ADMIN — TAMSULOSIN HYDROCHLORIDE 0.4 MG: 0.4 CAPSULE ORAL at 09:53

## 2023-08-16 RX ADMIN — ENOXAPARIN SODIUM 40 MG: 100 INJECTION SUBCUTANEOUS at 09:53

## 2023-08-16 RX ADMIN — LOSARTAN POTASSIUM 100 MG: 50 TABLET, FILM COATED ORAL at 09:52

## 2023-08-16 RX ADMIN — CITALOPRAM HYDROBROMIDE 10 MG: 20 TABLET ORAL at 09:53

## 2023-08-16 RX ADMIN — SODIUM CHLORIDE: 9 INJECTION, SOLUTION INTRAVENOUS at 11:53

## 2023-08-16 RX ADMIN — LEVOTHYROXINE SODIUM 25 MCG: 25 TABLET ORAL at 06:42

## 2023-08-16 RX ADMIN — ROSUVASTATIN CALCIUM 10 MG: 10 TABLET, FILM COATED ORAL at 19:55

## 2023-08-16 RX ADMIN — Medication 10 ML: at 09:57

## 2023-08-16 ASSESSMENT — ENCOUNTER SYMPTOMS
SORE THROAT: 0
VOICE CHANGE: 0
WHEEZING: 0
EYE DISCHARGE: 0
EYE ITCHING: 0
COUGH: 0
SHORTNESS OF BREATH: 0
SINUS PRESSURE: 0
GASTROINTESTINAL NEGATIVE: 1
SINUS PAIN: 0
TROUBLE SWALLOWING: 0
RHINORRHEA: 0
EYE REDNESS: 0

## 2023-08-16 ASSESSMENT — PAIN SCALES - GENERAL: PAINLEVEL_OUTOF10: 0

## 2023-08-16 ASSESSMENT — VISUAL ACUITY: OU: 1

## 2023-08-16 NOTE — H&P
EOMI  Ears: no obvious scars, no lesions, no masses, hearing intact  Mouth: mucous membranes tacky, no obvious oral sores  Head: normocephalic, atraumatic  Neck: no JVD, no adenopathy, no thyromegaly, neck is supple, trachea is midline  Back: ROM normal, no CVA tenderness. Chest: no pain on palpation  Lungs: clear to auscultation bilaterally, without rhonchi, crackle, wheezing, or rale, no retractions or use of accessory muscles, on oxygen  Heart: regular rate and regular rhythm, no murmur, normal S1, S2  Abdomen: soft, non-tender; bowel sounds normal; no masses, no organomegaly  : Deferred   Extremities: no lower extremity edema, extremities atraumatic, no cyanosis, no clubbing, 2+ pedal pulses palpated  Skin: normal color, normal texture, normal turgor, no rashes, no lesions  Neurologic: Patient unable to follow commands or answer questions appropriately. Secondary to dementia. Labs-   Lab Results   Component Value Date    WBC 7.6 08/16/2023    HGB 11.0 (L) 08/16/2023    HCT 34.2 (L) 08/16/2023     08/16/2023     08/16/2023    K 3.5 08/16/2023     08/16/2023    CREATININE 0.8 08/16/2023    BUN 23 08/16/2023    CO2 22 08/16/2023    GLUCOSE 100 (H) 08/16/2023    ALT 12 08/16/2023    AST 19 08/16/2023    INR 1.1 03/17/2023     No results found for: CKTOTAL, CKMB, CKMBINDEX, TROPONINI      Recent Radiological Studies:  CT ABDOMEN PELVIS W IV CONTRAST Additional Contrast? None   Final Result   Abnormal appearance of the bladder with wall thickening and surrounding   stranding with decompression Duong catheter placement with correlation to   urinalysis is recommended to exclude possible cystitis. There is some   distension seen in the renal collecting system on the left with mild   distension of left ureter which a pyelitis/pyelonephritis cannot be excluded. No obstructing stone. Stranding in the perinephric fat bilaterally to   suggest mild bilateral renal insufficiency.          XR CHEST

## 2023-08-16 NOTE — ACP (ADVANCE CARE PLANNING)
Advance Care Planning   Healthcare Decision Maker:    Primary Decision Maker: pedro aceves - [facility] - [phone]      Today we documented Decision Maker(s) consistent with Legal Next of Kin hierarchy.

## 2023-08-16 NOTE — ED NOTES
Page placed to dr Sultana Bernstein. Pt is no longer an er pt. Pt with decreased bp. Waiting for return call.      Hubert Rasmussen RN  08/15/23 2050

## 2023-08-16 NOTE — CARE COORDINATION
Social work / Discharge planning:          Patient is a readmission from home with family. Social work spoke to patient's daughter Gatito Garcia for initial assessment. Patient lives with his daughter after recently being discharged from [facility] the [facility]. He has a walker and wc at home as well as a cpap and oxygen concentrator for the cpap from [facility]. Patient has used [facility]. int he past.     Patient's daughter is anticipating the need for [facility] again. Social work provided freedom of choice list for [employer] and [employer]. Will need follow up. Awaiting PT evaluation.     Electronically signed by FENG Dodge on 8/16/2023 at 2:31 PM

## 2023-08-17 PROBLEM — B96.5 URINARY TRACT INFECTION DUE TO PSEUDOMONAS AERUGINOSA: Status: ACTIVE | Noted: 2023-07-22

## 2023-08-17 PROBLEM — G30.9 SEVERE ALZHEIMER'S DEMENTIA WITHOUT BEHAVIORAL DISTURBANCE, PSYCHOTIC DISTURBANCE, MOOD DISTURBANCE, OR ANXIETY (HCC): Status: ACTIVE | Noted: 2023-08-17

## 2023-08-17 PROBLEM — R78.81 BACTEREMIA DUE TO GRAM-NEGATIVE BACTERIA: Status: ACTIVE | Noted: 2023-08-17

## 2023-08-17 PROBLEM — F02.C0 SEVERE ALZHEIMER'S DEMENTIA WITHOUT BEHAVIORAL DISTURBANCE, PSYCHOTIC DISTURBANCE, MOOD DISTURBANCE, OR ANXIETY (HCC): Status: ACTIVE | Noted: 2023-08-17

## 2023-08-17 LAB
ALBUMIN SERPL-MCNC: 2.5 G/DL (ref 3.5–5.2)
ALP SERPL-CCNC: 73 U/L (ref 40–129)
ALT SERPL-CCNC: 17 U/L (ref 0–40)
ANION GAP SERPL CALCULATED.3IONS-SCNC: 8 MMOL/L (ref 7–16)
AST SERPL-CCNC: 28 U/L (ref 0–39)
BASOPHILS # BLD: 0.01 K/UL (ref 0–0.2)
BASOPHILS NFR BLD: 0 % (ref 0–2)
BILIRUB SERPL-MCNC: 0.4 MG/DL (ref 0–1.2)
BUN SERPL-MCNC: 17 MG/DL (ref 6–23)
CALCIUM SERPL-MCNC: 9 MG/DL (ref 8.6–10.2)
CHLORIDE SERPL-SCNC: 103 MMOL/L (ref 98–107)
CO2 SERPL-SCNC: 22 MMOL/L (ref 22–29)
CREAT SERPL-MCNC: 0.8 MG/DL (ref 0.7–1.2)
EOSINOPHIL # BLD: 0.09 K/UL (ref 0.05–0.5)
EOSINOPHILS RELATIVE PERCENT: 1 % (ref 0–6)
ERYTHROCYTE [DISTWIDTH] IN BLOOD BY AUTOMATED COUNT: 13.9 % (ref 11.5–15)
GFR SERPL CREATININE-BSD FRML MDRD: >60 ML/MIN/1.73M2
GLUCOSE SERPL-MCNC: 108 MG/DL (ref 74–99)
HCT VFR BLD AUTO: 30.2 % (ref 37–54)
HGB BLD-MCNC: 9.8 G/DL (ref 12.5–16.5)
IMM GRANULOCYTES # BLD AUTO: 0.04 K/UL (ref 0–0.58)
IMM GRANULOCYTES NFR BLD: 1 % (ref 0–5)
LYMPHOCYTES NFR BLD: 0.64 K/UL (ref 1.5–4)
LYMPHOCYTES RELATIVE PERCENT: 10 % (ref 20–42)
MAGNESIUM SERPL-MCNC: 1.8 MG/DL (ref 1.6–2.6)
MCH RBC QN AUTO: 28 PG (ref 26–35)
MCHC RBC AUTO-ENTMCNC: 32.5 G/DL (ref 32–34.5)
MCV RBC AUTO: 86.3 FL (ref 80–99.9)
MONOCYTES NFR BLD: 0.7 K/UL (ref 0.1–0.95)
MONOCYTES NFR BLD: 11 % (ref 2–12)
NEUTROPHILS NFR BLD: 77 % (ref 43–80)
NEUTS SEG NFR BLD: 4.99 K/UL (ref 1.8–7.3)
PLATELET # BLD AUTO: 160 K/UL (ref 130–450)
PMV BLD AUTO: 11.3 FL (ref 7–12)
POTASSIUM SERPL-SCNC: 3.4 MMOL/L (ref 3.5–5)
PROT SERPL-MCNC: 5.4 G/DL (ref 6.4–8.3)
RBC # BLD AUTO: 3.5 M/UL (ref 3.8–5.8)
SODIUM SERPL-SCNC: 133 MMOL/L (ref 132–146)
WBC OTHER # BLD: 6.5 K/UL (ref 4.5–11.5)

## 2023-08-17 PROCEDURE — 2580000003 HC RX 258: Performed by: HOSPITALIST

## 2023-08-17 PROCEDURE — 85025 COMPLETE CBC W/AUTO DIFF WBC: CPT

## 2023-08-17 PROCEDURE — 6360000002 HC RX W HCPCS: Performed by: STUDENT IN AN ORGANIZED HEALTH CARE EDUCATION/TRAINING PROGRAM

## 2023-08-17 PROCEDURE — 6370000000 HC RX 637 (ALT 250 FOR IP): Performed by: NURSE PRACTITIONER

## 2023-08-17 PROCEDURE — 2580000003 HC RX 258: Performed by: NURSE PRACTITIONER

## 2023-08-17 PROCEDURE — 2580000003 HC RX 258: Performed by: STUDENT IN AN ORGANIZED HEALTH CARE EDUCATION/TRAINING PROGRAM

## 2023-08-17 PROCEDURE — 94660 CPAP INITIATION&MGMT: CPT

## 2023-08-17 PROCEDURE — 2700000000 HC OXYGEN THERAPY PER DAY

## 2023-08-17 PROCEDURE — 80053 COMPREHEN METABOLIC PANEL: CPT

## 2023-08-17 PROCEDURE — 6370000000 HC RX 637 (ALT 250 FOR IP): Performed by: HOSPITALIST

## 2023-08-17 PROCEDURE — 83735 ASSAY OF MAGNESIUM: CPT

## 2023-08-17 PROCEDURE — 2060000000 HC ICU INTERMEDIATE R&B

## 2023-08-17 RX ADMIN — Medication 10 ML: at 20:29

## 2023-08-17 RX ADMIN — CITALOPRAM HYDROBROMIDE 10 MG: 20 TABLET ORAL at 09:16

## 2023-08-17 RX ADMIN — PETROLATUM: 420 OINTMENT TOPICAL at 20:28

## 2023-08-17 RX ADMIN — CEFEPIME 2000 MG: 2 INJECTION, POWDER, FOR SOLUTION INTRAVENOUS at 20:22

## 2023-08-17 RX ADMIN — LEVOTHYROXINE SODIUM 25 MCG: 25 TABLET ORAL at 05:31

## 2023-08-17 RX ADMIN — CEFEPIME 2000 MG: 2 INJECTION, POWDER, FOR SOLUTION INTRAVENOUS at 12:19

## 2023-08-17 RX ADMIN — POTASSIUM BICARBONATE 40 MEQ: 782 TABLET, EFFERVESCENT ORAL at 05:31

## 2023-08-17 RX ADMIN — METOPROLOL SUCCINATE 25 MG: 25 TABLET, EXTENDED RELEASE ORAL at 09:17

## 2023-08-17 RX ADMIN — LOSARTAN POTASSIUM 100 MG: 50 TABLET, FILM COATED ORAL at 09:16

## 2023-08-17 RX ADMIN — SODIUM CHLORIDE: 9 INJECTION, SOLUTION INTRAVENOUS at 15:53

## 2023-08-17 RX ADMIN — ROSUVASTATIN CALCIUM 10 MG: 10 TABLET, FILM COATED ORAL at 20:21

## 2023-08-17 RX ADMIN — TAMSULOSIN HYDROCHLORIDE 0.4 MG: 0.4 CAPSULE ORAL at 09:16

## 2023-08-17 RX ADMIN — DONEPEZIL HYDROCHLORIDE 10 MG: 10 TABLET, FILM COATED ORAL at 20:21

## 2023-08-17 ASSESSMENT — PAIN SCALES - GENERAL
PAINLEVEL_OUTOF10: 0
PAINLEVEL_OUTOF10: 0

## 2023-08-17 NOTE — CARE COORDINATION
Social work / Discharge Planning:           Awaiting PT evaluation. Patient's daughter is anticipating the need for [facility] and is reviewing the freedom of choice lists provided yesterday.     Electronically signed by FENG Hair on 8/17/2023 at 10:11 AM

## 2023-08-18 PROBLEM — E43 SEVERE PROTEIN-CALORIE MALNUTRITION (HCC): Status: ACTIVE | Noted: 2023-08-18

## 2023-08-18 LAB
ACB COMPLEX DNA BLD POS QL NAA+NON-PROBE: NOT DETECTED
ALBUMIN SERPL-MCNC: 2.3 G/DL (ref 3.5–5.2)
ALP SERPL-CCNC: 75 U/L (ref 40–129)
ALT SERPL-CCNC: 27 U/L (ref 0–40)
ANION GAP SERPL CALCULATED.3IONS-SCNC: 9 MMOL/L (ref 7–16)
AST SERPL-CCNC: 33 U/L (ref 0–39)
B FRAGILIS DNA BLD POS QL NAA+NON-PROBE: NOT DETECTED
BASOPHILS # BLD: 0.01 K/UL (ref 0–0.2)
BASOPHILS NFR BLD: 0 % (ref 0–2)
BILIRUB SERPL-MCNC: 0.2 MG/DL (ref 0–1.2)
BIOFIRE TEST COMMENT: ABNORMAL
BLACTX-M ISLT/SPM QL: NOT DETECTED
BLAIMP ISLT/SPM QL: NOT DETECTED
BLAKPC ISLT/SPM QL: NOT DETECTED
BLAVIM ISLT/SPM QL: NOT DETECTED
BUN SERPL-MCNC: 13 MG/DL (ref 6–23)
C ALBICANS DNA BLD POS QL NAA+NON-PROBE: NOT DETECTED
C AURIS DNA BLD POS QL NAA+NON-PROBE: NOT DETECTED
C GATTII+NEOFOR DNA BLD POS QL NAA+N-PRB: NOT DETECTED
C GLABRATA DNA BLD POS QL NAA+NON-PROBE: NOT DETECTED
C KRUSEI DNA BLD POS QL NAA+NON-PROBE: NOT DETECTED
C PARAP DNA BLD POS QL NAA+NON-PROBE: NOT DETECTED
C TROPICLS DNA BLD POS QL NAA+NON-PROBE: NOT DETECTED
CALCIUM SERPL-MCNC: 8.8 MG/DL (ref 8.6–10.2)
CHLORIDE SERPL-SCNC: 103 MMOL/L (ref 98–107)
CO2 SERPL-SCNC: 22 MMOL/L (ref 22–29)
CREAT SERPL-MCNC: 0.7 MG/DL (ref 0.7–1.2)
E CLOAC COMP DNA BLD POS NAA+NON-PROBE: NOT DETECTED
E COLI DNA BLD POS QL NAA+NON-PROBE: NOT DETECTED
E FAECALIS DNA BLD POS QL NAA+NON-PROBE: NOT DETECTED
E FAECIUM DNA BLD POS QL NAA+NON-PROBE: NOT DETECTED
ENTEROBACTERALES DNA BLD POS NAA+N-PRB: NOT DETECTED
EOSINOPHIL # BLD: 0.16 K/UL (ref 0.05–0.5)
EOSINOPHILS RELATIVE PERCENT: 3 % (ref 0–6)
ERYTHROCYTE [DISTWIDTH] IN BLOOD BY AUTOMATED COUNT: 13.4 % (ref 11.5–15)
GFR SERPL CREATININE-BSD FRML MDRD: >60 ML/MIN/1.73M2
GLUCOSE SERPL-MCNC: 93 MG/DL (ref 74–99)
GP B STREP DNA BLD POS QL NAA+NON-PROBE: NOT DETECTED
HAEM INFLU DNA BLD POS QL NAA+NON-PROBE: NOT DETECTED
HCT VFR BLD AUTO: 30.6 % (ref 37–54)
HGB BLD-MCNC: 10 G/DL (ref 12.5–16.5)
IMM GRANULOCYTES # BLD AUTO: <0.03 K/UL (ref 0–0.58)
IMM GRANULOCYTES NFR BLD: 0 % (ref 0–5)
K OXYTOCA DNA BLD POS QL NAA+NON-PROBE: NOT DETECTED
KLEBSIELLA SP DNA BLD POS QL NAA+NON-PRB: NOT DETECTED
KLEBSIELLA SP DNA BLD POS QL NAA+NON-PRB: NOT DETECTED
L MONOCYTOG DNA BLD POS QL NAA+NON-PROBE: NOT DETECTED
LYMPHOCYTES NFR BLD: 0.62 K/UL (ref 1.5–4)
LYMPHOCYTES RELATIVE PERCENT: 12 % (ref 20–42)
MAGNESIUM SERPL-MCNC: 1.9 MG/DL (ref 1.6–2.6)
MCH RBC QN AUTO: 27.9 PG (ref 26–35)
MCHC RBC AUTO-ENTMCNC: 32.7 G/DL (ref 32–34.5)
MCV RBC AUTO: 85.5 FL (ref 80–99.9)
MICROORGANISM SPEC CULT: ABNORMAL
MICROORGANISM/AGENT SPEC: ABNORMAL
MONOCYTES NFR BLD: 0.49 K/UL (ref 0.1–0.95)
MONOCYTES NFR BLD: 9 % (ref 2–12)
N MEN DNA BLD POS QL NAA+NON-PROBE: NOT DETECTED
NEUTROPHILS NFR BLD: 76 % (ref 43–80)
NEUTS SEG NFR BLD: 4.11 K/UL (ref 1.8–7.3)
P AERUGINOSA DNA BLD POS NAA+NON-PROBE: DETECTED
PLATELET # BLD AUTO: 176 K/UL (ref 130–450)
PMV BLD AUTO: 11.5 FL (ref 7–12)
POTASSIUM SERPL-SCNC: 3.2 MMOL/L (ref 3.5–5)
PROT SERPL-MCNC: 5.3 G/DL (ref 6.4–8.3)
PROTEUS SP DNA BLD POS QL NAA+NON-PROBE: NOT DETECTED
RBC # BLD AUTO: 3.58 M/UL (ref 3.8–5.8)
RESISTANT GENE NDM BY PCR: NOT DETECTED
S AUREUS DNA BLD POS QL NAA+NON-PROBE: NOT DETECTED
S AUREUS+CONS DNA BLD POS NAA+NON-PROBE: NOT DETECTED
S EPIDERMIDIS DNA BLD POS QL NAA+NON-PRB: NOT DETECTED
S LUGDUNENSIS DNA BLD POS QL NAA+NON-PRB: NOT DETECTED
S MALTOPHILIA DNA BLD POS QL NAA+NON-PRB: NOT DETECTED
S MARCESCENS DNA BLD POS NAA+NON-PROBE: NOT DETECTED
S PNEUM DNA BLD POS QL NAA+NON-PROBE: NOT DETECTED
S PYO DNA BLD POS QL NAA+NON-PROBE: NOT DETECTED
SALMONELLA DNA BLD POS QL NAA+NON-PROBE: NOT DETECTED
SERVICE CMNT-IMP: ABNORMAL
SODIUM SERPL-SCNC: 134 MMOL/L (ref 132–146)
SPECIMEN DESCRIPTION: ABNORMAL
SPECIMEN DESCRIPTION: ABNORMAL
STREPTOCOCCUS DNA BLD POS NAA+NON-PROBE: NOT DETECTED
WBC OTHER # BLD: 5.4 K/UL (ref 4.5–11.5)

## 2023-08-18 PROCEDURE — 94660 CPAP INITIATION&MGMT: CPT

## 2023-08-18 PROCEDURE — 85025 COMPLETE CBC W/AUTO DIFF WBC: CPT

## 2023-08-18 PROCEDURE — 6370000000 HC RX 637 (ALT 250 FOR IP): Performed by: HOSPITALIST

## 2023-08-18 PROCEDURE — 97535 SELF CARE MNGMENT TRAINING: CPT

## 2023-08-18 PROCEDURE — 2580000003 HC RX 258: Performed by: STUDENT IN AN ORGANIZED HEALTH CARE EDUCATION/TRAINING PROGRAM

## 2023-08-18 PROCEDURE — 6370000000 HC RX 637 (ALT 250 FOR IP): Performed by: NURSE PRACTITIONER

## 2023-08-18 PROCEDURE — 80053 COMPREHEN METABOLIC PANEL: CPT

## 2023-08-18 PROCEDURE — 6360000002 HC RX W HCPCS: Performed by: STUDENT IN AN ORGANIZED HEALTH CARE EDUCATION/TRAINING PROGRAM

## 2023-08-18 PROCEDURE — 83735 ASSAY OF MAGNESIUM: CPT

## 2023-08-18 PROCEDURE — 2580000003 HC RX 258: Performed by: NURSE PRACTITIONER

## 2023-08-18 PROCEDURE — 1200000000 HC SEMI PRIVATE

## 2023-08-18 PROCEDURE — 2060000000 HC ICU INTERMEDIATE R&B

## 2023-08-18 PROCEDURE — 97161 PT EVAL LOW COMPLEX 20 MIN: CPT

## 2023-08-18 PROCEDURE — 2700000000 HC OXYGEN THERAPY PER DAY

## 2023-08-18 RX ADMIN — ROSUVASTATIN CALCIUM 10 MG: 10 TABLET, FILM COATED ORAL at 20:21

## 2023-08-18 RX ADMIN — LOSARTAN POTASSIUM 100 MG: 50 TABLET, FILM COATED ORAL at 08:07

## 2023-08-18 RX ADMIN — Medication 10 ML: at 08:07

## 2023-08-18 RX ADMIN — CEFEPIME 2000 MG: 2 INJECTION, POWDER, FOR SOLUTION INTRAVENOUS at 12:03

## 2023-08-18 RX ADMIN — CEFEPIME 2000 MG: 2 INJECTION, POWDER, FOR SOLUTION INTRAVENOUS at 20:20

## 2023-08-18 RX ADMIN — LEVOTHYROXINE SODIUM 25 MCG: 25 TABLET ORAL at 06:33

## 2023-08-18 RX ADMIN — DONEPEZIL HYDROCHLORIDE 10 MG: 10 TABLET, FILM COATED ORAL at 20:21

## 2023-08-18 RX ADMIN — TAMSULOSIN HYDROCHLORIDE 0.4 MG: 0.4 CAPSULE ORAL at 08:07

## 2023-08-18 RX ADMIN — METOPROLOL SUCCINATE 25 MG: 25 TABLET, EXTENDED RELEASE ORAL at 08:07

## 2023-08-18 RX ADMIN — POTASSIUM CHLORIDE 40 MEQ: 1500 TABLET, EXTENDED RELEASE ORAL at 09:19

## 2023-08-18 RX ADMIN — CITALOPRAM HYDROBROMIDE 10 MG: 20 TABLET ORAL at 08:07

## 2023-08-18 RX ADMIN — CEFEPIME 2000 MG: 2 INJECTION, POWDER, FOR SOLUTION INTRAVENOUS at 04:08

## 2023-08-18 ASSESSMENT — PAIN SCALES - GENERAL
PAINLEVEL_OUTOF10: 0

## 2023-08-18 NOTE — CARE COORDINATION
Social work / Discharge planning:        Social work spoke with  patient's daughter Russel Bonilla via phone to discuss [facility] choices. She requested 1.)[facility] and 2.) [address] Awaiting return call from SO liaison to complete referrals. Patient will need follow up visit from [address] [place] [address] for precert submission. Electronically signed by FENG Grayson on 8/18/2023 at 11:53 AM            Referral made to [address] [place] [address] for both [facility] and [facility] locations. Awaiting response.    Electronically signed by FENG Grayson on 8/18/2023 at 11:58 AM

## 2023-08-19 LAB
ANION GAP SERPL CALCULATED.3IONS-SCNC: 8 MMOL/L (ref 7–16)
BASOPHILS # BLD: 0.01 K/UL (ref 0–0.2)
BASOPHILS NFR BLD: 0 % (ref 0–2)
BUN SERPL-MCNC: 12 MG/DL (ref 6–23)
CALCIUM SERPL-MCNC: 9.1 MG/DL (ref 8.6–10.2)
CHLORIDE SERPL-SCNC: 107 MMOL/L (ref 98–107)
CO2 SERPL-SCNC: 22 MMOL/L (ref 22–29)
CREAT SERPL-MCNC: 0.7 MG/DL (ref 0.7–1.2)
EOSINOPHIL # BLD: 0.23 K/UL (ref 0.05–0.5)
EOSINOPHILS RELATIVE PERCENT: 4 % (ref 0–6)
ERYTHROCYTE [DISTWIDTH] IN BLOOD BY AUTOMATED COUNT: 13.4 % (ref 11.5–15)
GFR SERPL CREATININE-BSD FRML MDRD: >60 ML/MIN/1.73M2
GLUCOSE SERPL-MCNC: 138 MG/DL (ref 74–99)
HCT VFR BLD AUTO: 32.8 % (ref 37–54)
HGB BLD-MCNC: 10.8 G/DL (ref 12.5–16.5)
IMM GRANULOCYTES # BLD AUTO: 0.03 K/UL (ref 0–0.58)
IMM GRANULOCYTES NFR BLD: 1 % (ref 0–5)
LYMPHOCYTES NFR BLD: 0.83 K/UL (ref 1.5–4)
LYMPHOCYTES RELATIVE PERCENT: 13 % (ref 20–42)
MCH RBC QN AUTO: 28.2 PG (ref 26–35)
MCHC RBC AUTO-ENTMCNC: 32.9 G/DL (ref 32–34.5)
MCV RBC AUTO: 85.6 FL (ref 80–99.9)
MONOCYTES NFR BLD: 0.57 K/UL (ref 0.1–0.95)
MONOCYTES NFR BLD: 9 % (ref 2–12)
NEUTROPHILS NFR BLD: 73 % (ref 43–80)
NEUTS SEG NFR BLD: 4.58 K/UL (ref 1.8–7.3)
PLATELET # BLD AUTO: 213 K/UL (ref 130–450)
PMV BLD AUTO: 10.8 FL (ref 7–12)
POTASSIUM SERPL-SCNC: 3.7 MMOL/L (ref 3.5–5)
RBC # BLD AUTO: 3.83 M/UL (ref 3.8–5.8)
SODIUM SERPL-SCNC: 137 MMOL/L (ref 132–146)
WBC OTHER # BLD: 6.3 K/UL (ref 4.5–11.5)

## 2023-08-19 PROCEDURE — 2060000000 HC ICU INTERMEDIATE R&B

## 2023-08-19 PROCEDURE — 2580000003 HC RX 258: Performed by: STUDENT IN AN ORGANIZED HEALTH CARE EDUCATION/TRAINING PROGRAM

## 2023-08-19 PROCEDURE — 6360000002 HC RX W HCPCS: Performed by: STUDENT IN AN ORGANIZED HEALTH CARE EDUCATION/TRAINING PROGRAM

## 2023-08-19 PROCEDURE — 80048 BASIC METABOLIC PNL TOTAL CA: CPT

## 2023-08-19 PROCEDURE — 6370000000 HC RX 637 (ALT 250 FOR IP): Performed by: HOSPITALIST

## 2023-08-19 PROCEDURE — 6370000000 HC RX 637 (ALT 250 FOR IP): Performed by: NURSE PRACTITIONER

## 2023-08-19 PROCEDURE — 94660 CPAP INITIATION&MGMT: CPT

## 2023-08-19 PROCEDURE — 2580000003 HC RX 258: Performed by: NURSE PRACTITIONER

## 2023-08-19 PROCEDURE — 1200000000 HC SEMI PRIVATE

## 2023-08-19 PROCEDURE — 85025 COMPLETE CBC W/AUTO DIFF WBC: CPT

## 2023-08-19 RX ORDER — LOSARTAN POTASSIUM 50 MG/1
50 TABLET ORAL DAILY
Status: DISCONTINUED | OUTPATIENT
Start: 2023-08-20 | End: 2023-08-24 | Stop reason: HOSPADM

## 2023-08-19 RX ADMIN — LOSARTAN POTASSIUM 100 MG: 50 TABLET, FILM COATED ORAL at 08:54

## 2023-08-19 RX ADMIN — CEFEPIME 2000 MG: 2 INJECTION, POWDER, FOR SOLUTION INTRAVENOUS at 20:54

## 2023-08-19 RX ADMIN — LEVOTHYROXINE SODIUM 25 MCG: 25 TABLET ORAL at 06:35

## 2023-08-19 RX ADMIN — TAMSULOSIN HYDROCHLORIDE 0.4 MG: 0.4 CAPSULE ORAL at 08:54

## 2023-08-19 RX ADMIN — ROSUVASTATIN CALCIUM 10 MG: 10 TABLET, FILM COATED ORAL at 20:52

## 2023-08-19 RX ADMIN — METOPROLOL SUCCINATE 25 MG: 25 TABLET, EXTENDED RELEASE ORAL at 08:54

## 2023-08-19 RX ADMIN — CITALOPRAM HYDROBROMIDE 10 MG: 20 TABLET ORAL at 08:54

## 2023-08-19 RX ADMIN — Medication 10 ML: at 20:52

## 2023-08-19 RX ADMIN — DONEPEZIL HYDROCHLORIDE 10 MG: 10 TABLET, FILM COATED ORAL at 20:52

## 2023-08-19 RX ADMIN — CEFEPIME 2000 MG: 2 INJECTION, POWDER, FOR SOLUTION INTRAVENOUS at 13:33

## 2023-08-19 RX ADMIN — CEFEPIME 2000 MG: 2 INJECTION, POWDER, FOR SOLUTION INTRAVENOUS at 03:44

## 2023-08-19 ASSESSMENT — PAIN SCALES - GENERAL
PAINLEVEL_OUTOF10: 0
PAINLEVEL_OUTOF10: 0

## 2023-08-20 PROBLEM — R50.9 FEVER: Status: RESOLVED | Noted: 2023-08-16 | Resolved: 2023-08-20

## 2023-08-20 LAB
BASOPHILS # BLD: 0.02 K/UL (ref 0–0.2)
BASOPHILS NFR BLD: 0 % (ref 0–2)
EOSINOPHIL # BLD: 0.26 K/UL (ref 0.05–0.5)
EOSINOPHILS RELATIVE PERCENT: 4 % (ref 0–6)
ERYTHROCYTE [DISTWIDTH] IN BLOOD BY AUTOMATED COUNT: 13.5 % (ref 11.5–15)
HCT VFR BLD AUTO: 32.8 % (ref 37–54)
HGB BLD-MCNC: 10.8 G/DL (ref 12.5–16.5)
IMM GRANULOCYTES # BLD AUTO: 0.04 K/UL (ref 0–0.58)
IMM GRANULOCYTES NFR BLD: 1 % (ref 0–5)
LYMPHOCYTES NFR BLD: 1.02 K/UL (ref 1.5–4)
LYMPHOCYTES RELATIVE PERCENT: 16 % (ref 20–42)
MCH RBC QN AUTO: 28.2 PG (ref 26–35)
MCHC RBC AUTO-ENTMCNC: 32.9 G/DL (ref 32–34.5)
MCV RBC AUTO: 85.6 FL (ref 80–99.9)
MICROORGANISM SPEC CULT: NORMAL
MONOCYTES NFR BLD: 0.6 K/UL (ref 0.1–0.95)
MONOCYTES NFR BLD: 9 % (ref 2–12)
NEUTROPHILS NFR BLD: 71 % (ref 43–80)
NEUTS SEG NFR BLD: 4.64 K/UL (ref 1.8–7.3)
PLATELET # BLD AUTO: 220 K/UL (ref 130–450)
PMV BLD AUTO: 10.9 FL (ref 7–12)
RBC # BLD AUTO: 3.83 M/UL (ref 3.8–5.8)
SERVICE CMNT-IMP: NORMAL
SPECIMEN DESCRIPTION: NORMAL
WBC OTHER # BLD: 6.6 K/UL (ref 4.5–11.5)

## 2023-08-20 PROCEDURE — 94660 CPAP INITIATION&MGMT: CPT

## 2023-08-20 PROCEDURE — 2580000003 HC RX 258: Performed by: NURSE PRACTITIONER

## 2023-08-20 PROCEDURE — 6370000000 HC RX 637 (ALT 250 FOR IP): Performed by: NURSE PRACTITIONER

## 2023-08-20 PROCEDURE — 2580000003 HC RX 258: Performed by: STUDENT IN AN ORGANIZED HEALTH CARE EDUCATION/TRAINING PROGRAM

## 2023-08-20 PROCEDURE — 1200000000 HC SEMI PRIVATE

## 2023-08-20 PROCEDURE — 6360000002 HC RX W HCPCS: Performed by: STUDENT IN AN ORGANIZED HEALTH CARE EDUCATION/TRAINING PROGRAM

## 2023-08-20 PROCEDURE — 2060000000 HC ICU INTERMEDIATE R&B

## 2023-08-20 PROCEDURE — 85025 COMPLETE CBC W/AUTO DIFF WBC: CPT

## 2023-08-20 PROCEDURE — 6370000000 HC RX 637 (ALT 250 FOR IP): Performed by: HOSPITALIST

## 2023-08-20 RX ADMIN — Medication 10 ML: at 19:59

## 2023-08-20 RX ADMIN — ROSUVASTATIN CALCIUM 10 MG: 10 TABLET, FILM COATED ORAL at 19:58

## 2023-08-20 RX ADMIN — DONEPEZIL HYDROCHLORIDE 10 MG: 10 TABLET, FILM COATED ORAL at 19:58

## 2023-08-20 RX ADMIN — METOPROLOL SUCCINATE 25 MG: 25 TABLET, EXTENDED RELEASE ORAL at 08:34

## 2023-08-20 RX ADMIN — CEFEPIME 2000 MG: 2 INJECTION, POWDER, FOR SOLUTION INTRAVENOUS at 11:06

## 2023-08-20 RX ADMIN — LEVOTHYROXINE SODIUM 25 MCG: 25 TABLET ORAL at 05:46

## 2023-08-20 RX ADMIN — CEFEPIME 2000 MG: 2 INJECTION, POWDER, FOR SOLUTION INTRAVENOUS at 20:03

## 2023-08-20 RX ADMIN — TAMSULOSIN HYDROCHLORIDE 0.4 MG: 0.4 CAPSULE ORAL at 08:34

## 2023-08-20 RX ADMIN — Medication 10 ML: at 08:34

## 2023-08-20 RX ADMIN — CEFEPIME 2000 MG: 2 INJECTION, POWDER, FOR SOLUTION INTRAVENOUS at 04:09

## 2023-08-20 RX ADMIN — CITALOPRAM HYDROBROMIDE 10 MG: 20 TABLET ORAL at 08:34

## 2023-08-20 RX ADMIN — LOSARTAN POTASSIUM 50 MG: 50 TABLET, FILM COATED ORAL at 08:34

## 2023-08-20 ASSESSMENT — PAIN SCALES - GENERAL: PAINLEVEL_OUTOF10: 0

## 2023-08-21 LAB
ALBUMIN SERPL-MCNC: 2.5 G/DL (ref 3.5–5.2)
ALP SERPL-CCNC: 77 U/L (ref 40–129)
ALT SERPL-CCNC: 29 U/L (ref 0–40)
ANION GAP SERPL CALCULATED.3IONS-SCNC: 8 MMOL/L (ref 7–16)
AST SERPL-CCNC: 27 U/L (ref 0–39)
BASOPHILS # BLD: 0.02 K/UL (ref 0–0.2)
BASOPHILS NFR BLD: 0 % (ref 0–2)
BILIRUB SERPL-MCNC: 0.2 MG/DL (ref 0–1.2)
BUN SERPL-MCNC: 9 MG/DL (ref 6–23)
CALCIUM SERPL-MCNC: 9.2 MG/DL (ref 8.6–10.2)
CHLORIDE SERPL-SCNC: 105 MMOL/L (ref 98–107)
CO2 SERPL-SCNC: 26 MMOL/L (ref 22–29)
CREAT SERPL-MCNC: 0.7 MG/DL (ref 0.7–1.2)
EOSINOPHIL # BLD: 0.29 K/UL (ref 0.05–0.5)
EOSINOPHILS RELATIVE PERCENT: 4 % (ref 0–6)
ERYTHROCYTE [DISTWIDTH] IN BLOOD BY AUTOMATED COUNT: 13.8 % (ref 11.5–15)
GFR SERPL CREATININE-BSD FRML MDRD: >60 ML/MIN/1.73M2
GLUCOSE SERPL-MCNC: 138 MG/DL (ref 74–99)
HCT VFR BLD AUTO: 33.2 % (ref 37–54)
HGB BLD-MCNC: 10.9 G/DL (ref 12.5–16.5)
IMM GRANULOCYTES # BLD AUTO: 0.08 K/UL (ref 0–0.58)
IMM GRANULOCYTES NFR BLD: 1 % (ref 0–5)
LYMPHOCYTES NFR BLD: 0.98 K/UL (ref 1.5–4)
LYMPHOCYTES RELATIVE PERCENT: 13 % (ref 20–42)
MAGNESIUM SERPL-MCNC: 2 MG/DL (ref 1.6–2.6)
MCH RBC QN AUTO: 28 PG (ref 26–35)
MCHC RBC AUTO-ENTMCNC: 32.8 G/DL (ref 32–34.5)
MCV RBC AUTO: 85.3 FL (ref 80–99.9)
MONOCYTES NFR BLD: 0.59 K/UL (ref 0.1–0.95)
MONOCYTES NFR BLD: 8 % (ref 2–12)
NEUTROPHILS NFR BLD: 74 % (ref 43–80)
NEUTS SEG NFR BLD: 5.55 K/UL (ref 1.8–7.3)
PLATELET # BLD AUTO: 234 K/UL (ref 130–450)
PMV BLD AUTO: 10.6 FL (ref 7–12)
POTASSIUM SERPL-SCNC: 3.2 MMOL/L (ref 3.5–5)
PROT SERPL-MCNC: 5.7 G/DL (ref 6.4–8.3)
RBC # BLD AUTO: 3.89 M/UL (ref 3.8–5.8)
SODIUM SERPL-SCNC: 139 MMOL/L (ref 132–146)
WBC OTHER # BLD: 7.5 K/UL (ref 4.5–11.5)

## 2023-08-21 PROCEDURE — 6370000000 HC RX 637 (ALT 250 FOR IP): Performed by: HOSPITALIST

## 2023-08-21 PROCEDURE — 6370000000 HC RX 637 (ALT 250 FOR IP): Performed by: NURSE PRACTITIONER

## 2023-08-21 PROCEDURE — 97530 THERAPEUTIC ACTIVITIES: CPT

## 2023-08-21 PROCEDURE — 6360000002 HC RX W HCPCS: Performed by: STUDENT IN AN ORGANIZED HEALTH CARE EDUCATION/TRAINING PROGRAM

## 2023-08-21 PROCEDURE — 85025 COMPLETE CBC W/AUTO DIFF WBC: CPT

## 2023-08-21 PROCEDURE — 2580000003 HC RX 258: Performed by: NURSE PRACTITIONER

## 2023-08-21 PROCEDURE — 94660 CPAP INITIATION&MGMT: CPT

## 2023-08-21 PROCEDURE — 97535 SELF CARE MNGMENT TRAINING: CPT

## 2023-08-21 PROCEDURE — 83735 ASSAY OF MAGNESIUM: CPT

## 2023-08-21 PROCEDURE — 1200000000 HC SEMI PRIVATE

## 2023-08-21 PROCEDURE — 80053 COMPREHEN METABOLIC PANEL: CPT

## 2023-08-21 PROCEDURE — 36415 COLL VENOUS BLD VENIPUNCTURE: CPT

## 2023-08-21 PROCEDURE — 2580000003 HC RX 258: Performed by: STUDENT IN AN ORGANIZED HEALTH CARE EDUCATION/TRAINING PROGRAM

## 2023-08-21 RX ORDER — LEVOFLOXACIN 750 MG/1
750 TABLET ORAL DAILY
Qty: 7 TABLET | Refills: 0 | DISCHARGE
Start: 2023-08-21 | End: 2023-08-28

## 2023-08-21 RX ADMIN — LOSARTAN POTASSIUM 50 MG: 50 TABLET, FILM COATED ORAL at 09:28

## 2023-08-21 RX ADMIN — CITALOPRAM HYDROBROMIDE 10 MG: 20 TABLET ORAL at 09:26

## 2023-08-21 RX ADMIN — LEVOTHYROXINE SODIUM 25 MCG: 25 TABLET ORAL at 06:16

## 2023-08-21 RX ADMIN — CEFEPIME 2000 MG: 2 INJECTION, POWDER, FOR SOLUTION INTRAVENOUS at 04:41

## 2023-08-21 RX ADMIN — PETROLATUM: 420 OINTMENT TOPICAL at 19:40

## 2023-08-21 RX ADMIN — Medication 10 ML: at 19:40

## 2023-08-21 RX ADMIN — Medication 10 ML: at 09:29

## 2023-08-21 RX ADMIN — ROSUVASTATIN CALCIUM 10 MG: 10 TABLET, FILM COATED ORAL at 19:40

## 2023-08-21 RX ADMIN — DONEPEZIL HYDROCHLORIDE 10 MG: 10 TABLET, FILM COATED ORAL at 19:40

## 2023-08-21 RX ADMIN — TAMSULOSIN HYDROCHLORIDE 0.4 MG: 0.4 CAPSULE ORAL at 09:27

## 2023-08-21 RX ADMIN — CEFEPIME 2000 MG: 2 INJECTION, POWDER, FOR SOLUTION INTRAVENOUS at 19:38

## 2023-08-21 RX ADMIN — METOPROLOL SUCCINATE 25 MG: 25 TABLET, EXTENDED RELEASE ORAL at 09:27

## 2023-08-21 RX ADMIN — CEFEPIME 2000 MG: 2 INJECTION, POWDER, FOR SOLUTION INTRAVENOUS at 13:07

## 2023-08-21 ASSESSMENT — PAIN SCALES - GENERAL
PAINLEVEL_OUTOF10: 0

## 2023-08-21 NOTE — CARE COORDINATION
Social Work / Discharge Planning : Message left to Miguelina Body from [address] to see if [facility] or [facility] can accept patient. Await response. SW to follow. Electronically signed by FENG Goddard on 8/21/23 at 10:38 AM EDT     Addendum: Miguelina Body from [facility] updated SW they do NOT have a bed. SW called and spoke to daughter Lizz Segura and updated. She would like [facility] and [facility]. Referrals out. Await acceptance./ denial. SW to follow.  Electronically signed by FENG Goddard on 8/21/23 at 11:57 AM EDT

## 2023-08-22 LAB
ALBUMIN SERPL-MCNC: 2.6 G/DL (ref 3.5–5.2)
ALP SERPL-CCNC: 69 U/L (ref 40–129)
ALT SERPL-CCNC: 29 U/L (ref 0–40)
ANION GAP SERPL CALCULATED.3IONS-SCNC: 8 MMOL/L (ref 7–16)
AST SERPL-CCNC: 27 U/L (ref 0–39)
BASOPHILS # BLD: 0.03 K/UL (ref 0–0.2)
BASOPHILS NFR BLD: 1 % (ref 0–2)
BILIRUB SERPL-MCNC: 0.2 MG/DL (ref 0–1.2)
BUN SERPL-MCNC: 8 MG/DL (ref 6–23)
CALCIUM SERPL-MCNC: 9.3 MG/DL (ref 8.6–10.2)
CHLORIDE SERPL-SCNC: 106 MMOL/L (ref 98–107)
CO2 SERPL-SCNC: 25 MMOL/L (ref 22–29)
CREAT SERPL-MCNC: 0.7 MG/DL (ref 0.7–1.2)
EOSINOPHIL # BLD: 0.2 K/UL (ref 0.05–0.5)
EOSINOPHILS RELATIVE PERCENT: 3 % (ref 0–6)
ERYTHROCYTE [DISTWIDTH] IN BLOOD BY AUTOMATED COUNT: 13.8 % (ref 11.5–15)
GFR SERPL CREATININE-BSD FRML MDRD: >60 ML/MIN/1.73M2
GLUCOSE SERPL-MCNC: 82 MG/DL (ref 74–99)
HCT VFR BLD AUTO: 32.2 % (ref 37–54)
HGB BLD-MCNC: 10.4 G/DL (ref 12.5–16.5)
IMM GRANULOCYTES # BLD AUTO: 0.07 K/UL (ref 0–0.58)
IMM GRANULOCYTES NFR BLD: 1 % (ref 0–5)
LYMPHOCYTES NFR BLD: 1.09 K/UL (ref 1.5–4)
LYMPHOCYTES RELATIVE PERCENT: 17 % (ref 20–42)
MAGNESIUM SERPL-MCNC: 2.1 MG/DL (ref 1.6–2.6)
MCH RBC QN AUTO: 27.9 PG (ref 26–35)
MCHC RBC AUTO-ENTMCNC: 32.3 G/DL (ref 32–34.5)
MCV RBC AUTO: 86.3 FL (ref 80–99.9)
MONOCYTES NFR BLD: 0.47 K/UL (ref 0.1–0.95)
MONOCYTES NFR BLD: 7 % (ref 2–12)
NEUTROPHILS NFR BLD: 71 % (ref 43–80)
NEUTS SEG NFR BLD: 4.47 K/UL (ref 1.8–7.3)
PLATELET # BLD AUTO: 255 K/UL (ref 130–450)
PMV BLD AUTO: 10.9 FL (ref 7–12)
POTASSIUM SERPL-SCNC: 3.4 MMOL/L (ref 3.5–5)
PROT SERPL-MCNC: 5.5 G/DL (ref 6.4–8.3)
RBC # BLD AUTO: 3.73 M/UL (ref 3.8–5.8)
SODIUM SERPL-SCNC: 139 MMOL/L (ref 132–146)
WBC OTHER # BLD: 6.3 K/UL (ref 4.5–11.5)

## 2023-08-22 PROCEDURE — 80053 COMPREHEN METABOLIC PANEL: CPT

## 2023-08-22 PROCEDURE — 2580000003 HC RX 258: Performed by: NURSE PRACTITIONER

## 2023-08-22 PROCEDURE — 2580000003 HC RX 258: Performed by: STUDENT IN AN ORGANIZED HEALTH CARE EDUCATION/TRAINING PROGRAM

## 2023-08-22 PROCEDURE — 94660 CPAP INITIATION&MGMT: CPT

## 2023-08-22 PROCEDURE — 6370000000 HC RX 637 (ALT 250 FOR IP): Performed by: HOSPITALIST

## 2023-08-22 PROCEDURE — 6370000000 HC RX 637 (ALT 250 FOR IP): Performed by: NURSE PRACTITIONER

## 2023-08-22 PROCEDURE — 6360000002 HC RX W HCPCS: Performed by: STUDENT IN AN ORGANIZED HEALTH CARE EDUCATION/TRAINING PROGRAM

## 2023-08-22 PROCEDURE — 85025 COMPLETE CBC W/AUTO DIFF WBC: CPT

## 2023-08-22 PROCEDURE — 1200000000 HC SEMI PRIVATE

## 2023-08-22 PROCEDURE — 83735 ASSAY OF MAGNESIUM: CPT

## 2023-08-22 RX ORDER — DONEPEZIL HYDROCHLORIDE 10 MG/1
10 TABLET, FILM COATED ORAL NIGHTLY
Qty: 30 TABLET | Refills: 0 | DISCHARGE
Start: 2023-08-22

## 2023-08-22 RX ORDER — SENNOSIDES A AND B 8.6 MG/1
1 TABLET, FILM COATED ORAL DAILY PRN
DISCHARGE
Start: 2023-08-22 | End: 2023-09-21

## 2023-08-22 RX ADMIN — LEVOTHYROXINE SODIUM 25 MCG: 25 TABLET ORAL at 04:57

## 2023-08-22 RX ADMIN — METOPROLOL SUCCINATE 25 MG: 25 TABLET, EXTENDED RELEASE ORAL at 08:44

## 2023-08-22 RX ADMIN — CEFEPIME 2000 MG: 2 INJECTION, POWDER, FOR SOLUTION INTRAVENOUS at 11:32

## 2023-08-22 RX ADMIN — Medication 10 ML: at 20:53

## 2023-08-22 RX ADMIN — DONEPEZIL HYDROCHLORIDE 10 MG: 10 TABLET, FILM COATED ORAL at 20:52

## 2023-08-22 RX ADMIN — Medication 10 ML: at 09:00

## 2023-08-22 RX ADMIN — CITALOPRAM HYDROBROMIDE 10 MG: 20 TABLET ORAL at 08:44

## 2023-08-22 RX ADMIN — TAMSULOSIN HYDROCHLORIDE 0.4 MG: 0.4 CAPSULE ORAL at 08:44

## 2023-08-22 RX ADMIN — CEFEPIME 2000 MG: 2 INJECTION, POWDER, FOR SOLUTION INTRAVENOUS at 04:56

## 2023-08-22 RX ADMIN — ROSUVASTATIN CALCIUM 10 MG: 10 TABLET, FILM COATED ORAL at 20:53

## 2023-08-22 RX ADMIN — PETROLATUM: 420 OINTMENT TOPICAL at 20:53

## 2023-08-22 RX ADMIN — POTASSIUM CHLORIDE 40 MEQ: 1500 TABLET, EXTENDED RELEASE ORAL at 08:44

## 2023-08-22 RX ADMIN — CEFEPIME 2000 MG: 2 INJECTION, POWDER, FOR SOLUTION INTRAVENOUS at 20:52

## 2023-08-22 RX ADMIN — LOSARTAN POTASSIUM 50 MG: 50 TABLET, FILM COATED ORAL at 08:44

## 2023-08-22 ASSESSMENT — PAIN SCALES - GENERAL
PAINLEVEL_OUTOF10: 0

## 2023-08-22 NOTE — DISCHARGE INSTR - COC
documented pain score (0-10 scale): Pain Level: 0  Last Weight:   Wt Readings from Last 1 Encounters:   08/22/23 199 lb 2 oz (90.3 kg)     Mental Status:  oriented, alert, coherent, logical, thought processes intact, and able to concentrate and follow conversation    IV Access:  - None    Nursing Mobility/ADLs:  Walking   Dependent  Transfer  [facility]   whole    Wound Care Documentation and Therapy:        Elimination:  Continence: Bowel: No  Bladder: No  Urinary Catheter: Insertion Date: 8/18/2023    Colostomy/Ileostomy/Ileal Conduit: No       Date of Last BM: 08/23/2023      Intake/Output Summary (Last 24 hours) at 8/22/2023 0620  Last data filed at 8/22/2023 0500  Gross per 24 hour   Intake --   Output 4050 ml   Net -4050 ml     I/O last 3 completed shifts:  In: -   Out: 5281 [Urine:4475]    Safety Concerns:     Sundowners [place] and At Risk for Falls    Impairments/Disabilities:      None    Nutrition Therapy:  Current Nutrition Therapy:   - Oral Diet:  General  - Oral Nutrition Supplement:  Standard  three times a day    Routes of Feeding: Oral  Liquids: Thin Liquids  Daily Fluid Restriction: no  Last Modified Barium Swallow with Video (Video Swallowing Test): not done    Treatments at the Time of Hospital Discharge:   Respiratory Treatments: none  Oxygen Therapy:  is not on home oxygen therapy. Ventilator:    - No ventilator support    Rehab Therapies: Physical Therapy and Occupational Therapy  Weight Bearing Status/Restrictions: No weight bearing restrictions  Other Medical Equipment (for information only, NOT a DME order):  wheelchair, walker, bedside commode, and hospital bed  Other Treatments: Miconazole Powder to groin and estiven anal area BID and PRN inc bowel and bladder, Aquaphor BID and PRN inc to scrotal/estiven-anal/buttocks.       Patient's personal belongings (please select all

## 2023-08-22 NOTE — DISCHARGE SUMMARY
donepezil 10 MG tablet  Commonly known as: ARICEPT  Take 1 tablet by mouth nightly     levoFLOXacin 750 MG tablet  Commonly known as: Levaquin  Take 1 tablet by mouth daily for 7 days     senna 8.6 MG tablet  Commonly known as: SENOKOT  Take 1 tablet by mouth daily as needed (Constipation)            CHANGE how you take these medications      levothyroxine 25 MCG tablet  Commonly known as: SYNTHROID  What changed: Another medication with the same name was removed. Continue taking this medication, and follow the directions you see here. losartan 100 MG tablet  Commonly known as: COZAAR  What changed: Another medication with the same name was removed. Continue taking this medication, and follow the directions you see here. tamsulosin 0.4 MG capsule  Commonly known as: FLOMAX  What changed: Another medication with the same name was removed. Continue taking this medication, and follow the directions you see here. CONTINUE taking these medications      citalopram 10 MG tablet  Commonly known as: CELEXA     metoprolol succinate 25 MG extended release tablet  Commonly known as: TOPROL XL  Take 1 tablet by mouth daily     rosuvastatin 10 MG tablet  Commonly known as: CRESTOR            STOP taking these medications      busPIRone 7.5 MG tablet  Commonly known as: BUSPAR               Where to Get Your Medications        Information about where to get these medications is not yet available    Ask your nurse or doctor about these medications  donepezil 10 MG tablet  levoFLOXacin 750 MG tablet  senna 8.6 MG tablet           INTERNAL MEDICINE INSTRUCTIONS:  Follow-up with primary care physician as directed in discharge paperwork. Please review results of imaging studies with PCP. Follow-up with consultants as directed by them. If recurrence or worsening of symptoms go to the ED or call primary care physician. Diet: ADULT DIET;  Regular  ADULT ORAL NUTRITION SUPPLEMENT; Breakfast, Dinner; Clear Liquid

## 2023-08-22 NOTE — CARE COORDINATION
Social Work / Discharge Planning : SW left message with [facility] from [employer] [facility] [employer]. AWait acceptance/ denial. UNA to follow. Electronically signed by FENG Foy on 8/22/23 at 8:20 AM EDT     Addendum: SW spoke to [facility] for [employer]. They accepted patient but when they ran benefits NOT in network. Insurance would not approve one time contract. Daughter updated and SW obtained three additional choices: [facility] of [employer]. REferrals out. AWait acceptance/ denial. Will need pre-cert. UNA to follow. Electronically signed by FENG Foy on 8/22/23 at 10:04 AM EDT     Addendum: [facility] NOT have beds. [facility] accepted and submitted pre-cert. VM left with daughter and margarita submitted. Await cert. N 17, HENS and transport forms completed. UNA to follow.  Electronically signed by FENG Foy on 8/22/23 at 1:37 PM EDT

## 2023-08-22 NOTE — CARE COORDINATION
Spoke with Connor Saul at [facility] who states she is unable to accept patient at this time due to bed availability.     Alfrieda Babinski, MSN, [address]   Cell: [phone]

## 2023-08-23 VITALS
OXYGEN SATURATION: 95 % | WEIGHT: 207 LBS | HEART RATE: 67 BPM | RESPIRATION RATE: 18 BRPM | SYSTOLIC BLOOD PRESSURE: 119 MMHG | BODY MASS INDEX: 29.63 KG/M2 | HEIGHT: 70 IN | TEMPERATURE: 98 F | DIASTOLIC BLOOD PRESSURE: 77 MMHG

## 2023-08-23 LAB
ALBUMIN SERPL-MCNC: 2.4 G/DL (ref 3.5–5.2)
ALP SERPL-CCNC: 69 U/L (ref 40–129)
ALT SERPL-CCNC: 26 U/L (ref 0–40)
ANION GAP SERPL CALCULATED.3IONS-SCNC: 9 MMOL/L (ref 7–16)
AST SERPL-CCNC: 25 U/L (ref 0–39)
BASOPHILS # BLD: 0.03 K/UL (ref 0–0.2)
BASOPHILS NFR BLD: 1 % (ref 0–2)
BILIRUB SERPL-MCNC: 0.3 MG/DL (ref 0–1.2)
BUN SERPL-MCNC: 8 MG/DL (ref 6–23)
CALCIUM SERPL-MCNC: 9.5 MG/DL (ref 8.6–10.2)
CHLORIDE SERPL-SCNC: 104 MMOL/L (ref 98–107)
CO2 SERPL-SCNC: 22 MMOL/L (ref 22–29)
CREAT SERPL-MCNC: 0.7 MG/DL (ref 0.7–1.2)
EOSINOPHIL # BLD: 0.22 K/UL (ref 0.05–0.5)
EOSINOPHILS RELATIVE PERCENT: 3 % (ref 0–6)
ERYTHROCYTE [DISTWIDTH] IN BLOOD BY AUTOMATED COUNT: 14 % (ref 11.5–15)
GFR SERPL CREATININE-BSD FRML MDRD: >60 ML/MIN/1.73M2
GLUCOSE SERPL-MCNC: 87 MG/DL (ref 74–99)
HCT VFR BLD AUTO: 33.8 % (ref 37–54)
HGB BLD-MCNC: 10.9 G/DL (ref 12.5–16.5)
IMM GRANULOCYTES # BLD AUTO: 0.07 K/UL (ref 0–0.58)
IMM GRANULOCYTES NFR BLD: 1 % (ref 0–5)
LYMPHOCYTES NFR BLD: 1.07 K/UL (ref 1.5–4)
LYMPHOCYTES RELATIVE PERCENT: 17 % (ref 20–42)
MCH RBC QN AUTO: 27.7 PG (ref 26–35)
MCHC RBC AUTO-ENTMCNC: 32.2 G/DL (ref 32–34.5)
MCV RBC AUTO: 85.8 FL (ref 80–99.9)
MONOCYTES NFR BLD: 0.54 K/UL (ref 0.1–0.95)
MONOCYTES NFR BLD: 8 % (ref 2–12)
NEUTROPHILS NFR BLD: 70 % (ref 43–80)
NEUTS SEG NFR BLD: 4.48 K/UL (ref 1.8–7.3)
PLATELET # BLD AUTO: 249 K/UL (ref 130–450)
PMV BLD AUTO: 10.3 FL (ref 7–12)
POTASSIUM SERPL-SCNC: 3.6 MMOL/L (ref 3.5–5)
PROT SERPL-MCNC: 5.6 G/DL (ref 6.4–8.3)
RBC # BLD AUTO: 3.94 M/UL (ref 3.8–5.8)
SODIUM SERPL-SCNC: 135 MMOL/L (ref 132–146)
WBC OTHER # BLD: 6.4 K/UL (ref 4.5–11.5)

## 2023-08-23 PROCEDURE — 6370000000 HC RX 637 (ALT 250 FOR IP): Performed by: NURSE PRACTITIONER

## 2023-08-23 PROCEDURE — 2580000003 HC RX 258: Performed by: STUDENT IN AN ORGANIZED HEALTH CARE EDUCATION/TRAINING PROGRAM

## 2023-08-23 PROCEDURE — 94660 CPAP INITIATION&MGMT: CPT

## 2023-08-23 PROCEDURE — 6360000002 HC RX W HCPCS: Performed by: STUDENT IN AN ORGANIZED HEALTH CARE EDUCATION/TRAINING PROGRAM

## 2023-08-23 PROCEDURE — 80053 COMPREHEN METABOLIC PANEL: CPT

## 2023-08-23 PROCEDURE — 2580000003 HC RX 258: Performed by: NURSE PRACTITIONER

## 2023-08-23 PROCEDURE — 85025 COMPLETE CBC W/AUTO DIFF WBC: CPT

## 2023-08-23 RX ADMIN — CEFEPIME 2000 MG: 2 INJECTION, POWDER, FOR SOLUTION INTRAVENOUS at 04:34

## 2023-08-23 RX ADMIN — Medication 10 ML: at 08:53

## 2023-08-23 RX ADMIN — LEVOTHYROXINE SODIUM 25 MCG: 25 TABLET ORAL at 06:07

## 2023-08-23 RX ADMIN — LOSARTAN POTASSIUM 50 MG: 50 TABLET, FILM COATED ORAL at 08:52

## 2023-08-23 RX ADMIN — CEFEPIME 2000 MG: 2 INJECTION, POWDER, FOR SOLUTION INTRAVENOUS at 12:21

## 2023-08-23 RX ADMIN — TAMSULOSIN HYDROCHLORIDE 0.4 MG: 0.4 CAPSULE ORAL at 08:52

## 2023-08-23 RX ADMIN — PETROLATUM: 420 OINTMENT TOPICAL at 08:53

## 2023-08-23 RX ADMIN — METOPROLOL SUCCINATE 25 MG: 25 TABLET, EXTENDED RELEASE ORAL at 08:52

## 2023-08-23 RX ADMIN — CITALOPRAM HYDROBROMIDE 10 MG: 20 TABLET ORAL at 08:52

## 2023-08-23 ASSESSMENT — PAIN SCALES - GENERAL
PAINLEVEL_OUTOF10: 0

## 2023-08-23 NOTE — CARE COORDINATION
Social Work / Discharge Planning : Authorization received. Patient will be discharged to [facility] today at 4:[facility]. VM left with daughter Colletta Chamber and nursing updated as well as Sheila from [facility]. SW to follow.  Electronically signed by Murleen Aschoff, LSW on 8/23/23 at 11:13 AM EDT

## 2023-08-23 NOTE — PLAN OF CARE
Problem: ABCDS Injury Assessment  Goal: Absence of physical injury  8/20/2023 0139 by Renata Morataya LPN  Outcome: Progressing  8/19/2023 2252 by Dante Tatum RN  Outcome: Progressing     Problem: Skin/Tissue Integrity  Goal: Absence of new skin breakdown  Description: 1. Monitor for areas of redness and/or skin breakdown  2. Assess vascular access sites hourly  3. Every 4-6 hours minimum:  Change oxygen saturation probe site  4. Every 4-6 hours:  If on nasal continuous positive airway pressure, respiratory therapy assess nares and determine need for appliance change or resting period.   8/19/2023 2252 by Dante Tatum RN  Outcome: Progressing     Problem: Discharge Planning  Goal: Discharge to home or other facility with appropriate resources  8/19/2023 2252 by Dante Tatum RN  Outcome: Progressing     Problem: Safety - Adult  Goal: Free from fall injury  8/20/2023 0139 by Renata Morataya LPN  Outcome: Progressing  8/19/2023 2252 by Dante Tatum RN  Outcome: Progressing     Problem: Genitourinary - Adult  Goal: Urinary catheter remains patent  Outcome: Progressing
Problem: ABCDS Injury Assessment  Goal: Absence of physical injury  8/20/2023 1044 by Eleanor Ramirez RN  Outcome: Progressing  8/20/2023 0139 by Horace Wilcox LPN  Outcome: Progressing  8/19/2023 2252 by Thanh Schultz RN  Outcome: Progressing     Problem: Skin/Tissue Integrity  Goal: Absence of new skin breakdown  Description: 1. Monitor for areas of redness and/or skin breakdown  2. Assess vascular access sites hourly  3. Every 4-6 hours minimum:  Change oxygen saturation probe site  4. Every 4-6 hours:  If on nasal continuous positive airway pressure, respiratory therapy assess nares and determine need for appliance change or resting period.   8/20/2023 1044 by Eleanor Ramirez RN  Outcome: Progressing  8/19/2023 2252 by Thanh Schultz RN  Outcome: Progressing     Problem: Discharge Planning  Goal: Discharge to home or other facility with appropriate resources  8/20/2023 1044 by Eleanor Ramirez RN  Outcome: Progressing  Flowsheets (Taken 8/20/2023 0800)  Discharge to home or other facility with appropriate resources:   Identify barriers to discharge with patient and caregiver   Arrange for needed discharge resources and transportation as appropriate   Identify discharge learning needs (meds, wound care, etc)   Arrange for interpreters to assist at discharge as needed   Refer to discharge planning if patient needs post-hospital services based on physician order or complex needs related to functional status, cognitive ability or social support system  8/19/2023 2252 by Thanh Schultz RN  Outcome: Progressing     Problem: Safety - Adult  Goal: Free from fall injury  8/20/2023 1044 by Eleanor Ramirez RN  Outcome: Progressing  8/20/2023 0139 by Horace Wilcox LPN  Outcome: Progressing  8/19/2023 2252 by Thanh Schultz RN  Outcome: Progressing     Problem: Respiratory - Adult  Goal: Achieves optimal ventilation and oxygenation  Outcome: Progressing  Flowsheets (Taken 8/20/2023 0800)  Achieves optimal
Problem: ABCDS Injury Assessment  Goal: Absence of physical injury  8/23/2023 1646 by Di Earing  Outcome: Adequate for Discharge  8/23/2023 1646 by Di Earing  Outcome: Progressing  8/23/2023 1324 by Di Earing  Outcome: Adequate for Discharge     Problem: Skin/Tissue Integrity  Goal: Absence of new skin breakdown  Description: 1. Monitor for areas of redness and/or skin breakdown  2. Assess vascular access sites hourly  3. Every 4-6 hours minimum:  Change oxygen saturation probe site  4. Every 4-6 hours:  If on nasal continuous positive airway pressure, respiratory therapy assess nares and determine need for appliance change or resting period.   8/23/2023 1646 by Di Earing  Outcome: Adequate for Discharge  8/23/2023 1646 by Di Earing  Outcome: Progressing  8/23/2023 1324 by Di Earing  Outcome: Adequate for Discharge     Problem: Discharge Planning  Goal: Discharge to home or other facility with appropriate resources  8/23/2023 1646 by Di Earing  Outcome: Adequate for Discharge  8/23/2023 1646 by Di Earing  Outcome: Progressing  8/23/2023 1324 by Di Earing  Outcome: Adequate for Discharge     Problem: Safety - Adult  Goal: Free from fall injury  8/23/2023 1646 by Di Earing  Outcome: Adequate for Discharge  8/23/2023 1646 by Di Earing  Outcome: Progressing  8/23/2023 1324 by Di Earing  Outcome: Adequate for Discharge     Problem: Respiratory - Adult  Goal: Achieves optimal ventilation and oxygenation  8/23/2023 1646 by Di Earing  Outcome: Adequate for Discharge  8/23/2023 1646 by Di Earing  Outcome: Progressing  8/23/2023 1324 by Di Earing  Outcome: Adequate for Discharge     Problem: Genitourinary - Adult  Goal: Urinary catheter remains patent  8/23/2023 1646 by Di Earing  Outcome: Adequate for Discharge  8/23/2023 1646 by Di Earing  Outcome: Progressing  8/23/2023 1324 by Di Earing  Outcome: Adequate for
Problem: ABCDS Injury Assessment  Goal: Absence of physical injury  8/23/2023 1646 by Lyly Mcdonald  Outcome: Progressing  8/23/2023 1324 by Lyly Mcdonald  Outcome: Adequate for Discharge     Problem: Skin/Tissue Integrity  Goal: Absence of new skin breakdown  Description: 1. Monitor for areas of redness and/or skin breakdown  2. Assess vascular access sites hourly  3. Every 4-6 hours minimum:  Change oxygen saturation probe site  4. Every 4-6 hours:  If on nasal continuous positive airway pressure, respiratory therapy assess nares and determine need for appliance change or resting period.   8/23/2023 1646 by Lyly Mcdonald  Outcome: Progressing  8/23/2023 1324 by Lyly Mcdonald  Outcome: Adequate for Discharge     Problem: Discharge Planning  Goal: Discharge to home or other facility with appropriate resources  8/23/2023 1646 by Lyly Mcdonald  Outcome: Progressing  8/23/2023 1324 by Lyly Mcdonald  Outcome: Adequate for Discharge     Problem: Safety - Adult  Goal: Free from fall injury  8/23/2023 1646 by Lyly Mcdonald  Outcome: Progressing  8/23/2023 1324 by Lyly Mcdonald  Outcome: Adequate for Discharge     Problem: Respiratory - Adult  Goal: Achieves optimal ventilation and oxygenation  8/23/2023 1646 by Lyly Mcdonald  Outcome: Progressing  8/23/2023 1324 by Lyly Mcdonald  Outcome: Adequate for Discharge     Problem: Genitourinary - Adult  Goal: Urinary catheter remains patent  8/23/2023 1646 by Lyly Mcdonald  Outcome: Progressing  8/23/2023 1324 by Lyly Mcdonald  Outcome: Adequate for Discharge     Problem: Pain  Goal: Verbalizes/displays adequate comfort level or baseline comfort level  8/23/2023 1646 by Lyly Mcdonald  Outcome: Progressing  8/23/2023 1324 by Lyly Mcdonald  Outcome: Adequate for Discharge     Problem: Nutrition Deficit:  Goal: Optimize nutritional status  8/23/2023 1646 by Lyly Mcdonald  Outcome: Progressing  8/23/2023 1324 by GODFREY Dean  Outcome: Adequate
Problem: ABCDS Injury Assessment  Goal: Absence of physical injury  Outcome: Adequate for Discharge     Problem: Skin/Tissue Integrity  Goal: Absence of new skin breakdown  Description: 1. Monitor for areas of redness and/or skin breakdown  2. Assess vascular access sites hourly  3. Every 4-6 hours minimum:  Change oxygen saturation probe site  4. Every 4-6 hours:  If on nasal continuous positive airway pressure, respiratory therapy assess nares and determine need for appliance change or resting period.   Outcome: Adequate for Discharge     Problem: Discharge Planning  Goal: Discharge to home or other facility with appropriate resources  Outcome: Adequate for Discharge     Problem: Safety - Adult  Goal: Free from fall injury  Outcome: Adequate for Discharge     Problem: Respiratory - Adult  Goal: Achieves optimal ventilation and oxygenation  Outcome: Adequate for Discharge     Problem: Genitourinary - Adult  Goal: Urinary catheter remains patent  Outcome: Adequate for Discharge     Problem: Pain  Goal: Verbalizes/displays adequate comfort level or baseline comfort level  Outcome: Adequate for Discharge     Problem: Nutrition Deficit:  Goal: Optimize nutritional status  Outcome: Adequate for Discharge
Problem: ABCDS Injury Assessment  Goal: Absence of physical injury  Outcome: Progressing     Problem: Skin/Tissue Integrity  Goal: Absence of new skin breakdown  Description: 1. Monitor for areas of redness and/or skin breakdown  2. Assess vascular access sites hourly  3. Every 4-6 hours minimum:  Change oxygen saturation probe site  4. Every 4-6 hours:  If on nasal continuous positive airway pressure, respiratory therapy assess nares and determine need for appliance change or resting period.   Outcome: Progressing
Problem: ABCDS Injury Assessment  Goal: Absence of physical injury  Outcome: Progressing     Problem: Skin/Tissue Integrity  Goal: Absence of new skin breakdown  Description: 1. Monitor for areas of redness and/or skin breakdown  2. Assess vascular access sites hourly  3. Every 4-6 hours minimum:  Change oxygen saturation probe site  4. Every 4-6 hours:  If on nasal continuous positive airway pressure, respiratory therapy assess nares and determine need for appliance change or resting period.   Outcome: Progressing     Problem: Discharge Planning  Goal: Discharge to home or other facility with appropriate resources  Outcome: Progressing  Flowsheets (Taken 8/17/2023 0800)  Discharge to home or other facility with appropriate resources:   Identify barriers to discharge with patient and caregiver   Arrange for needed discharge resources and transportation as appropriate   Identify discharge learning needs (meds, wound care, etc)     Problem: Safety - Adult  Goal: Free from fall injury  Outcome: Progressing     Problem: Respiratory - Adult  Goal: Achieves optimal ventilation and oxygenation  Outcome: Progressing  Flowsheets (Taken 8/17/2023 0800)  Achieves optimal ventilation and oxygenation:   Assess for changes in respiratory status   Assess for changes in mentation and behavior   Oxygen supplementation based on oxygen saturation or arterial blood gases   Position to facilitate oxygenation and minimize respiratory effort     Problem: Genitourinary - Adult  Goal: Urinary catheter remains patent  Outcome: Progressing  Flowsheets (Taken 8/17/2023 0800)  Urinary catheter remains patent:   Assess patency of urinary catheter   Irrigate catheter per Licensed Independent Practitioner order if indicated and notify Licensed Independent Practitioner if unable to irrigate   Assess need for a larger catheter size or a 3-way catheter for continuous bladder irrigation     Problem: Pain  Goal: Verbalizes/displays adequate
Problem: ABCDS Injury Assessment  Goal: Absence of physical injury  Outcome: Progressing     Problem: Skin/Tissue Integrity  Goal: Absence of new skin breakdown  Description: 1. Monitor for areas of redness and/or skin breakdown  2. Assess vascular access sites hourly  3. Every 4-6 hours minimum:  Change oxygen saturation probe site  4. Every 4-6 hours:  If on nasal continuous positive airway pressure, respiratory therapy assess nares and determine need for appliance change or resting period.   Outcome: Progressing     Problem: Discharge Planning  Goal: Discharge to home or other facility with appropriate resources  Outcome: Progressing  Flowsheets (Taken 8/18/2023 0800)  Discharge to home or other facility with appropriate resources:   Identify barriers to discharge with patient and caregiver   Arrange for needed discharge resources and transportation as appropriate   Identify discharge learning needs (meds, wound care, etc)     Problem: Safety - Adult  Goal: Free from fall injury  Outcome: Progressing     Problem: Respiratory - Adult  Goal: Achieves optimal ventilation and oxygenation  Outcome: Progressing  Flowsheets  Taken 8/18/2023 0800 by Patt Acosta RN  Achieves optimal ventilation and oxygenation:   Assess for changes in respiratory status   Assess for changes in mentation and behavior   Position to facilitate oxygenation and minimize respiratory effort    Problem: Genitourinary - Adult  Goal: Urinary catheter remains patent  Outcome: Progressing  Flowsheets  Taken 8/18/2023 0800 by Patt Acosta RN  Urinary catheter remains patent: Assess patency of urinary catheter    Problem: Pain  Goal: Verbalizes/displays adequate comfort level or baseline comfort level  Outcome: Progressing
Problem: ABCDS Injury Assessment  Goal: Absence of physical injury  Outcome: Progressing     Problem: Skin/Tissue Integrity  Goal: Absence of new skin breakdown  Description: 1. Monitor for areas of redness and/or skin breakdown  2. Assess vascular access sites hourly  3. Every 4-6 hours minimum:  Change oxygen saturation probe site  4. Every 4-6 hours:  If on nasal continuous positive airway pressure, respiratory therapy assess nares and determine need for appliance change or resting period.   Outcome: Progressing     Problem: Discharge Planning  Goal: Discharge to home or other facility with appropriate resources  Outcome: Progressing  Flowsheets (Taken 8/19/2023 0800)  Discharge to home or other facility with appropriate resources:   Identify barriers to discharge with patient and caregiver   Arrange for needed discharge resources and transportation as appropriate   Identify discharge learning needs (meds, wound care, etc)     Problem: Safety - Adult  Goal: Free from fall injury  Outcome: Progressing     Problem: Respiratory - Adult  Goal: Achieves optimal ventilation and oxygenation  Outcome: Progressing  Flowsheets (Taken 8/19/2023 0800)  Achieves optimal ventilation and oxygenation:   Assess for changes in mentation and behavior   Position to facilitate oxygenation and minimize respiratory effort   Assess for changes in respiratory status     Problem: Genitourinary - Adult  Goal: Urinary catheter remains patent  Outcome: Progressing  Flowsheets (Taken 8/19/2023 0800)  Urinary catheter remains patent:   Assess patency of urinary catheter   Irrigate catheter per Licensed Independent Practitioner order if indicated and notify Licensed Independent Practitioner if unable to irrigate   Assess need for a larger catheter size or a 3-way catheter for continuous bladder irrigation     Problem: Pain  Goal: Verbalizes/displays adequate comfort level or baseline comfort level  Outcome: Progressing  Flowsheets (Taken
Problem: ABCDS Injury Assessment  Goal: Absence of physical injury  Outcome: Progressing  Flowsheets (Taken 8/15/2023 2315)  Absence of Physical Injury: Implement safety measures based on patient assessment     Problem: Skin/Tissue Integrity  Goal: Absence of new skin breakdown  Description: 1. Monitor for areas of redness and/or skin breakdown  2. Assess vascular access sites hourly  3. Every 4-6 hours minimum:  Change oxygen saturation probe site  4. Every 4-6 hours:  If on nasal continuous positive airway pressure, respiratory therapy assess nares and determine need for appliance change or resting period.   Outcome: Progressing     Problem: Discharge Planning  Goal: Discharge to home or other facility with appropriate resources  Outcome: Progressing  Flowsheets  Taken 8/15/2023 2315 by Mary Man RN  Discharge to home or other facility with appropriate resources: Identify barriers to discharge with patient and caregiver  Taken 8/15/2023 1753 by Santhosh Jacobs RN  Discharge to home or other facility with appropriate resources: Refer to discharge planning if patient needs post-hospital services based on physician order or complex needs related to functional status, cognitive ability or social support system     Problem: Safety - Adult  Goal: Free from fall injury  Outcome: Progressing  Flowsheets (Taken 8/15/2023 2315)  Free From Fall Injury: Instruct family/caregiver on patient safety     Problem: Respiratory - Adult  Goal: Achieves optimal ventilation and oxygenation  Outcome: Progressing     Problem: Genitourinary - Adult  Goal: Urinary catheter remains patent  Outcome: Progressing
Progressing  Flowsheets (Taken 8/19/2023 0800)  Urinary catheter remains patent:   Assess patency of urinary catheter   Irrigate catheter per Licensed Independent Practitioner order if indicated and notify Licensed Independent Practitioner if unable to irrigate   Assess need for a larger catheter size or a 3-way catheter for continuous bladder irrigation     Problem: Pain  Goal: Verbalizes/displays adequate comfort level or baseline comfort level  8/19/2023 1113 by Raquel Chen RN  Outcome: Progressing  Flowsheets (Taken 8/19/2023 0758 by Ketan Gutierrez RN)  Verbalizes/displays adequate comfort level or baseline comfort level:   Encourage patient to monitor pain and request assistance   Assess pain using appropriate pain scale   Administer analgesics based on type and severity of pain and evaluate response   Implement non-pharmacological measures as appropriate and evaluate response   Consider cultural and social influences on pain and pain management   Notify Licensed Independent Practitioner if interventions unsuccessful or patient reports new pain     Problem: Nutrition Deficit:  Goal: Optimize nutritional status  8/19/2023 1113 by Raquel Chen RN  Outcome: Progressing

## 2023-08-25 LAB
ALBUMIN SERPL-MCNC: 2.9 G/DL (ref 3.5–5.2)
ALP SERPL-CCNC: 82 U/L (ref 40–129)
ALT SERPL-CCNC: 20 U/L (ref 0–40)
ANION GAP SERPL CALCULATED.3IONS-SCNC: 10 MMOL/L (ref 7–16)
AST SERPL-CCNC: 17 U/L (ref 0–39)
BASOPHILS # BLD: 0.02 K/UL (ref 0–0.2)
BASOPHILS NFR BLD: 0 % (ref 0–2)
BILIRUB SERPL-MCNC: 0.3 MG/DL (ref 0–1.2)
BUN SERPL-MCNC: 9 MG/DL (ref 6–23)
CALCIUM SERPL-MCNC: 9.8 MG/DL (ref 8.6–10.2)
CHLORIDE SERPL-SCNC: 103 MMOL/L (ref 98–107)
CHOLEST SERPL-MCNC: 130 MG/DL
CO2 SERPL-SCNC: 26 MMOL/L (ref 22–29)
CREAT SERPL-MCNC: 0.8 MG/DL (ref 0.7–1.2)
EOSINOPHIL # BLD: 0.13 K/UL (ref 0.05–0.5)
EOSINOPHILS RELATIVE PERCENT: 2 % (ref 0–6)
ERYTHROCYTE [DISTWIDTH] IN BLOOD BY AUTOMATED COUNT: 14.4 % (ref 11.5–15)
GFR SERPL CREATININE-BSD FRML MDRD: >60 ML/MIN/1.73M2
GLUCOSE SERPL-MCNC: 77 MG/DL (ref 74–99)
HCT VFR BLD AUTO: 35.7 % (ref 37–54)
HDLC SERPL-MCNC: 31 MG/DL
HGB BLD-MCNC: 11.4 G/DL (ref 12.5–16.5)
IMM GRANULOCYTES # BLD AUTO: 0.04 K/UL (ref 0–0.58)
IMM GRANULOCYTES NFR BLD: 1 % (ref 0–5)
LDLC SERPL CALC-MCNC: 84 MG/DL
LYMPHOCYTES NFR BLD: 1.03 K/UL (ref 1.5–4)
LYMPHOCYTES RELATIVE PERCENT: 16 % (ref 20–42)
MCH RBC QN AUTO: 28.1 PG (ref 26–35)
MCHC RBC AUTO-ENTMCNC: 31.9 G/DL (ref 32–34.5)
MCV RBC AUTO: 87.9 FL (ref 80–99.9)
MONOCYTES NFR BLD: 0.56 K/UL (ref 0.1–0.95)
MONOCYTES NFR BLD: 9 % (ref 2–12)
NEUTROPHILS NFR BLD: 73 % (ref 43–80)
NEUTS SEG NFR BLD: 4.81 K/UL (ref 1.8–7.3)
PLATELET # BLD AUTO: 234 K/UL (ref 130–450)
PMV BLD AUTO: 10.8 FL (ref 7–12)
POTASSIUM SERPL-SCNC: 3.8 MMOL/L (ref 3.5–5)
PROT SERPL-MCNC: 6.2 G/DL (ref 6.4–8.3)
RBC # BLD AUTO: 4.06 M/UL (ref 3.8–5.8)
SODIUM SERPL-SCNC: 139 MMOL/L (ref 132–146)
T4 SERPL-MCNC: 6.4 UG/DL (ref 4.5–11.7)
TRIGL SERPL-MCNC: 74 MG/DL
TSH SERPL DL<=0.05 MIU/L-ACNC: 2.17 UIU/ML (ref 0.27–4.2)
VIT B12 SERPL-MCNC: 572 PG/ML (ref 211–946)
VLDLC SERPL CALC-MCNC: 15 MG/DL
WBC OTHER # BLD: 6.6 K/UL (ref 4.5–11.5)

## 2023-08-25 NOTE — ADT AUTH CERT
qhs  iv maxipime 2gm q8h   aricept 10mg qhs   synthroid 25mcg qd  toprol xl 25mg qd   flomax 0.4mg qd   cozaar 50mg qd   po tylenol 650mg q6h prn . . no doses      NEW ORDERS:  po klor con 40meq x1      UNA NOTES:  Social Work / Discharge Planning:   SW left message with [place] from [place] [facility] [employer]. Await acceptance/ denial. SW to follow. Addendum:   SW spoke to [place] for [facility] [place]. They accepted patient but when they ran benefits NOT in network. Insurance would not approve one time contract. Daughter updated and SW obtained three additional choices: [facility], Mariah Reyes of [employer] and [facility]. REferrals out. Await acceptance/ denial. Will need pre-cert. SW to follow. Addendum:    [facility] and submitted pre-cert. VM left with daughter and margarita submitted. Await cert. N 17, HENS and transport forms completed. SW to follow.         8/20/23 -  by Vanessa Cordon RN       Review Status Review Entered   In Primary 8/24/2023 1046       Created By   Vanessa Cordon RN      Criteria Review   8/20/23      RELEVANT BASELINES:   Pt wears cpap at night at home   Pt is not on home oxygen      VITALS:  afebrile today  18 75 121/59  94% ra      ABNL/PERTINENT LABS/RADIOLOGY/DIAGNOSTIC STUDIES:   wbc 6.6  h&h 10.8/32.8       PHYSICAL EXAM:  General: awake and alert, oriented, seems tired but comfortable, frail and ill appearing  Eyes: conjunctivae/corneas clear, sclera non icteric  Skin: warm, dry and pale, no rashes or lesions  Lungs: clear but diminished throughout, respirations even and non-labored on room air  Heart: regular rate, regular rhythm, +murmur  Abdomen: soft, non-distended, mild tenderness to the RLQ, bowel sounds normal  Extremities: generalized weakness, no edema  Neurologic: following simple commands, speech is clear but weak     IM A/P:  Sepsis in the setting of Pseudomonas bacteremia with acute

## 2023-08-27 LAB — 1,25(OH)2D3 SERPL-MCNC: 18.2 PG/ML (ref 19.9–79.3)

## 2023-08-30 ENCOUNTER — HOSPITAL ENCOUNTER (EMERGENCY)
Age: 78
Discharge: NURSING, REHAB OR LONG-TERM CARE | End: 2023-08-30
Attending: STUDENT IN AN ORGANIZED HEALTH CARE EDUCATION/TRAINING PROGRAM
Payer: MEDICARE

## 2023-08-30 ENCOUNTER — APPOINTMENT (OUTPATIENT)
Dept: GENERAL RADIOLOGY | Age: 78
End: 2023-08-30
Payer: MEDICARE

## 2023-08-30 VITALS
BODY MASS INDEX: 25.2 KG/M2 | OXYGEN SATURATION: 94 % | TEMPERATURE: 98.6 F | HEART RATE: 69 BPM | DIASTOLIC BLOOD PRESSURE: 60 MMHG | HEIGHT: 70 IN | WEIGHT: 176 LBS | RESPIRATION RATE: 14 BRPM | SYSTOLIC BLOOD PRESSURE: 115 MMHG

## 2023-08-30 DIAGNOSIS — R31.0 GROSS HEMATURIA: Primary | ICD-10-CM

## 2023-08-30 LAB
ALBUMIN SERPL-MCNC: 3.3 G/DL (ref 3.5–5.2)
ALP SERPL-CCNC: 81 U/L (ref 40–129)
ALT SERPL-CCNC: 11 U/L (ref 0–40)
ANION GAP SERPL CALCULATED.3IONS-SCNC: 9 MMOL/L (ref 7–16)
AST SERPL-CCNC: 14 U/L (ref 0–39)
BACTERIA URNS QL MICRO: ABNORMAL
BASOPHILS # BLD: 0.02 K/UL (ref 0–0.2)
BASOPHILS NFR BLD: 0 % (ref 0–2)
BILIRUB SERPL-MCNC: 0.6 MG/DL (ref 0–1.2)
BILIRUB UR QL STRIP: ABNORMAL
BUN SERPL-MCNC: 11 MG/DL (ref 6–23)
CALCIUM SERPL-MCNC: 10 MG/DL (ref 8.6–10.2)
CHLORIDE SERPL-SCNC: 97 MMOL/L (ref 98–107)
CLARITY UR: ABNORMAL
CO2 SERPL-SCNC: 26 MMOL/L (ref 22–29)
COLOR UR: ABNORMAL
CREAT SERPL-MCNC: 0.8 MG/DL (ref 0.7–1.2)
EOSINOPHIL # BLD: 0.03 K/UL (ref 0.05–0.5)
EOSINOPHILS RELATIVE PERCENT: 0 % (ref 0–6)
ERYTHROCYTE [DISTWIDTH] IN BLOOD BY AUTOMATED COUNT: 14.4 % (ref 11.5–15)
GFR SERPL CREATININE-BSD FRML MDRD: >60 ML/MIN/1.73M2
GLUCOSE SERPL-MCNC: 127 MG/DL (ref 74–99)
GLUCOSE UR STRIP-MCNC: NEGATIVE MG/DL
HCT VFR BLD AUTO: 37.4 % (ref 37–54)
HGB BLD-MCNC: 12.3 G/DL (ref 12.5–16.5)
HGB UR QL STRIP.AUTO: ABNORMAL
IMM GRANULOCYTES # BLD AUTO: 0.03 K/UL (ref 0–0.58)
IMM GRANULOCYTES NFR BLD: 0 % (ref 0–5)
KETONES UR STRIP-MCNC: NEGATIVE MG/DL
LEUKOCYTE ESTERASE UR QL STRIP: ABNORMAL
LYMPHOCYTES NFR BLD: 0.74 K/UL (ref 1.5–4)
LYMPHOCYTES RELATIVE PERCENT: 8 % (ref 20–42)
MCH RBC QN AUTO: 27.9 PG (ref 26–35)
MCHC RBC AUTO-ENTMCNC: 32.9 G/DL (ref 32–34.5)
MCV RBC AUTO: 84.8 FL (ref 80–99.9)
MONOCYTES NFR BLD: 0.67 K/UL (ref 0.1–0.95)
MONOCYTES NFR BLD: 8 % (ref 2–12)
NEUTROPHILS NFR BLD: 83 % (ref 43–80)
NEUTS SEG NFR BLD: 7.28 K/UL (ref 1.8–7.3)
NITRITE UR QL STRIP: POSITIVE
PH UR STRIP: 6.5 [PH] (ref 5–9)
PLATELET # BLD AUTO: 232 K/UL (ref 130–450)
PMV BLD AUTO: 10.5 FL (ref 7–12)
POTASSIUM SERPL-SCNC: 4.1 MMOL/L (ref 3.5–5)
PROT SERPL-MCNC: 6.8 G/DL (ref 6.4–8.3)
PROT UR STRIP-MCNC: 100 MG/DL
RBC # BLD AUTO: 4.41 M/UL (ref 3.8–5.8)
RBC #/AREA URNS HPF: ABNORMAL /HPF
SODIUM SERPL-SCNC: 132 MMOL/L (ref 132–146)
SP GR UR STRIP: 1.01 (ref 1–1.03)
UROBILINOGEN UR STRIP-ACNC: 0.2 EU/DL (ref 0–1)
WBC #/AREA URNS HPF: ABNORMAL /HPF
WBC OTHER # BLD: 8.8 K/UL (ref 4.5–11.5)

## 2023-08-30 PROCEDURE — 73502 X-RAY EXAM HIP UNI 2-3 VIEWS: CPT

## 2023-08-30 PROCEDURE — 80053 COMPREHEN METABOLIC PANEL: CPT

## 2023-08-30 PROCEDURE — 99284 EMERGENCY DEPT VISIT MOD MDM: CPT

## 2023-08-30 PROCEDURE — 6370000000 HC RX 637 (ALT 250 FOR IP): Performed by: STUDENT IN AN ORGANIZED HEALTH CARE EDUCATION/TRAINING PROGRAM

## 2023-08-30 PROCEDURE — 85025 COMPLETE CBC W/AUTO DIFF WBC: CPT

## 2023-08-30 PROCEDURE — 81001 URINALYSIS AUTO W/SCOPE: CPT

## 2023-08-30 RX ORDER — ACETAMINOPHEN 325 MG/1
650 TABLET ORAL ONCE
Status: COMPLETED | OUTPATIENT
Start: 2023-08-30 | End: 2023-08-30

## 2023-08-30 RX ADMIN — ACETAMINOPHEN 650 MG: 325 TABLET ORAL at 07:52

## 2023-08-30 ASSESSMENT — PAIN SCALES - GENERAL
PAINLEVEL_OUTOF10: 3
PAINLEVEL_OUTOF10: 10

## 2023-08-30 ASSESSMENT — PAIN - FUNCTIONAL ASSESSMENT
PAIN_FUNCTIONAL_ASSESSMENT: 0-10
PAIN_FUNCTIONAL_ASSESSMENT: NONE - DENIES PAIN

## 2023-08-30 NOTE — CARE COORDINATION
Social Work/Transition of Care:     Pt in need of transportation to return to [address] Liaison for the facility who will arrange transport to return via facility van.     Electronically signed by Tanya Liang on 5/77/6042 at 12:14 PM

## 2023-09-19 ENCOUNTER — CARE COORDINATION (OUTPATIENT)
Dept: CASE MANAGEMENT | Age: 78
End: 2023-09-19

## 2023-09-19 DIAGNOSIS — N12 PYELONEPHRITIS: Primary | ICD-10-CM

## 2023-09-19 PROCEDURE — 1111F DSCHRG MED/CURRENT MED MERGE: CPT | Performed by: INTERNAL MEDICINE

## 2023-09-19 NOTE — CARE COORDINATION
[facility] Care Transitions Initial Follow Up Call    Call within 2 business days of discharge: Yes    Patient Current Location:  Home: [address], [address] [facility]    Care Transition Nurse contacted the patient by telephone to perform post hospital discharge assessment. Verified name and  with patient as identifiers. Provided introduction to self, and explanation of the Care Transition Nurse role. Patient: Ajit Ulloa Patient : 1945   MRN: [id number]  Reason for Admission: UTI - CAUTI  Discharge Date: 23 RARS: Readmission Risk Score: 17.9      Last Discharge [address] [facility]       Date Complaint Diagnosis Description Type Department Provider    23 Other Gross hematuria ED (DISCHARGE) [facility] Mariano Hyman, DO            Was this an external facility discharge? Yes,   Discharge Facility: [address] [place] [address] to be reviewed by the provider   Additional needs identified to be addressed with provider: Yes  none               Method of communication with provider: none. Acute Care Course:   8/15 to  [facility] with hematuria and sepsis   to  [facility] UTI to home with Lyssa Gann made:  10/11 Dr Nubia Garcia PCP    Sig Hx:   ANGUS, HTN, valve disease, Alzheimers, BPH    DME:     Conversation:   Spoke to Sharon after 2 IDs. She states that dad was having a lot of blood clots. She states she has been giving him a lot of fluids and juice and has been irrigating. [facility] nurse has looked at it. It is now much better. Did not take to hospital as temp not high. She agreed to take pt to hospital if gets above 101. States dad is reticent to go to hospital but he has agreed to go if temp above 101. Educated that [address] can also signify an infection. She does give pt ibuprofen at times. States he has the sore on his bottom and one on his lower back. [facility] has seen and has given her Mepilex and she is going to purchase a donut pillow. Educated to have dad move and change positions.  She

## 2023-09-26 ENCOUNTER — CARE COORDINATION (OUTPATIENT)
Dept: CASE MANAGEMENT | Age: 78
End: 2023-09-26

## 2023-09-26 NOTE — CARE COORDINATION
Non [facility] PCP with no re-admit within 30 days      Laine Arce, BSN, RN   [address] [facility] Nurse  [phone]

## 2023-11-09 ENCOUNTER — APPOINTMENT (OUTPATIENT)
Dept: CT IMAGING | Age: 78
End: 2023-11-09
Payer: MEDICARE

## 2023-11-09 ENCOUNTER — APPOINTMENT (OUTPATIENT)
Dept: GENERAL RADIOLOGY | Age: 78
End: 2023-11-09
Payer: MEDICARE

## 2023-11-09 ENCOUNTER — HOSPITAL ENCOUNTER (EMERGENCY)
Age: 78
Discharge: ANOTHER HOSPITAL | End: 2023-11-10
Attending: STUDENT IN AN ORGANIZED HEALTH CARE EDUCATION/TRAINING PROGRAM
Payer: MEDICARE

## 2023-11-09 VITALS
HEART RATE: 82 BPM | OXYGEN SATURATION: 97 % | BODY MASS INDEX: 26.77 KG/M2 | SYSTOLIC BLOOD PRESSURE: 140 MMHG | TEMPERATURE: 98.1 F | WEIGHT: 187 LBS | HEIGHT: 70 IN | RESPIRATION RATE: 16 BRPM | DIASTOLIC BLOOD PRESSURE: 78 MMHG

## 2023-11-09 DIAGNOSIS — N41.0 ACUTE PROSTATITIS: ICD-10-CM

## 2023-11-09 DIAGNOSIS — N30.01 ACUTE CYSTITIS WITH HEMATURIA: ICD-10-CM

## 2023-11-09 DIAGNOSIS — R41.0 CONFUSION: ICD-10-CM

## 2023-11-09 DIAGNOSIS — R33.9 URINARY RETENTION: ICD-10-CM

## 2023-11-09 DIAGNOSIS — T83.9XXA PROBLEM WITH FOLEY CATHETER, INITIAL ENCOUNTER (HCC): Primary | ICD-10-CM

## 2023-11-09 LAB
ALBUMIN SERPL-MCNC: 3.9 G/DL (ref 3.5–5.2)
ALP SERPL-CCNC: 80 U/L (ref 40–129)
ALT SERPL-CCNC: 6 U/L (ref 0–40)
ANION GAP SERPL CALCULATED.3IONS-SCNC: 13 MMOL/L (ref 7–16)
AST SERPL-CCNC: 19 U/L (ref 0–39)
BACTERIA URNS QL MICRO: ABNORMAL
BASOPHILS # BLD: 0.01 K/UL (ref 0–0.2)
BASOPHILS NFR BLD: 0 % (ref 0–2)
BILIRUB SERPL-MCNC: 0.4 MG/DL (ref 0–1.2)
BILIRUB UR QL STRIP: NEGATIVE
BUN SERPL-MCNC: 22 MG/DL (ref 6–23)
CALCIUM SERPL-MCNC: 10.1 MG/DL (ref 8.6–10.2)
CHLORIDE SERPL-SCNC: 103 MMOL/L (ref 98–107)
CLARITY UR: ABNORMAL
CO2 SERPL-SCNC: 21 MMOL/L (ref 22–29)
COLOR UR: YELLOW
CREAT SERPL-MCNC: 0.9 MG/DL (ref 0.7–1.2)
EOSINOPHIL # BLD: 0.01 K/UL (ref 0.05–0.5)
EOSINOPHILS RELATIVE PERCENT: 0 % (ref 0–6)
ERYTHROCYTE [DISTWIDTH] IN BLOOD BY AUTOMATED COUNT: 15.9 % (ref 11.5–15)
FLUAV RNA RESP QL NAA+PROBE: NOT DETECTED
FLUBV RNA RESP QL NAA+PROBE: NOT DETECTED
GFR SERPL CREATININE-BSD FRML MDRD: >60 ML/MIN/1.73M2
GLUCOSE SERPL-MCNC: 119 MG/DL (ref 74–99)
GLUCOSE UR STRIP-MCNC: NEGATIVE MG/DL
HCT VFR BLD AUTO: 38.2 % (ref 37–54)
HGB BLD-MCNC: 12.5 G/DL (ref 12.5–16.5)
HGB UR QL STRIP.AUTO: ABNORMAL
IMM GRANULOCYTES # BLD AUTO: 0.03 K/UL (ref 0–0.58)
IMM GRANULOCYTES NFR BLD: 0 % (ref 0–5)
KETONES UR STRIP-MCNC: NEGATIVE MG/DL
LACTATE BLDV-SCNC: 1.6 MMOL/L (ref 0.5–1.9)
LEUKOCYTE ESTERASE UR QL STRIP: ABNORMAL
LIPASE SERPL-CCNC: 28 U/L (ref 13–60)
LYMPHOCYTES NFR BLD: 0.7 K/UL (ref 1.5–4)
LYMPHOCYTES RELATIVE PERCENT: 8 % (ref 20–42)
MCH RBC QN AUTO: 27.5 PG (ref 26–35)
MCHC RBC AUTO-ENTMCNC: 32.7 G/DL (ref 32–34.5)
MCV RBC AUTO: 84 FL (ref 80–99.9)
MONOCYTES NFR BLD: 0.65 K/UL (ref 0.1–0.95)
MONOCYTES NFR BLD: 7 % (ref 2–12)
NEUTROPHILS NFR BLD: 84 % (ref 43–80)
NEUTS SEG NFR BLD: 7.5 K/UL (ref 1.8–7.3)
NITRITE UR QL STRIP: POSITIVE
PH UR STRIP: 6 [PH] (ref 5–9)
PLATELET # BLD AUTO: 215 K/UL (ref 130–450)
PMV BLD AUTO: 11.5 FL (ref 7–12)
POTASSIUM SERPL-SCNC: 4.5 MMOL/L (ref 3.5–5)
PROT SERPL-MCNC: 7.4 G/DL (ref 6.4–8.3)
PROT UR STRIP-MCNC: 30 MG/DL
RBC # BLD AUTO: 4.55 M/UL (ref 3.8–5.8)
RBC #/AREA URNS HPF: ABNORMAL /HPF
SARS-COV-2 RNA RESP QL NAA+PROBE: NOT DETECTED
SODIUM SERPL-SCNC: 137 MMOL/L (ref 132–146)
SOURCE: NORMAL
SP GR UR STRIP: 1.02 (ref 1–1.03)
SPECIMEN DESCRIPTION: NORMAL
TROPONIN I SERPL HS-MCNC: 30 NG/L (ref 0–11)
UROBILINOGEN UR STRIP-ACNC: 0.2 EU/DL (ref 0–1)
WBC #/AREA URNS HPF: ABNORMAL /HPF
WBC OTHER # BLD: 8.9 K/UL (ref 4.5–11.5)

## 2023-11-09 PROCEDURE — 81001 URINALYSIS AUTO W/SCOPE: CPT

## 2023-11-09 PROCEDURE — 74176 CT ABD & PELVIS W/O CONTRAST: CPT

## 2023-11-09 PROCEDURE — 99285 EMERGENCY DEPT VISIT HI MDM: CPT

## 2023-11-09 PROCEDURE — 83605 ASSAY OF LACTIC ACID: CPT

## 2023-11-09 PROCEDURE — 70450 CT HEAD/BRAIN W/O DYE: CPT

## 2023-11-09 PROCEDURE — 87077 CULTURE AEROBIC IDENTIFY: CPT

## 2023-11-09 PROCEDURE — 51702 INSERT TEMP BLADDER CATH: CPT

## 2023-11-09 PROCEDURE — 2580000003 HC RX 258: Performed by: STUDENT IN AN ORGANIZED HEALTH CARE EDUCATION/TRAINING PROGRAM

## 2023-11-09 PROCEDURE — 83690 ASSAY OF LIPASE: CPT

## 2023-11-09 PROCEDURE — 80053 COMPREHEN METABOLIC PANEL: CPT

## 2023-11-09 PROCEDURE — 84484 ASSAY OF TROPONIN QUANT: CPT

## 2023-11-09 PROCEDURE — 96365 THER/PROPH/DIAG IV INF INIT: CPT

## 2023-11-09 PROCEDURE — 71045 X-RAY EXAM CHEST 1 VIEW: CPT

## 2023-11-09 PROCEDURE — 6360000002 HC RX W HCPCS: Performed by: STUDENT IN AN ORGANIZED HEALTH CARE EDUCATION/TRAINING PROGRAM

## 2023-11-09 PROCEDURE — 87086 URINE CULTURE/COLONY COUNT: CPT

## 2023-11-09 PROCEDURE — 93005 ELECTROCARDIOGRAM TRACING: CPT | Performed by: STUDENT IN AN ORGANIZED HEALTH CARE EDUCATION/TRAINING PROGRAM

## 2023-11-09 PROCEDURE — 87040 BLOOD CULTURE FOR BACTERIA: CPT

## 2023-11-09 PROCEDURE — 85025 COMPLETE CBC W/AUTO DIFF WBC: CPT

## 2023-11-09 PROCEDURE — 87636 SARSCOV2 & INF A&B AMP PRB: CPT

## 2023-11-09 RX ORDER — SODIUM CHLORIDE 0.9 % (FLUSH) 0.9 %
10 SYRINGE (ML) INJECTION PRN
Status: CANCELLED | OUTPATIENT
Start: 2023-11-09

## 2023-11-09 RX ORDER — ONDANSETRON 2 MG/ML
4 INJECTION INTRAMUSCULAR; INTRAVENOUS EVERY 6 HOURS PRN
Status: CANCELLED | OUTPATIENT
Start: 2023-11-09

## 2023-11-09 RX ORDER — ACETAMINOPHEN 325 MG/1
650 TABLET ORAL EVERY 6 HOURS PRN
Status: CANCELLED | OUTPATIENT
Start: 2023-11-09

## 2023-11-09 RX ORDER — MAGNESIUM SULFATE IN WATER 40 MG/ML
2000 INJECTION, SOLUTION INTRAVENOUS PRN
Status: CANCELLED | OUTPATIENT
Start: 2023-11-09

## 2023-11-09 RX ORDER — ACETAMINOPHEN 650 MG/1
650 SUPPOSITORY RECTAL EVERY 6 HOURS PRN
Status: CANCELLED | OUTPATIENT
Start: 2023-11-09

## 2023-11-09 RX ORDER — TAMSULOSIN HYDROCHLORIDE 0.4 MG/1
0.4 CAPSULE ORAL DAILY
Status: CANCELLED | OUTPATIENT
Start: 2023-11-10

## 2023-11-09 RX ORDER — ENOXAPARIN SODIUM 100 MG/ML
40 INJECTION SUBCUTANEOUS DAILY
Status: CANCELLED | OUTPATIENT
Start: 2023-11-10

## 2023-11-09 RX ORDER — SODIUM CHLORIDE 9 MG/ML
INJECTION, SOLUTION INTRAVENOUS PRN
Status: CANCELLED | OUTPATIENT
Start: 2023-11-09

## 2023-11-09 RX ORDER — LEVOTHYROXINE SODIUM 0.03 MG/1
25 TABLET ORAL DAILY
Status: CANCELLED | OUTPATIENT
Start: 2023-11-10

## 2023-11-09 RX ORDER — ONDANSETRON 4 MG/1
4 TABLET, ORALLY DISINTEGRATING ORAL EVERY 8 HOURS PRN
Status: CANCELLED | OUTPATIENT
Start: 2023-11-09

## 2023-11-09 RX ORDER — SENNOSIDES A AND B 8.6 MG/1
1 TABLET, FILM COATED ORAL DAILY PRN
Status: CANCELLED | OUTPATIENT
Start: 2023-11-09

## 2023-11-09 RX ORDER — METOPROLOL SUCCINATE 25 MG/1
25 TABLET, EXTENDED RELEASE ORAL DAILY
Status: CANCELLED | OUTPATIENT
Start: 2023-11-10

## 2023-11-09 RX ORDER — POTASSIUM CHLORIDE 20 MEQ/1
40 TABLET, EXTENDED RELEASE ORAL PRN
Status: CANCELLED | OUTPATIENT
Start: 2023-11-09

## 2023-11-09 RX ORDER — POTASSIUM CHLORIDE 7.45 MG/ML
10 INJECTION INTRAVENOUS PRN
Status: CANCELLED | OUTPATIENT
Start: 2023-11-09

## 2023-11-09 RX ORDER — SODIUM CHLORIDE 0.9 % (FLUSH) 0.9 %
10 SYRINGE (ML) INJECTION EVERY 12 HOURS SCHEDULED
Status: CANCELLED | OUTPATIENT
Start: 2023-11-09

## 2023-11-09 RX ORDER — LOSARTAN POTASSIUM 25 MG/1
100 TABLET ORAL DAILY
Status: CANCELLED | OUTPATIENT
Start: 2023-11-10

## 2023-11-09 RX ORDER — ROSUVASTATIN CALCIUM 10 MG/1
10 TABLET, COATED ORAL NIGHTLY
Status: CANCELLED | OUTPATIENT
Start: 2023-11-09

## 2023-11-09 RX ADMIN — PIPERACILLIN AND TAZOBACTAM 4500 MG: 4; .5 INJECTION, POWDER, LYOPHILIZED, FOR SOLUTION INTRAVENOUS at 19:02

## 2023-11-09 ASSESSMENT — ENCOUNTER SYMPTOMS
SINUS PRESSURE: 0
DIARRHEA: 0
EYE DISCHARGE: 0
EYE REDNESS: 0
ABDOMINAL PAIN: 1
VOMITING: 0
COUGH: 0
BACK PAIN: 0
EYE PAIN: 0
SORE THROAT: 0
WHEEZING: 0
NAUSEA: 0
SHORTNESS OF BREATH: 0

## 2023-11-09 NOTE — ED NOTES
Pt came in with samuel from home, decreased output and home health nurse unable to flush and did not have another samuel on her. Home samuel removed, new 16F places without issue. Blood clots noted when inserted. Urine now clear yellow. Drained 650 Ml.  Pt tolerated procedure well, states he has \"immediate relief\"      Rebecca Marcos RN  11/09/23 3572

## 2023-11-09 NOTE — ED PROVIDER NOTES
HPI   This is a an 66-year-old male patient presents to emergency department for evaluation of possible Duong catheter obstruction. Patient lives at home with daughter. He has a history of dementia. Apparently there is increasing confusion. No fevers. His Duong catheter was not draining earlier today and he was complaining of lower abdominal pain. No falls or fevers. Patient having no chest pain or shortness of breath. Review of Systems   Constitutional:  Negative for chills and fever. HENT:  Negative for ear pain, sinus pressure and sore throat. Eyes:  Negative for pain, discharge and redness. Respiratory:  Negative for cough, shortness of breath and wheezing. Cardiovascular:  Negative for chest pain. Gastrointestinal:  Positive for abdominal pain. Negative for diarrhea, nausea and vomiting. Genitourinary:  Negative for dysuria and frequency. Duong catheter not draining. Musculoskeletal:  Negative for arthralgias and back pain. Skin:  Negative for rash and wound. Neurological:  Negative for weakness and headaches. Hematological:  Negative for adenopathy. All other systems reviewed and are negative. Physical Exam  Vitals and nursing note reviewed. Constitutional:       Appearance: He is well-developed. HENT:      Head: Normocephalic and atraumatic. Eyes:      Conjunctiva/sclera: Conjunctivae normal.   Cardiovascular:      Rate and Rhythm: Normal rate and regular rhythm. Heart sounds: Murmur heard. Pulmonary:      Effort: Pulmonary effort is normal. No respiratory distress. Breath sounds: Normal breath sounds. Abdominal:      Palpations: Abdomen is soft. Tenderness: There is no abdominal tenderness. There is no guarding or rebound. Comments: Suprapubic distention with mild tenderness. Duong catheter in place but not draining. Musculoskeletal:         General: No tenderness or deformity.       Cervical back: Normal range of motion and

## 2023-11-10 ENCOUNTER — HOSPITAL ENCOUNTER (INPATIENT)
Age: 78
LOS: 2 days | Discharge: HOME HEALTH | DRG: 699 | End: 2023-11-12
Attending: INTERNAL MEDICINE | Admitting: INTERNAL MEDICINE
Payer: MEDICARE

## 2023-11-10 PROBLEM — N39.0 UTI (URINARY TRACT INFECTION): Status: ACTIVE | Noted: 2023-11-10

## 2023-11-10 LAB
ALBUMIN SERPL-MCNC: 3.3 G/DL (ref 3.5–5.2)
ALP SERPL-CCNC: 65 U/L (ref 40–129)
ALT SERPL-CCNC: 8 U/L (ref 0–40)
ANION GAP SERPL CALCULATED.3IONS-SCNC: 8 MMOL/L (ref 7–16)
AST SERPL-CCNC: 15 U/L (ref 0–39)
BILIRUB SERPL-MCNC: 0.6 MG/DL (ref 0–1.2)
BUN SERPL-MCNC: 17 MG/DL (ref 6–23)
CALCIUM SERPL-MCNC: 9.5 MG/DL (ref 8.6–10.2)
CHLORIDE SERPL-SCNC: 103 MMOL/L (ref 98–107)
CO2 SERPL-SCNC: 25 MMOL/L (ref 22–29)
CREAT SERPL-MCNC: 0.9 MG/DL (ref 0.7–1.2)
EKG ATRIAL RATE: 87 BPM
EKG P AXIS: 12 DEGREES
EKG P-R INTERVAL: 166 MS
EKG Q-T INTERVAL: 390 MS
EKG QRS DURATION: 114 MS
EKG QTC CALCULATION (BAZETT): 469 MS
EKG R AXIS: -20 DEGREES
EKG T AXIS: 24 DEGREES
EKG VENTRICULAR RATE: 87 BPM
GFR SERPL CREATININE-BSD FRML MDRD: >60 ML/MIN/1.73M2
GLUCOSE SERPL-MCNC: 94 MG/DL (ref 74–99)
MAGNESIUM SERPL-MCNC: 2 MG/DL (ref 1.6–2.6)
POTASSIUM SERPL-SCNC: 3.4 MMOL/L (ref 3.5–5)
PROT SERPL-MCNC: 6.4 G/DL (ref 6.4–8.3)
SODIUM SERPL-SCNC: 136 MMOL/L (ref 132–146)

## 2023-11-10 PROCEDURE — 80053 COMPREHEN METABOLIC PANEL: CPT

## 2023-11-10 PROCEDURE — 6360000002 HC RX W HCPCS: Performed by: NURSE PRACTITIONER

## 2023-11-10 PROCEDURE — 97165 OT EVAL LOW COMPLEX 30 MIN: CPT

## 2023-11-10 PROCEDURE — 36415 COLL VENOUS BLD VENIPUNCTURE: CPT

## 2023-11-10 PROCEDURE — 83735 ASSAY OF MAGNESIUM: CPT

## 2023-11-10 PROCEDURE — 2060000000 HC ICU INTERMEDIATE R&B

## 2023-11-10 PROCEDURE — 2580000003 HC RX 258: Performed by: NURSE PRACTITIONER

## 2023-11-10 PROCEDURE — 6370000000 HC RX 637 (ALT 250 FOR IP): Performed by: NURSE PRACTITIONER

## 2023-11-10 PROCEDURE — 93010 ELECTROCARDIOGRAM REPORT: CPT | Performed by: INTERNAL MEDICINE

## 2023-11-10 RX ORDER — SODIUM CHLORIDE 9 MG/ML
INJECTION, SOLUTION INTRAVENOUS PRN
Status: DISCONTINUED | OUTPATIENT
Start: 2023-11-10 | End: 2023-11-12 | Stop reason: HOSPADM

## 2023-11-10 RX ORDER — SODIUM CHLORIDE 0.9 % (FLUSH) 0.9 %
10 SYRINGE (ML) INJECTION EVERY 12 HOURS SCHEDULED
Status: DISCONTINUED | OUTPATIENT
Start: 2023-11-10 | End: 2023-11-12 | Stop reason: HOSPADM

## 2023-11-10 RX ORDER — IBUPROFEN 400 MG/1
400 TABLET ORAL 2 TIMES DAILY
COMMUNITY

## 2023-11-10 RX ORDER — POTASSIUM CHLORIDE 20 MEQ/1
40 TABLET, EXTENDED RELEASE ORAL PRN
Status: DISCONTINUED | OUTPATIENT
Start: 2023-11-10 | End: 2023-11-12 | Stop reason: HOSPADM

## 2023-11-10 RX ORDER — LANOLIN ALCOHOL/MO/W.PET/CERES
3 CREAM (GRAM) TOPICAL NIGHTLY PRN
Status: DISCONTINUED | OUTPATIENT
Start: 2023-11-10 | End: 2023-11-12 | Stop reason: HOSPADM

## 2023-11-10 RX ORDER — POTASSIUM CHLORIDE 7.45 MG/ML
10 INJECTION INTRAVENOUS PRN
Status: DISCONTINUED | OUTPATIENT
Start: 2023-11-10 | End: 2023-11-12 | Stop reason: HOSPADM

## 2023-11-10 RX ORDER — SODIUM CHLORIDE 0.9 % (FLUSH) 0.9 %
10 SYRINGE (ML) INJECTION PRN
Status: DISCONTINUED | OUTPATIENT
Start: 2023-11-10 | End: 2023-11-12 | Stop reason: HOSPADM

## 2023-11-10 RX ORDER — MAGNESIUM SULFATE IN WATER 40 MG/ML
2000 INJECTION, SOLUTION INTRAVENOUS PRN
Status: DISCONTINUED | OUTPATIENT
Start: 2023-11-10 | End: 2023-11-12 | Stop reason: HOSPADM

## 2023-11-10 RX ORDER — ONDANSETRON 4 MG/1
4 TABLET, ORALLY DISINTEGRATING ORAL EVERY 8 HOURS PRN
Status: DISCONTINUED | OUTPATIENT
Start: 2023-11-10 | End: 2023-11-12 | Stop reason: HOSPADM

## 2023-11-10 RX ORDER — SENNOSIDES A AND B 8.6 MG/1
1 TABLET, FILM COATED ORAL DAILY PRN
Status: DISCONTINUED | OUTPATIENT
Start: 2023-11-10 | End: 2023-11-12 | Stop reason: HOSPADM

## 2023-11-10 RX ORDER — ROSUVASTATIN CALCIUM 10 MG/1
10 TABLET, COATED ORAL NIGHTLY
Status: DISCONTINUED | OUTPATIENT
Start: 2023-11-10 | End: 2023-11-12 | Stop reason: HOSPADM

## 2023-11-10 RX ORDER — ONDANSETRON 2 MG/ML
4 INJECTION INTRAMUSCULAR; INTRAVENOUS EVERY 6 HOURS PRN
Status: DISCONTINUED | OUTPATIENT
Start: 2023-11-10 | End: 2023-11-12 | Stop reason: HOSPADM

## 2023-11-10 RX ORDER — ENOXAPARIN SODIUM 100 MG/ML
40 INJECTION SUBCUTANEOUS DAILY
Status: DISCONTINUED | OUTPATIENT
Start: 2023-11-10 | End: 2023-11-12 | Stop reason: HOSPADM

## 2023-11-10 RX ORDER — LOSARTAN POTASSIUM 50 MG/1
100 TABLET ORAL DAILY
Status: DISCONTINUED | OUTPATIENT
Start: 2023-11-10 | End: 2023-11-12 | Stop reason: HOSPADM

## 2023-11-10 RX ORDER — METOPROLOL SUCCINATE 25 MG/1
25 TABLET, EXTENDED RELEASE ORAL DAILY
Status: DISCONTINUED | OUTPATIENT
Start: 2023-11-10 | End: 2023-11-12 | Stop reason: HOSPADM

## 2023-11-10 RX ORDER — LEVOTHYROXINE SODIUM 0.03 MG/1
25 TABLET ORAL DAILY
Status: DISCONTINUED | OUTPATIENT
Start: 2023-11-10 | End: 2023-11-12 | Stop reason: HOSPADM

## 2023-11-10 RX ORDER — TAMSULOSIN HYDROCHLORIDE 0.4 MG/1
0.4 CAPSULE ORAL DAILY
Status: DISCONTINUED | OUTPATIENT
Start: 2023-11-10 | End: 2023-11-12 | Stop reason: HOSPADM

## 2023-11-10 RX ORDER — ACETAMINOPHEN 325 MG/1
650 TABLET ORAL EVERY 6 HOURS PRN
Status: DISCONTINUED | OUTPATIENT
Start: 2023-11-10 | End: 2023-11-12 | Stop reason: HOSPADM

## 2023-11-10 RX ORDER — ACETAMINOPHEN 650 MG/1
650 SUPPOSITORY RECTAL EVERY 6 HOURS PRN
Status: DISCONTINUED | OUTPATIENT
Start: 2023-11-10 | End: 2023-11-12 | Stop reason: HOSPADM

## 2023-11-10 RX ADMIN — PIPERACILLIN AND TAZOBACTAM 3375 MG: 3; .375 INJECTION, POWDER, LYOPHILIZED, FOR SOLUTION INTRAVENOUS at 21:15

## 2023-11-10 RX ADMIN — PIPERACILLIN AND TAZOBACTAM 3375 MG: 3; .375 INJECTION, POWDER, LYOPHILIZED, FOR SOLUTION INTRAVENOUS at 05:00

## 2023-11-10 RX ADMIN — ROSUVASTATIN CALCIUM 10 MG: 10 TABLET, FILM COATED ORAL at 21:15

## 2023-11-10 RX ADMIN — METOPROLOL SUCCINATE 25 MG: 25 TABLET, EXTENDED RELEASE ORAL at 08:37

## 2023-11-10 RX ADMIN — ENOXAPARIN SODIUM 40 MG: 100 INJECTION SUBCUTANEOUS at 08:38

## 2023-11-10 RX ADMIN — LOSARTAN POTASSIUM 100 MG: 50 TABLET, FILM COATED ORAL at 08:37

## 2023-11-10 RX ADMIN — SODIUM CHLORIDE, PRESERVATIVE FREE 10 ML: 5 INJECTION INTRAVENOUS at 21:15

## 2023-11-10 RX ADMIN — POTASSIUM BICARBONATE 40 MEQ: 782 TABLET, EFFERVESCENT ORAL at 06:46

## 2023-11-10 RX ADMIN — PIPERACILLIN AND TAZOBACTAM 3375 MG: 3; .375 INJECTION, POWDER, LYOPHILIZED, FOR SOLUTION INTRAVENOUS at 12:58

## 2023-11-10 RX ADMIN — SODIUM CHLORIDE, PRESERVATIVE FREE 10 ML: 5 INJECTION INTRAVENOUS at 08:38

## 2023-11-10 RX ADMIN — LEVOTHYROXINE SODIUM 25 MCG: 25 TABLET ORAL at 05:02

## 2023-11-10 RX ADMIN — TAMSULOSIN HYDROCHLORIDE 0.4 MG: 0.4 CAPSULE ORAL at 08:37

## 2023-11-10 ASSESSMENT — PAIN SCALES - GENERAL
PAINLEVEL_OUTOF10: 0
PAINLEVEL_OUTOF10: 0

## 2023-11-10 NOTE — ED NOTES
Report called to [address] [place] [address] [address] to [address], [address] [place] [address],[id number], RN  11/09/23 4990

## 2023-11-10 NOTE — ED NOTES
Daughter / caregiver / [facility] Parent   [phone]     Vicki May RN  11/09/23 6107

## 2023-11-10 NOTE — CARE COORDINATION
Internal Medicine On-call Care Coordination Note    I was called by the ED physician because they recommended admission for this patient and we cover their PCP. The history as I understand it after discussion with the ED physician is as follows:    Presents with AMS from home  Hx chronic samuel, not draining  Samuel exchanged in ED  Found to have UTI- given zosyn    I placed admission orders. Including:    IV zosyn  BC pending    Dr. Tamar Vuong, or our coverage will see the patient tomorrow for H&P.     Electronically signed by MARIA GUADALUPE Armando CNP on 11/9/2023 at 9:04 PM

## 2023-11-10 NOTE — H&P
Internal Medicine History & Physical     Name: Maame Floyd  : 1945  Chief Complaint: AMS  Primary Care Physician: Huber Holly MD  Admission date: 11/10/2023  Date of service: 11/10/2023   Unit: [facility]  INTERMEDIATE    History of Present Illness  Wyatt Dobbs is a 78y.o. year old male. He presented to the hospital the chief complaint of altered mental status. He has underlying dementia. He has BPH and a chronic Duong catheter. Nothing in particular seem to be making his symptoms better or worse. Overall symptoms seem to be moderate in severity. He denied any other associated symptoms. He was not confused at all when I saw him and he was oriented to person, place, time, purpose. There were no family or friends present at bedside. History is provided by the patient. He is felt to be a good historian. ED course:   Initial blood work and imaging studies performed. Admission recommended by ED physician. My coverage discussed the case with the ED provider. Meds in the ED consisted of the following: Duong catheter changed in the ED, Helen    Past Medical History:   Diagnosis Date    Bacteremia due to Gram-negative bacteria 2023    Fever 2023    Sepsis ([address]) 2023    Severe Alzheimer's dementia without behavioral disturbance, psychotic disturbance, mood disturbance, or anxiety ([address]) 2023       Past Surgical History:   Procedure Laterality Date    HERNIA REPAIR Bilateral     TOTAL KNEE ARTHROPLASTY      bilateral    ULNAR NERVE REPAIR         Family Medical History:  No pertinent family medical history with regard to current issues. Social History  Patient lives at home with his daughter. He does not walk at baseline. Employment: none  Illicit drug use- denies  TOBACCO:   reports that he has never smoked. He has never used smokeless tobacco.  ETOH:   reports no history of alcohol use.     Home Medications  Prior to Admission medications    Medication Sig Start Date End Date

## 2023-11-10 NOTE — CONSULTS
[facility]  [facility]    Consultation Note     Admit Date: 11/10/2023  3:40 AM    Reason for Consult:   UTI    Attending Physician:  David Martin DO     Chief Complaint: abdominal pain. HISTORY OF PRESENT ILLNESS:   The patient is a 78 y.o.  male known to the Infectious Diseases service. The patient had hx of Pseudomonas aeruginosa bacteremia from CAUTI in august and was treated with IV cefepime in patient and was treated with oral levaquin at discharge. Patient presented to ER yesterday with samuel catheter obstruction. It was exchanged in the ER. Had abdominal pain/fever/chills/nausea/vomiting. No diarrhea. Does have back pain. Since admission pt is afebrile, HS stable on RA. Labs showed normal CBC,. BMP /Lfts normal. Blood cx in process UA showed pyuria, urine cx in process. Patient tested negatie for fluA/B/sars-cov2. CT A/P showed Significant prostatomegaly with diffuse urinary bladder wall thickening. Samuel catheter within the urinary bladder. Bilateral pleural effusions. Cardiomegaly. CXR clear. Patient is on IV zosyn and i got consulted for antibiotic management.     Past Medical History:        Diagnosis Date    Bacteremia due to Gram-negative bacteria 8/17/2023    Fever 8/16/2023    Sepsis ([address]) 8/16/2023    Severe Alzheimer's dementia without behavioral disturbance, psychotic disturbance, mood disturbance, or anxiety ([address]) 8/17/2023     Past Surgical History:        Procedure Laterality Date    HERNIA REPAIR Bilateral     TOTAL KNEE ARTHROPLASTY      bilateral    ULNAR NERVE REPAIR       Current Medications:   Scheduled Meds:   piperacillin-tazobactam  3,375 mg IntraVENous Q8H    levothyroxine  25 mcg Oral Daily    metoprolol succinate  25 mg Oral Daily    losartan  100 mg Oral Daily    rosuvastatin  10 mg Oral Nightly    tamsulosin  0.4 mg Oral Daily    sodium chloride flush  10 mL IntraVENous 2 times per day    enoxaparin  40 mg SubCUTAneous Daily     Continuous

## 2023-11-11 LAB
ALBUMIN SERPL-MCNC: 3.4 G/DL (ref 3.5–5.2)
ALP SERPL-CCNC: 59 U/L (ref 40–129)
ALT SERPL-CCNC: 9 U/L (ref 0–40)
ANION GAP SERPL CALCULATED.3IONS-SCNC: 9 MMOL/L (ref 7–16)
AST SERPL-CCNC: 15 U/L (ref 0–39)
BASOPHILS # BLD: 0.02 K/UL (ref 0–0.2)
BASOPHILS NFR BLD: 0 % (ref 0–2)
BILIRUB SERPL-MCNC: 0.5 MG/DL (ref 0–1.2)
BUN SERPL-MCNC: 15 MG/DL (ref 6–23)
CALCIUM SERPL-MCNC: 9.4 MG/DL (ref 8.6–10.2)
CHLORIDE SERPL-SCNC: 103 MMOL/L (ref 98–107)
CO2 SERPL-SCNC: 25 MMOL/L (ref 22–29)
CREAT SERPL-MCNC: 1 MG/DL (ref 0.7–1.2)
EOSINOPHIL # BLD: 0.19 K/UL (ref 0.05–0.5)
EOSINOPHILS RELATIVE PERCENT: 4 % (ref 0–6)
ERYTHROCYTE [DISTWIDTH] IN BLOOD BY AUTOMATED COUNT: 16.2 % (ref 11.5–15)
GFR SERPL CREATININE-BSD FRML MDRD: >60 ML/MIN/1.73M2
GLUCOSE SERPL-MCNC: 92 MG/DL (ref 74–99)
HCT VFR BLD AUTO: 31.5 % (ref 37–54)
HGB BLD-MCNC: 10.3 G/DL (ref 12.5–16.5)
IMM GRANULOCYTES # BLD AUTO: <0.03 K/UL (ref 0–0.58)
IMM GRANULOCYTES NFR BLD: 0 % (ref 0–5)
LYMPHOCYTES NFR BLD: 1.09 K/UL (ref 1.5–4)
LYMPHOCYTES RELATIVE PERCENT: 22 % (ref 20–42)
MCH RBC QN AUTO: 28 PG (ref 26–35)
MCHC RBC AUTO-ENTMCNC: 32.7 G/DL (ref 32–34.5)
MCV RBC AUTO: 85.6 FL (ref 80–99.9)
MONOCYTES NFR BLD: 0.55 K/UL (ref 0.1–0.95)
MONOCYTES NFR BLD: 11 % (ref 2–12)
NEUTROPHILS NFR BLD: 62 % (ref 43–80)
NEUTS SEG NFR BLD: 3.05 K/UL (ref 1.8–7.3)
PLATELET # BLD AUTO: 157 K/UL (ref 130–450)
PMV BLD AUTO: 10.8 FL (ref 7–12)
POTASSIUM SERPL-SCNC: 3.9 MMOL/L (ref 3.5–5)
PROT SERPL-MCNC: 6.3 G/DL (ref 6.4–8.3)
RBC # BLD AUTO: 3.68 M/UL (ref 3.8–5.8)
SODIUM SERPL-SCNC: 137 MMOL/L (ref 132–146)
WBC OTHER # BLD: 4.9 K/UL (ref 4.5–11.5)

## 2023-11-11 PROCEDURE — 6370000000 HC RX 637 (ALT 250 FOR IP): Performed by: INTERNAL MEDICINE

## 2023-11-11 PROCEDURE — 2580000003 HC RX 258: Performed by: NURSE PRACTITIONER

## 2023-11-11 PROCEDURE — 85025 COMPLETE CBC W/AUTO DIFF WBC: CPT

## 2023-11-11 PROCEDURE — 6370000000 HC RX 637 (ALT 250 FOR IP): Performed by: NURSE PRACTITIONER

## 2023-11-11 PROCEDURE — 6360000002 HC RX W HCPCS: Performed by: NURSE PRACTITIONER

## 2023-11-11 PROCEDURE — 80053 COMPREHEN METABOLIC PANEL: CPT

## 2023-11-11 PROCEDURE — 2060000000 HC ICU INTERMEDIATE R&B

## 2023-11-11 RX ADMIN — SODIUM CHLORIDE, PRESERVATIVE FREE 10 ML: 5 INJECTION INTRAVENOUS at 08:50

## 2023-11-11 RX ADMIN — METOPROLOL SUCCINATE 25 MG: 25 TABLET, EXTENDED RELEASE ORAL at 08:50

## 2023-11-11 RX ADMIN — LOSARTAN POTASSIUM 100 MG: 50 TABLET, FILM COATED ORAL at 08:50

## 2023-11-11 RX ADMIN — PIPERACILLIN AND TAZOBACTAM 3375 MG: 3; .375 INJECTION, POWDER, LYOPHILIZED, FOR SOLUTION INTRAVENOUS at 14:42

## 2023-11-11 RX ADMIN — ENOXAPARIN SODIUM 40 MG: 100 INJECTION SUBCUTANEOUS at 08:50

## 2023-11-11 RX ADMIN — PIPERACILLIN AND TAZOBACTAM 3375 MG: 3; .375 INJECTION, POWDER, LYOPHILIZED, FOR SOLUTION INTRAVENOUS at 05:05

## 2023-11-11 RX ADMIN — Medication 3 MG: at 20:55

## 2023-11-11 RX ADMIN — TAMSULOSIN HYDROCHLORIDE 0.4 MG: 0.4 CAPSULE ORAL at 08:50

## 2023-11-11 RX ADMIN — LEVOTHYROXINE SODIUM 25 MCG: 25 TABLET ORAL at 06:22

## 2023-11-11 RX ADMIN — SODIUM CHLORIDE, PRESERVATIVE FREE 10 ML: 5 INJECTION INTRAVENOUS at 20:55

## 2023-11-11 RX ADMIN — ROSUVASTATIN CALCIUM 10 MG: 10 TABLET, FILM COATED ORAL at 20:55

## 2023-11-11 RX ADMIN — PIPERACILLIN AND TAZOBACTAM 3375 MG: 3; .375 INJECTION, POWDER, LYOPHILIZED, FOR SOLUTION INTRAVENOUS at 21:00

## 2023-11-11 ASSESSMENT — PAIN SCALES - GENERAL: PAINLEVEL_OUTOF10: 0

## 2023-11-12 VITALS
BODY MASS INDEX: 27.94 KG/M2 | HEART RATE: 63 BPM | SYSTOLIC BLOOD PRESSURE: 111 MMHG | DIASTOLIC BLOOD PRESSURE: 76 MMHG | RESPIRATION RATE: 18 BRPM | OXYGEN SATURATION: 97 % | WEIGHT: 195.2 LBS | HEIGHT: 70 IN | TEMPERATURE: 98.1 F

## 2023-11-12 LAB
ALBUMIN SERPL-MCNC: 3.2 G/DL (ref 3.5–5.2)
ALP SERPL-CCNC: 56 U/L (ref 40–129)
ALT SERPL-CCNC: 7 U/L (ref 0–40)
ANION GAP SERPL CALCULATED.3IONS-SCNC: 9 MMOL/L (ref 7–16)
AST SERPL-CCNC: 13 U/L (ref 0–39)
BASOPHILS # BLD: 0.02 K/UL (ref 0–0.2)
BASOPHILS NFR BLD: 0 % (ref 0–2)
BILIRUB SERPL-MCNC: 0.5 MG/DL (ref 0–1.2)
BUN SERPL-MCNC: 13 MG/DL (ref 6–23)
CALCIUM SERPL-MCNC: 9.5 MG/DL (ref 8.6–10.2)
CHLORIDE SERPL-SCNC: 104 MMOL/L (ref 98–107)
CO2 SERPL-SCNC: 24 MMOL/L (ref 22–29)
CREAT SERPL-MCNC: 1 MG/DL (ref 0.7–1.2)
EOSINOPHIL # BLD: 0.23 K/UL (ref 0.05–0.5)
EOSINOPHILS RELATIVE PERCENT: 5 % (ref 0–6)
ERYTHROCYTE [DISTWIDTH] IN BLOOD BY AUTOMATED COUNT: 15.8 % (ref 11.5–15)
GFR SERPL CREATININE-BSD FRML MDRD: >60 ML/MIN/1.73M2
GLUCOSE SERPL-MCNC: 91 MG/DL (ref 74–99)
HCT VFR BLD AUTO: 33.3 % (ref 37–54)
HGB BLD-MCNC: 10.6 G/DL (ref 12.5–16.5)
IMM GRANULOCYTES # BLD AUTO: <0.03 K/UL (ref 0–0.58)
IMM GRANULOCYTES NFR BLD: 0 % (ref 0–5)
LYMPHOCYTES NFR BLD: 1.27 K/UL (ref 1.5–4)
LYMPHOCYTES RELATIVE PERCENT: 25 % (ref 20–42)
MCH RBC QN AUTO: 27.6 PG (ref 26–35)
MCHC RBC AUTO-ENTMCNC: 31.8 G/DL (ref 32–34.5)
MCV RBC AUTO: 86.7 FL (ref 80–99.9)
MICROORGANISM SPEC CULT: ABNORMAL
MICROORGANISM SPEC CULT: NORMAL
MICROORGANISM SPEC CULT: NORMAL
MONOCYTES NFR BLD: 0.54 K/UL (ref 0.1–0.95)
MONOCYTES NFR BLD: 11 % (ref 2–12)
NEUTROPHILS NFR BLD: 59 % (ref 43–80)
NEUTS SEG NFR BLD: 2.94 K/UL (ref 1.8–7.3)
PLATELET # BLD AUTO: 169 K/UL (ref 130–450)
PMV BLD AUTO: 11.1 FL (ref 7–12)
POTASSIUM SERPL-SCNC: 3.9 MMOL/L (ref 3.5–5)
PROT SERPL-MCNC: 6.1 G/DL (ref 6.4–8.3)
RBC # BLD AUTO: 3.84 M/UL (ref 3.8–5.8)
SERVICE CMNT-IMP: NORMAL
SERVICE CMNT-IMP: NORMAL
SODIUM SERPL-SCNC: 137 MMOL/L (ref 132–146)
SPECIMEN DESCRIPTION: ABNORMAL
SPECIMEN DESCRIPTION: NORMAL
SPECIMEN DESCRIPTION: NORMAL
WBC OTHER # BLD: 5 K/UL (ref 4.5–11.5)

## 2023-11-12 PROCEDURE — 6370000000 HC RX 637 (ALT 250 FOR IP): Performed by: NURSE PRACTITIONER

## 2023-11-12 PROCEDURE — 2580000003 HC RX 258: Performed by: NURSE PRACTITIONER

## 2023-11-12 PROCEDURE — 6360000002 HC RX W HCPCS: Performed by: NURSE PRACTITIONER

## 2023-11-12 PROCEDURE — 80053 COMPREHEN METABOLIC PANEL: CPT

## 2023-11-12 PROCEDURE — 85025 COMPLETE CBC W/AUTO DIFF WBC: CPT

## 2023-11-12 RX ORDER — LEVOFLOXACIN 750 MG/1
750 TABLET ORAL DAILY
Qty: 7 TABLET | Refills: 0 | Status: SHIPPED | OUTPATIENT
Start: 2023-11-12 | End: 2023-11-19

## 2023-11-12 RX ADMIN — ENOXAPARIN SODIUM 40 MG: 100 INJECTION SUBCUTANEOUS at 09:23

## 2023-11-12 RX ADMIN — TAMSULOSIN HYDROCHLORIDE 0.4 MG: 0.4 CAPSULE ORAL at 09:23

## 2023-11-12 RX ADMIN — PIPERACILLIN AND TAZOBACTAM 3375 MG: 3; .375 INJECTION, POWDER, LYOPHILIZED, FOR SOLUTION INTRAVENOUS at 12:33

## 2023-11-12 RX ADMIN — PIPERACILLIN AND TAZOBACTAM 3375 MG: 3; .375 INJECTION, POWDER, LYOPHILIZED, FOR SOLUTION INTRAVENOUS at 06:02

## 2023-11-12 RX ADMIN — METOPROLOL SUCCINATE 25 MG: 25 TABLET, EXTENDED RELEASE ORAL at 09:23

## 2023-11-12 RX ADMIN — LOSARTAN POTASSIUM 100 MG: 50 TABLET, FILM COATED ORAL at 09:23

## 2023-11-12 RX ADMIN — LEVOTHYROXINE SODIUM 25 MCG: 25 TABLET ORAL at 06:02

## 2023-11-12 ASSESSMENT — PAIN SCALES - GENERAL
PAINLEVEL_OUTOF10: 0
PAINLEVEL_OUTOF10: 0

## 2023-11-12 NOTE — DISCHARGE SUMMARY
spine.  Degenerative changes involving both hip joints. Significant prostatomegaly with diffuse urinary bladder wall thickening. Duong catheter within the urinary bladder. Bilateral pleural effusions. Cardiomegaly. CT HEAD WO CONTRAST    Result Date: 11/9/2023  EXAMINATION: CT OF THE HEAD WITHOUT CONTRAST  11/9/2023 5:49 pm TECHNIQUE: CT of the head was performed without the administration of intravenous contrast. Automated exposure control, iterative reconstruction, and/or weight based adjustment of the mA/kV was utilized to reduce the radiation dose to as low as reasonably achievable. COMPARISON: None. HISTORY: ORDERING SYSTEM PROVIDED HISTORY: confusion TECHNOLOGIST PROVIDED HISTORY: Reason for exam:->confusion Has a \"code stroke\" or \"stroke alert\" been called? ->No Decision Support Exception - unselect if not a suspected or confirmed emergency medical condition->Emergency Medical Condition (MA) FINDINGS: BRAIN/VENTRICLES: There is no acute intracranial hemorrhage, mass effect or midline shift. No abnormal extra-axial fluid collection. The gray-white differentiation is maintained without evidence of an acute infarct. There is no evidence of hydrocephalus. Diffuse volume loss. The is is is ORBITS: The visualized portion of the orbits demonstrate no acute abnormality. SINUSES: The visualized paranasal sinuses and mastoid air cells demonstrate no acute abnormality. SOFT TISSUES/SKULL:  No acute abnormality of the visualized skull or soft tissues. No acute intracranial abnormality. XR CHEST PORTABLE    Result Date: 11/9/2023  EXAMINATION: ONE XRAY VIEW OF THE CHEST 11/9/2023 5:30 pm COMPARISON: /15/23 HISTORY: ORDERING SYSTEM PROVIDED HISTORY: r/o pna TECHNOLOGIST PROVIDED HISTORY: Reason for exam:->r/o pna FINDINGS: The lungs are without acute focal process. There is no effusion or pneumothorax. Stable heart size. The osseous structures are without acute process. No acute process.

## 2023-11-12 NOTE — ACP (ADVANCE CARE PLANNING)
Advance Care Planning   Healthcare Decision Maker:    Primary Decision Maker: ketanpedro [facility] [place] - [phone]        Today we documented Decision Maker(s) consistent with ACP documents on file.

## 2023-11-12 NOTE — PLAN OF CARE
Problem: Discharge Planning  Goal: Discharge to home or other facility with appropriate resources  11/12/2023 0920 by Gabriel Cheatham RN  Outcome: Progressing     Problem: Safety - Adult  Goal: Free from fall injury  11/12/2023 0920 by Gabriel Cheatham RN  Outcome: Progressing     Problem: Pain  Goal: Verbalizes/displays adequate comfort level or baseline comfort level  11/12/2023 0920 by Gabriel Cheatham RN  Outcome: Progressing

## 2023-11-12 NOTE — CARE COORDINATION
Pt to discharge today, per nursing pt is active w/[facility]. CM LM for Liaison Alfredo Chaves to confirm pt was active and make aware of discharge today. [facility] order in place, pt can discharge home w/family. MARTHA called dtr who confirms pt was active w/[facility] [address] and she wishes to resume care w/them.    Qi Francis, JARVISN, RN  [facility] Case Management  [phone]

## 2023-11-13 NOTE — CARE COORDINATION
Per Nette mathur/Fern they can accept back. [facility] order faxed per Priya's request, [phone].   JARVIS NortonN, RN  [facility] Case Management  [phone]

## 2023-11-14 ENCOUNTER — CARE COORDINATION (OUTPATIENT)
Dept: CARE COORDINATION | Age: 78
End: 2023-11-14

## 2023-11-14 NOTE — CARE COORDINATION
Non MH PCP Care Transitions Initial Follow Up Call    Call within 2 business days of discharge: Yes    Patient Current Location:  Home: [address], [address] [facility]    Care Transition Nurse contacted the  pt's daughter and primary caregiver, Sharon (Noted DPOA on forms scanned into chart),  by telephone to perform post hospital discharge assessment. Provided introduction to self, and explanation of the Care Transition Nurse role. Patient: Zhao Rivera Patient : 1945   MRN: [id number]  Reason for Admission: UTI  Discharge Date: 23 RARS: Readmission Risk Score: 18      Last Discharge Facility       Date Complaint Diagnosis Description Type Department Provider    11/10/23   Admission (Discharged) [facility] Chaudhari, DO            Was this an external facility discharge? No     Challenges to be reviewed by the provider   Additional needs identified to be addressed with provider: No  none               Method of communication with provider: none. CTN called and spoke with the pt's daughter, Sharon (primary caregiver/DPOA), for an [facility]. Pt admitted to [facility] on 11/10/23 dx UTI. Per chart review pt presented to [facility] ED with c/o FC not draining, lower abd pain, and increased confusion. Noted new FC exchange in ED on 23. Pt transferred to [facility] for admission for complicated UTI. Seen by ID during IP stay and discharged home on Levaquin PO x 7/d. Sharon states that pt has been up walking around with walker, ate breakfast, and is dressed. States only complaint today from pt is that he feels tired. She reports that [facility] LAUREN is intact and draining a \"kilpatrick\" yellow urine. Reports no FC drainage issues. She states that she feels the Levaquin atb might have kept him up last night, stating he was up constantly walking around and that it was a \"rough\" night because of this.  Sharon states that she is going to try and change the Levaquin schedule to giving it

## 2023-11-15 LAB
MICROORGANISM SPEC CULT: NORMAL
MICROORGANISM SPEC CULT: NORMAL
SERVICE CMNT-IMP: NORMAL
SERVICE CMNT-IMP: NORMAL
SPECIMEN DESCRIPTION: NORMAL
SPECIMEN DESCRIPTION: NORMAL

## 2023-11-27 ENCOUNTER — APPOINTMENT (OUTPATIENT)
Dept: GENERAL RADIOLOGY | Age: 78
End: 2023-11-27
Payer: MEDICARE

## 2023-11-27 ENCOUNTER — APPOINTMENT (OUTPATIENT)
Dept: CT IMAGING | Age: 78
End: 2023-11-27
Payer: MEDICARE

## 2023-11-27 ENCOUNTER — HOSPITAL ENCOUNTER (EMERGENCY)
Age: 78
Discharge: ANOTHER HOSPITAL | End: 2023-11-27
Attending: EMERGENCY MEDICINE
Payer: MEDICARE

## 2023-11-27 VITALS
DIASTOLIC BLOOD PRESSURE: 62 MMHG | OXYGEN SATURATION: 95 % | SYSTOLIC BLOOD PRESSURE: 116 MMHG | BODY MASS INDEX: 29.35 KG/M2 | WEIGHT: 205 LBS | HEART RATE: 68 BPM | HEIGHT: 70 IN | TEMPERATURE: 98.8 F | RESPIRATION RATE: 15 BRPM

## 2023-11-27 DIAGNOSIS — G93.41 ACUTE METABOLIC ENCEPHALOPATHY: ICD-10-CM

## 2023-11-27 DIAGNOSIS — J90 BILATERAL PLEURAL EFFUSION: ICD-10-CM

## 2023-11-27 DIAGNOSIS — I71.9 AORTIC ANEURYSM WITHOUT RUPTURE, UNSPECIFIED PORTION OF AORTA (HCC): ICD-10-CM

## 2023-11-27 DIAGNOSIS — N39.0 COMPLICATED UTI (URINARY TRACT INFECTION): Primary | ICD-10-CM

## 2023-11-27 DIAGNOSIS — K57.90 DIVERTICULOSIS: ICD-10-CM

## 2023-11-27 LAB
ALBUMIN SERPL-MCNC: 3.9 G/DL (ref 3.5–5.2)
ALP SERPL-CCNC: 75 U/L (ref 40–129)
ALT SERPL-CCNC: 11 U/L (ref 0–40)
ANION GAP SERPL CALCULATED.3IONS-SCNC: 11 MMOL/L (ref 7–16)
AST SERPL-CCNC: 16 U/L (ref 0–39)
BACTERIA URNS QL MICRO: ABNORMAL
BASOPHILS # BLD: 0.01 K/UL (ref 0–0.2)
BASOPHILS NFR BLD: 0 % (ref 0–2)
BILIRUB SERPL-MCNC: 0.6 MG/DL (ref 0–1.2)
BILIRUB UR QL STRIP: NEGATIVE
BUN SERPL-MCNC: 20 MG/DL (ref 6–23)
CALCIUM SERPL-MCNC: 10 MG/DL (ref 8.6–10.2)
CHLORIDE SERPL-SCNC: 103 MMOL/L (ref 98–107)
CLARITY UR: ABNORMAL
CO2 SERPL-SCNC: 24 MMOL/L (ref 22–29)
COLOR UR: ABNORMAL
CREAT SERPL-MCNC: 1.1 MG/DL (ref 0.7–1.2)
CRP SERPL HS-MCNC: 6 MG/L (ref 0–5)
EKG ATRIAL RATE: 93 BPM
EKG P AXIS: 45 DEGREES
EKG P-R INTERVAL: 192 MS
EKG Q-T INTERVAL: 376 MS
EKG QRS DURATION: 116 MS
EKG QTC CALCULATION (BAZETT): 467 MS
EKG R AXIS: -12 DEGREES
EKG T AXIS: 11 DEGREES
EKG VENTRICULAR RATE: 93 BPM
EOSINOPHIL # BLD: 0.01 K/UL (ref 0.05–0.5)
EOSINOPHILS RELATIVE PERCENT: 0 % (ref 0–6)
ERYTHROCYTE [DISTWIDTH] IN BLOOD BY AUTOMATED COUNT: 15.5 % (ref 11.5–15)
ERYTHROCYTE [SEDIMENTATION RATE] IN BLOOD BY WESTERGREN METHOD: 31 MM/HR (ref 0–15)
GFR SERPL CREATININE-BSD FRML MDRD: >60 ML/MIN/1.73M2
GLUCOSE SERPL-MCNC: 138 MG/DL (ref 74–99)
GLUCOSE UR STRIP-MCNC: NEGATIVE MG/DL
HCT VFR BLD AUTO: 38.5 % (ref 37–54)
HGB BLD-MCNC: 12.3 G/DL (ref 12.5–16.5)
HGB UR QL STRIP.AUTO: ABNORMAL
IMM GRANULOCYTES # BLD AUTO: 0.07 K/UL (ref 0–0.58)
IMM GRANULOCYTES NFR BLD: 1 % (ref 0–5)
KETONES UR STRIP-MCNC: NEGATIVE MG/DL
LACTATE BLDV-SCNC: 1.9 MMOL/L (ref 0.5–1.9)
LACTATE BLDV-SCNC: 2.6 MMOL/L (ref 0.5–1.9)
LEUKOCYTE ESTERASE UR QL STRIP: ABNORMAL
LIPASE SERPL-CCNC: 20 U/L (ref 13–60)
LYMPHOCYTES NFR BLD: 0.15 K/UL (ref 1.5–4)
LYMPHOCYTES RELATIVE PERCENT: 1 % (ref 20–42)
MAGNESIUM SERPL-MCNC: 1.9 MG/DL (ref 1.6–2.6)
MCH RBC QN AUTO: 27.2 PG (ref 26–35)
MCHC RBC AUTO-ENTMCNC: 31.9 G/DL (ref 32–34.5)
MCV RBC AUTO: 85 FL (ref 80–99.9)
MONOCYTES NFR BLD: 0.8 K/UL (ref 0.1–0.95)
MONOCYTES NFR BLD: 6 % (ref 2–12)
NEUTROPHILS NFR BLD: 92 % (ref 43–80)
NEUTS SEG NFR BLD: 12.48 K/UL (ref 1.8–7.3)
NITRITE UR QL STRIP: POSITIVE
PH UR STRIP: 6 [PH] (ref 5–9)
PLATELET # BLD AUTO: 198 K/UL (ref 130–450)
PMV BLD AUTO: 11 FL (ref 7–12)
POTASSIUM SERPL-SCNC: 4.1 MMOL/L (ref 3.5–5)
PROCALCITONIN SERPL-MCNC: 0.25 NG/ML (ref 0–0.08)
PROT SERPL-MCNC: 7.3 G/DL (ref 6.4–8.3)
PROT UR STRIP-MCNC: 30 MG/DL
RBC # BLD AUTO: 4.53 M/UL (ref 3.8–5.8)
RBC #/AREA URNS HPF: ABNORMAL /HPF
RENAL EPITHELIAL, UA: PRESENT /HPF
SODIUM SERPL-SCNC: 138 MMOL/L (ref 132–146)
SP GR UR STRIP: 1.01 (ref 1–1.03)
TROPONIN I SERPL HS-MCNC: 48 NG/L (ref 0–11)
TROPONIN I SERPL HS-MCNC: 74 NG/L (ref 0–11)
UROBILINOGEN UR STRIP-ACNC: 0.2 EU/DL (ref 0–1)
WBC #/AREA URNS HPF: ABNORMAL /HPF
WBC OTHER # BLD: 13.5 K/UL (ref 4.5–11.5)

## 2023-11-27 PROCEDURE — 84145 PROCALCITONIN (PCT): CPT

## 2023-11-27 PROCEDURE — 83605 ASSAY OF LACTIC ACID: CPT

## 2023-11-27 PROCEDURE — 96367 TX/PROPH/DG ADDL SEQ IV INF: CPT

## 2023-11-27 PROCEDURE — 96361 HYDRATE IV INFUSION ADD-ON: CPT

## 2023-11-27 PROCEDURE — 93005 ELECTROCARDIOGRAM TRACING: CPT | Performed by: EMERGENCY MEDICINE

## 2023-11-27 PROCEDURE — 85025 COMPLETE CBC W/AUTO DIFF WBC: CPT

## 2023-11-27 PROCEDURE — 74176 CT ABD & PELVIS W/O CONTRAST: CPT

## 2023-11-27 PROCEDURE — 71275 CT ANGIOGRAPHY CHEST: CPT

## 2023-11-27 PROCEDURE — 70450 CT HEAD/BRAIN W/O DYE: CPT

## 2023-11-27 PROCEDURE — 96366 THER/PROPH/DIAG IV INF ADDON: CPT

## 2023-11-27 PROCEDURE — 2580000003 HC RX 258: Performed by: EMERGENCY MEDICINE

## 2023-11-27 PROCEDURE — 83690 ASSAY OF LIPASE: CPT

## 2023-11-27 PROCEDURE — 6360000004 HC RX CONTRAST MEDICATION: Performed by: RADIOLOGY

## 2023-11-27 PROCEDURE — 71045 X-RAY EXAM CHEST 1 VIEW: CPT

## 2023-11-27 PROCEDURE — 87086 URINE CULTURE/COLONY COUNT: CPT

## 2023-11-27 PROCEDURE — 6370000000 HC RX 637 (ALT 250 FOR IP): Performed by: EMERGENCY MEDICINE

## 2023-11-27 PROCEDURE — 86140 C-REACTIVE PROTEIN: CPT

## 2023-11-27 PROCEDURE — 96360 HYDRATION IV INFUSION INIT: CPT

## 2023-11-27 PROCEDURE — 93010 ELECTROCARDIOGRAM REPORT: CPT | Performed by: INTERNAL MEDICINE

## 2023-11-27 PROCEDURE — 84484 ASSAY OF TROPONIN QUANT: CPT

## 2023-11-27 PROCEDURE — 6360000002 HC RX W HCPCS: Performed by: EMERGENCY MEDICINE

## 2023-11-27 PROCEDURE — 80053 COMPREHEN METABOLIC PANEL: CPT

## 2023-11-27 PROCEDURE — 81001 URINALYSIS AUTO W/SCOPE: CPT

## 2023-11-27 PROCEDURE — 87040 BLOOD CULTURE FOR BACTERIA: CPT

## 2023-11-27 PROCEDURE — 83735 ASSAY OF MAGNESIUM: CPT

## 2023-11-27 PROCEDURE — 99285 EMERGENCY DEPT VISIT HI MDM: CPT

## 2023-11-27 PROCEDURE — 87077 CULTURE AEROBIC IDENTIFY: CPT

## 2023-11-27 PROCEDURE — 85652 RBC SED RATE AUTOMATED: CPT

## 2023-11-27 PROCEDURE — 96365 THER/PROPH/DIAG IV INF INIT: CPT

## 2023-11-27 RX ORDER — ACETAMINOPHEN 650 MG/1
650 SUPPOSITORY RECTAL EVERY 6 HOURS PRN
Status: CANCELLED | OUTPATIENT
Start: 2023-11-27

## 2023-11-27 RX ORDER — SODIUM CHLORIDE 0.9 % (FLUSH) 0.9 %
5-40 SYRINGE (ML) INJECTION PRN
Status: CANCELLED | OUTPATIENT
Start: 2023-11-27

## 2023-11-27 RX ORDER — LEVOTHYROXINE SODIUM 0.03 MG/1
25 TABLET ORAL DAILY
Status: CANCELLED | OUTPATIENT
Start: 2023-11-27

## 2023-11-27 RX ORDER — ONDANSETRON 2 MG/ML
4 INJECTION INTRAMUSCULAR; INTRAVENOUS EVERY 6 HOURS PRN
Status: CANCELLED | OUTPATIENT
Start: 2023-11-27

## 2023-11-27 RX ORDER — ACETAMINOPHEN 325 MG/1
650 TABLET ORAL EVERY 6 HOURS PRN
Status: CANCELLED | OUTPATIENT
Start: 2023-11-27

## 2023-11-27 RX ORDER — ENOXAPARIN SODIUM 100 MG/ML
40 INJECTION SUBCUTANEOUS DAILY
Status: CANCELLED | OUTPATIENT
Start: 2023-11-27

## 2023-11-27 RX ORDER — ASPIRIN 81 MG/1
81 TABLET, CHEWABLE ORAL DAILY
Status: ON HOLD | COMMUNITY
End: 2023-12-01 | Stop reason: HOSPADM

## 2023-11-27 RX ORDER — 0.9 % SODIUM CHLORIDE 0.9 %
500 INTRAVENOUS SOLUTION INTRAVENOUS ONCE
Status: COMPLETED | OUTPATIENT
Start: 2023-11-27 | End: 2023-11-27

## 2023-11-27 RX ORDER — HYDROCHLOROTHIAZIDE 25 MG/1
TABLET ORAL DAILY
Status: ON HOLD | COMMUNITY
End: 2023-12-01 | Stop reason: HOSPADM

## 2023-11-27 RX ORDER — FOLIC ACID 1 MG/1
1 TABLET ORAL DAILY
Status: ON HOLD | COMMUNITY
End: 2023-12-01 | Stop reason: HOSPADM

## 2023-11-27 RX ORDER — IBUPROFEN 600 MG/1
600 TABLET ORAL ONCE
Status: COMPLETED | OUTPATIENT
Start: 2023-11-27 | End: 2023-11-27

## 2023-11-27 RX ORDER — SODIUM CHLORIDE 9 MG/ML
INJECTION, SOLUTION INTRAVENOUS CONTINUOUS
Status: CANCELLED | OUTPATIENT
Start: 2023-11-27 | End: 2023-11-29

## 2023-11-27 RX ORDER — TAMSULOSIN HYDROCHLORIDE 0.4 MG/1
0.4 CAPSULE ORAL DAILY
Status: CANCELLED | OUTPATIENT
Start: 2023-11-27

## 2023-11-27 RX ORDER — NIFEDIPINE 90 MG/1
90 TABLET, FILM COATED, EXTENDED RELEASE ORAL DAILY
Status: CANCELLED | OUTPATIENT
Start: 2023-11-27

## 2023-11-27 RX ORDER — POLYETHYLENE GLYCOL 3350 17 G/17G
17 POWDER, FOR SOLUTION ORAL DAILY PRN
Status: CANCELLED | OUTPATIENT
Start: 2023-11-27

## 2023-11-27 RX ORDER — ASPIRIN 81 MG/1
81 TABLET, CHEWABLE ORAL DAILY
Status: CANCELLED | OUTPATIENT
Start: 2023-11-27

## 2023-11-27 RX ORDER — SODIUM CHLORIDE 9 MG/ML
INJECTION, SOLUTION INTRAVENOUS PRN
Status: CANCELLED | OUTPATIENT
Start: 2023-11-27

## 2023-11-27 RX ORDER — ONDANSETRON 4 MG/1
4 TABLET, ORALLY DISINTEGRATING ORAL EVERY 8 HOURS PRN
Status: CANCELLED | OUTPATIENT
Start: 2023-11-27

## 2023-11-27 RX ORDER — SODIUM CHLORIDE 0.9 % (FLUSH) 0.9 %
5-40 SYRINGE (ML) INJECTION EVERY 12 HOURS SCHEDULED
Status: CANCELLED | OUTPATIENT
Start: 2023-11-27

## 2023-11-27 RX ORDER — METOPROLOL SUCCINATE 25 MG/1
25 TABLET, EXTENDED RELEASE ORAL DAILY
Status: CANCELLED | OUTPATIENT
Start: 2023-11-27

## 2023-11-27 RX ADMIN — VANCOMYCIN HYDROCHLORIDE 1850 MG: 1 INJECTION, POWDER, LYOPHILIZED, FOR SOLUTION INTRAVENOUS at 13:59

## 2023-11-27 RX ADMIN — PIPERACILLIN AND TAZOBACTAM 4500 MG: 4; .5 INJECTION, POWDER, LYOPHILIZED, FOR SOLUTION INTRAVENOUS at 12:54

## 2023-11-27 RX ADMIN — IOPAMIDOL 75 ML: 755 INJECTION, SOLUTION INTRAVENOUS at 13:10

## 2023-11-27 RX ADMIN — SODIUM CHLORIDE 500 ML: 9 INJECTION, SOLUTION INTRAVENOUS at 19:00

## 2023-11-27 RX ADMIN — IBUPROFEN 600 MG: 600 TABLET, FILM COATED ORAL at 12:50

## 2023-11-27 ASSESSMENT — ENCOUNTER SYMPTOMS: ABDOMINAL PAIN: 1

## 2023-11-27 ASSESSMENT — PAIN - FUNCTIONAL ASSESSMENT: PAIN_FUNCTIONAL_ASSESSMENT: NONE - DENIES PAIN

## 2023-11-27 NOTE — CARE COORDINATION
Patient being admitted with sepsis due to UTI. Patient was recently discharged over 2 weeks ago with similar complaints. He was in the [place] ER and found to have some gross hematuria as well. Patient has chronic Duong catheter in place. Patient was found to have an elevated white blood cell count, tachycardia, elevated lactic acid concerning for sepsis.     Orders placed for cefepime secondary to prior culture of Pseudomonas sensitive to cefepime  Consult ID  Blood and urine cultures obtained  Repeat lactic acid  IV fluids overnight  Hold losartan and hydrochlorothiazide unless blood pressure is elevated  Continue Procardia and metoprolol    Electronically signed by Moon Ruggiero MD on 11/27/2023 at 6:01 PM

## 2023-11-27 NOTE — SIGNIFICANT EVENT
Radiology Procedure Waiver   Name: Lul Phillips  : 1945  MRN: [id number]    Date:  23    Time: 1:03 PM EST    Benefits of immediately proceeding with Radiology exam(s) without pre-testing outweigh the risks or are not indicated as specified below and therefore the following is/are being waived:    [] Pregnancy test   [] Patients LMP on-time and regular.   [] Patient had Tubal Ligation or has other Contraception Device. [] Patient  is Menopausal or Premenarcheal.    [] Patient had Full or Partial Hysterectomy. [] Protocol for Iodine allergy    [] MRI Questionnaire     [x] BUN/Creatinine   [] Patient age w/no hx of renal dysfunction. [] Patient on Dialysis. [x] Recent Normal Labs.   Electronically signed by MARIA GUADALUPE Romo CNP on 23 at 1:03 PM EST

## 2023-11-27 NOTE — ED PROVIDER NOTES
Alzheimer's dementia without behavioral disturbance, psychotic disturbance, mood disturbance, or anxiety ([address]) 08/17/2023    Severe protein-calorie malnutrition ([address]) 08/18/2023    Duong catheter problem ([address]) 11/09/2023    Urinary retention 11/09/2023    Acute prostatitis 11/09/2023    Confusion 11/09/2023    Acute cystitis with hematuria 11/09/2023    UTI (urinary tract infection) 11/10/2023     Resolved Ambulatory Problems     Diagnosis Date Noted    Fever 08/16/2023     Past Medical History:   Diagnosis Date    CHF (congestive heart failure) (HCC)     Dementia ([facility])     Electrolyte imbalance          CONSULTS: (Who and What was discussed)  IP CONSULT TO INFECTIOUS DISEASES    Discussion with Other Profesionals : Admitting Team dr Lamine Tenorio will admit    Social Determinants : None    Records Reviewed : Inpatient Notes recent admission, cultures    CC/HPI Summary, DDx, ED Course, and Reassessment:   Patient 49-year-old male presented febrile tachycardic weak confused unable to ambulate. He has a chronic Duong. He had blood in his Duong catheter. Differential diagnosis includes hemorrhagic cystitis versus pyelonephritis versus pneumonia to name a few. Patient's urinalysis is nitrate positive with pyuria he had previous urine cultures that were Pseudomonas and Enterococcus. Patient was given Vanco and Zosyn. He was pancultured. Fluids were held secondary to increasing pleural effusions and history of heart failure with reduced ejection fraction    Disposition Considerations (tests considered but not done, Shared Decision Making, Pt Expectation of Test or Tx.):   admit      I am the Primary Clinician of Record. FINAL IMPRESSION      1. Complicated UTI (urinary tract infection)    2. Acute metabolic encephalopathy    3. Bilateral pleural effusion    4. Diverticulosis    5.  Aortic aneurysm without rupture, unspecified portion of aorta [facility])          DISPOSITION/PLAN     DISPOSITION Decision To Transfer 11/27/2023

## 2023-11-28 ENCOUNTER — HOSPITAL ENCOUNTER (INPATIENT)
Age: 78
LOS: 3 days | Discharge: HOME | End: 2023-12-01
Attending: HOSPITALIST | Admitting: HOSPITALIST
Payer: MEDICARE

## 2023-11-28 LAB
ALBUMIN SERPL-MCNC: 3.4 G/DL (ref 3.5–5.2)
ALP SERPL-CCNC: 64 U/L (ref 40–129)
ALT SERPL-CCNC: 11 U/L (ref 0–40)
ANION GAP SERPL CALCULATED.3IONS-SCNC: 12 MMOL/L (ref 7–16)
AST SERPL-CCNC: 17 U/L (ref 0–39)
BILIRUB SERPL-MCNC: 0.5 MG/DL (ref 0–1.2)
BUN SERPL-MCNC: 19 MG/DL (ref 6–23)
CALCIUM SERPL-MCNC: 9.7 MG/DL (ref 8.6–10.2)
CHLORIDE SERPL-SCNC: 104 MMOL/L (ref 98–107)
CO2 SERPL-SCNC: 21 MMOL/L (ref 22–29)
CREAT SERPL-MCNC: 1 MG/DL (ref 0.7–1.2)
EKG ATRIAL RATE: 95 BPM
EKG P AXIS: 48 DEGREES
EKG P-R INTERVAL: 180 MS
EKG Q-T INTERVAL: 406 MS
EKG QRS DURATION: 110 MS
EKG QTC CALCULATION (BAZETT): 510 MS
EKG R AXIS: -19 DEGREES
EKG T AXIS: -21 DEGREES
EKG VENTRICULAR RATE: 95 BPM
GFR SERPL CREATININE-BSD FRML MDRD: >60 ML/MIN/1.73M2
GLUCOSE SERPL-MCNC: 108 MG/DL (ref 74–99)
LACTATE BLDV-SCNC: 1.9 MMOL/L (ref 0.5–2.2)
POTASSIUM SERPL-SCNC: 4.2 MMOL/L (ref 3.5–5)
PROT SERPL-MCNC: 6.6 G/DL (ref 6.4–8.3)
SODIUM SERPL-SCNC: 137 MMOL/L (ref 132–146)

## 2023-11-28 PROCEDURE — 80053 COMPREHEN METABOLIC PANEL: CPT

## 2023-11-28 PROCEDURE — 36415 COLL VENOUS BLD VENIPUNCTURE: CPT

## 2023-11-28 PROCEDURE — 93010 ELECTROCARDIOGRAM REPORT: CPT | Performed by: INTERNAL MEDICINE

## 2023-11-28 PROCEDURE — 6360000002 HC RX W HCPCS: Performed by: NURSE PRACTITIONER

## 2023-11-28 PROCEDURE — 83605 ASSAY OF LACTIC ACID: CPT

## 2023-11-28 PROCEDURE — 6370000000 HC RX 637 (ALT 250 FOR IP): Performed by: NURSE PRACTITIONER

## 2023-11-28 PROCEDURE — 2060000000 HC ICU INTERMEDIATE R&B

## 2023-11-28 PROCEDURE — 97165 OT EVAL LOW COMPLEX 30 MIN: CPT

## 2023-11-28 PROCEDURE — 2580000003 HC RX 258: Performed by: NURSE PRACTITIONER

## 2023-11-28 RX ORDER — METOPROLOL SUCCINATE 25 MG/1
25 TABLET, EXTENDED RELEASE ORAL DAILY
Status: DISCONTINUED | OUTPATIENT
Start: 2023-11-28 | End: 2023-12-01 | Stop reason: HOSPADM

## 2023-11-28 RX ORDER — SODIUM CHLORIDE 0.9 % (FLUSH) 0.9 %
10 SYRINGE (ML) INJECTION PRN
Status: DISCONTINUED | OUTPATIENT
Start: 2023-11-28 | End: 2023-12-01 | Stop reason: HOSPADM

## 2023-11-28 RX ORDER — POTASSIUM CHLORIDE 7.45 MG/ML
10 INJECTION INTRAVENOUS PRN
Status: DISCONTINUED | OUTPATIENT
Start: 2023-11-28 | End: 2023-12-01 | Stop reason: HOSPADM

## 2023-11-28 RX ORDER — MAGNESIUM SULFATE IN WATER 40 MG/ML
2000 INJECTION, SOLUTION INTRAVENOUS PRN
Status: DISCONTINUED | OUTPATIENT
Start: 2023-11-28 | End: 2023-12-01 | Stop reason: HOSPADM

## 2023-11-28 RX ORDER — SODIUM CHLORIDE 9 MG/ML
INJECTION, SOLUTION INTRAVENOUS CONTINUOUS
Status: DISCONTINUED | OUTPATIENT
Start: 2023-11-28 | End: 2023-11-29

## 2023-11-28 RX ORDER — POTASSIUM CHLORIDE 20 MEQ/1
40 TABLET, EXTENDED RELEASE ORAL PRN
Status: DISCONTINUED | OUTPATIENT
Start: 2023-11-28 | End: 2023-12-01 | Stop reason: HOSPADM

## 2023-11-28 RX ORDER — ONDANSETRON 4 MG/1
4 TABLET, ORALLY DISINTEGRATING ORAL EVERY 8 HOURS PRN
Status: DISCONTINUED | OUTPATIENT
Start: 2023-11-28 | End: 2023-12-01 | Stop reason: HOSPADM

## 2023-11-28 RX ORDER — LEVOTHYROXINE SODIUM 0.03 MG/1
25 TABLET ORAL DAILY
Status: DISCONTINUED | OUTPATIENT
Start: 2023-11-28 | End: 2023-12-01 | Stop reason: HOSPADM

## 2023-11-28 RX ORDER — ONDANSETRON 2 MG/ML
4 INJECTION INTRAMUSCULAR; INTRAVENOUS EVERY 6 HOURS PRN
Status: DISCONTINUED | OUTPATIENT
Start: 2023-11-28 | End: 2023-12-01 | Stop reason: HOSPADM

## 2023-11-28 RX ORDER — TAMSULOSIN HYDROCHLORIDE 0.4 MG/1
0.4 CAPSULE ORAL DAILY
Status: DISCONTINUED | OUTPATIENT
Start: 2023-11-28 | End: 2023-12-01 | Stop reason: HOSPADM

## 2023-11-28 RX ORDER — ROSUVASTATIN CALCIUM 10 MG/1
10 TABLET, COATED ORAL NIGHTLY
Status: DISCONTINUED | OUTPATIENT
Start: 2023-11-28 | End: 2023-12-01 | Stop reason: HOSPADM

## 2023-11-28 RX ORDER — ENOXAPARIN SODIUM 100 MG/ML
40 INJECTION SUBCUTANEOUS DAILY
Status: DISCONTINUED | OUTPATIENT
Start: 2023-11-28 | End: 2023-12-01 | Stop reason: HOSPADM

## 2023-11-28 RX ORDER — SENNOSIDES A AND B 8.6 MG/1
1 TABLET, FILM COATED ORAL DAILY PRN
Status: DISCONTINUED | OUTPATIENT
Start: 2023-11-28 | End: 2023-12-01 | Stop reason: HOSPADM

## 2023-11-28 RX ORDER — SODIUM CHLORIDE 9 MG/ML
INJECTION, SOLUTION INTRAVENOUS PRN
Status: DISCONTINUED | OUTPATIENT
Start: 2023-11-28 | End: 2023-12-01 | Stop reason: HOSPADM

## 2023-11-28 RX ORDER — SODIUM CHLORIDE 0.9 % (FLUSH) 0.9 %
10 SYRINGE (ML) INJECTION EVERY 12 HOURS SCHEDULED
Status: DISCONTINUED | OUTPATIENT
Start: 2023-11-28 | End: 2023-12-01 | Stop reason: HOSPADM

## 2023-11-28 RX ADMIN — Medication 10 ML: at 08:21

## 2023-11-28 RX ADMIN — TAMSULOSIN HYDROCHLORIDE 0.4 MG: 0.4 CAPSULE ORAL at 08:20

## 2023-11-28 RX ADMIN — SODIUM CHLORIDE: 9 INJECTION, SOLUTION INTRAVENOUS at 12:26

## 2023-11-28 RX ADMIN — ENOXAPARIN SODIUM 40 MG: 100 INJECTION SUBCUTANEOUS at 08:20

## 2023-11-28 RX ADMIN — LEVOTHYROXINE SODIUM 25 MCG: 25 TABLET ORAL at 06:15

## 2023-11-28 RX ADMIN — METOPROLOL SUCCINATE 25 MG: 25 TABLET, EXTENDED RELEASE ORAL at 08:20

## 2023-11-28 RX ADMIN — CEFEPIME 2000 MG: 2 INJECTION, POWDER, FOR SOLUTION INTRAVENOUS at 13:51

## 2023-11-28 RX ADMIN — CEFEPIME 2000 MG: 2 INJECTION, POWDER, FOR SOLUTION INTRAVENOUS at 02:17

## 2023-11-28 RX ADMIN — ROSUVASTATIN CALCIUM 10 MG: 10 TABLET, FILM COATED ORAL at 21:28

## 2023-11-28 RX ADMIN — SODIUM CHLORIDE: 9 INJECTION, SOLUTION INTRAVENOUS at 02:16

## 2023-11-28 ASSESSMENT — PAIN SCALES - GENERAL
PAINLEVEL_OUTOF10: 0

## 2023-11-28 NOTE — CONSULTS
[facility]  [facility]  Consultation Note     Admit Date: 11/28/2023 12:42 AM    Reason for Consult:   sepsis secondary to UTI    Attending Physician:  Mahnaz Acosta MD    HISTORY OF PRESENT ILLNESS:             The history is obtained from extensive review of available past medical records. The patient is a 78 y.o. male who is previously known to the ID service. He was recently admitted to [facility] on 11/10/23 with a samuel catheter obstruction. He had nausea, vomiting, fever, chills, and abdominal & back  pain. UA showed pyuria and culture grew pansensitive Pseudomonas aeruginosa. He was treated with Zosyn and discharged on 11/12 home with [address] on 7 days of Levofloxacin. He was brought to the [place] ED by his daughter with fever, increased weakness, confusion, and hematuria. Tmax in the ED was 99. He was tachycardic. He has dementia at baseline. His samuel was changed on 11/19. He says he doesn't feel well. He cannot describe his symptoms. He denies back pain and there is no CVA tenderness on exam. Endorses abdominal pain but I could not elicit pain on palpation. His urine is clear in his catheter bag. His cheeks are flushed. In the ED labs showed WBC 13.5, CRP 6, ESR 31, lactic acid 2.6 - down to 1.9 now. UA showed pyuria, too many RBC to count and 3+ bacteria. CT of the abdomen and pelvis showed left renal cysts, no hydronephrosis, multiple urinary bladder diverticuli. He was given Vancomycin and Zosyn in the ED. He was started on Cefepime on admission to [facility]. He has been afebrile here. Blood and urine cultures are negative. ID was asked to see    Past Medical History:      August 2023: Admitted to [facility] with UTI secondary to CAUTI. Cultures grew pansensitive pseudomonas aeruginosa. He was treated with Cefepime followed by oral Levofloxacin.          Diagnosis Date    Bacteremia due to Gram-negative bacteria 08/17/2023    CHF (congestive heart cultures. He may need long-term treatment for prostatitis. We will also check for PSA. We will follow with you.     Rajat Harry MD  11/28/2023  4:13 PM

## 2023-11-28 NOTE — PROGRESS NOTES
4 Eyes Skin Assessment     NAME:  Vern Worthy  YOB: 1945  MEDICAL RECORD NUMBER:  [id number]    The patient is being assessed for  Admission    I agree that at least one RN has performed a thorough Head to Toe Skin Assessment on the patient. ALL assessment sites listed below have been assessed. Areas assessed by both nurses:    Head, Face, Ears, Shoulders, Back, Chest, Arms, Elbows, Hands, Sacrum. Buttock, Coccyx, Ischium, Legs. Feet and Heels, and Under Medical Devices         Does the Patient have a Wound?  No noted wound(s)       Lg Prevention initiated by RN: Yes  Wound Care Orders initiated by RN: No    Pressure Injury (Stage 3,4, Unstageable, DTI, NWPT, and Complex wounds) if present, place Wound referral order by RN under : No    New Ostomies, if present place, Ostomy referral order under : No     Nurse 1 eSignature: Electronically signed by Matilda Bravo RN on 11/28/23 at 1:44 AM EST    **SHARE this note so that the co-signing nurse can place an eSignature**    Nurse 2 eSignature: Electronically signed by Anastasia Vick RN on 11/28/23 at 1:45 AM EST

## 2023-11-28 NOTE — ED NOTES
Called Stacie the daughter and gave her the information on what room he is going too room 606-A at [facility] and ambulance with be her between 65 and Zuly Dunn RN  11/27/23 1926

## 2023-11-28 NOTE — ACP (ADVANCE CARE PLANNING)
Advance Care Planning   Healthcare Decision Maker:    Primary Decision Maker: pedro aceves [facility] - [phone]    Click here to complete Healthcare Decision Makers including selection of the Healthcare Decision Maker Relationship (ie \"Primary\").

## 2023-11-28 NOTE — PROGRESS NOTES
OCCUPATIONAL THERAPY INITIAL EVALUATION    [address]   [facility], [address] [place] [address]        CHES:                                                  Patient Name: Pavel Rincon    MRN: [id number]    : 1945    Room: [address] [place] [address]     Evaluating OT: Otto Galindo OTR/L #[id number]    Referring Provider:  Delano Zhao MD     Specific Provider Orders/Date:  OT Eval and Treat, 23     Diagnosis:   No diagnosis found.        Surgery: None       Pertinent Medical History: Alzheimer's dementia, CHF, B TKA      Precautions:  Fall Risk, falls and alarm      Assessment of current deficits    [x] Functional mobility  [x]ADLs  [x] Strength               [x]Cognition    [x] Functional transfers   [x] IADLs         [x] Safety Awareness   [x]Endurance    [] Fine Coordination              [x] Balance      [] Vision/perception   []Sensation     []Gross Motor Coordination  [] ROM  [] Delirium                   [] Motor Control     OT PLAN OF CARE   OT POC based on physician orders, patient diagnosis and results of clinical assessment    Frequency/Duration 1-3 days/wk for 2 weeks PRN     Specific OT Treatment Interventions to include:   * Instruction/training on adapted ADL techniques and AE recommendations to increase functional independence within precautions       * Training on energy conservation strategies, correct breathing pattern and techniques to improve independence/tolerance for self-care routine  * Functional transfer/mobility training/DME recommendations for increased independence, safety, and fall prevention  * Patient/Family education to increase follow through with safety techniques and functional independence  * Recommendation of environmental modifications for increased safety with functional transfers/mobility and ADLs  * Cognitive retraining/development of therapeutic activities to improve problem solving, judgement, memory, and attention

## 2023-11-28 NOTE — H&P
Internal Medicine History & Physical     Name: Ana Maria Maldonado  : 1945  Chief Complaint: No chief complaint on file. Primary Care Physician: Hal Viveros MD  Admission date: 2023  Date of service: 2023     History of Present Illness  Rg Silva is a 78y.o. year old male. Patient presented to the [facility] emergency room with feeling sick for couple days and then having a fall at home. Patient states he is not eating and drinking well. No other bedsores or wounds. Patient has chronic Duong catheter in place and was recently treated for Pseudomonas UTI. Patient is not having any diarrhea, loose stools, bloody stools. Patient denies chest pain, shortness of breath, nausea, vomiting, headache. Patient states nothing was making his symptoms better or worse. Patient states symptoms are improving at this time with antibiotics. Still feeling weak and fatigued. Patient is felt to be a fair historian. History of dementia. Chart review indicates story that was similar. Of note patient did have some gross hematuria as well. ED course:   Initial blood work and imaging studies performed. Admission recommended by ED physician. Case was discussed with ED provider.  Meds in ED consisted of the following:  Medications   levothyroxine (SYNTHROID) tablet 25 mcg (25 mcg Oral Given 23)   metoprolol succinate (TOPROL XL) extended release tablet 25 mg (25 mg Oral Given 23)   rosuvastatin (CRESTOR) tablet 10 mg (has no administration in time range)   tamsulosin (FLOMAX) capsule 0.4 mg (0.4 mg Oral Given 23)   ceFEPIme (MAXIPIME) 2,000 mg in sodium chloride 0.9 % 100 mL IVPB (0 mg IntraVENous Stopped 23)   sodium chloride flush 0.9 % injection 10 mL (10 mLs IntraVENous Given 23)   sodium chloride flush 0.9 % injection 10 mL (has no administration in time range)   0.9 % sodium chloride infusion (has no administration in time range)   potassium Unsure of dose  Patient not taking: Reported on 11/27/2023    Yoshi Edwards MD   Amoxicillin (AMOXIL PO) Take by mouth  Patient not taking: Reported on 11/27/2023    Yoshi Edwards MD   folic acid (FOLVITE) 1 MG tablet Take 1 tablet by mouth daily  Patient not taking: Reported on 11/27/2023    Yoshi Edwards MD   NIFEdipine (ADALAT CC PO) Take by mouth  Patient not taking: Reported on 11/27/2023    Yoshi Edwards MD   ibuprofen (ADVIL;MOTRIN) 400 MG tablet Take 1 tablet by mouth 2 times daily at 0800 and 1400    Yoshi Edwards MD   levothyroxine (SYNTHROID) 25 MCG tablet levothyroxine 25 mcg tablet    Yoshi Edwards MD   losartan (COZAAR) 100 MG tablet losartan 100 mg tablet    Yoshi Edwards MD   tamsulosin (FLOMAX) 0.4 MG capsule Take 1 capsule by mouth daily    Yoshi Edwards MD   rosuvastatin (CRESTOR) 10 MG tablet TAKE ONE TABLET (10MG) BY MOUTH EVERY DAY 7/9/23   Yoshi Edwards MD   metoprolol succinate (TOPROL XL) 25 MG extended release tablet Take 1 tablet by mouth daily 5/8/23   Ferny Lora MD       Allergies  Allergies   Allergen Reactions    Hydrocodone-Acetaminophen Other (See Comments)    Ciprofloxacin Diarrhea    Quinapril     Tylenol [Acetaminophen]        Review of Systems:   Please see HPI above. All bolded are positive. All un-bolded are negative.   Constitutional Symptoms: fever, chills, fatigue, generalized weakness, diaphoresis, increase in thirst, loss of appetite  Eyes: vision change   Ears, Nose, Mouth, Throat: hearing loss, nasal congestion, sores in the mouth  Cardiovascular: chest pain, chest heaviness, palpitations  Respiratory: shortness of breath, wheezing, coughing  Gastrointestinal: abdominal pain, nausea, vomiting, diarrhea, constipation, melena, hematochezia, hematemesis  Genitourinary: dysuria, hematuria, increased frequency  Musculoskeletal: lower extremity edema, myalgias, arthralgias, back pain  Integumentary:

## 2023-11-28 NOTE — ED NOTES
[facility] called for transport. ETA 3-4 hours. Outsourcing requested.      Jackline Lanes, RN  11/27/23 1922

## 2023-11-29 LAB
ALBUMIN SERPL-MCNC: 3.3 G/DL (ref 3.5–5.2)
ALP SERPL-CCNC: 58 U/L (ref 40–129)
ALT SERPL-CCNC: 10 U/L (ref 0–40)
ANION GAP SERPL CALCULATED.3IONS-SCNC: 11 MMOL/L (ref 7–16)
AST SERPL-CCNC: 15 U/L (ref 0–39)
BASOPHILS # BLD: 0.02 K/UL (ref 0–0.2)
BASOPHILS NFR BLD: 1 % (ref 0–2)
BILIRUB SERPL-MCNC: 0.4 MG/DL (ref 0–1.2)
BUN SERPL-MCNC: 16 MG/DL (ref 6–23)
CALCIUM SERPL-MCNC: 9.6 MG/DL (ref 8.6–10.2)
CHLORIDE SERPL-SCNC: 106 MMOL/L (ref 98–107)
CO2 SERPL-SCNC: 20 MMOL/L (ref 22–29)
CREAT SERPL-MCNC: 0.9 MG/DL (ref 0.7–1.2)
EOSINOPHIL # BLD: 0.07 K/UL (ref 0.05–0.5)
EOSINOPHILS RELATIVE PERCENT: 2 % (ref 0–6)
ERYTHROCYTE [DISTWIDTH] IN BLOOD BY AUTOMATED COUNT: 15.6 % (ref 11.5–15)
GFR SERPL CREATININE-BSD FRML MDRD: >60 ML/MIN/1.73M2
GLUCOSE SERPL-MCNC: 97 MG/DL (ref 74–99)
HCT VFR BLD AUTO: 33 % (ref 37–54)
HGB BLD-MCNC: 10.7 G/DL (ref 12.5–16.5)
IMM GRANULOCYTES # BLD AUTO: <0.03 K/UL (ref 0–0.58)
IMM GRANULOCYTES NFR BLD: 0 % (ref 0–5)
LYMPHOCYTES NFR BLD: 0.67 K/UL (ref 1.5–4)
LYMPHOCYTES RELATIVE PERCENT: 17 % (ref 20–42)
MCH RBC QN AUTO: 27.7 PG (ref 26–35)
MCHC RBC AUTO-ENTMCNC: 32.4 G/DL (ref 32–34.5)
MCV RBC AUTO: 85.5 FL (ref 80–99.9)
MICROORGANISM SPEC CULT: ABNORMAL
MONOCYTES NFR BLD: 0.53 K/UL (ref 0.1–0.95)
MONOCYTES NFR BLD: 13 % (ref 2–12)
NEUTROPHILS NFR BLD: 68 % (ref 43–80)
NEUTS SEG NFR BLD: 2.72 K/UL (ref 1.8–7.3)
PLATELET, FLUORESCENCE: 119 K/UL (ref 130–450)
PMV BLD AUTO: 11.6 FL (ref 7–12)
POTASSIUM SERPL-SCNC: 3.7 MMOL/L (ref 3.5–5)
PROT SERPL-MCNC: 6.2 G/DL (ref 6.4–8.3)
RBC # BLD AUTO: 3.86 M/UL (ref 3.8–5.8)
SODIUM SERPL-SCNC: 137 MMOL/L (ref 132–146)
SPECIMEN DESCRIPTION: ABNORMAL
WBC OTHER # BLD: 4 K/UL (ref 4.5–11.5)

## 2023-11-29 PROCEDURE — 97535 SELF CARE MNGMENT TRAINING: CPT

## 2023-11-29 PROCEDURE — 2060000000 HC ICU INTERMEDIATE R&B

## 2023-11-29 PROCEDURE — 80053 COMPREHEN METABOLIC PANEL: CPT

## 2023-11-29 PROCEDURE — 36415 COLL VENOUS BLD VENIPUNCTURE: CPT

## 2023-11-29 PROCEDURE — 6370000000 HC RX 637 (ALT 250 FOR IP): Performed by: NURSE PRACTITIONER

## 2023-11-29 PROCEDURE — 6370000000 HC RX 637 (ALT 250 FOR IP)

## 2023-11-29 PROCEDURE — 85025 COMPLETE CBC W/AUTO DIFF WBC: CPT

## 2023-11-29 PROCEDURE — 2580000003 HC RX 258: Performed by: NURSE PRACTITIONER

## 2023-11-29 PROCEDURE — 97530 THERAPEUTIC ACTIVITIES: CPT

## 2023-11-29 PROCEDURE — 6360000002 HC RX W HCPCS: Performed by: NURSE PRACTITIONER

## 2023-11-29 PROCEDURE — 97161 PT EVAL LOW COMPLEX 20 MIN: CPT

## 2023-11-29 PROCEDURE — 87086 URINE CULTURE/COLONY COUNT: CPT

## 2023-11-29 RX ORDER — LINEZOLID 600 MG/1
600 TABLET, FILM COATED ORAL EVERY 12 HOURS SCHEDULED
Status: DISCONTINUED | OUTPATIENT
Start: 2023-11-29 | End: 2023-12-01 | Stop reason: HOSPADM

## 2023-11-29 RX ORDER — IBUPROFEN 400 MG/1
400 TABLET ORAL 2 TIMES DAILY PRN
Status: DISCONTINUED | OUTPATIENT
Start: 2023-11-29 | End: 2023-12-01 | Stop reason: HOSPADM

## 2023-11-29 RX ORDER — LIDOCAINE HYDROCHLORIDE 20 MG/ML
JELLY TOPICAL
Status: DISPENSED | OUTPATIENT
Start: 2023-11-29 | End: 2023-11-30

## 2023-11-29 RX ADMIN — METOPROLOL SUCCINATE 25 MG: 25 TABLET, EXTENDED RELEASE ORAL at 10:11

## 2023-11-29 RX ADMIN — ROSUVASTATIN CALCIUM 10 MG: 10 TABLET, FILM COATED ORAL at 21:11

## 2023-11-29 RX ADMIN — LINEZOLID 600 MG: 600 TABLET, FILM COATED ORAL at 21:11

## 2023-11-29 RX ADMIN — Medication 10 ML: at 22:21

## 2023-11-29 RX ADMIN — LEVOTHYROXINE SODIUM 25 MCG: 25 TABLET ORAL at 06:42

## 2023-11-29 RX ADMIN — TAMSULOSIN HYDROCHLORIDE 0.4 MG: 0.4 CAPSULE ORAL at 10:11

## 2023-11-29 RX ADMIN — Medication 10 ML: at 10:11

## 2023-11-29 RX ADMIN — CEFEPIME 2000 MG: 2 INJECTION, POWDER, FOR SOLUTION INTRAVENOUS at 02:43

## 2023-11-29 RX ADMIN — CEFEPIME 2000 MG: 2 INJECTION, POWDER, FOR SOLUTION INTRAVENOUS at 14:45

## 2023-11-29 ASSESSMENT — PAIN SCALES - GENERAL: PAINLEVEL_OUTOF10: 0

## 2023-11-29 NOTE — PROGRESS NOTES
Physician Progress Note      PATIENTMagdanthony Velazquez  [facility] #:                  [id number]  :                       1945  ADMIT DATE:       2023 12:42 AM  [address] [facility] DATE:  RESPONDING  PROVIDER #:        Ru Silva MD          QUERY TEXT:    Pt admitted with Sepsis. Noted documentation of UTI secondary to samuel   catheter per ID consult. If possible, please document in progress notes and   discharge summary:    The medical record reflects the following:  Risk Factors: samuel poa  Clinical Indicators: per ID \". ..recently admitted to [facility] on   11/10/23 with a samuel catheter obstruction. Elad Tao His samuel was changed on   . Elda Tao Complicated UTI secondary to chronic samuel catheter. Elda Tao \", per H&P   \". Elda Tao Sepsis related to UTI and chronic indwelling catheter. Elda Tao \"  Treatment: IV Maxipime    Thank you,  Tea Persaud RN, BSN, CDIS  Clinical Documentation Integrity  [email or web address]  Options provided:  -- UTI secondary to samuel catheter confirmed present on admission  -- Defer to ID consultant documentation regarding UTI secondary to samuel   catheter  -- Other - I will add my own diagnosis  -- Disagree - Not applicable / Not valid  -- Disagree - Clinically unable to determine / Unknown  -- Refer to Clinical Documentation Reviewer    PROVIDER RESPONSE TEXT:    The diagnosis of UTI secondary to samuel catheter was confirmed as present on   admission.     Query created by: Mary Beth Casey on 2023 12:19 PM      Electronically signed by:  Ru Silva MD 2023 1:23 PM

## 2023-11-29 NOTE — PROGRESS NOTES
Perfect Serve to Dr. Divina Jorge with the following message: Patient with very bloody urine to Duong this morning again. He follows with Dr. Elma Geller outpatient. Do you want us to try flushing the catheter or do you want a Urology consult. Okay to give Lovenox or hold? Hgb 10.7.     Electronically signed by Moises Roth RN on 11/29/2023 at 10:19 AM

## 2023-11-29 NOTE — PROGRESS NOTES
in place  Neurologic: cranial nerves 2-12 grossly intact, no slurred speech    Most Recent Labs  Lab Results   Component Value Date    WBC 4.0 (L) 11/29/2023    HGB 10.7 (L) 11/29/2023    HCT 33.0 (L) 11/29/2023     11/27/2023     11/29/2023    K 3.7 11/29/2023     11/29/2023    CREATININE 0.9 11/29/2023    BUN 16 11/29/2023    CO2 20 (L) 11/29/2023    GLUCOSE 97 11/29/2023    ALT 10 11/29/2023    AST 15 11/29/2023    INR 1.1 03/17/2023    TSH 2.17 08/25/2023       No orders to display         Assessment   Active Hospital Problems    Diagnosis     Severe protein-calorie malnutrition (HCC) [E43]     Severe Alzheimer's dementia without behavioral disturbance, psychotic disturbance, mood disturbance, or anxiety ([address]) [G30.9, F02. C0]     Hypothyroidism [E03.9]     Benign essential hypertension [I10]     Sleep apnea [G47.30]     Urinary tract infection due to Pseudomonas aeruginosa [N39.0, B96.5]     Benign prostatic hyperplasia without urinary obstruction [N40.0]          Plan  Sepsis related to UTI and chronic indwelling catheter  Infectious disease consulted given history of Pseudomonas infection  Continue IV cefepime as ordered  History of Alzheimer's dementia without behavior disturbance  Patient seems to be stable at this time  Continue to monitor  Confusion continued  Hypothyroidism  Continue home medication  Hypertension  Blood pressure stable on current medication  BPH  Chronic Duong in place  PT AM-PAC-- 15/24  Follow labs   DVT prophylaxis  Please see orders for further management and care. Discharge plan: likely to rehab when stable    Electronically signed by Kamlesh Castle MD on 11/29/2023 at 1:38 PM    I can be reached through [address].

## 2023-11-29 NOTE — PROGRESS NOTES
Physical Therapy  Facility/Department: [facility] MED SURG  Physical Therapy Initial Assessment    Name: Yeimi Neville  : 1945  MRN: [id number]  Date of Service: 2023          Patient Diagnosis(es): There were no encounter diagnoses. Past Medical History:  has a past medical history of Bacteremia due to Gram-negative bacteria, CHF (congestive heart failure) ([address]), Dementia ([address]), Electrolyte imbalance, Fever, Sepsis ([address]), and Severe Alzheimer's dementia without behavioral disturbance, psychotic disturbance, mood disturbance, or anxiety ([address]). Past Surgical History:  has a past surgical history that includes Total knee arthroplasty; hernia repair (Bilateral); and Ulnar nerve repair. Requires PT Follow-Up: Yes     Evaluating Therapist: Harish Putnam PT     Referring Provider:  Mukul Lee MD    PT order : PT eval and treat     Room #: 780  DIAGNOSIS: UTI   PRECAUTIONS:  falls     Social:  Pt lives with  family  in a  1  floor plan  4  steps and  1  rails to enter. Prior to admission pt walked with  ww short distances, w/c for longer. Info per chart. Pt a questionable historian      Initial Evaluation  Date:  2023  Treatment      Short Term/ Long Term   Goals   Was pt agreeable to Eval/treatment? Yes      Does pt have pain?  None reported      Bed Mobility  Rolling:  min assist   Supine to sit:  min assist   Sit to supine: mod assist   Scooting:  mod assist in sit    CGA /min assist    Transfers Sit to stand: min assist   Stand to sit:  min assist  Stand pivot:  NT    CGA    Ambulation     3 feet side stepping to HOB with ww  with  min assist    25  feet with  ww  with  CGA       Stair negotiation: ascended and descended NT   TBA    LE ROM  AAROM WFL      LE strength  3- / 5 hips, 3- to 3+/ 5 distally      AM- PAC RAW score   15/ 24            Pt is alert and Oriented x  1      Balance:  min assist . High fall risk due to weakness, decreased balance, and decreased safety awareness activity tolerance during functional mobility   [x] Transfer Training to improve safety and independence with all functional transfers   [x] Gait Training to improve gait mechanics, endurance and assess need for appropriate assistive device  [] Stair Training in preparation for safe discharge home and/or into the community   [x] Positioning to prevent skin breakdown and contractures  [x] Safety and Education Training   [x] Patient/Caregiver Education   [] HEP  [] Other     Frequency of treatments will be 2-5x/week x 7-10 days. Time in:  1057   Time out:  1108       Evaluation Time includes thorough review of current medical information, gathering information on past medical history/social history and prior level of function, completion of standardized testing/informal observation of tasks, assessment of data and education on plan of care and goals.     CPT codes:  [x] Low Complexity PT evaluation 97076  [] Moderate Complexity PT evaluation 29095  [] High Complexity PT evaluation 35586  [] PT Re-evaluation 92422  [] Gait training 45442  minutes  [] Therapeutic activities 63560  minutes  [] Therapeutic exercises 71330  minutes  [] Neuromuscular reeducation 21743  minutes       [address] number:  PT 0608

## 2023-11-29 NOTE — PROGRESS NOTES
strength for ADLs/functional transfers  * Therapeutic activities to facilitate/challenge dynamic balance, stand tolerance for increased safety and independence with ADLs  * Therapeutic activities to facilitate gross/fine motor skills for increased independence with ADLs  * Positioning to improve skin integrity, interaction with environment and functional independence     Recommended Adaptive Equipment: TBD       Home Living: Lives with family, single family home, 1st floor set-up. Bathroom set-up: Sponge bathes only          Equipment owned: Wheeled walker      Prior Level of Function: Family assists with ADLs and IADLs; ambulated with wheeled walker PTA. Pt admits to multiple falls over the last few months. Pain Level: reports discomfort from having catheter change earlier this date. Cognition: Awake and alert. Encouragement to participate. Functional Assessment: AM-PAC Daily Activity Raw Score: 15/24    Initial Eval Status  Date: 11/28/23    Treatment Status  Date: 11/29/23  STGs = LTGs  Time frame: 10-14 days   Feeding Supervision       Independent    Grooming Minimal Assist       Supervision    UB Dressing Minimal Assist    Mod A   Supervision    LB Dressing Moderate Assist      max A  Supervision    Bathing Moderate Assist      Supervision    Toileting Maximal Assist   Pt has chronic samuel     Catheter. Max A hygiene. Supervision    Bed Mobility  Supine to sit: Stand by Assist   Sit to supine: Stand by Assist     Min A supine to sit   Supine to sit: Independent   Sit to supine: Independent    Functional Transfers Sit to stand: Minimal Assist   Stand to sit: Minimal Assist     Transfer training with verbal cues for hand placement to improve safety. Mod A sit to stand   Supervision    Functional Mobility Minimal Assist with wheeled walker to improve balance few steps forward/backward and lateral along side of bed. Min A pivot to chair using w/w for support. Supervision with use of wheeled walker    Balance Sitting:     Static: fair plus     Dynamic: fair plus   Standing: fair with walker       Sitting:     Static: good    Dynamic: good  Standing: good with walker    Activity Tolerance fair   Limited   Increase standing tolerance >3  minutes for improved engagement with functional transfers and indep in ADLs        Comments:  pt required encouragement to get up to the chair this PM.  Assist for ADL and transfers. New bed arrived during this session. Pt assisted back to the chair so that staff can change beds. He remained seated in the chair with alarm activated. Education/treatment:  ADL retraining with facilitation of movement to increase self care skills. Therapeutic activity to address balance and endurance for ADL and transfers. Pt education of walker safety and transfer safety. Pt has made  progress towards set goals.        Time In: 130  Time Out: [address] [address] 30 1   ADL/Self Care 95910 10 1   Orthotic Management 11544     Neuro Re-Ed 24204     Non-Billable Time     TOTAL TIMED TREATMENT [facility] ENEDINA/L [address]66

## 2023-11-30 LAB
ALBUMIN SERPL-MCNC: 3.1 G/DL (ref 3.5–5.2)
ALP SERPL-CCNC: 66 U/L (ref 40–129)
ALT SERPL-CCNC: 14 U/L (ref 0–40)
ANION GAP SERPL CALCULATED.3IONS-SCNC: 8 MMOL/L (ref 7–16)
AST SERPL-CCNC: 22 U/L (ref 0–39)
BASOPHILS # BLD: 0.01 K/UL (ref 0–0.2)
BASOPHILS NFR BLD: 0 % (ref 0–2)
BILIRUB SERPL-MCNC: 0.3 MG/DL (ref 0–1.2)
BUN SERPL-MCNC: 14 MG/DL (ref 6–23)
CALCIUM SERPL-MCNC: 9.1 MG/DL (ref 8.6–10.2)
CHLORIDE SERPL-SCNC: 106 MMOL/L (ref 98–107)
CO2 SERPL-SCNC: 21 MMOL/L (ref 22–29)
CREAT SERPL-MCNC: 0.9 MG/DL (ref 0.7–1.2)
EOSINOPHIL # BLD: 0.14 K/UL (ref 0.05–0.5)
EOSINOPHILS RELATIVE PERCENT: 3 % (ref 0–6)
ERYTHROCYTE [DISTWIDTH] IN BLOOD BY AUTOMATED COUNT: 15.2 % (ref 11.5–15)
GFR SERPL CREATININE-BSD FRML MDRD: >60 ML/MIN/1.73M2
GLUCOSE SERPL-MCNC: 96 MG/DL (ref 74–99)
HCT VFR BLD AUTO: 32.6 % (ref 37–54)
HGB BLD-MCNC: 10.8 G/DL (ref 12.5–16.5)
IMM GRANULOCYTES # BLD AUTO: <0.03 K/UL (ref 0–0.58)
IMM GRANULOCYTES NFR BLD: 1 % (ref 0–5)
LYMPHOCYTES NFR BLD: 0.63 K/UL (ref 1.5–4)
LYMPHOCYTES RELATIVE PERCENT: 15 % (ref 20–42)
MCH RBC QN AUTO: 28.4 PG (ref 26–35)
MCHC RBC AUTO-ENTMCNC: 33.1 G/DL (ref 32–34.5)
MCV RBC AUTO: 85.8 FL (ref 80–99.9)
MICROORGANISM SPEC CULT: NO GROWTH
MONOCYTES NFR BLD: 0.45 K/UL (ref 0.1–0.95)
MONOCYTES NFR BLD: 11 % (ref 2–12)
NEUTROPHILS NFR BLD: 71 % (ref 43–80)
NEUTS SEG NFR BLD: 3.02 K/UL (ref 1.8–7.3)
PLATELET # BLD AUTO: 130 K/UL (ref 130–450)
PMV BLD AUTO: 12 FL (ref 7–12)
POTASSIUM SERPL-SCNC: 4 MMOL/L (ref 3.5–5)
PROT SERPL-MCNC: 6.3 G/DL (ref 6.4–8.3)
RBC # BLD AUTO: 3.8 M/UL (ref 3.8–5.8)
SODIUM SERPL-SCNC: 135 MMOL/L (ref 132–146)
SPECIMEN DESCRIPTION: NORMAL
WBC OTHER # BLD: 4.3 K/UL (ref 4.5–11.5)

## 2023-11-30 PROCEDURE — 6360000002 HC RX W HCPCS: Performed by: NURSE PRACTITIONER

## 2023-11-30 PROCEDURE — 85025 COMPLETE CBC W/AUTO DIFF WBC: CPT

## 2023-11-30 PROCEDURE — 2060000000 HC ICU INTERMEDIATE R&B

## 2023-11-30 PROCEDURE — 6370000000 HC RX 637 (ALT 250 FOR IP): Performed by: NURSE PRACTITIONER

## 2023-11-30 PROCEDURE — 6370000000 HC RX 637 (ALT 250 FOR IP)

## 2023-11-30 PROCEDURE — 2580000003 HC RX 258: Performed by: NURSE PRACTITIONER

## 2023-11-30 PROCEDURE — 80053 COMPREHEN METABOLIC PANEL: CPT

## 2023-11-30 RX ADMIN — Medication 10 ML: at 10:38

## 2023-11-30 RX ADMIN — LINEZOLID 600 MG: 600 TABLET, FILM COATED ORAL at 11:14

## 2023-11-30 RX ADMIN — METOPROLOL SUCCINATE 25 MG: 25 TABLET, EXTENDED RELEASE ORAL at 10:38

## 2023-11-30 RX ADMIN — LEVOTHYROXINE SODIUM 25 MCG: 25 TABLET ORAL at 06:53

## 2023-11-30 RX ADMIN — TAMSULOSIN HYDROCHLORIDE 0.4 MG: 0.4 CAPSULE ORAL at 10:38

## 2023-11-30 RX ADMIN — ROSUVASTATIN CALCIUM 10 MG: 10 TABLET, FILM COATED ORAL at 20:39

## 2023-11-30 RX ADMIN — Medication 10 ML: at 20:39

## 2023-11-30 RX ADMIN — ENOXAPARIN SODIUM 40 MG: 100 INJECTION SUBCUTANEOUS at 10:38

## 2023-11-30 RX ADMIN — LINEZOLID 600 MG: 600 TABLET, FILM COATED ORAL at 20:38

## 2023-11-30 RX ADMIN — CEFEPIME 2000 MG: 2 INJECTION, POWDER, FOR SOLUTION INTRAVENOUS at 02:03

## 2023-11-30 ASSESSMENT — PAIN SCALES - GENERAL
PAINLEVEL_OUTOF10: 0

## 2023-11-30 NOTE — PROGRESS NOTES
Date:2023  Patient Name: Jaimie Ramirez  MRN: [id number]  : 1945  ROOM #: [id number]    Occupational Therapy treatment attempted this date. Patient politely declined OT treatment due to not feeling well. Will re-attempt OT treatment at a later time. Thank you.      Moises HUERTA/EV #[id number]

## 2023-11-30 NOTE — PROGRESS NOTES
catheter with clear yellow urine. Laboratory and Tests:  Lab Results   Component Value Date    CRP 6.0 (H) 11/27/2023     Lab Results   Component Value Date    SEDRATE 31 (H) 11/27/2023       Radiology:      Microbiology:   Blood Culture 11/27: negative so far  Urine Culture 11/27: >100K Staph epi   Urine Culture 11/29: Pending    Recent Labs     11/27/23  1130   PROCAL 0.25*       ASSESSMENT:  Complicated UTI secondary to chronic samuel catheter  Leukocytosis associated to the above   Possible prostatitis? PLAN:  Continue oral Linezolid   Stop IV Cefepime   Monitor labs  Will continue to follow     MARIA GUADALUPE Isaac - CNP  10:04 AM  11/30/2023     Patient seen and examined. I had a face to face encounter with the patient. Agree with exam.  Assessment and plan as outlined above and directed by me. Addition and corrections were done as deemed appropriate. My exam and plan include: The patient is feeling better. Tolerating Linezolid, day 2 of 5.     Gilda García MD  11/30/2023  3:15 PM

## 2023-11-30 NOTE — DISCHARGE INSTRUCTIONS
Place the patients samuel to leg bag on discharge from the hospital.  Please give the patient a night bag (large bag) and samuel instructions upon discharge. Please have the patient contact the office for samuel removal instructions. The catheter may go red (hematuria), may go clear, and may go red. This is a normal process, as long as the catheter is draining. Call the office if any concerns should arise for further instructions, [phone]. Call upon discharge to schedule a follow-up visit with Brittney Lindsay/Steven/Lissa ([facility]) at [phone] About Indwelling Urinary Catheter Care to Prevent Infection  Overview     A urinary catheter is a flexible plastic tube that's used to drain urine from your bladder when you can't urinate on your own. The catheter allows urine to drain from the bladder into a bag. Two types of drainage bags may be used with a urinary catheter. A bedside bag is a large bag that you can hang on the side of your bed or on a chair. You can use it overnight or anytime you will be sitting or lying down for a long time. A leg bag is a small bag that you can use during the day. It is usually attached to your thigh or calf and hidden under your clothes. Having a urinary catheter increases your risk of getting a urinary tract infection. Germs may get on the catheter and cause an infection in your bladder or kidneys. The longer you have a catheter, the more likely it is that you will get an infection. You can help prevent this problem with good hygiene and careful handling of your catheter and drainage bags. How can you help prevent infection? Take care to stay clean  Always wash your hands well before and after you handle your catheter. Clean the skin around the catheter daily using soap and water. Dry with a clean towel afterward. You can shower with your catheter and drainage bag in place unless your doctor told you not to.   When you clean around the catheter,

## 2023-12-01 VITALS
TEMPERATURE: 98 F | SYSTOLIC BLOOD PRESSURE: 166 MMHG | RESPIRATION RATE: 18 BRPM | OXYGEN SATURATION: 95 % | HEART RATE: 95 BPM | HEIGHT: 70 IN | DIASTOLIC BLOOD PRESSURE: 93 MMHG | WEIGHT: 216 LBS | BODY MASS INDEX: 30.92 KG/M2

## 2023-12-01 LAB
ALBUMIN SERPL-MCNC: 3.4 G/DL (ref 3.5–5.2)
ALP SERPL-CCNC: 73 U/L (ref 40–129)
ALT SERPL-CCNC: 15 U/L (ref 0–40)
ANION GAP SERPL CALCULATED.3IONS-SCNC: 12 MMOL/L (ref 7–16)
AST SERPL-CCNC: 18 U/L (ref 0–39)
BASOPHILS # BLD: 0 K/UL (ref 0–0.2)
BASOPHILS NFR BLD: 0 % (ref 0–2)
BILIRUB SERPL-MCNC: 0.4 MG/DL (ref 0–1.2)
BUN SERPL-MCNC: 12 MG/DL (ref 6–23)
CALCIUM SERPL-MCNC: 9.7 MG/DL (ref 8.6–10.2)
CHLORIDE SERPL-SCNC: 104 MMOL/L (ref 98–107)
CO2 SERPL-SCNC: 20 MMOL/L (ref 22–29)
CREAT SERPL-MCNC: 0.9 MG/DL (ref 0.7–1.2)
EOSINOPHIL # BLD: 0.06 K/UL (ref 0.05–0.5)
EOSINOPHILS RELATIVE PERCENT: 2 % (ref 0–6)
ERYTHROCYTE [DISTWIDTH] IN BLOOD BY AUTOMATED COUNT: 15 % (ref 11.5–15)
GFR SERPL CREATININE-BSD FRML MDRD: >60 ML/MIN/1.73M2
GLUCOSE SERPL-MCNC: 96 MG/DL (ref 74–99)
HCT VFR BLD AUTO: 34 % (ref 37–54)
HGB BLD-MCNC: 10.9 G/DL (ref 12.5–16.5)
LYMPHOCYTES NFR BLD: 0.48 K/UL (ref 1.5–4)
LYMPHOCYTES RELATIVE PERCENT: 15 % (ref 20–42)
MCH RBC QN AUTO: 27.5 PG (ref 26–35)
MCHC RBC AUTO-ENTMCNC: 32.1 G/DL (ref 32–34.5)
MCV RBC AUTO: 85.6 FL (ref 80–99.9)
MONOCYTES NFR BLD: 0.29 K/UL (ref 0.1–0.95)
MONOCYTES NFR BLD: 9 % (ref 2–12)
NEUTROPHILS NFR BLD: 74 % (ref 43–80)
NEUTS SEG NFR BLD: 2.37 K/UL (ref 1.8–7.3)
PLATELET # BLD AUTO: 144 K/UL (ref 130–450)
PMV BLD AUTO: 11.3 FL (ref 7–12)
POTASSIUM SERPL-SCNC: 3.6 MMOL/L (ref 3.5–5)
PROT SERPL-MCNC: 6.4 G/DL (ref 6.4–8.3)
RBC # BLD AUTO: 3.97 M/UL (ref 3.8–5.8)
RBC # BLD: ABNORMAL 10*6/UL
SODIUM SERPL-SCNC: 136 MMOL/L (ref 132–146)
WBC OTHER # BLD: 3.2 K/UL (ref 4.5–11.5)

## 2023-12-01 PROCEDURE — 6370000000 HC RX 637 (ALT 250 FOR IP)

## 2023-12-01 PROCEDURE — 2580000003 HC RX 258: Performed by: NURSE PRACTITIONER

## 2023-12-01 PROCEDURE — 6360000002 HC RX W HCPCS: Performed by: NURSE PRACTITIONER

## 2023-12-01 PROCEDURE — 85025 COMPLETE CBC W/AUTO DIFF WBC: CPT

## 2023-12-01 PROCEDURE — 6370000000 HC RX 637 (ALT 250 FOR IP): Performed by: NURSE PRACTITIONER

## 2023-12-01 PROCEDURE — 80053 COMPREHEN METABOLIC PANEL: CPT

## 2023-12-01 RX ORDER — LINEZOLID 600 MG/1
600 TABLET, FILM COATED ORAL EVERY 12 HOURS SCHEDULED
Qty: 6 TABLET | Refills: 0 | Status: SHIPPED | OUTPATIENT
Start: 2023-12-01 | End: 2023-12-04

## 2023-12-01 RX ORDER — IBUPROFEN 400 MG/1
400 TABLET ORAL 2 TIMES DAILY PRN
Qty: 120 TABLET | Refills: 3 | DISCHARGE
Start: 2023-12-01 | End: 2023-12-08 | Stop reason: ALTCHOICE

## 2023-12-01 RX ADMIN — LINEZOLID 600 MG: 600 TABLET, FILM COATED ORAL at 08:05

## 2023-12-01 RX ADMIN — TAMSULOSIN HYDROCHLORIDE 0.4 MG: 0.4 CAPSULE ORAL at 08:05

## 2023-12-01 RX ADMIN — Medication 10 ML: at 08:06

## 2023-12-01 RX ADMIN — LEVOTHYROXINE SODIUM 25 MCG: 25 TABLET ORAL at 06:29

## 2023-12-01 RX ADMIN — METOPROLOL SUCCINATE 25 MG: 25 TABLET, EXTENDED RELEASE ORAL at 08:05

## 2023-12-01 RX ADMIN — ENOXAPARIN SODIUM 40 MG: 100 INJECTION SUBCUTANEOUS at 08:05

## 2023-12-01 ASSESSMENT — PAIN SCALES - GENERAL: PAINLEVEL_OUTOF10: 0

## 2023-12-01 NOTE — PROGRESS NOTES
Occupational Therapy    Attempted OT treatment, patient refused despite encouragement. OT to re-attempt at a later date/time as able and appropriate.      Pauly Raza, LEESAR/L  License Number: [id number]

## 2023-12-01 NOTE — DISCHARGE SUMMARY
Internal Medicine Discharge Summary    NAME: Vijay Quiros :  1945  MRN:  [id number] Jojo King MD    ADMITTED: 2023   DISCHARGED: No discharge date for patient encounter. ADMITTING PHYSICIAN: Moon Ruggiero MD    PCP: Dony Bah MD    CONSULTANT(S):   IP CONSULT TO INFECTIOUS DISEASES  IP CONSULT TO UROLOGY     ADMITTING DIAGNOSIS:   UTI (urinary tract infection) [N39.0]     Please see H&P for further details    DISCHARGE DIAGNOSES:   Active Hospital Problems    Diagnosis     Severe protein-calorie malnutrition ([address]) [E43]     Severe Alzheimer's dementia without behavioral disturbance, psychotic disturbance, mood disturbance, or anxiety ([address]) [G30.9, F02. C0]     Hypothyroidism [E03.9]     Benign essential hypertension [I10]     Sleep apnea [G47.30]     Urinary tract infection due to Pseudomonas aeruginosa [N39.0, B96.5]     Benign prostatic hyperplasia without urinary obstruction [N40.0]        BRIEF HISTORY OF PRESENT ILLNESS: Vijay Quiros is a 78 y.o. male patient of Dony Bah MD who  has a past medical history of Bacteremia due to Gram-negative bacteria, CHF (congestive heart failure) ([address]), Dementia ([address]), Electrolyte imbalance, Fever, Sepsis ([address]), and Severe Alzheimer's dementia without behavioral disturbance, psychotic disturbance, mood disturbance, or anxiety ([address]). who originally had no chief complaint listed for this encounter. at presentation on 2023, and was found to have UTI (urinary tract infection) [N39.0] after workup. Please see H&P for further details. HOSPITAL COURSE:   The patient presented to the hospital with the chief complaint of No chief complaint on file.   . The patient was admitted to the hospital.     Sepsis related to UTI and chronic indwelling catheter  Infectious disease consulted given history of Pseudomonas infection  Continue linezolid based on culture results from [facility] recommendations  Discharge on

## 2023-12-04 ENCOUNTER — CARE COORDINATION (OUTPATIENT)
Dept: CARE COORDINATION | Age: 78
End: 2023-12-04

## 2023-12-04 NOTE — CARE COORDINATION
Care Transitions Initial Follow Up Call    Call within 2 business days of discharge: Yes    Care Transition Nurse attempted initial CT outreach leaving Jono VM, purpose of call, my contact info, and reminder to schedule appts. Patient: Lori Dee Patient : 1945   MRN: <[id number]>  Reason for Admission: 2023 [facility] ED. UTI, Encephalopathy, Transfer. 2023 - 2023 [facility] IP. Chronic samuel, UTI.   Discharge Date: 23 RARS: Readmission Risk Score: 18.4  Readmit  CT    [facility]       Schedule an appointment with Ye Diop MD (Internal Medicine) [facility] in 1 week (2023)  Schedule an appointment with Ophelia Ng MD (Urology) in 4 weeks (2023)    START taking:  linezolid (Malou Rands)  CHANGE how you take:  ibuprofen (ADVIL;MOTRIN)  STOP taking:  ADALAT CC PO  AMOXIL PO  aspirin 81 MG chewable tablet  folic acid 1 MG tablet (FOLVITE)  hydroCHLOROthiazide 25 MG tablet (HYDRODIURIL)    Last Discharge Facility       Date Complaint Diagnosis Description Type Department Provider    23   Admission (Discharged) MD Leticia Ca, RN

## 2023-12-05 ENCOUNTER — CARE COORDINATION (OUTPATIENT)
Dept: CARE COORDINATION | Age: 78
End: 2023-12-05

## 2023-12-05 NOTE — CARE COORDINATION
Care Transitions Initial Follow Up Call    Call within 2 business days of discharge: Yes    Care Transition Nurse attempted second initial CT outreach leaving Hipaa VM, purpose of call, my contact, and reminder to schedule appts. CTN s/o. Patient: Karen Paiz Patient : 1945   MRN: [id number]  Reason for Admission: 2023 [facility] ED. UTI, Encephalopathy, Transfer. 2023 - 2023 [facility] IP. Chronic samuel, UTI.   Discharge Date: 23 RARS: Readmission Risk Score: 18.4  Readmit  CT     [facility]         Schedule an appointment with Robel Coleman MD (Internal Medicine) [facility] in 1 week (2023)  Schedule an appointment with Flako López MD (Urology) in 4 weeks (2023)     START taking:  linezolid (Argyle Matt)  CHANGE how you take:  ibuprofen (ADVIL;MOTRIN)  STOP taking:  ADALAT CC PO  AMOXIL PO  aspirin 81 MG chewable tablet  folic acid 1 MG tablet (FOLVITE)  hydroCHLOROthiazide 25 MG tablet (HYDRODIURIL)    Last Discharge Facility       Date Complaint Diagnosis Description Type Department Provider    23   Admission (Discharged) MICHELLE GuidryW MD Rudolph Palencia, MARLENY

## 2023-12-08 ENCOUNTER — APPOINTMENT (OUTPATIENT)
Dept: GENERAL RADIOLOGY | Age: 78
DRG: 175 | End: 2023-12-08
Payer: MEDICARE

## 2023-12-08 ENCOUNTER — APPOINTMENT (OUTPATIENT)
Dept: CT IMAGING | Age: 78
DRG: 175 | End: 2023-12-08
Payer: MEDICARE

## 2023-12-08 ENCOUNTER — HOSPITAL ENCOUNTER (INPATIENT)
Age: 78
LOS: 3 days | Discharge: HOME HEALTH | DRG: 175 | End: 2023-12-11
Attending: EMERGENCY MEDICINE | Admitting: INTERNAL MEDICINE
Payer: MEDICARE

## 2023-12-08 DIAGNOSIS — I26.94 MULTIPLE SUBSEGMENTAL PULMONARY EMBOLI WITHOUT ACUTE COR PULMONALE (HCC): ICD-10-CM

## 2023-12-08 DIAGNOSIS — I50.9 ACUTE ON CHRONIC CONGESTIVE HEART FAILURE, UNSPECIFIED HEART FAILURE TYPE (HCC): Primary | ICD-10-CM

## 2023-12-08 DIAGNOSIS — U07.1 COVID-19: ICD-10-CM

## 2023-12-08 DIAGNOSIS — I50.33 ACUTE ON CHRONIC DIASTOLIC CHF (CONGESTIVE HEART FAILURE) (HCC): ICD-10-CM

## 2023-12-08 LAB
ALBUMIN SERPL-MCNC: 3.4 G/DL (ref 3.5–5.2)
ALP SERPL-CCNC: 73 U/L (ref 40–129)
ALT SERPL-CCNC: 22 U/L (ref 0–40)
ANION GAP SERPL CALCULATED.3IONS-SCNC: 12 MMOL/L (ref 7–16)
AST SERPL-CCNC: 25 U/L (ref 0–39)
BACTERIA URNS QL MICRO: ABNORMAL
BASOPHILS # BLD: 0.01 K/UL (ref 0–0.2)
BASOPHILS NFR BLD: 0 % (ref 0–2)
BILIRUB SERPL-MCNC: 0.4 MG/DL (ref 0–1.2)
BILIRUB UR QL STRIP: NEGATIVE
BNP SERPL-MCNC: ABNORMAL PG/ML (ref 0–450)
BUN SERPL-MCNC: 12 MG/DL (ref 6–23)
CALCIUM SERPL-MCNC: 9 MG/DL (ref 8.6–10.2)
CHLORIDE SERPL-SCNC: 99 MMOL/L (ref 98–107)
CLARITY UR: CLEAR
CO2 SERPL-SCNC: 22 MMOL/L (ref 22–29)
COLOR UR: YELLOW
CREAT SERPL-MCNC: 0.9 MG/DL (ref 0.7–1.2)
EKG ATRIAL RATE: 73 BPM
EKG P AXIS: 29 DEGREES
EKG P-R INTERVAL: 190 MS
EKG Q-T INTERVAL: 492 MS
EKG QRS DURATION: 104 MS
EKG QTC CALCULATION (BAZETT): 542 MS
EKG R AXIS: 7 DEGREES
EKG T AXIS: 70 DEGREES
EKG VENTRICULAR RATE: 73 BPM
EOSINOPHIL # BLD: 0.01 K/UL (ref 0.05–0.5)
EOSINOPHILS RELATIVE PERCENT: 0 % (ref 0–6)
ERYTHROCYTE [DISTWIDTH] IN BLOOD BY AUTOMATED COUNT: 15.1 % (ref 11.5–15)
GFR SERPL CREATININE-BSD FRML MDRD: >60 ML/MIN/1.73M2
GLUCOSE SERPL-MCNC: 97 MG/DL (ref 74–99)
GLUCOSE UR STRIP-MCNC: NEGATIVE MG/DL
HCT VFR BLD AUTO: 36.6 % (ref 37–54)
HGB BLD-MCNC: 11.7 G/DL (ref 12.5–16.5)
HGB UR QL STRIP.AUTO: ABNORMAL
IMM GRANULOCYTES # BLD AUTO: <0.03 K/UL (ref 0–0.58)
IMM GRANULOCYTES NFR BLD: 0 % (ref 0–5)
INFLUENZA A BY PCR: NOT DETECTED
INFLUENZA B BY PCR: NOT DETECTED
KETONES UR STRIP-MCNC: NEGATIVE MG/DL
LACTATE BLDV-SCNC: 1.8 MMOL/L (ref 0.5–2.2)
LEUKOCYTE ESTERASE UR QL STRIP: ABNORMAL
LIPASE SERPL-CCNC: 34 U/L (ref 13–60)
LYMPHOCYTES NFR BLD: 0.55 K/UL (ref 1.5–4)
LYMPHOCYTES RELATIVE PERCENT: 16 % (ref 20–42)
MCH RBC QN AUTO: 27.1 PG (ref 26–35)
MCHC RBC AUTO-ENTMCNC: 32 G/DL (ref 32–34.5)
MCV RBC AUTO: 84.9 FL (ref 80–99.9)
MONOCYTES NFR BLD: 0.36 K/UL (ref 0.1–0.95)
MONOCYTES NFR BLD: 11 % (ref 2–12)
NEUTROPHILS NFR BLD: 73 % (ref 43–80)
NEUTS SEG NFR BLD: 2.5 K/UL (ref 1.8–7.3)
NITRITE UR QL STRIP: NEGATIVE
PH UR STRIP: 7.5 [PH] (ref 5–9)
PLATELET # BLD AUTO: 168 K/UL (ref 130–450)
PMV BLD AUTO: 10.9 FL (ref 7–12)
POTASSIUM SERPL-SCNC: 4.1 MMOL/L (ref 3.5–5)
PROT SERPL-MCNC: 6.4 G/DL (ref 6.4–8.3)
PROT UR STRIP-MCNC: 30 MG/DL
RBC # BLD AUTO: 4.31 M/UL (ref 3.8–5.8)
RBC # BLD: ABNORMAL 10*6/UL
RBC #/AREA URNS HPF: ABNORMAL /HPF
SARS-COV-2 RDRP RESP QL NAA+PROBE: DETECTED
SODIUM SERPL-SCNC: 133 MMOL/L (ref 132–146)
SP GR UR STRIP: 1.02 (ref 1–1.03)
SPECIMEN DESCRIPTION: ABNORMAL
TROPONIN I SERPL HS-MCNC: 33 NG/L (ref 0–11)
TROPONIN I SERPL HS-MCNC: 36 NG/L (ref 0–11)
UROBILINOGEN UR STRIP-ACNC: 0.2 EU/DL (ref 0–1)
WBC #/AREA URNS HPF: ABNORMAL /HPF
WBC OTHER # BLD: 3.4 K/UL (ref 4.5–11.5)

## 2023-12-08 PROCEDURE — 80053 COMPREHEN METABOLIC PANEL: CPT

## 2023-12-08 PROCEDURE — 71045 X-RAY EXAM CHEST 1 VIEW: CPT

## 2023-12-08 PROCEDURE — 83880 ASSAY OF NATRIURETIC PEPTIDE: CPT

## 2023-12-08 PROCEDURE — 2060000000 HC ICU INTERMEDIATE R&B

## 2023-12-08 PROCEDURE — 87086 URINE CULTURE/COLONY COUNT: CPT

## 2023-12-08 PROCEDURE — 96374 THER/PROPH/DIAG INJ IV PUSH: CPT

## 2023-12-08 PROCEDURE — 99285 EMERGENCY DEPT VISIT HI MDM: CPT

## 2023-12-08 PROCEDURE — 84484 ASSAY OF TROPONIN QUANT: CPT

## 2023-12-08 PROCEDURE — 6360000004 HC RX CONTRAST MEDICATION: Performed by: RADIOLOGY

## 2023-12-08 PROCEDURE — 71275 CT ANGIOGRAPHY CHEST: CPT

## 2023-12-08 PROCEDURE — 8E0ZXY6 ISOLATION: ICD-10-PCS | Performed by: INTERNAL MEDICINE

## 2023-12-08 PROCEDURE — 85025 COMPLETE CBC W/AUTO DIFF WBC: CPT

## 2023-12-08 PROCEDURE — 6360000002 HC RX W HCPCS: Performed by: INTERNAL MEDICINE

## 2023-12-08 PROCEDURE — 2580000003 HC RX 258: Performed by: INTERNAL MEDICINE

## 2023-12-08 PROCEDURE — 87502 INFLUENZA DNA AMP PROBE: CPT

## 2023-12-08 PROCEDURE — 93010 ELECTROCARDIOGRAM REPORT: CPT | Performed by: INTERNAL MEDICINE

## 2023-12-08 PROCEDURE — 83690 ASSAY OF LIPASE: CPT

## 2023-12-08 PROCEDURE — 87635 SARS-COV-2 COVID-19 AMP PRB: CPT

## 2023-12-08 PROCEDURE — 6370000000 HC RX 637 (ALT 250 FOR IP): Performed by: INTERNAL MEDICINE

## 2023-12-08 PROCEDURE — 6360000002 HC RX W HCPCS

## 2023-12-08 PROCEDURE — 81001 URINALYSIS AUTO W/SCOPE: CPT

## 2023-12-08 PROCEDURE — 93005 ELECTROCARDIOGRAM TRACING: CPT

## 2023-12-08 PROCEDURE — 83605 ASSAY OF LACTIC ACID: CPT

## 2023-12-08 PROCEDURE — 74177 CT ABD & PELVIS W/CONTRAST: CPT

## 2023-12-08 RX ORDER — TAMSULOSIN HYDROCHLORIDE 0.4 MG/1
0.4 CAPSULE ORAL DAILY
Status: DISCONTINUED | OUTPATIENT
Start: 2023-12-09 | End: 2023-12-11 | Stop reason: HOSPADM

## 2023-12-08 RX ORDER — METOPROLOL SUCCINATE 25 MG/1
25 TABLET, EXTENDED RELEASE ORAL EVERY MORNING
COMMUNITY

## 2023-12-08 RX ORDER — ENOXAPARIN SODIUM 100 MG/ML
1 INJECTION SUBCUTANEOUS 2 TIMES DAILY
Status: DISCONTINUED | OUTPATIENT
Start: 2023-12-08 | End: 2023-12-08

## 2023-12-08 RX ORDER — ROSUVASTATIN CALCIUM 10 MG/1
10 TABLET, COATED ORAL NIGHTLY
Status: DISCONTINUED | OUTPATIENT
Start: 2023-12-09 | End: 2023-12-11 | Stop reason: HOSPADM

## 2023-12-08 RX ORDER — ENOXAPARIN SODIUM 100 MG/ML
1 INJECTION SUBCUTANEOUS 2 TIMES DAILY
Status: DISCONTINUED | OUTPATIENT
Start: 2023-12-08 | End: 2023-12-10 | Stop reason: ALTCHOICE

## 2023-12-08 RX ORDER — LEVOTHYROXINE SODIUM 0.03 MG/1
25 TABLET ORAL DAILY
Status: DISCONTINUED | OUTPATIENT
Start: 2023-12-08 | End: 2023-12-11 | Stop reason: HOSPADM

## 2023-12-08 RX ORDER — LOSARTAN POTASSIUM 50 MG/1
100 TABLET ORAL DAILY
Status: DISCONTINUED | OUTPATIENT
Start: 2023-12-08 | End: 2023-12-11 | Stop reason: HOSPADM

## 2023-12-08 RX ORDER — POTASSIUM CHLORIDE 7.45 MG/ML
10 INJECTION INTRAVENOUS PRN
Status: DISCONTINUED | OUTPATIENT
Start: 2023-12-08 | End: 2023-12-11 | Stop reason: HOSPADM

## 2023-12-08 RX ORDER — MAGNESIUM SULFATE IN WATER 40 MG/ML
2000 INJECTION, SOLUTION INTRAVENOUS PRN
Status: DISCONTINUED | OUTPATIENT
Start: 2023-12-08 | End: 2023-12-11 | Stop reason: HOSPADM

## 2023-12-08 RX ORDER — FUROSEMIDE 10 MG/ML
20 INJECTION INTRAMUSCULAR; INTRAVENOUS DAILY
Status: DISCONTINUED | OUTPATIENT
Start: 2023-12-09 | End: 2023-12-11 | Stop reason: HOSPADM

## 2023-12-08 RX ORDER — FUROSEMIDE 10 MG/ML
20 INJECTION INTRAMUSCULAR; INTRAVENOUS ONCE
Status: COMPLETED | OUTPATIENT
Start: 2023-12-08 | End: 2023-12-08

## 2023-12-08 RX ORDER — POTASSIUM CHLORIDE 20 MEQ/1
40 TABLET, EXTENDED RELEASE ORAL PRN
Status: DISCONTINUED | OUTPATIENT
Start: 2023-12-08 | End: 2023-12-11 | Stop reason: HOSPADM

## 2023-12-08 RX ORDER — SODIUM CHLORIDE 0.9 % (FLUSH) 0.9 %
10 SYRINGE (ML) INJECTION EVERY 12 HOURS SCHEDULED
Status: DISCONTINUED | OUTPATIENT
Start: 2023-12-08 | End: 2023-12-11 | Stop reason: HOSPADM

## 2023-12-08 RX ORDER — ONDANSETRON 4 MG/1
4 TABLET, ORALLY DISINTEGRATING ORAL EVERY 8 HOURS PRN
Status: DISCONTINUED | OUTPATIENT
Start: 2023-12-08 | End: 2023-12-08

## 2023-12-08 RX ORDER — LANOLIN ALCOHOL/MO/W.PET/CERES
3 CREAM (GRAM) TOPICAL NIGHTLY PRN
Status: DISCONTINUED | OUTPATIENT
Start: 2023-12-08 | End: 2023-12-11 | Stop reason: HOSPADM

## 2023-12-08 RX ORDER — SODIUM CHLORIDE 0.9 % (FLUSH) 0.9 %
10 SYRINGE (ML) INJECTION PRN
Status: DISCONTINUED | OUTPATIENT
Start: 2023-12-08 | End: 2023-12-11 | Stop reason: HOSPADM

## 2023-12-08 RX ORDER — SENNOSIDES A AND B 8.6 MG/1
1 TABLET, FILM COATED ORAL DAILY PRN
Status: DISCONTINUED | OUTPATIENT
Start: 2023-12-08 | End: 2023-12-11 | Stop reason: HOSPADM

## 2023-12-08 RX ORDER — PROCHLORPERAZINE EDISYLATE 5 MG/ML
10 INJECTION INTRAMUSCULAR; INTRAVENOUS EVERY 6 HOURS PRN
Status: DISCONTINUED | OUTPATIENT
Start: 2023-12-08 | End: 2023-12-11 | Stop reason: HOSPADM

## 2023-12-08 RX ORDER — SODIUM CHLORIDE 9 MG/ML
INJECTION, SOLUTION INTRAVENOUS PRN
Status: DISCONTINUED | OUTPATIENT
Start: 2023-12-08 | End: 2023-12-11 | Stop reason: HOSPADM

## 2023-12-08 RX ORDER — ENOXAPARIN SODIUM 100 MG/ML
1 INJECTION SUBCUTANEOUS ONCE
Status: DISCONTINUED | OUTPATIENT
Start: 2023-12-08 | End: 2023-12-08

## 2023-12-08 RX ORDER — METOPROLOL SUCCINATE 25 MG/1
25 TABLET, EXTENDED RELEASE ORAL DAILY
Status: DISCONTINUED | OUTPATIENT
Start: 2023-12-09 | End: 2023-12-11 | Stop reason: HOSPADM

## 2023-12-08 RX ORDER — ONDANSETRON 2 MG/ML
4 INJECTION INTRAMUSCULAR; INTRAVENOUS EVERY 6 HOURS PRN
Status: DISCONTINUED | OUTPATIENT
Start: 2023-12-08 | End: 2023-12-08

## 2023-12-08 RX ORDER — PROCHLORPERAZINE MALEATE 10 MG
10 TABLET ORAL EVERY 8 HOURS PRN
Status: DISCONTINUED | OUTPATIENT
Start: 2023-12-08 | End: 2023-12-11 | Stop reason: HOSPADM

## 2023-12-08 RX ADMIN — LOSARTAN POTASSIUM 100 MG: 50 TABLET, FILM COATED ORAL at 17:35

## 2023-12-08 RX ADMIN — Medication 3 MG: at 20:50

## 2023-12-08 RX ADMIN — FUROSEMIDE 20 MG: 10 INJECTION, SOLUTION INTRAMUSCULAR; INTRAVENOUS at 14:49

## 2023-12-08 RX ADMIN — IOPAMIDOL 75 ML: 755 INJECTION, SOLUTION INTRAVENOUS at 12:55

## 2023-12-08 RX ADMIN — SODIUM CHLORIDE, PRESERVATIVE FREE 10 ML: 5 INJECTION INTRAVENOUS at 20:49

## 2023-12-08 RX ADMIN — ENOXAPARIN SODIUM 80 MG: 100 INJECTION SUBCUTANEOUS at 17:35

## 2023-12-08 ASSESSMENT — PAIN SCALES - GENERAL: PAINLEVEL_OUTOF10: 0

## 2023-12-08 NOTE — CARE COORDINATION
Internal Medicine On-call Care Coordination Note    I was called by the ED physician because they recommended admission for this patient and I cover their PCP. The history as I understand it after discussion with the ED physician is as follows: He presented to the ED with SOB  He was found to have CHF, PE's, pleural effusions, COVID-19    I placed admission orders. Including:    Pulm consult  Therapeutic Lovenox   IV Lasix    Dr. Alfred Vazquez or his coverage will see the patient tomorrow for H&P.     Electronically signed by Florencia Cheatham DO on 12/8/2023 at 3:20 PM

## 2023-12-08 NOTE — ED NOTES
Patient's daughter Yaz Mclean notified that the patient would be getting admitted; she states she will visit him tomorrow - pt aware.      Gloria Mccauley RN  12/08/23 [id number]

## 2023-12-08 NOTE — ED PROVIDER NOTES
There is also evidence of bilateral lower lobe pleural effusions and atelectasis. CT abdomen pelvis shows small hiatal hernia, diverticulosis without diverticulitis, distended urinary bladder and multiple bladder diverticula, prostatomegaly, bilateral L5 spondylosis and grade 1 spondylolisthesis L5 on S1. Patient was medicated with Lasix in the emergency room. Case was discussed with Dr. Robinson Davila, internal medicine, who agrees to admit the patient. At time of admission, the patient was hemodynamically stable. CONSULTS: (Who and What was discussed)  IP CONSULT TO INTERNAL MEDICINE  IP CONSULT TO PULMONOLOGY        I am the Primary Clinician of Record. FINAL IMPRESSION      1. Acute on chronic congestive heart failure, unspecified heart failure type ([address])    2. Multiple subsegmental pulmonary emboli without acute cor pulmonale ([address])    3. COVID-19          DISPOSITION/PLAN     DISPOSITION Admitted 12/08/2023 03:27:48 PM      PATIENT REFERRED TO:  No follow-up provider specified. DISCHARGE MEDICATIONS:  Current Discharge Medication List          DISCONTINUED MEDICATIONS:  Current Discharge Medication List        STOP taking these medications       levothyroxine (SYNTHROID) 25 MCG tablet Comments:   Reason for Stopping:         losartan (COZAAR) 100 MG tablet Comments:   Reason for Stopping:                      Patient was seen and evaluated by myself and my attending Taye Keyes DO. Assessment and Plan discussed with attending provider, please see attestation for final plan of care.      (Please note that portions of this note were completed with a voice recognition program.  Efforts were made to edit the dictations but occasionally words are mis-transcribed.)    Alice Brown MD (electronically signed)           Alice Brown MD  Resident  12/09/23 8706

## 2023-12-08 NOTE — ED NOTES
Notified that patient tested positive for covid; mask applied, daughter notified via telephone.      Ajit Cat RN  12/08/23 4805

## 2023-12-09 LAB
ALBUMIN SERPL-MCNC: 3.5 G/DL (ref 3.5–5.2)
ALP SERPL-CCNC: 73 U/L (ref 40–129)
ALT SERPL-CCNC: 23 U/L (ref 0–40)
ANION GAP SERPL CALCULATED.3IONS-SCNC: 13 MMOL/L (ref 7–16)
AST SERPL-CCNC: 31 U/L (ref 0–39)
BASOPHILS # BLD: 0.01 K/UL (ref 0–0.2)
BASOPHILS NFR BLD: 0 % (ref 0–2)
BILIRUB SERPL-MCNC: 0.5 MG/DL (ref 0–1.2)
BUN SERPL-MCNC: 12 MG/DL (ref 6–23)
CALCIUM SERPL-MCNC: 9.4 MG/DL (ref 8.6–10.2)
CHLORIDE SERPL-SCNC: 102 MMOL/L (ref 98–107)
CO2 SERPL-SCNC: 21 MMOL/L (ref 22–29)
CREAT SERPL-MCNC: 0.9 MG/DL (ref 0.7–1.2)
EOSINOPHIL # BLD: 0.02 K/UL (ref 0.05–0.5)
EOSINOPHILS RELATIVE PERCENT: 1 % (ref 0–6)
ERYTHROCYTE [DISTWIDTH] IN BLOOD BY AUTOMATED COUNT: 15.4 % (ref 11.5–15)
GFR SERPL CREATININE-BSD FRML MDRD: >60 ML/MIN/1.73M2
GLUCOSE SERPL-MCNC: 96 MG/DL (ref 74–99)
HCT VFR BLD AUTO: 37.7 % (ref 37–54)
HGB BLD-MCNC: 12.1 G/DL (ref 12.5–16.5)
IMM GRANULOCYTES # BLD AUTO: <0.03 K/UL (ref 0–0.58)
IMM GRANULOCYTES NFR BLD: 0 % (ref 0–5)
LYMPHOCYTES NFR BLD: 0.87 K/UL (ref 1.5–4)
LYMPHOCYTES RELATIVE PERCENT: 25 % (ref 20–42)
MCH RBC QN AUTO: 27.3 PG (ref 26–35)
MCHC RBC AUTO-ENTMCNC: 32.1 G/DL (ref 32–34.5)
MCV RBC AUTO: 84.9 FL (ref 80–99.9)
MONOCYTES NFR BLD: 0.4 K/UL (ref 0.1–0.95)
MONOCYTES NFR BLD: 11 % (ref 2–12)
NEUTROPHILS NFR BLD: 63 % (ref 43–80)
NEUTS SEG NFR BLD: 2.22 K/UL (ref 1.8–7.3)
PLATELET # BLD AUTO: 161 K/UL (ref 130–450)
PMV BLD AUTO: 10.5 FL (ref 7–12)
POTASSIUM SERPL-SCNC: 4.7 MMOL/L (ref 3.5–5)
PROT SERPL-MCNC: 6.7 G/DL (ref 6.4–8.3)
RBC # BLD AUTO: 4.44 M/UL (ref 3.8–5.8)
SODIUM SERPL-SCNC: 136 MMOL/L (ref 132–146)
WBC OTHER # BLD: 3.5 K/UL (ref 4.5–11.5)

## 2023-12-09 PROCEDURE — 85025 COMPLETE CBC W/AUTO DIFF WBC: CPT

## 2023-12-09 PROCEDURE — 2060000000 HC ICU INTERMEDIATE R&B

## 2023-12-09 PROCEDURE — 80053 COMPREHEN METABOLIC PANEL: CPT

## 2023-12-09 PROCEDURE — 6370000000 HC RX 637 (ALT 250 FOR IP): Performed by: INTERNAL MEDICINE

## 2023-12-09 PROCEDURE — 6360000002 HC RX W HCPCS: Performed by: INTERNAL MEDICINE

## 2023-12-09 PROCEDURE — 2580000003 HC RX 258: Performed by: INTERNAL MEDICINE

## 2023-12-09 RX ADMIN — SODIUM CHLORIDE, PRESERVATIVE FREE 10 ML: 5 INJECTION INTRAVENOUS at 20:52

## 2023-12-09 RX ADMIN — ENOXAPARIN SODIUM 80 MG: 100 INJECTION SUBCUTANEOUS at 20:52

## 2023-12-09 RX ADMIN — SODIUM CHLORIDE, PRESERVATIVE FREE 10 ML: 5 INJECTION INTRAVENOUS at 09:09

## 2023-12-09 RX ADMIN — LEVOTHYROXINE SODIUM 25 MCG: 25 TABLET ORAL at 07:08

## 2023-12-09 RX ADMIN — METOPROLOL SUCCINATE 25 MG: 25 TABLET, EXTENDED RELEASE ORAL at 09:08

## 2023-12-09 RX ADMIN — TAMSULOSIN HYDROCHLORIDE 0.4 MG: 0.4 CAPSULE ORAL at 09:08

## 2023-12-09 RX ADMIN — FUROSEMIDE 20 MG: 10 INJECTION, SOLUTION INTRAMUSCULAR; INTRAVENOUS at 09:08

## 2023-12-09 RX ADMIN — ENOXAPARIN SODIUM 80 MG: 100 INJECTION SUBCUTANEOUS at 09:09

## 2023-12-09 RX ADMIN — ROSUVASTATIN CALCIUM 10 MG: 10 TABLET, FILM COATED ORAL at 20:52

## 2023-12-09 RX ADMIN — LOSARTAN POTASSIUM 100 MG: 50 TABLET, FILM COATED ORAL at 09:08

## 2023-12-09 NOTE — PLAN OF CARE
Problem: Discharge Planning  Goal: Discharge to home or other facility with appropriate resources  Outcome: Progressing     Problem: Skin/Tissue Integrity  Goal: Absence of new skin breakdown  Description: 1. Monitor for areas of redness and/or skin breakdown  2. Assess vascular access sites hourly  3. Every 4-6 hours minimum:  Change oxygen saturation probe site  4. Every 4-6 hours:  If on nasal continuous positive airway pressure, respiratory therapy assess nares and determine need for appliance change or resting period.   Outcome: Adequate for Discharge     Problem: ABCDS Injury Assessment  Goal: Absence of physical injury  Outcome: Adequate for Discharge     Problem: Safety - Adult  Goal: Free from fall injury  Outcome: Adequate for Discharge     Problem: Pain  Goal: Verbalizes/displays adequate comfort level or baseline comfort level  Outcome: Adequate for Discharge

## 2023-12-09 NOTE — H&P
History and Physical      CHIEF COMPLAINT:  fatigue      HISTORY OF PRESENT ILLNESS:      The patient is a 78 y.o. male patient of Dr Karley Sibley who presents with shortness of breath and fatigue from home. He was admitted here last month with urinary obstruction. He was discharged on 11/12. He returned to the emergency room yesterday from a local nursing home due to shortness of breath. He was found to be COVID-positive. He also had elevated proBNP and chest x-ray revealed evidence of significant bilateral pleural effusions and pulmonary embolism. Patient himself complains of generalized fatigue and malaise. He was also noted to have significant peripheral edema. He was placed on IV diuretics and anticoagulation and admitted for further evaluation. He was seen by pulmonary and recommendation was to continue her Lovenox. Patient continues on IV Lasix. No thoracentesis is planned. No therapeutic interventions for COVID at this time.     Past Medical History:    Past Medical History:   Diagnosis Date    Bacteremia due to Gram-negative bacteria 08/17/2023    CHF (congestive heart failure) ([facility])     Dementia ([facility])     Electrolyte imbalance     Fever 08/16/2023    Sepsis ([address]) 08/16/2023    Severe Alzheimer's dementia without behavioral disturbance, psychotic disturbance, mood disturbance, or anxiety ([address]) 08/17/2023       Past Surgical History:    Past Surgical History:   Procedure Laterality Date    HERNIA REPAIR Bilateral     TOTAL KNEE ARTHROPLASTY      bilateral    ULNAR NERVE REPAIR         Medications Prior to Admission:    Medications Prior to Admission: metoprolol succinate (TOPROL XL) 25 MG extended release tablet, Take 1 tablet by mouth every morning  tamsulosin (FLOMAX) 0.4 MG capsule, Take 1 capsule by mouth every morning  rosuvastatin (CRESTOR) 10 MG tablet, Take 1 tablet by mouth every morning    Allergies:    Cipro [ciprofloxacin hcl], Hydrocodone-acetaminophen, Quinapril, and Tylenol

## 2023-12-10 LAB
ALBUMIN SERPL-MCNC: 3.2 G/DL (ref 3.5–5.2)
ALP SERPL-CCNC: 71 U/L (ref 40–129)
ALT SERPL-CCNC: 19 U/L (ref 0–40)
ANION GAP SERPL CALCULATED.3IONS-SCNC: 11 MMOL/L (ref 7–16)
AST SERPL-CCNC: 30 U/L (ref 0–39)
BASOPHILS # BLD: 0.01 K/UL (ref 0–0.2)
BASOPHILS NFR BLD: 0 % (ref 0–2)
BILIRUB SERPL-MCNC: 0.5 MG/DL (ref 0–1.2)
BUN SERPL-MCNC: 14 MG/DL (ref 6–23)
CALCIUM SERPL-MCNC: 9 MG/DL (ref 8.6–10.2)
CHLORIDE SERPL-SCNC: 104 MMOL/L (ref 98–107)
CO2 SERPL-SCNC: 21 MMOL/L (ref 22–29)
CREAT SERPL-MCNC: 0.8 MG/DL (ref 0.7–1.2)
EOSINOPHIL # BLD: 0.04 K/UL (ref 0.05–0.5)
EOSINOPHILS RELATIVE PERCENT: 1 % (ref 0–6)
ERYTHROCYTE [DISTWIDTH] IN BLOOD BY AUTOMATED COUNT: 15.2 % (ref 11.5–15)
GFR SERPL CREATININE-BSD FRML MDRD: >60 ML/MIN/1.73M2
GLUCOSE SERPL-MCNC: 93 MG/DL (ref 74–99)
HCT VFR BLD AUTO: 37.8 % (ref 37–54)
HGB BLD-MCNC: 12.2 G/DL (ref 12.5–16.5)
IMM GRANULOCYTES # BLD AUTO: <0.03 K/UL (ref 0–0.58)
IMM GRANULOCYTES NFR BLD: 0 % (ref 0–5)
LYMPHOCYTES NFR BLD: 0.9 K/UL (ref 1.5–4)
LYMPHOCYTES RELATIVE PERCENT: 18 % (ref 20–42)
MCH RBC QN AUTO: 27.5 PG (ref 26–35)
MCHC RBC AUTO-ENTMCNC: 32.3 G/DL (ref 32–34.5)
MCV RBC AUTO: 85.3 FL (ref 80–99.9)
MONOCYTES NFR BLD: 0.58 K/UL (ref 0.1–0.95)
MONOCYTES NFR BLD: 11 % (ref 2–12)
NEUTROPHILS NFR BLD: 70 % (ref 43–80)
NEUTS SEG NFR BLD: 3.61 K/UL (ref 1.8–7.3)
PLATELET # BLD AUTO: 155 K/UL (ref 130–450)
PMV BLD AUTO: 10.1 FL (ref 7–12)
POTASSIUM SERPL-SCNC: 4.3 MMOL/L (ref 3.5–5)
PROT SERPL-MCNC: 6.8 G/DL (ref 6.4–8.3)
RBC # BLD AUTO: 4.43 M/UL (ref 3.8–5.8)
SODIUM SERPL-SCNC: 136 MMOL/L (ref 132–146)
WBC OTHER # BLD: 5.2 K/UL (ref 4.5–11.5)

## 2023-12-10 PROCEDURE — 2580000003 HC RX 258: Performed by: INTERNAL MEDICINE

## 2023-12-10 PROCEDURE — 6360000002 HC RX W HCPCS: Performed by: INTERNAL MEDICINE

## 2023-12-10 PROCEDURE — 85025 COMPLETE CBC W/AUTO DIFF WBC: CPT

## 2023-12-10 PROCEDURE — 2060000000 HC ICU INTERMEDIATE R&B

## 2023-12-10 PROCEDURE — 6370000000 HC RX 637 (ALT 250 FOR IP): Performed by: INTERNAL MEDICINE

## 2023-12-10 PROCEDURE — 80053 COMPREHEN METABOLIC PANEL: CPT

## 2023-12-10 PROCEDURE — 6370000000 HC RX 637 (ALT 250 FOR IP): Performed by: CLINICAL NURSE SPECIALIST

## 2023-12-10 RX ADMIN — LOSARTAN POTASSIUM 100 MG: 50 TABLET, FILM COATED ORAL at 09:20

## 2023-12-10 RX ADMIN — ROSUVASTATIN CALCIUM 10 MG: 10 TABLET, FILM COATED ORAL at 20:12

## 2023-12-10 RX ADMIN — FUROSEMIDE 20 MG: 10 INJECTION, SOLUTION INTRAMUSCULAR; INTRAVENOUS at 09:20

## 2023-12-10 RX ADMIN — METOPROLOL SUCCINATE 25 MG: 25 TABLET, EXTENDED RELEASE ORAL at 09:20

## 2023-12-10 RX ADMIN — ENOXAPARIN SODIUM 80 MG: 100 INJECTION SUBCUTANEOUS at 09:20

## 2023-12-10 RX ADMIN — SODIUM CHLORIDE, PRESERVATIVE FREE 10 ML: 5 INJECTION INTRAVENOUS at 20:13

## 2023-12-10 RX ADMIN — TAMSULOSIN HYDROCHLORIDE 0.4 MG: 0.4 CAPSULE ORAL at 09:20

## 2023-12-10 RX ADMIN — APIXABAN 10 MG: 5 TABLET, FILM COATED ORAL at 20:12

## 2023-12-10 RX ADMIN — SODIUM CHLORIDE, PRESERVATIVE FREE 10 ML: 5 INJECTION INTRAVENOUS at 09:20

## 2023-12-10 RX ADMIN — LEVOTHYROXINE SODIUM 25 MCG: 25 TABLET ORAL at 05:59

## 2023-12-10 ASSESSMENT — PAIN SCALES - GENERAL: PAINLEVEL_OUTOF10: 0

## 2023-12-10 NOTE — PLAN OF CARE
Problem: Skin/Tissue Integrity  Goal: Absence of new skin breakdown  Description: 1. Monitor for areas of redness and/or skin breakdown  2. Assess vascular access sites hourly  3. Every 4-6 hours minimum:  Change oxygen saturation probe site  4. Every 4-6 hours:  If on nasal continuous positive airway pressure, respiratory therapy assess nares and determine need for appliance change or resting period.   12/9/2023 2304 by Sully Foster RN  Outcome: Progressing  12/9/2023 1817 by Obey Lara RN  Outcome: Adequate for Discharge     Problem: ABCDS Injury Assessment  Goal: Absence of physical injury  12/9/2023 2304 by Sully Foster RN  Outcome: Progressing  12/9/2023 1817 by Obey Lara RN  Outcome: Adequate for Discharge     Problem: Discharge Planning  Goal: Discharge to home or other facility with appropriate resources  12/9/2023 2304 by Sully Foster RN  Outcome: Progressing  12/9/2023 1817 by Obey Lara RN  Outcome: Progressing     Problem: Safety - Adult  Goal: Free from fall injury  12/9/2023 2304 by Sully Foster RN  Outcome: Progressing  12/9/2023 1817 by Obey Lara RN  Outcome: Adequate for Discharge     Problem: Pain  Goal: Verbalizes/displays adequate comfort level or baseline comfort level  12/9/2023 2304 by Sully Foster RN  Outcome: Progressing  12/9/2023 1817 by Obey Lara RN  Outcome: Adequate for Discharge

## 2023-12-11 VITALS
SYSTOLIC BLOOD PRESSURE: 135 MMHG | OXYGEN SATURATION: 94 % | HEIGHT: 70 IN | RESPIRATION RATE: 18 BRPM | TEMPERATURE: 97.2 F | BODY MASS INDEX: 25 KG/M2 | WEIGHT: 174.6 LBS | HEART RATE: 71 BPM | DIASTOLIC BLOOD PRESSURE: 93 MMHG

## 2023-12-11 PROBLEM — U07.1 COVID-19: Status: ACTIVE | Noted: 2023-12-11

## 2023-12-11 PROBLEM — I26.99 PULMONARY EMBOLISM (HCC): Status: ACTIVE | Noted: 2023-12-11

## 2023-12-11 LAB
ALBUMIN SERPL-MCNC: 3.2 G/DL (ref 3.5–5.2)
ALP SERPL-CCNC: 67 U/L (ref 40–129)
ALT SERPL-CCNC: 16 U/L (ref 0–40)
ANION GAP SERPL CALCULATED.3IONS-SCNC: 13 MMOL/L (ref 7–16)
AST SERPL-CCNC: 21 U/L (ref 0–39)
BASOPHILS # BLD: 0.01 K/UL (ref 0–0.2)
BASOPHILS NFR BLD: 0 % (ref 0–2)
BILIRUB SERPL-MCNC: 0.5 MG/DL (ref 0–1.2)
BNP SERPL-MCNC: ABNORMAL PG/ML (ref 0–450)
BUN SERPL-MCNC: 13 MG/DL (ref 6–23)
CALCIUM SERPL-MCNC: 8.9 MG/DL (ref 8.6–10.2)
CHLORIDE SERPL-SCNC: 102 MMOL/L (ref 98–107)
CO2 SERPL-SCNC: 21 MMOL/L (ref 22–29)
CREAT SERPL-MCNC: 0.8 MG/DL (ref 0.7–1.2)
EOSINOPHIL # BLD: 0.04 K/UL (ref 0.05–0.5)
EOSINOPHILS RELATIVE PERCENT: 1 % (ref 0–6)
ERYTHROCYTE [DISTWIDTH] IN BLOOD BY AUTOMATED COUNT: 14.9 % (ref 11.5–15)
GFR SERPL CREATININE-BSD FRML MDRD: >60 ML/MIN/1.73M2
GLUCOSE SERPL-MCNC: 92 MG/DL (ref 74–99)
HCT VFR BLD AUTO: 36.7 % (ref 37–54)
HGB BLD-MCNC: 12.1 G/DL (ref 12.5–16.5)
IMM GRANULOCYTES # BLD AUTO: <0.03 K/UL (ref 0–0.58)
IMM GRANULOCYTES NFR BLD: 0 % (ref 0–5)
LYMPHOCYTES NFR BLD: 0.75 K/UL (ref 1.5–4)
LYMPHOCYTES RELATIVE PERCENT: 21 % (ref 20–42)
MCH RBC QN AUTO: 27.8 PG (ref 26–35)
MCHC RBC AUTO-ENTMCNC: 33 G/DL (ref 32–34.5)
MCV RBC AUTO: 84.2 FL (ref 80–99.9)
MICROORGANISM SPEC CULT: NO GROWTH
MONOCYTES NFR BLD: 0.41 K/UL (ref 0.1–0.95)
MONOCYTES NFR BLD: 11 % (ref 2–12)
NEUTROPHILS NFR BLD: 66 % (ref 43–80)
NEUTS SEG NFR BLD: 2.38 K/UL (ref 1.8–7.3)
PLATELET # BLD AUTO: 173 K/UL (ref 130–450)
PMV BLD AUTO: 11.8 FL (ref 7–12)
POTASSIUM SERPL-SCNC: 3.7 MMOL/L (ref 3.5–5)
PROT SERPL-MCNC: 6.3 G/DL (ref 6.4–8.3)
RBC # BLD AUTO: 4.36 M/UL (ref 3.8–5.8)
SODIUM SERPL-SCNC: 136 MMOL/L (ref 132–146)
SPECIMEN DESCRIPTION: NORMAL
WBC OTHER # BLD: 3.6 K/UL (ref 4.5–11.5)

## 2023-12-11 PROCEDURE — 83880 ASSAY OF NATRIURETIC PEPTIDE: CPT

## 2023-12-11 PROCEDURE — 97165 OT EVAL LOW COMPLEX 30 MIN: CPT

## 2023-12-11 PROCEDURE — 2580000003 HC RX 258: Performed by: INTERNAL MEDICINE

## 2023-12-11 PROCEDURE — 6360000002 HC RX W HCPCS: Performed by: INTERNAL MEDICINE

## 2023-12-11 PROCEDURE — 97161 PT EVAL LOW COMPLEX 20 MIN: CPT

## 2023-12-11 PROCEDURE — 80053 COMPREHEN METABOLIC PANEL: CPT

## 2023-12-11 PROCEDURE — 6370000000 HC RX 637 (ALT 250 FOR IP): Performed by: INTERNAL MEDICINE

## 2023-12-11 PROCEDURE — 85025 COMPLETE CBC W/AUTO DIFF WBC: CPT

## 2023-12-11 PROCEDURE — 6370000000 HC RX 637 (ALT 250 FOR IP): Performed by: CLINICAL NURSE SPECIALIST

## 2023-12-11 RX ORDER — FUROSEMIDE 20 MG/1
20 TABLET ORAL DAILY
Qty: 30 TABLET | Refills: 0 | Status: SHIPPED | OUTPATIENT
Start: 2023-12-11

## 2023-12-11 RX ORDER — POTASSIUM CHLORIDE 750 MG/1
10 TABLET, EXTENDED RELEASE ORAL DAILY
Qty: 30 TABLET | Refills: 0 | Status: SHIPPED | OUTPATIENT
Start: 2023-12-11

## 2023-12-11 RX ADMIN — APIXABAN 10 MG: 5 TABLET, FILM COATED ORAL at 19:42

## 2023-12-11 RX ADMIN — FUROSEMIDE 20 MG: 10 INJECTION, SOLUTION INTRAMUSCULAR; INTRAVENOUS at 09:07

## 2023-12-11 RX ADMIN — APIXABAN 10 MG: 5 TABLET, FILM COATED ORAL at 09:07

## 2023-12-11 RX ADMIN — TAMSULOSIN HYDROCHLORIDE 0.4 MG: 0.4 CAPSULE ORAL at 09:07

## 2023-12-11 RX ADMIN — METOPROLOL SUCCINATE 25 MG: 25 TABLET, EXTENDED RELEASE ORAL at 09:07

## 2023-12-11 RX ADMIN — LEVOTHYROXINE SODIUM 25 MCG: 25 TABLET ORAL at 05:48

## 2023-12-11 RX ADMIN — SODIUM CHLORIDE, PRESERVATIVE FREE 10 ML: 5 INJECTION INTRAVENOUS at 09:07

## 2023-12-11 RX ADMIN — LOSARTAN POTASSIUM 100 MG: 50 TABLET, FILM COATED ORAL at 09:07

## 2023-12-11 NOTE — PLAN OF CARE
Patient's chart updated to reflect:      . - HF care plan, HF education points and HF discharge instructions.  -Orders: 2 gram sodium diet, daily weights, I/O.  -PCP and cardiology follow up appointments to be scheduled within 7 days of hospital discharge. -CHF education session will be provided to the patient prior to hospital discharge.     Caryn Auguste RN   Heart Failure Navigator

## 2023-12-11 NOTE — CARE COORDINATION
CASE MANAGEMENT. Delorise Pillar Delorise Pillar Patient recently admitted urinary obstruction. Returns to the hospital and found to have Covid, PE and CHF. Patient resting quietly with eyes closed. Attempted to call daughter Renan Scott, but no answer. Left vm on her cell at [phone]. Per chart review, patient is from home with Renan Scott. One floor plan. Has walker and wc. He is current with [facility] with Adonis Casillas that patient is receiving skilled nursing, pt, ot and is accepted back. He is on their schedule for resumption of care tomorrow. [facility] orders in place. Discharge order noted on chart. PT/OT pending-requested for them to see prior to dc. Mr Belem Martinez is tolerating room air. Was started on Eliquis. Free 30 day discount card placed in chart for nursing to provide with dc instructions. Will cont to follow along.

## 2023-12-11 NOTE — PLAN OF CARE
Problem: Skin/Tissue Integrity  Goal: Absence of new skin breakdown  Description: 1. Monitor for areas of redness and/or skin breakdown  2. Assess vascular access sites hourly  3. Every 4-6 hours minimum:  Change oxygen saturation probe site  4. Every 4-6 hours:  If on nasal continuous positive airway pressure, respiratory therapy assess nares and determine need for appliance change or resting period.   Outcome: Completed     Problem: ABCDS Injury Assessment  Goal: Absence of physical injury  Outcome: Completed     Problem: Discharge Planning  Goal: Discharge to home or other facility with appropriate resources  Outcome: Completed     Problem: Safety - Adult  Goal: Free from fall injury  Outcome: Completed     Problem: Pain  Goal: Verbalizes/displays adequate comfort level or baseline comfort level  Outcome: Completed     Problem: Chronic Conditions and Co-morbidities  Goal: Patient's chronic conditions and co-morbidity symptoms are monitored and maintained or improved  Outcome: Completed

## 2023-12-11 NOTE — CARE COORDINATION
CASE MANAGEMENT. Rosaline Price When nursing is able to speak with daughter, she will inform her of therapy scores and offer kelsey if interested. If not, then patient will return home with [facility].

## 2023-12-11 NOTE — DISCHARGE INSTRUCTIONS
***HEART FAILURE - CONGESTIVE HEART FAILURE***  DISCHARGE INSTRUCTIONS:  GUIDELINES TO FOLLOW AT [facility]/LTAC/SNF/ Assisted Living    No future appointments. MEDICATIONS:  Please notify the doctor if patient is not able to take their medications or if medications are being held for any reasons (such as low blood pressure ect.)  Do not give the patient ibuprofen (Advil or Motrin), naproxen (Aleve) without talking to the doctor first. This could make their heart failure worse. WEIGHT MONITORING:   Weigh patient every day in the morning after they void (If patient is able to stand, please get a standing weight.)   Notify the doctor of a weight gain of 3 pounds or more in 1 day   OR  a total of 5 pounds or more in 1 week             DIET   Cardiac heart healthy diet:  Low sodium diet: no  more than 2,000mg (2 grams) of salt / sodium per day (which equals to a little less than  a teaspoon of salt)/ Cardiac Diet: Low saturated / low trans fat, no added salt, caffeine restricted    If patient is there for rehab and will be returning home in the near future; reinforce with the patient and the family to follow a low sodium diet (2,000 mg)- avoid using salt at the table, avoid / limit use of canned soups, processed / packaged foods, salted snacks, olives and pickles. Do not use a salt substitute without checking with the doctor. (Mrs. Leif Meza is safe to use).        NOTIFY THE DOCTOR THE FIRST DAY OF ONSET OF ANY OF THESE   SYMPTOMS:   Weight gain of 3 pounds or more in 1 day         OR 5 pounds or more in one week  More shortness of breath  More swelling in stomach, legs, ankles or feet  Feeling more tired, No energy  Dry hacky cough  Dizziness  More chest pain / discomfort  Hard time breathing laying down

## 2023-12-11 NOTE — ACP (ADVANCE CARE PLANNING)
Advance Care Planning   Healthcare Decision Maker:    Primary Decision Maker: Priscilla Nicolas Child - [phone]

## 2023-12-11 NOTE — DISCHARGE SUMMARY
INSTRUCTIONS:  Follow-up with primary care physician as directed in discharge paperwork. Please review results of imaging studies with PCP. Follow-up with consultants as directed by them. If recurrence or worsening of symptoms go to the ED or call primary care physician. Diet: ADULT DIET; Regular; Low Sodium (2 gm)    Marcus Heart MD  [address] [place] [address]  [phone]    Schedule an appointment as soon as possible for a visit in 1 week(s)  for follow-up appointment from this hospital stay    [address] [phone]    Call  for follow-up appointment from this hospital stay      Preparing for this patient's discharge, including paperwork, orders, instructions, and meeting with patient did required 34 minutes.     Electronically signed by Abbie Rankin DO on 12/12/2023 at 9:32 AM

## 2023-12-11 NOTE — PLAN OF CARE
Problem: Skin/Tissue Integrity  Goal: Absence of new skin breakdown  Description: 1. Monitor for areas of redness and/or skin breakdown  2. Assess vascular access sites hourly  3. Every 4-6 hours minimum:  Change oxygen saturation probe site  4. Every 4-6 hours:  If on nasal continuous positive airway pressure, respiratory therapy assess nares and determine need for appliance change or resting period.   Outcome: Progressing     Problem: ABCDS Injury Assessment  Goal: Absence of physical injury  Outcome: Progressing     Problem: Discharge Planning  Goal: Discharge to home or other facility with appropriate resources  Outcome: Progressing     Problem: Safety - Adult  Goal: Free from fall injury  Outcome: Progressing     Problem: Pain  Goal: Verbalizes/displays adequate comfort level or baseline comfort level  Outcome: Progressing

## 2023-12-26 ENCOUNTER — HOSPITAL ENCOUNTER (INPATIENT)
Age: 78
LOS: 3 days | Discharge: NURSING, REHAB OR LONG-TERM CARE | End: 2023-12-30
Attending: EMERGENCY MEDICINE | Admitting: INTERNAL MEDICINE
Payer: MEDICARE

## 2023-12-26 ENCOUNTER — APPOINTMENT (OUTPATIENT)
Dept: GENERAL RADIOLOGY | Age: 78
End: 2023-12-26
Payer: MEDICARE

## 2023-12-26 DIAGNOSIS — U07.1 COVID-19: Primary | ICD-10-CM

## 2023-12-26 DIAGNOSIS — T83.511A URINARY TRACT INFECTION ASSOCIATED WITH INDWELLING URETHRAL CATHETER, INITIAL ENCOUNTER (HCC): ICD-10-CM

## 2023-12-26 DIAGNOSIS — N39.0 URINARY TRACT INFECTION ASSOCIATED WITH INDWELLING URETHRAL CATHETER, INITIAL ENCOUNTER (HCC): ICD-10-CM

## 2023-12-26 DIAGNOSIS — R62.7 ADULT FAILURE TO THRIVE: ICD-10-CM

## 2023-12-26 LAB
ALBUMIN SERPL-MCNC: 3.4 G/DL (ref 3.5–5.2)
ALP SERPL-CCNC: 70 U/L (ref 40–129)
ALT SERPL-CCNC: 18 U/L (ref 0–40)
AMORPH SED URNS QL MICRO: PRESENT
ANION GAP SERPL CALCULATED.3IONS-SCNC: 11 MMOL/L (ref 7–16)
AST SERPL-CCNC: 25 U/L (ref 0–39)
BACTERIA URNS QL MICRO: ABNORMAL
BASOPHILS # BLD: 0.03 K/UL (ref 0–0.2)
BASOPHILS NFR BLD: 1 % (ref 0–2)
BILIRUB SERPL-MCNC: 0.4 MG/DL (ref 0–1.2)
BILIRUB UR QL STRIP: NEGATIVE
BUN SERPL-MCNC: 24 MG/DL (ref 6–23)
CALCIUM SERPL-MCNC: 9.2 MG/DL (ref 8.6–10.2)
CHLORIDE SERPL-SCNC: 106 MMOL/L (ref 98–107)
CLARITY UR: ABNORMAL
CO2 SERPL-SCNC: 22 MMOL/L (ref 22–29)
COLOR UR: YELLOW
CREAT SERPL-MCNC: 1 MG/DL (ref 0.7–1.2)
EOSINOPHIL # BLD: 0.12 K/UL (ref 0.05–0.5)
EOSINOPHILS RELATIVE PERCENT: 3 % (ref 0–6)
ERYTHROCYTE [DISTWIDTH] IN BLOOD BY AUTOMATED COUNT: 14.1 % (ref 11.5–15)
GFR SERPL CREATININE-BSD FRML MDRD: >60 ML/MIN/1.73M2
GLUCOSE SERPL-MCNC: 96 MG/DL (ref 74–99)
GLUCOSE UR STRIP-MCNC: NEGATIVE MG/DL
HCT VFR BLD AUTO: 36.6 % (ref 37–54)
HGB BLD-MCNC: 11.6 G/DL (ref 12.5–16.5)
HGB UR QL STRIP.AUTO: ABNORMAL
IMM GRANULOCYTES # BLD AUTO: <0.03 K/UL (ref 0–0.58)
IMM GRANULOCYTES NFR BLD: 0 % (ref 0–5)
INFLUENZA A BY PCR: NOT DETECTED
INFLUENZA B BY PCR: NOT DETECTED
INR PPP: 1.5
KETONES UR STRIP-MCNC: NEGATIVE MG/DL
LACTATE BLDV-SCNC: 1 MMOL/L (ref 0.5–2.2)
LEUKOCYTE ESTERASE UR QL STRIP: ABNORMAL
LIPASE SERPL-CCNC: 43 U/L (ref 13–60)
LYMPHOCYTES NFR BLD: 1.06 K/UL (ref 1.5–4)
LYMPHOCYTES RELATIVE PERCENT: 25 % (ref 20–42)
MAGNESIUM SERPL-MCNC: 2.3 MG/DL (ref 1.6–2.6)
MCH RBC QN AUTO: 26.9 PG (ref 26–35)
MCHC RBC AUTO-ENTMCNC: 31.7 G/DL (ref 32–34.5)
MCV RBC AUTO: 84.7 FL (ref 80–99.9)
MONOCYTES NFR BLD: 0.5 K/UL (ref 0.1–0.95)
MONOCYTES NFR BLD: 12 % (ref 2–12)
NEUTROPHILS NFR BLD: 60 % (ref 43–80)
NEUTS SEG NFR BLD: 2.55 K/UL (ref 1.8–7.3)
NITRITE UR QL STRIP: POSITIVE
PH UR STRIP: 7 [PH] (ref 5–9)
PLATELET # BLD AUTO: 217 K/UL (ref 130–450)
PMV BLD AUTO: 10.8 FL (ref 7–12)
POTASSIUM SERPL-SCNC: 4 MMOL/L (ref 3.5–5)
PROT SERPL-MCNC: 7.2 G/DL (ref 6.4–8.3)
PROT UR STRIP-MCNC: 100 MG/DL
PROTHROMBIN TIME: 16.5 SEC (ref 9.3–12.4)
RBC # BLD AUTO: 4.32 M/UL (ref 3.8–5.8)
RBC #/AREA URNS HPF: ABNORMAL /HPF
SARS-COV-2 RDRP RESP QL NAA+PROBE: DETECTED
SODIUM SERPL-SCNC: 139 MMOL/L (ref 132–146)
SP GR UR STRIP: 1.02 (ref 1–1.03)
SPECIMEN DESCRIPTION: ABNORMAL
TROPONIN I SERPL HS-MCNC: 35 NG/L (ref 0–11)
TROPONIN I SERPL HS-MCNC: 37 NG/L (ref 0–11)
UROBILINOGEN UR STRIP-ACNC: 0.2 EU/DL (ref 0–1)
WBC #/AREA URNS HPF: ABNORMAL /HPF
WBC OTHER # BLD: 4.3 K/UL (ref 4.5–11.5)

## 2023-12-26 PROCEDURE — 71046 X-RAY EXAM CHEST 2 VIEWS: CPT

## 2023-12-26 PROCEDURE — 87040 BLOOD CULTURE FOR BACTERIA: CPT

## 2023-12-26 PROCEDURE — 87502 INFLUENZA DNA AMP PROBE: CPT

## 2023-12-26 PROCEDURE — 87635 SARS-COV-2 COVID-19 AMP PRB: CPT

## 2023-12-26 PROCEDURE — 2580000003 HC RX 258: Performed by: EMERGENCY MEDICINE

## 2023-12-26 PROCEDURE — 81001 URINALYSIS AUTO W/SCOPE: CPT

## 2023-12-26 PROCEDURE — 2580000003 HC RX 258

## 2023-12-26 PROCEDURE — 80053 COMPREHEN METABOLIC PANEL: CPT

## 2023-12-26 PROCEDURE — 93005 ELECTROCARDIOGRAM TRACING: CPT | Performed by: EMERGENCY MEDICINE

## 2023-12-26 PROCEDURE — 85610 PROTHROMBIN TIME: CPT

## 2023-12-26 PROCEDURE — 87086 URINE CULTURE/COLONY COUNT: CPT

## 2023-12-26 PROCEDURE — 83605 ASSAY OF LACTIC ACID: CPT

## 2023-12-26 PROCEDURE — 99285 EMERGENCY DEPT VISIT HI MDM: CPT

## 2023-12-26 PROCEDURE — 83735 ASSAY OF MAGNESIUM: CPT

## 2023-12-26 PROCEDURE — 87077 CULTURE AEROBIC IDENTIFY: CPT

## 2023-12-26 PROCEDURE — 84484 ASSAY OF TROPONIN QUANT: CPT

## 2023-12-26 PROCEDURE — 96361 HYDRATE IV INFUSION ADD-ON: CPT

## 2023-12-26 PROCEDURE — 85025 COMPLETE CBC W/AUTO DIFF WBC: CPT

## 2023-12-26 PROCEDURE — 83690 ASSAY OF LIPASE: CPT

## 2023-12-26 PROCEDURE — 96360 HYDRATION IV INFUSION INIT: CPT

## 2023-12-26 RX ORDER — 0.9 % SODIUM CHLORIDE 0.9 %
1000 INTRAVENOUS SOLUTION INTRAVENOUS ONCE
Status: COMPLETED | OUTPATIENT
Start: 2023-12-26 | End: 2023-12-26

## 2023-12-26 RX ORDER — SODIUM CHLORIDE, SODIUM LACTATE, POTASSIUM CHLORIDE, CALCIUM CHLORIDE 600; 310; 30; 20 MG/100ML; MG/100ML; MG/100ML; MG/100ML
INJECTION, SOLUTION INTRAVENOUS CONTINUOUS
Status: DISCONTINUED | OUTPATIENT
Start: 2023-12-26 | End: 2023-12-27

## 2023-12-26 RX ADMIN — SODIUM CHLORIDE 1000 ML: 9 INJECTION, SOLUTION INTRAVENOUS at 21:00

## 2023-12-26 RX ADMIN — SODIUM CHLORIDE, POTASSIUM CHLORIDE, SODIUM LACTATE AND CALCIUM CHLORIDE: 600; 310; 30; 20 INJECTION, SOLUTION INTRAVENOUS at 23:42

## 2023-12-27 PROBLEM — R62.7 FAILURE TO THRIVE IN ADULT: Status: ACTIVE | Noted: 2023-12-27

## 2023-12-27 LAB
ALBUMIN SERPL-MCNC: 3.1 G/DL (ref 3.5–5.2)
ALP SERPL-CCNC: 61 U/L (ref 40–129)
ALT SERPL-CCNC: 16 U/L (ref 0–40)
ANION GAP SERPL CALCULATED.3IONS-SCNC: 10 MMOL/L (ref 7–16)
AST SERPL-CCNC: 20 U/L (ref 0–39)
BASOPHILS # BLD: 0.01 K/UL (ref 0–0.2)
BASOPHILS NFR BLD: 0 % (ref 0–2)
BILIRUB SERPL-MCNC: 0.3 MG/DL (ref 0–1.2)
BUN SERPL-MCNC: 21 MG/DL (ref 6–23)
CALCIUM SERPL-MCNC: 9.3 MG/DL (ref 8.6–10.2)
CHLORIDE SERPL-SCNC: 109 MMOL/L (ref 98–107)
CO2 SERPL-SCNC: 21 MMOL/L (ref 22–29)
CREAT SERPL-MCNC: 0.8 MG/DL (ref 0.7–1.2)
EKG ATRIAL RATE: 65 BPM
EKG P AXIS: 14 DEGREES
EKG P-R INTERVAL: 196 MS
EKG Q-T INTERVAL: 432 MS
EKG QRS DURATION: 110 MS
EKG QTC CALCULATION (BAZETT): 449 MS
EKG R AXIS: -14 DEGREES
EKG T AXIS: 63 DEGREES
EKG VENTRICULAR RATE: 65 BPM
EOSINOPHIL # BLD: 0.06 K/UL (ref 0.05–0.5)
EOSINOPHILS RELATIVE PERCENT: 1 % (ref 0–6)
ERYTHROCYTE [DISTWIDTH] IN BLOOD BY AUTOMATED COUNT: 14.2 % (ref 11.5–15)
GFR SERPL CREATININE-BSD FRML MDRD: >60 ML/MIN/1.73M2
GLUCOSE SERPL-MCNC: 82 MG/DL (ref 74–99)
HCT VFR BLD AUTO: 33 % (ref 37–54)
HGB BLD-MCNC: 10.4 G/DL (ref 12.5–16.5)
IMM GRANULOCYTES # BLD AUTO: <0.03 K/UL (ref 0–0.58)
IMM GRANULOCYTES NFR BLD: 0 % (ref 0–5)
LYMPHOCYTES NFR BLD: 0.78 K/UL (ref 1.5–4)
LYMPHOCYTES RELATIVE PERCENT: 16 % (ref 20–42)
MCH RBC QN AUTO: 26.7 PG (ref 26–35)
MCHC RBC AUTO-ENTMCNC: 31.5 G/DL (ref 32–34.5)
MCV RBC AUTO: 84.6 FL (ref 80–99.9)
MONOCYTES NFR BLD: 0.52 K/UL (ref 0.1–0.95)
MONOCYTES NFR BLD: 10 % (ref 2–12)
NEUTROPHILS NFR BLD: 72 % (ref 43–80)
NEUTS SEG NFR BLD: 3.6 K/UL (ref 1.8–7.3)
PLATELET # BLD AUTO: 189 K/UL (ref 130–450)
PMV BLD AUTO: 10.7 FL (ref 7–12)
POTASSIUM SERPL-SCNC: 3.9 MMOL/L (ref 3.5–5)
PROT SERPL-MCNC: 6.2 G/DL (ref 6.4–8.3)
RBC # BLD AUTO: 3.9 M/UL (ref 3.8–5.8)
SODIUM SERPL-SCNC: 140 MMOL/L (ref 132–146)
WBC OTHER # BLD: 5 K/UL (ref 4.5–11.5)

## 2023-12-27 PROCEDURE — 85025 COMPLETE CBC W/AUTO DIFF WBC: CPT

## 2023-12-27 PROCEDURE — 2580000003 HC RX 258: Performed by: NURSE PRACTITIONER

## 2023-12-27 PROCEDURE — 1200000000 HC SEMI PRIVATE

## 2023-12-27 PROCEDURE — 6370000000 HC RX 637 (ALT 250 FOR IP): Performed by: NURSE PRACTITIONER

## 2023-12-27 PROCEDURE — 6360000002 HC RX W HCPCS: Performed by: NURSE PRACTITIONER

## 2023-12-27 PROCEDURE — 93010 ELECTROCARDIOGRAM REPORT: CPT | Performed by: INTERNAL MEDICINE

## 2023-12-27 PROCEDURE — 80053 COMPREHEN METABOLIC PANEL: CPT

## 2023-12-27 RX ORDER — IBUPROFEN 800 MG/1
800 TABLET ORAL DAILY
Status: ON HOLD | COMMUNITY
End: 2023-12-30 | Stop reason: HOSPADM

## 2023-12-27 RX ORDER — SENNOSIDES A AND B 8.6 MG/1
1 TABLET, FILM COATED ORAL DAILY PRN
Status: DISCONTINUED | OUTPATIENT
Start: 2023-12-27 | End: 2023-12-30 | Stop reason: HOSPADM

## 2023-12-27 RX ORDER — ONDANSETRON 2 MG/ML
4 INJECTION INTRAMUSCULAR; INTRAVENOUS EVERY 6 HOURS PRN
Status: DISCONTINUED | OUTPATIENT
Start: 2023-12-27 | End: 2023-12-30 | Stop reason: HOSPADM

## 2023-12-27 RX ORDER — LEVOTHYROXINE SODIUM 0.03 MG/1
25 TABLET ORAL DAILY
COMMUNITY

## 2023-12-27 RX ORDER — POTASSIUM CHLORIDE 20 MEQ/1
40 TABLET, EXTENDED RELEASE ORAL PRN
Status: DISCONTINUED | OUTPATIENT
Start: 2023-12-27 | End: 2023-12-30 | Stop reason: HOSPADM

## 2023-12-27 RX ORDER — SODIUM CHLORIDE 9 MG/ML
INJECTION, SOLUTION INTRAVENOUS CONTINUOUS
Status: DISCONTINUED | OUTPATIENT
Start: 2023-12-27 | End: 2023-12-30 | Stop reason: HOSPADM

## 2023-12-27 RX ORDER — SODIUM CHLORIDE 0.9 % (FLUSH) 0.9 %
10 SYRINGE (ML) INJECTION EVERY 12 HOURS SCHEDULED
Status: DISCONTINUED | OUTPATIENT
Start: 2023-12-27 | End: 2023-12-30 | Stop reason: HOSPADM

## 2023-12-27 RX ORDER — LOSARTAN POTASSIUM 100 MG/1
100 TABLET ORAL DAILY
COMMUNITY

## 2023-12-27 RX ORDER — METOPROLOL SUCCINATE 25 MG/1
25 TABLET, EXTENDED RELEASE ORAL EVERY MORNING
Status: DISCONTINUED | OUTPATIENT
Start: 2023-12-27 | End: 2023-12-30 | Stop reason: HOSPADM

## 2023-12-27 RX ORDER — FUROSEMIDE 20 MG/1
20 TABLET ORAL DAILY
Status: DISCONTINUED | OUTPATIENT
Start: 2023-12-27 | End: 2023-12-30 | Stop reason: HOSPADM

## 2023-12-27 RX ORDER — ONDANSETRON 4 MG/1
4 TABLET, ORALLY DISINTEGRATING ORAL EVERY 8 HOURS PRN
Status: DISCONTINUED | OUTPATIENT
Start: 2023-12-27 | End: 2023-12-30 | Stop reason: HOSPADM

## 2023-12-27 RX ORDER — ROSUVASTATIN CALCIUM 10 MG/1
10 TABLET, COATED ORAL EVERY MORNING
Status: DISCONTINUED | OUTPATIENT
Start: 2023-12-27 | End: 2023-12-30 | Stop reason: HOSPADM

## 2023-12-27 RX ORDER — MAGNESIUM SULFATE IN WATER 40 MG/ML
2000 INJECTION, SOLUTION INTRAVENOUS PRN
Status: DISCONTINUED | OUTPATIENT
Start: 2023-12-27 | End: 2023-12-30 | Stop reason: HOSPADM

## 2023-12-27 RX ORDER — POTASSIUM CHLORIDE 7.45 MG/ML
10 INJECTION INTRAVENOUS PRN
Status: DISCONTINUED | OUTPATIENT
Start: 2023-12-27 | End: 2023-12-30 | Stop reason: HOSPADM

## 2023-12-27 RX ORDER — TAMSULOSIN HYDROCHLORIDE 0.4 MG/1
0.4 CAPSULE ORAL EVERY MORNING
Status: DISCONTINUED | OUTPATIENT
Start: 2023-12-27 | End: 2023-12-30 | Stop reason: HOSPADM

## 2023-12-27 RX ORDER — POTASSIUM CHLORIDE 750 MG/1
10 TABLET, EXTENDED RELEASE ORAL DAILY
Status: DISCONTINUED | OUTPATIENT
Start: 2023-12-27 | End: 2023-12-30 | Stop reason: HOSPADM

## 2023-12-27 RX ORDER — SODIUM CHLORIDE 9 MG/ML
INJECTION, SOLUTION INTRAVENOUS PRN
Status: DISCONTINUED | OUTPATIENT
Start: 2023-12-27 | End: 2023-12-30 | Stop reason: HOSPADM

## 2023-12-27 RX ORDER — SODIUM CHLORIDE 0.9 % (FLUSH) 0.9 %
10 SYRINGE (ML) INJECTION PRN
Status: DISCONTINUED | OUTPATIENT
Start: 2023-12-27 | End: 2023-12-30 | Stop reason: HOSPADM

## 2023-12-27 RX ADMIN — APIXABAN 5 MG: 5 TABLET, FILM COATED ORAL at 08:03

## 2023-12-27 RX ADMIN — FUROSEMIDE 20 MG: 20 TABLET ORAL at 12:15

## 2023-12-27 RX ADMIN — SODIUM CHLORIDE: 9 INJECTION, SOLUTION INTRAVENOUS at 03:08

## 2023-12-27 RX ADMIN — METOPROLOL SUCCINATE 25 MG: 25 TABLET, EXTENDED RELEASE ORAL at 08:03

## 2023-12-27 RX ADMIN — POTASSIUM CHLORIDE 10 MEQ: 750 TABLET, EXTENDED RELEASE ORAL at 08:03

## 2023-12-27 RX ADMIN — WATER 1000 MG: 1 INJECTION INTRAMUSCULAR; INTRAVENOUS; SUBCUTANEOUS at 03:10

## 2023-12-27 RX ADMIN — TAMSULOSIN HYDROCHLORIDE 0.4 MG: 0.4 CAPSULE ORAL at 08:03

## 2023-12-27 RX ADMIN — SODIUM CHLORIDE: 9 INJECTION, SOLUTION INTRAVENOUS at 19:30

## 2023-12-27 RX ADMIN — ROSUVASTATIN CALCIUM 10 MG: 10 TABLET, FILM COATED ORAL at 12:15

## 2023-12-27 RX ADMIN — APIXABAN 5 MG: 5 TABLET, FILM COATED ORAL at 20:29

## 2023-12-27 NOTE — PROGRESS NOTES
4 Eyes Skin Assessment     NAME:  Mary Gann  YOB: 1945  MEDICAL RECORD NUMBER:  [id number]    The patient is being assessed for  Admission    I agree that at least one RN has performed a thorough Head to Toe Skin Assessment on the patient. ALL assessment sites listed below have been assessed. Areas assessed by both nurses:    Head, Face, Ears, Shoulders, Back, Chest, Arms, Elbows, Hands, Sacrum. Buttock, Coccyx, Ischium, and Legs. Feet and Heels        Does the Patient have a Wound? No noted wound(s)   Pt has blanchable redness on sacrum and buttocks.   Lg Prevention initiated by RN: Yes  Wound Care Orders initiated by RN: No    Pressure Injury (Stage 3,4, Unstageable, DTI, NWPT, and Complex wounds) if present, place Wound referral order by RN under : No    New Ostomies, if present place, Ostomy referral order under : No     Nurse 1 eSignature: Electronically signed by Noble Carrasquillo RN on 12/27/23 at 6:55 PM EST    **SHARE this note so that the co-signing nurse can place an eSignature**    Nurse 2 eSignature: {Esignature:[id number]}

## 2023-12-27 NOTE — ED TRIAGE NOTES
Tested positive for covid 3 days ago, increased weakness per daughter who he lives with. Normally uses walker to get around and has been in bed x 3 days.  Not eating or drinking  EMS bgl 138  Per ems daughter will come tomorrow

## 2023-12-27 NOTE — CARE COORDINATION
Internal Medicine On-call Care Coordination Note    I was called by the ED physician because they recommended admission for this patient and we cover their PCP. The history as I understand it after discussion with the ED physician is as follows:    Presents with weakness, poor oral intake  Positive for covid 3 days ago  CXR neg acute    I placed admission orders. Including:    PT/OT  SW for placement  IV rocephin- UA positive  IVF    Dr. Saba Hairston, or our coverage will see the patient tomorrow for H&P.     Electronically signed by Lamona Primrose, APRN - CNP on 12/27/2023 at 2:24 AM

## 2023-12-27 NOTE — PROGRESS NOTES
Pt unsure of the medications he takes at home. Database completed with daughter Malena Silva. Pt baseline independent with a walker at home,but has not been out of bed in the last three days.

## 2023-12-27 NOTE — ED PROVIDER NOTES
[address] ENCOUNTER        Pt Name: Cj Orr  MRN: [id number]  [address] [facility] [place] 1945  Date of evaluation: 12/26/2023  Provider: Shelly Moreno MD  PCP: Anat Charles MD  Note Started: 8:22 PM EST 12/26/23    CHIEF COMPLAINT       Chief Complaint   Patient presents with    Positive For Covid-19     Tested positive for covid 3 days ago, increased weakness per daughter who he lives with. Normally uses walker to get around and has been in bed x 3 days. Not eating or drinking  EMS bgl 138    Dehydration       HISTORY OF PRESENT ILLNESS: 1 or more Elements   History From: Patient and Daughter    Limitations to history : None  Social Determinants : None    Cj Orr is a 78 y.o. male with a history of subarachnoid hemorrhage, pulmonary embolism, Alzheimer's dementia, congestive heart failure on anticoagulation with Eliquis who presents with increased weakness as per his daughter. Normally uses walker to get around but has been in bed since 3 days. He was just tested positive for COVID 3 days back. I spoke to the daughter who mentions that he has been more weak and fatigued and denying oral intake since 3 days. He has not had a bowel movement since 5 days. Denies any fever, chills, nausea, vomiting, headache, dizziness, vision changes, neck tenderness or stiffness, weakness, chest pain, palpitations, leg swelling/tenderness, sob, cough, abdominal pain, dysuria, hematuria, diarrhea, bloody stools.     Nursing Notes were all reviewed and agreed with or any disagreements were addressed in the HPI.    ROS:   Pertinent positives and negatives are stated within HPI, all other systems reviewed and are negative.      --------------------------------------------- PAST HISTORY ---------------------------------------------  Past Medical History:       Diagnosis Date    Bacteremia due to Gram-negative bacteria 08/17/2023    CHF (congestive

## 2023-12-28 LAB
ALBUMIN SERPL-MCNC: 3.2 G/DL (ref 3.5–5.2)
ALP SERPL-CCNC: 62 U/L (ref 40–129)
ALT SERPL-CCNC: 12 U/L (ref 0–40)
ANION GAP SERPL CALCULATED.3IONS-SCNC: 13 MMOL/L (ref 7–16)
AST SERPL-CCNC: 19 U/L (ref 0–39)
BASOPHILS # BLD: 0.01 K/UL (ref 0–0.2)
BASOPHILS NFR BLD: 0 % (ref 0–2)
BILIRUB SERPL-MCNC: 0.4 MG/DL (ref 0–1.2)
BUN SERPL-MCNC: 17 MG/DL (ref 6–23)
CALCIUM SERPL-MCNC: 9.4 MG/DL (ref 8.6–10.2)
CHLORIDE SERPL-SCNC: 105 MMOL/L (ref 98–107)
CO2 SERPL-SCNC: 20 MMOL/L (ref 22–29)
CREAT SERPL-MCNC: 0.8 MG/DL (ref 0.7–1.2)
EOSINOPHIL # BLD: 0.07 K/UL (ref 0.05–0.5)
EOSINOPHILS RELATIVE PERCENT: 2 % (ref 0–6)
ERYTHROCYTE [DISTWIDTH] IN BLOOD BY AUTOMATED COUNT: 14.3 % (ref 11.5–15)
GFR SERPL CREATININE-BSD FRML MDRD: >60 ML/MIN/1.73M2
GLUCOSE SERPL-MCNC: 74 MG/DL (ref 74–99)
HCT VFR BLD AUTO: 34.1 % (ref 37–54)
HGB BLD-MCNC: 11 G/DL (ref 12.5–16.5)
IMM GRANULOCYTES # BLD AUTO: <0.03 K/UL (ref 0–0.58)
IMM GRANULOCYTES NFR BLD: 0 % (ref 0–5)
LYMPHOCYTES NFR BLD: 0.69 K/UL (ref 1.5–4)
LYMPHOCYTES RELATIVE PERCENT: 16 % (ref 20–42)
MCH RBC QN AUTO: 26.9 PG (ref 26–35)
MCHC RBC AUTO-ENTMCNC: 32.3 G/DL (ref 32–34.5)
MCV RBC AUTO: 83.4 FL (ref 80–99.9)
MONOCYTES NFR BLD: 0.53 K/UL (ref 0.1–0.95)
MONOCYTES NFR BLD: 13 % (ref 2–12)
NEUTROPHILS NFR BLD: 69 % (ref 43–80)
NEUTS SEG NFR BLD: 2.92 K/UL (ref 1.8–7.3)
PLATELET # BLD AUTO: 185 K/UL (ref 130–450)
PMV BLD AUTO: 11 FL (ref 7–12)
POTASSIUM SERPL-SCNC: 3.8 MMOL/L (ref 3.5–5)
PROT SERPL-MCNC: 6.5 G/DL (ref 6.4–8.3)
RBC # BLD AUTO: 4.09 M/UL (ref 3.8–5.8)
SODIUM SERPL-SCNC: 138 MMOL/L (ref 132–146)
WBC OTHER # BLD: 4.2 K/UL (ref 4.5–11.5)

## 2023-12-28 PROCEDURE — 1200000000 HC SEMI PRIVATE

## 2023-12-28 PROCEDURE — 2580000003 HC RX 258: Performed by: NURSE PRACTITIONER

## 2023-12-28 PROCEDURE — 85025 COMPLETE CBC W/AUTO DIFF WBC: CPT

## 2023-12-28 PROCEDURE — 6370000000 HC RX 637 (ALT 250 FOR IP): Performed by: NURSE PRACTITIONER

## 2023-12-28 PROCEDURE — 97165 OT EVAL LOW COMPLEX 30 MIN: CPT

## 2023-12-28 PROCEDURE — 36415 COLL VENOUS BLD VENIPUNCTURE: CPT

## 2023-12-28 PROCEDURE — 97161 PT EVAL LOW COMPLEX 20 MIN: CPT

## 2023-12-28 PROCEDURE — 6360000002 HC RX W HCPCS: Performed by: NURSE PRACTITIONER

## 2023-12-28 PROCEDURE — 80053 COMPREHEN METABOLIC PANEL: CPT

## 2023-12-28 RX ADMIN — POTASSIUM CHLORIDE 10 MEQ: 750 TABLET, EXTENDED RELEASE ORAL at 07:51

## 2023-12-28 RX ADMIN — SODIUM CHLORIDE: 9 INJECTION, SOLUTION INTRAVENOUS at 07:57

## 2023-12-28 RX ADMIN — METOPROLOL SUCCINATE 25 MG: 25 TABLET, EXTENDED RELEASE ORAL at 07:51

## 2023-12-28 RX ADMIN — SODIUM CHLORIDE, PRESERVATIVE FREE 10 ML: 5 INJECTION INTRAVENOUS at 07:51

## 2023-12-28 RX ADMIN — APIXABAN 5 MG: 5 TABLET, FILM COATED ORAL at 07:51

## 2023-12-28 RX ADMIN — SODIUM CHLORIDE: 9 INJECTION, SOLUTION INTRAVENOUS at 21:05

## 2023-12-28 RX ADMIN — ROSUVASTATIN CALCIUM 10 MG: 10 TABLET, FILM COATED ORAL at 08:01

## 2023-12-28 RX ADMIN — WATER 1000 MG: 1 INJECTION INTRAMUSCULAR; INTRAVENOUS; SUBCUTANEOUS at 03:02

## 2023-12-28 RX ADMIN — APIXABAN 5 MG: 5 TABLET, FILM COATED ORAL at 21:04

## 2023-12-28 RX ADMIN — FUROSEMIDE 20 MG: 20 TABLET ORAL at 07:51

## 2023-12-28 RX ADMIN — TAMSULOSIN HYDROCHLORIDE 0.4 MG: 0.4 CAPSULE ORAL at 07:51

## 2023-12-28 NOTE — ACP (ADVANCE CARE PLANNING)
Advance Care Planning   Healthcare Decision Maker:    Primary Decision Maker: Shiloh Berger Child - [id number]

## 2023-12-28 NOTE — PROGRESS NOTES
Physical Therapy  Facility/Department: [address] MED SURG/TELE  Physical Therapy Initial Assessment    Name: Ajit Ulloa  : 1945  MRN: [id number]  Date of Service: 2023        Patient Diagnosis(es): The primary encounter diagnosis was COVID-19. Diagnoses of Urinary tract infection associated with indwelling urethral catheter, initial encounter [facility]) and Adult failure to thrive were also pertinent to this visit. Past Medical History:  has a past medical history of Bacteremia due to Gram-negative bacteria, CHF (congestive heart failure) ([address]), Dementia ([address]), Electrolyte imbalance, Fever, Sepsis ([address]), and Severe Alzheimer's dementia without behavioral disturbance, psychotic disturbance, mood disturbance, or anxiety ([address]). Past Surgical History:  has a past surgical history that includes Total knee arthroplasty; hernia repair (Bilateral); and Ulnar nerve repair. Referring provider:  Jena Avila MD    PT Order:  PT eval and treat     Evaluating PT:  Omi Mccormack PT, DPT PT [id number]    Room #:  3135/7769-E  Diagnosis:  Adult failure to thrive [R62.7]  Failure to thrive in adult [R62.7]  Urinary tract infection associated with indwelling urethral catheter, initial encounter ([address]) [T83.511A, N39.0]  COVID-19 [U07.1]  Precautions:  fall risk, droplet +  Equipment Needs:  none    SUBJECTIVE:    Pt lives with his daughter in a 1 story home with 1 stairs to enter. Bed and bath is on first floor. Pt ambulated with walker PTA. Walk in shower. Pt reported his daughter assists with bathing and dressing. History of falls. OBJECTIVE:   Initial Evaluation  Date:  Treatment Short Term/ Long Term   Goals   Was pt agreeable to Eval/treatment? yes     Does pt have pain?  None reported     Bed Mobility  Rolling: NT  Supine to sit: SBA  Sit to supine: Min A  Scooting: SBA to sitting EOB  Supervision   Transfers Sit to stand: Min A  Stand to sit: Min A  Stand pivot: NT  Supervision   Ambulation    15 feet

## 2023-12-28 NOTE — CARE COORDINATION
Kamar Number COVID POS. SW discussed discharge planning with Pts daughter Shayy Galindo who Pt resides with in a ranch home. Room air. IVB ATB. Pt is active with [facility] order will be needed if appropriate at discharge. Awaiting therapy evals. Pt has been on eliquis prior to this admit. [facility] is [facility] on [place] and PCP is Dr. Lucy Smith. Electronically signed by FENG Howard on 12/28/2023 at 10:16 AM

## 2023-12-28 NOTE — H&P
Internal Medicine History & Physical     Name: Travis Arroyo  : 1945  Chief Complaint: Positive For Covid-19 (Tested positive for covid 3 days ago, increased weakness per daughter who he lives with. Normally uses walker to get around and has been in bed x 3 days. Not eating or drinking/EMS bgl 138) and Dehydration  Primary Care Physician: Ángela Thomas MD  Admission date: 2023  Date of service: 2023     History of Present Illness  Celeste Zaidi is a 78y.o. year old male. Patient not eating or drinking the last 3 to 4 days. Patient is a poor dishistorian. Patient has not been out of bed for 3 days. Symptoms have been worsening. Patient was found to have COVID-19 infection. Does not endorse any other sick contacts. Patient not complaining of any diarrhea or skin lesions or skin breakdown at this time. No chest pains or shortness of breath. Symptoms were severe and worsening. Patient recent hospital admission and was discharged with similar symptoms of fatigue and weakness with poor appetite. This time, patient is positive for COVID-19. No other family or friends at bedside. ED course:   Initial blood work and imaging studies performed. Admission recommended by ED physician. Case was discussed with ED provider.  Meds in ED consisted of the following:  Medications   furosemide (LASIX) tablet 20 mg (20 mg Oral Given 23)   metoprolol succinate (TOPROL XL) extended release tablet 25 mg (25 mg Oral Given 23)   potassium chloride (KLOR-CON M) extended release tablet 10 mEq (10 mEq Oral Given 23)   rosuvastatin (CRESTOR) tablet 10 mg (10 mg Oral Given 23)   tamsulosin (FLOMAX) capsule 0.4 mg (0.4 mg Oral Given 23)   apixaban (ELIQUIS) tablet 5 mg (5 mg Oral Given 23)   sodium chloride flush 0.9 % injection 10 mL (10 mLs IntraVENous Not Given 23)   sodium chloride flush 0.9 % injection 10 mL (has no administration PM      I can be reached through [employer].    NOTE:  This report was transcribed using voice recognition software.  Every effort was made to ensure accuracy; however, inadvertent computerized transcription errors may be present.

## 2023-12-28 NOTE — CARE COORDINATION
12/28/2023  Social Work Discharge Planning:COVID POS. SW discussed discharge planning with Pts daughter Colletta Chamber who Pt resides with in a ranch home. Room air. IVB ATB. Pt is active with [facility] order will be needed if appropriate at discharge. Awaiting therapy evals. Pt has been on eliquis prior to this admit. [facility]acy is [facility] on [place] and PCP is Dr. Shabnam Tineo. Electronically signed by FENG Cassidy on 12/28/2023 at 10:12 AM

## 2023-12-28 NOTE — PROGRESS NOTES
OCCUPATIONAL THERAPY INITIAL EVALUATION    [address]   [address] [facility], [address] [place] [address] Drive        [facility]:[id number]                                                  Patient Name: Ana Maria Maldonado    MRN: [id number]    : 1945    Room: [address] [place] [address]     Evaluating OT: Baron Kaur OTR/L #[id number]    Referring Provider:  MARIA GUADALUPE Rodriguez CNP     Specific Provider Orders/Date:  OT Eval and Treat, 23     Diagnosis:   1. COVID-19    2. Urinary tract infection associated with indwelling urethral catheter, initial encounter ([address])    3.  Adult failure to thrive         Surgery: None       Pertinent Medical History: Alzheimer's dementia, CHF, B TKA       Precautions:  Fall Risk, falls, alarm, COVID+, droplet plus isolation      Assessment of current deficits    [x] Functional mobility  [x]ADLs  [x] Strength               [x]Cognition    [x] Functional transfers   [x] IADLs         [x] Safety Awareness   [x]Endurance    [] Fine Coordination              [x] Balance      [] Vision/perception   []Sensation     []Gross Motor Coordination  [] ROM  [] Delirium                   [] Motor Control     OT PLAN OF CARE   OT POC based on physician orders, patient diagnosis and results of clinical assessment    Frequency/Duration 1-3 days/wk for 2 weeks PRN     Specific OT Treatment Interventions to include:   * Instruction/training on adapted ADL techniques and AE recommendations to increase functional independence within precautions       * Training on energy conservation strategies, correct breathing pattern and techniques to improve independence/tolerance for self-care routine  * Functional transfer/mobility training/DME recommendations for increased independence, safety, and fall prevention  * Patient/Family education to increase follow through with safety techniques and functional independence  * Recommendation of environmental modifications for increased safety

## 2023-12-29 LAB
ALBUMIN SERPL-MCNC: 3.1 G/DL (ref 3.5–5.2)
ALP SERPL-CCNC: 65 U/L (ref 40–129)
ALT SERPL-CCNC: 13 U/L (ref 0–40)
ANION GAP SERPL CALCULATED.3IONS-SCNC: 10 MMOL/L (ref 7–16)
AST SERPL-CCNC: 18 U/L (ref 0–39)
BASOPHILS # BLD: 0.01 K/UL (ref 0–0.2)
BASOPHILS NFR BLD: 0 % (ref 0–2)
BILIRUB SERPL-MCNC: 0.4 MG/DL (ref 0–1.2)
BUN SERPL-MCNC: 16 MG/DL (ref 6–23)
CALCIUM SERPL-MCNC: 9.1 MG/DL (ref 8.6–10.2)
CHLORIDE SERPL-SCNC: 103 MMOL/L (ref 98–107)
CO2 SERPL-SCNC: 21 MMOL/L (ref 22–29)
CREAT SERPL-MCNC: 0.8 MG/DL (ref 0.7–1.2)
EOSINOPHIL # BLD: 0.12 K/UL (ref 0.05–0.5)
EOSINOPHILS RELATIVE PERCENT: 2 % (ref 0–6)
ERYTHROCYTE [DISTWIDTH] IN BLOOD BY AUTOMATED COUNT: 14.1 % (ref 11.5–15)
GFR SERPL CREATININE-BSD FRML MDRD: >60 ML/MIN/1.73M2
GLUCOSE SERPL-MCNC: 78 MG/DL (ref 74–99)
HCT VFR BLD AUTO: 34.3 % (ref 37–54)
HGB BLD-MCNC: 11.2 G/DL (ref 12.5–16.5)
IMM GRANULOCYTES # BLD AUTO: <0.03 K/UL (ref 0–0.58)
IMM GRANULOCYTES NFR BLD: 0 % (ref 0–5)
LYMPHOCYTES NFR BLD: 0.8 K/UL (ref 1.5–4)
LYMPHOCYTES RELATIVE PERCENT: 16 % (ref 20–42)
MCH RBC QN AUTO: 27.3 PG (ref 26–35)
MCHC RBC AUTO-ENTMCNC: 32.7 G/DL (ref 32–34.5)
MCV RBC AUTO: 83.7 FL (ref 80–99.9)
MICROORGANISM SPEC CULT: ABNORMAL
MONOCYTES NFR BLD: 0.61 K/UL (ref 0.1–0.95)
MONOCYTES NFR BLD: 12 % (ref 2–12)
NEUTROPHILS NFR BLD: 70 % (ref 43–80)
NEUTS SEG NFR BLD: 3.56 K/UL (ref 1.8–7.3)
PLATELET # BLD AUTO: 177 K/UL (ref 130–450)
PMV BLD AUTO: 11.3 FL (ref 7–12)
POTASSIUM SERPL-SCNC: 3.7 MMOL/L (ref 3.5–5)
PROT SERPL-MCNC: 6.4 G/DL (ref 6.4–8.3)
RBC # BLD AUTO: 4.1 M/UL (ref 3.8–5.8)
SODIUM SERPL-SCNC: 134 MMOL/L (ref 132–146)
SPECIMEN DESCRIPTION: ABNORMAL
WBC OTHER # BLD: 5.1 K/UL (ref 4.5–11.5)

## 2023-12-29 PROCEDURE — 6370000000 HC RX 637 (ALT 250 FOR IP): Performed by: NURSE PRACTITIONER

## 2023-12-29 PROCEDURE — 1200000000 HC SEMI PRIVATE

## 2023-12-29 PROCEDURE — 6360000002 HC RX W HCPCS: Performed by: NURSE PRACTITIONER

## 2023-12-29 PROCEDURE — 6370000000 HC RX 637 (ALT 250 FOR IP): Performed by: HOSPITALIST

## 2023-12-29 PROCEDURE — 85025 COMPLETE CBC W/AUTO DIFF WBC: CPT

## 2023-12-29 PROCEDURE — 80053 COMPREHEN METABOLIC PANEL: CPT

## 2023-12-29 PROCEDURE — 2580000003 HC RX 258: Performed by: NURSE PRACTITIONER

## 2023-12-29 RX ORDER — AMOXICILLIN 250 MG/1
500 CAPSULE ORAL EVERY 8 HOURS SCHEDULED
Status: DISCONTINUED | OUTPATIENT
Start: 2023-12-29 | End: 2023-12-30 | Stop reason: HOSPADM

## 2023-12-29 RX ADMIN — POTASSIUM CHLORIDE 10 MEQ: 750 TABLET, EXTENDED RELEASE ORAL at 09:52

## 2023-12-29 RX ADMIN — SODIUM CHLORIDE, PRESERVATIVE FREE 10 ML: 5 INJECTION INTRAVENOUS at 21:13

## 2023-12-29 RX ADMIN — FUROSEMIDE 20 MG: 20 TABLET ORAL at 09:52

## 2023-12-29 RX ADMIN — APIXABAN 5 MG: 5 TABLET, FILM COATED ORAL at 21:12

## 2023-12-29 RX ADMIN — ROSUVASTATIN CALCIUM 10 MG: 10 TABLET, FILM COATED ORAL at 09:52

## 2023-12-29 RX ADMIN — WATER 1000 MG: 1 INJECTION INTRAMUSCULAR; INTRAVENOUS; SUBCUTANEOUS at 02:32

## 2023-12-29 RX ADMIN — AMOXICILLIN 500 MG: 250 CAPSULE ORAL at 21:12

## 2023-12-29 RX ADMIN — APIXABAN 5 MG: 5 TABLET, FILM COATED ORAL at 09:52

## 2023-12-29 RX ADMIN — SODIUM CHLORIDE: 9 INJECTION, SOLUTION INTRAVENOUS at 09:53

## 2023-12-29 RX ADMIN — TAMSULOSIN HYDROCHLORIDE 0.4 MG: 0.4 CAPSULE ORAL at 09:52

## 2023-12-29 RX ADMIN — METOPROLOL SUCCINATE 25 MG: 25 TABLET, EXTENDED RELEASE ORAL at 09:52

## 2023-12-29 RX ADMIN — AMOXICILLIN 500 MG: 250 CAPSULE ORAL at 16:53

## 2023-12-29 ASSESSMENT — PAIN SCALES - GENERAL
PAINLEVEL_OUTOF10: 0
PAINLEVEL_OUTOF10: 0

## 2023-12-29 NOTE — CARE COORDINATION
12/29/2023  Social Work Discharge Planning:COVID POS. Plan is home with daughter Louisa Rosa. Pt is active with [facility] order is in. Electronically signed by FENG Solorzano on 12/29/2023 at 10:54 AM    12/29/2023  Social Work Discharge Planning:Stacie called this worker and states she now would like Pt to get short term rehab and chose [facility]. Referral was made. Waiting reply. Electronically signed by FENG Solorzano on 12/29/2023 at 11:13 AM    12/29/2023  Social Work Discharge Planning:[facility] is unable to accept referral was made to [facility]. They accepted and will start precert. DONAVAN,7000 and zulema completed.  Electronically signed by FENG Solorzano on 12/29/2023 at 2:53 PM

## 2023-12-29 NOTE — DISCHARGE INSTRUCTIONS
weight gain of 3 pounds or more in 1 day         OR 5 pounds or more in one week  More shortness of breath  More swelling of your stomach, legs, ankles or feet  Feeling more tired, No energy  Dry hacky cough  Dizziness  More chest pain / discomfort       (CALL 846 IF ANY OF THE FOLLOWING OCCURS  Chest pain (not relieved with nitroglycerine, if you have been prescribed this medication)  Severe shortness of breath  Faint / Pass out  Confusion / cannot think clearly  If symptoms get worse           SMOKING - TOBACCO USE:  * IF YOU SMOKE - STOP!  Kick the habit.  [facility] Program is offered at [facility] and [facility]. Call [phone] extension [phone] for more information.             ACTIVITY:   (Ask your doctor when you will be able to return to work and before starting any exercise program.  Do not drive unless unless your doctor has given you permission to do so).  Start light exercise.  Even if you can only do a small amount, exercise will help you get stronger, have more energy, help manage your weight and decrease  stress. Walking is an easy way to get exercise. Start out slowly and  increase the amount you walk as tolerated  If you become short of breath, dizzy or have chest pain; stop, sit down, and rest.  If you feel \"wiped out\" the day after you exercise, walk at a slower pace or for a shorter distance. You can gradually increase the pace or amount of time.            (Do not exercise right after a meal or in extreme temperatures, such as above 85 degrees, if the air is really humid, or wind chill is less than 20 degrees)                                             ADDITIONAL INFORMATION:  Avoid getting sick from colds and the flu. Stay away from friends or family that you know may have a contagious illness  Get plenty of rest   Get a flu shot each year.  Get a pneumococcal vaccine shot. If you have had one before, ask your  doctor whether you need another dose. My Goal for Self-management of Heart Failure Includes 5 steps :    1. Notice a change in symptoms ( weight gain, short of breath, leg swelling, decreased activity level, bloating. ...)    2. Evaluate the change: (use the Heart Failure Zones )     3. Decide to take action: decide what your options are, such as: (call your doctor for an extra visit, take a prescribed medication, such as your water pill if your doctor has given you directions to do so, [facility])    4. Come up with a strategy:  (now you call the doctor for advice / appointment. This is where you take action!!! Do not wait, catch the symptom early and treat it before it worsens. 5. Evaluate the response: The next day, check your Heart Failure Zones: are you in the GREEN ZONE (safe zone)? Worsening symptoms of YELLOW ZONE? Or have you moved to the RED ZONE and need to call 911 or go to the Emergency Room for evaluation? Call your doctor's office to update them on your symptoms of heart failure.

## 2023-12-29 NOTE — PLAN OF CARE
Patient's chart updated to reflect:      . - HF care plan, HF education points and HF discharge instructions.  -Orders: 2 gram sodium diet, daily weights, I/O.  -PCP and cardiology follow up appointments to be scheduled within 7 days of hospital discharge. -CHF education session will be provided to the patient prior to hospital discharge.     Ady Ba RN   Heart Failure Navigator

## 2023-12-29 NOTE — PROGRESS NOTES
Internal Medicine Progress Note    Patient's name: Jona Hobbs  : 1945  Chief complaints (on day of admission): Positive For Covid-19 (Tested positive for covid 3 days ago, increased weakness per daughter who he lives with. Normally uses walker to get around and has been in bed x 3 days. Not eating or drinking/EMS bgl 138) and Dehydration  Admission date: 2023  Date of service: 2023   Room: [address] MED SURG/TELE  Primary care physician: Sage Easley Jr., MD  Reason for visit: Follow-up for covid 19 infection    Subjective  Jona was seen and examined.  Patient still weak and fatigued.  No fevers or chills.  Still not eating or drinking well.  No nausea or vomiting.  Patient is very flat and somewhat upset.  Feels like he is never going to get home.  Very weak and fatigued.    Review of Systems  There are no new complaints of chest pain, shortness of breath, abdominal pain, nausea, vomiting, diarrhea, constipation.    Hospital Medications  Current Facility-Administered Medications   Medication Dose Route Frequency Provider Last Rate Last Admin    amoxicillin (AMOXIL) capsule 500 mg  500 mg Oral 3 times per day Osmar Luis MD        furosemide (LASIX) tablet 20 mg  20 mg Oral Daily LacivSushma delaney, APRN - CNP   20 mg at 23 0952    metoprolol succinate (TOPROL XL) extended release tablet 25 mg  25 mg Oral QAM IrmaivSushma delaney, APRN - CNP   25 mg at 23 09    potassium chloride (KLOR-CON M) extended release tablet 10 mEq  10 mEq Oral Daily LacivSushma delaney, APRN - CNP   10 mEq at 23 09    rosuvastatin (CRESTOR) tablet 10 mg  10 mg Oral QAM LacivSushma delaney, APRN - CNP   10 mg at 23 09    tamsulosin (FLOMAX) capsule 0.4 mg  0.4 mg Oral QAM IrmaivSushma delaney, APRN - CNP   0.4 mg at 23 09    apixaban (ELIQUIS) tablet 5 mg  5 mg Oral BID LacivSushma delaney, APRN - CNP   5 mg at 23 0952    sodium chloride flush 0.9 % injection 10 mL  10 mL IntraVENous 2  color/texture/turgor normal, no rashes or lesions  Lungs: diminished bs sergio, no retractions/use of accessory muscles, no vocal fremitus, no rhonchi, no crackle, no rales  Heart: regular rate, regular rhythm, no murmur  Abdomen: soft, NT, bowel sounds normal  Extremities: atraumatic, no edema  Neurologic: cranial nerves 2-12 grossly intact, no slurred speech    Most Recent Labs  Lab Results   Component Value Date    WBC 5.1 12/29/2023    HGB 11.2 (L) 12/29/2023    HCT 34.3 (L) 12/29/2023     12/29/2023     12/29/2023    K 3.7 12/29/2023     12/29/2023    CREATININE 0.8 12/29/2023    BUN 16 12/29/2023    CO2 21 (L) 12/29/2023    GLUCOSE 78 12/29/2023    ALT 13 12/29/2023    AST 18 12/29/2023    INR 1.5 12/26/2023    TSH 2.17 08/25/2023       XR CHEST (2 VW)   Final Result   1. Atherosclerotic disease and cardiomegaly. Fullness of the bilateral   pulmonary glenn likely reflects enlargement of the pulmonary arterial system. 2.  Coarsened interstitial markings. No acute cardiopulmonary pathology. Assessment   Active Hospital Problems    Diagnosis     Pulmonary embolism (HCC) [I26.99]      Priority: Medium    COVID-19 [U07.1]      Priority: Medium    Failure to thrive in adult [R62.7]     UTI (urinary tract infection) [N39.0]     Severe protein-calorie malnutrition (HCC) [E43]     Severe Alzheimer's dementia without behavioral disturbance, psychotic disturbance, mood disturbance, or anxiety ([address]) [G30.9, F02. C0]     Benign essential hypertension [I10]     Hypothyroidism [E03.9]     Sleep apnea [G47.30]          Plan  COVID-19 infection  Patient is not eating or drinking very well for the last several days  Ongoing  Weak and fatigued  Follow isolation protocols  PT and OT to evaluate patient  Failure to thrive likely secondary to continued recurrent illness  PT and OT to evaluate  Social work discussing with family regarding placement  Patient has been bedbound the last 3-5

## 2023-12-29 NOTE — CONSULTS
[facility]   Inpatient CHF Nurse Navigator Consult      Cardiologist: Dr Dl Cunha is a 78 y.o. (1945) male with a history of HFrEF, most recent EF:  Lab Results   Component Value Date    LVEF 48 03/20/2023       Patient was awake and alert, laying in bed during the consultation and is agreeable to heart failure education. He was engaged and asked appropriate questions throughout the education session. He is not feeling well today. He is going to rehab after discharge. Barriers identified during consult contributing to HF Hospitalization:  [] Limited medication adherence   [] Poor health literacy, education regarding HF medications provided   [] Pill box provided to patient  [] Difficulty affording medications  [] Prescription assistance information given     [] Not weighing themselves daily  [x] Weight log provided for easy monitoring  [] Scale provided     [] Not following low sodium diet  [] Food insecurity   [x] 2 gram sodium diet education provided   [] Low sodium recipes provided  [] Sodium free seasoning provided   [] Low sodium meal delivery options given to patient  [] Dietician consulted     [] Lack of transportation to appointments     [] Depression, given chronic illness  [] Primary team notified     [] Goals of care need addressed  [] Palliative care consulted     [] CHF CHW consulted, to assist with     Chart Reviewed:  Diet: ADULT DIET; Regular;  Low Sodium (2 gm)   Daily Weights: Patient Vitals for the past 96 hrs (Last 3 readings):   Weight   12/29/23 0043 83.6 kg (184 lb 4.8 oz)   12/28/23 0323 83.3 kg (183 lb 9 oz)   12/27/23 1830 66.2 kg (146 lb)     I/O:   Intake/Output Summary (Last 24 hours) at 12/29/2023 1247  Last data filed at 12/29/2023 4130  Gross per 24 hour   Intake 3870.07 ml   Output 1500 ml   Net 2370.07 ml       [] Nursing staff/manager notified of inaccurate cooper weights or I/O    Discharge Plan:  Above identified barriers

## 2023-12-29 NOTE — PROGRESS NOTES
This patient being passed on to another nurse now as this nurse going to ER to work. Passed on to new nurse that a perfectserve message was placed to Salma Campos NP, as this patient had a run of a fib rvr. No response to this nurse as of yet, so advised new nurse about reply to come.

## 2023-12-29 NOTE — DISCHARGE INSTR - COC
Continuity of Care Form    Patient Name: Aparna Lazo   :  1945  MRN:  [id number]    Admit date:  2023  Discharge date:  ***    Code Status Order: Full Code   Advance Directives:     Admitting Physician:  Mari Anglin DO  PCP: Lillie Kenyon MD    Discharging Nurse: [facility] Unit/Room#: 2952/9140-N  Discharging Unit Phone Number: ***    Emergency Contact:   Extended Emergency Contact Information  Primary Emergency Contact: Stacie Carroll  Address: [address] [place] [address] of [address] Phone: [phone]  Relation: Child  Preferred language: English    Past Surgical History:  Past Surgical History:   Procedure Laterality Date    HERNIA REPAIR Bilateral     TOTAL KNEE ARTHROPLASTY      bilateral    ULNAR NERVE REPAIR         Immunization History:   Immunization History   Administered Date(s) Administered    TD 5LF, Excell Latus, (age 7y+), IM, 0.5mL 2023       Active Problems:  Patient Active Problem List   Diagnosis Code    SAH (subarachnoid hemorrhage) ([address]) I60.9    Fall at home [address]. Hardeep Mas, Y92.009    Urinary tract infection due to Pseudomonas aeruginosa N39.0, B96.5    Pyelonephritis N12    Sepsis ([address]) A41.9    Benign essential hypertension I10    Benign prostatic hyperplasia without urinary obstruction N40.0    Hypothyroidism E03.9    Sleep apnea G47.30    Bacteremia due to Gram-negative bacteria R78.81    Severe Alzheimer's dementia without behavioral disturbance, psychotic disturbance, mood disturbance, or anxiety ([facility]) G30.9, F02. C0    Severe protein-calorie malnutrition ([address]) E43    Duong catheter problem ([address]) T83. 9XXA    Urinary retention R33.9    Acute prostatitis N41.0    Confusion R41.0    Acute cystitis with hematuria N30.01    UTI (urinary tract infection) N39.0    Acute on chronic diastolic CHF (congestive heart failure) ([facility]) I50.33    Pulmonary embolism ([facility]) I26.99    COVID-19 U07.1    Failure to thrive in

## 2023-12-29 NOTE — PROGRESS NOTES
Perfectserved Dr. Bob Teague. Cirilo Murguia NP, on call. Sent message as patient had run of a fib rvr.

## 2023-12-30 VITALS
HEIGHT: 70 IN | DIASTOLIC BLOOD PRESSURE: 60 MMHG | RESPIRATION RATE: 16 BRPM | SYSTOLIC BLOOD PRESSURE: 133 MMHG | BODY MASS INDEX: 26.54 KG/M2 | WEIGHT: 185.38 LBS | OXYGEN SATURATION: 98 % | HEART RATE: 75 BPM | TEMPERATURE: 97.7 F

## 2023-12-30 LAB
ALBUMIN SERPL-MCNC: 2.9 G/DL (ref 3.5–5.2)
ALP SERPL-CCNC: 64 U/L (ref 40–129)
ALT SERPL-CCNC: 13 U/L (ref 0–40)
ANION GAP SERPL CALCULATED.3IONS-SCNC: 10 MMOL/L (ref 7–16)
AST SERPL-CCNC: 20 U/L (ref 0–39)
BASOPHILS # BLD: 0.02 K/UL (ref 0–0.2)
BASOPHILS NFR BLD: 0 % (ref 0–2)
BILIRUB SERPL-MCNC: 0.4 MG/DL (ref 0–1.2)
BUN SERPL-MCNC: 11 MG/DL (ref 6–23)
CALCIUM SERPL-MCNC: 9.2 MG/DL (ref 8.6–10.2)
CHLORIDE SERPL-SCNC: 105 MMOL/L (ref 98–107)
CO2 SERPL-SCNC: 21 MMOL/L (ref 22–29)
CREAT SERPL-MCNC: 0.7 MG/DL (ref 0.7–1.2)
EOSINOPHIL # BLD: 0.15 K/UL (ref 0.05–0.5)
EOSINOPHILS RELATIVE PERCENT: 3 % (ref 0–6)
ERYTHROCYTE [DISTWIDTH] IN BLOOD BY AUTOMATED COUNT: 14.1 % (ref 11.5–15)
GFR SERPL CREATININE-BSD FRML MDRD: >60 ML/MIN/1.73M2
GLUCOSE SERPL-MCNC: 84 MG/DL (ref 74–99)
HCT VFR BLD AUTO: 35.3 % (ref 37–54)
HGB BLD-MCNC: 11.4 G/DL (ref 12.5–16.5)
IMM GRANULOCYTES # BLD AUTO: <0.03 K/UL (ref 0–0.58)
IMM GRANULOCYTES NFR BLD: 0 % (ref 0–5)
LYMPHOCYTES NFR BLD: 0.84 K/UL (ref 1.5–4)
LYMPHOCYTES RELATIVE PERCENT: 17 % (ref 20–42)
MCH RBC QN AUTO: 26.7 PG (ref 26–35)
MCHC RBC AUTO-ENTMCNC: 32.3 G/DL (ref 32–34.5)
MCV RBC AUTO: 82.7 FL (ref 80–99.9)
MONOCYTES NFR BLD: 0.53 K/UL (ref 0.1–0.95)
MONOCYTES NFR BLD: 11 % (ref 2–12)
NEUTROPHILS NFR BLD: 68 % (ref 43–80)
NEUTS SEG NFR BLD: 3.35 K/UL (ref 1.8–7.3)
PLATELET # BLD AUTO: 175 K/UL (ref 130–450)
PMV BLD AUTO: 11.2 FL (ref 7–12)
POTASSIUM SERPL-SCNC: 3.7 MMOL/L (ref 3.5–5)
PROT SERPL-MCNC: 6.2 G/DL (ref 6.4–8.3)
RBC # BLD AUTO: 4.27 M/UL (ref 3.8–5.8)
SODIUM SERPL-SCNC: 136 MMOL/L (ref 132–146)
WBC OTHER # BLD: 4.9 K/UL (ref 4.5–11.5)

## 2023-12-30 PROCEDURE — 80053 COMPREHEN METABOLIC PANEL: CPT

## 2023-12-30 PROCEDURE — 2580000003 HC RX 258: Performed by: HOSPITALIST

## 2023-12-30 PROCEDURE — 6370000000 HC RX 637 (ALT 250 FOR IP): Performed by: PHYSICIAN ASSISTANT

## 2023-12-30 PROCEDURE — APPSS60 APP SPLIT SHARED TIME 46-60 MINUTES: Performed by: PHYSICIAN ASSISTANT

## 2023-12-30 PROCEDURE — 6370000000 HC RX 637 (ALT 250 FOR IP): Performed by: HOSPITALIST

## 2023-12-30 PROCEDURE — 85025 COMPLETE CBC W/AUTO DIFF WBC: CPT

## 2023-12-30 PROCEDURE — 6370000000 HC RX 637 (ALT 250 FOR IP): Performed by: NURSE PRACTITIONER

## 2023-12-30 PROCEDURE — 36415 COLL VENOUS BLD VENIPUNCTURE: CPT

## 2023-12-30 PROCEDURE — 99223 1ST HOSP IP/OBS HIGH 75: CPT | Performed by: INTERNAL MEDICINE

## 2023-12-30 RX ORDER — MIRTAZAPINE 15 MG/1
15 TABLET, ORALLY DISINTEGRATING ORAL NIGHTLY
Qty: 30 TABLET | Refills: 3 | Status: SHIPPED | OUTPATIENT
Start: 2023-12-30

## 2023-12-30 RX ORDER — POTASSIUM CHLORIDE 20 MEQ/1
40 TABLET, EXTENDED RELEASE ORAL ONCE
Status: COMPLETED | OUTPATIENT
Start: 2023-12-30 | End: 2023-12-30

## 2023-12-30 RX ORDER — POTASSIUM CHLORIDE 20 MEQ/1
40 TABLET, EXTENDED RELEASE ORAL ONCE
Status: DISCONTINUED | OUTPATIENT
Start: 2023-12-30 | End: 2023-12-30

## 2023-12-30 RX ORDER — AMOXICILLIN 500 MG/1
500 CAPSULE ORAL EVERY 8 HOURS SCHEDULED
Qty: 11 CAPSULE | Refills: 0 | Status: SHIPPED | OUTPATIENT
Start: 2023-12-30 | End: 2024-01-03

## 2023-12-30 RX ORDER — MIRTAZAPINE 15 MG/1
15 TABLET, ORALLY DISINTEGRATING ORAL NIGHTLY
Status: DISCONTINUED | OUTPATIENT
Start: 2023-12-30 | End: 2023-12-30 | Stop reason: HOSPADM

## 2023-12-30 RX ADMIN — SODIUM CHLORIDE: 9 INJECTION, SOLUTION INTRAVENOUS at 20:30

## 2023-12-30 RX ADMIN — APIXABAN 5 MG: 5 TABLET, FILM COATED ORAL at 20:28

## 2023-12-30 RX ADMIN — METOPROLOL SUCCINATE 25 MG: 25 TABLET, EXTENDED RELEASE ORAL at 08:59

## 2023-12-30 RX ADMIN — APIXABAN 5 MG: 5 TABLET, FILM COATED ORAL at 08:58

## 2023-12-30 RX ADMIN — FUROSEMIDE 20 MG: 20 TABLET ORAL at 08:59

## 2023-12-30 RX ADMIN — AMOXICILLIN 500 MG: 250 CAPSULE ORAL at 14:09

## 2023-12-30 RX ADMIN — TAMSULOSIN HYDROCHLORIDE 0.4 MG: 0.4 CAPSULE ORAL at 08:59

## 2023-12-30 RX ADMIN — POTASSIUM CHLORIDE 40 MEQ: 1500 TABLET, EXTENDED RELEASE ORAL at 14:09

## 2023-12-30 RX ADMIN — SODIUM CHLORIDE: 9 INJECTION, SOLUTION INTRAVENOUS at 00:48

## 2023-12-30 RX ADMIN — ROSUVASTATIN CALCIUM 10 MG: 10 TABLET, FILM COATED ORAL at 08:59

## 2023-12-30 RX ADMIN — MIRTAZAPINE 15 MG: 15 TABLET, ORALLY DISINTEGRATING ORAL at 20:29

## 2023-12-30 RX ADMIN — AMOXICILLIN 500 MG: 250 CAPSULE ORAL at 05:38

## 2023-12-30 RX ADMIN — POTASSIUM CHLORIDE 10 MEQ: 750 TABLET, EXTENDED RELEASE ORAL at 08:59

## 2023-12-30 ASSESSMENT — PAIN SCALES - GENERAL: PAINLEVEL_OUTOF10: 0

## 2023-12-30 NOTE — DISCHARGE SUMMARY
Discharge Summary     Patient ID:  Stacie Garcia  [id number]  78 y.o. 1945 male  Tiffany Pereira MD        Admit date: 12/26/2023    Discharge date and time:  12/30/2023  3:19 PM      Activity level: As tolerated  Diet: General with protein supplements  Labs: None   Disposition:SNF  Condition on Discharge: Stable  DME: None     Admit Diagnoses:   Patient Active Problem List   Diagnosis    SAH (subarachnoid hemorrhage) ([address])    Fall at home    Urinary tract infection due to Pseudomonas aeruginosa    Pyelonephritis    Sepsis ([address])    Benign essential hypertension    Benign prostatic hyperplasia without urinary obstruction    Hypothyroidism    Sleep apnea    Bacteremia due to Gram-negative bacteria    Severe Alzheimer's dementia without behavioral disturbance, psychotic disturbance, mood disturbance, or anxiety (HCC)    Severe protein-calorie malnutrition (HCC)    Duong catheter problem (HCC)    Urinary retention    Acute prostatitis    Confusion    Acute cystitis with hematuria    UTI (urinary tract infection)    Acute on chronic diastolic CHF (congestive heart failure) (HCC)    Pulmonary embolism (HCC)    COVID-19    Failure to thrive in adult       Discharge Diagnoses: Principal Problem:    Failure to thrive in adult  Active Problems:    Pulmonary embolism ([address])    COVID-19    Benign essential hypertension    Hypothyroidism    Sleep apnea    Severe Alzheimer's dementia without behavioral disturbance, psychotic disturbance, mood disturbance, or anxiety (HCC)    Severe protein-calorie malnutrition (HCC)    UTI (urinary tract infection)  Resolved Problems:    * No resolved hospital problems. *      Consults:  IP CONSULT TO INTERNAL MEDICINE  IP CONSULT TO SOCIAL WORK  IP CONSULT TO HEART FAILURE NURSE/COORDINATOR  IP CONSULT TO HOME CARE NEEDS  IP CONSULT TO CARDIOLOGY    Procedures: None     Hospital Course: History of Present Illness  Rajendra Jones is a 78y.o. year old male.   Patient not eating or drinking the

## 2023-12-30 NOTE — CONSULTS
CARDIOLOGY ATTENDING ATTESTATION   I spent > 51% of the total time involved with completing the encounter. The total time included the following:  Independently interviewing the patient (HPI, ROS, PMH, PSH, FMH, SH, allergies, and medications)  Independently performing a medically appropriate examination  Reviewing the above documentation completed by the PANFILO  Ordering medications, tests, and/or procedures  Formulating the assessment/plan and reviewing the rationale for the above recommendations  Reviewing available records, results of all previously ordered testing/procedures, and current problem list  Counseling/educating the patient  Coordinating care with other healthcare professionals  Communicating results to the patient's family/caregiver  Documenting clinical information in the patient's electronic health record    Physical Exam   /65   Pulse 72   Temp 97.9 °F (36.6 °C) (Oral)   Resp 16   Ht 1.778 m (5' 10\")   Wt 84.1 kg (185 lb 6 oz)   SpO2 94%   BMI 26.60 kg/m²   Constitutional: Oriented to person, place, and time. Well-developed and well-nourished. No distress.    Head: Normocephalic and atraumatic.   Eyes: EOM are normal. Pupils are equal, round, and reactive to light.   Neck: Normal range of motion. Neck supple. No hepatojugular reflux and no JVD present. Carotid bruit is not present. No tracheal deviation present. No thyromegaly present.   Cardiovascular: Normal rate, regular rhythm, normal heart sounds and intact distal pulses.  Exam reveals no gallop and no friction rub.  No murmur heard.  Pulmonary/Chest: Effort normal and breath sounds normal. No respiratory distress. No wheezes. No rales. No tenderness.   Abdominal: Soft. Bowel sounds are normal. No distension and no mass. No tenderness. No rebound and no guarding.   Musculoskeletal: Normal range of motion. No edema and no tenderness.   Lymphadenopathy:   No cervical adenopathy.   Neurological: Alert and oriented to person, place, and  Severe    calcification of the aorto-mitral curtain. - The patient has not had a prior CC echocardiographic exam for comparison. Cardiac cath [facility] 7/3/2023  LMT: The proximal LMT is narrowed 30 % - focal disease and moderate diffuse   disease. Additional Comment: The LMT gives rise to the LAD and LCx arteries. There is a   moderate (30%), calcified stenosis in the proximal portion of the vessel. LAD:   The proximal LAD is narrowed 70 % - focal disease, ectatic and moderate   diffuse disease. Additional Comment: The LAD is a large caliber vessel that gives rise to a large   caliber D1 branch and additional small caliber diagonal branches before continuing   on to the apex. There is a severe (70%), calcified stenosis in the proximal   portion prior to and extending past the original of the first diagonal branch. LCX: The proximal circumflex is narrowed 80 % - severe diffuse disease and   severely calcified. Additional Comment: The left circumflex is a large caliber vessel that gives rise to a small caliber, high OM1, large caliber OM2 and large caliber OM3 branch. There is a severe (80%), heavily calcified stenosis in the proximal portion of the   vessel as well as a severe (70%) stenosis in the OM2 branch. RAMUS: Ramus Status: Not Applicable. RCA:   There was severe diffuse disease. The mid RCA is narrowed 90 % - focal disease. The acute marginal RCA - focal disease. Additional Comment: The RCA is a dominant vessel that is severely diseased. There  is a severe (90%) stenosis in the mid portion of the RCA as well as a subtotal   occlusion (99%) stenosis involving the proximal portion of a high acute   marginal/RV branch. See accompanying documentation for full consult.     ASSESSMENT AND PLAN:  Patient Active Problem List   Diagnosis    SAH (subarachnoid hemorrhage) ([address])    Fall at home    Urinary tract infection due to Pseudomonas aeruginosa    Pyelonephritis    Sepsis ([address])    Benign

## 2023-12-30 NOTE — CONSULTS
INPATIENT CARDIOLOGY CONSULT     Reason for Consult: AF RVR, PVCs    Cardiologist: Dr. Cayden Leigh    Requesting Physician: Dr. Jennifer Jennings    Date of Consultation: 12/30/2023    HISTORY OF PRESENT ILLNESS:   Patient is an 78year old male known to Dr. Caryle Man. He also follows with [facility]. 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 PERTINENT ENCOUNTERS:  Hospital admission March 2023 after a mechanical fall at home. Mechanical fall with questionable syncope 3/2023 resulting in R occipital and posterior R temporal lobe subarachnoid hemorrhage with thin subdural hematoma along the R tentorium, as well as midline shift on Head CT. Evaluated by neurosurgery, recommended holding aspirin with no plans for surgical intervention. Due to elevated troponin, cardiology was consulted. TTE revealed mildly reduced LV function with EF 45 to 50%, as well as mild to moderate MR, trace to mild AI and severe AS. Recommended outpatient ischemic evaluation/cardiac catheterization given acute issues once cleared by neurosurgery. Noted to have short runs of SVT versus AT, nonsustained. Initiated on Toprol-XL 25 mg daily, Losartan 25 mg daily if blood pressure allows. Discontinue nifedipine and HCTZ. 14-day event monitor. Discharged on the following cardiac medications: Toprol-XL 25 mg daily, Losartan 100 mg daily. CT surgery OV 4/13/2023. Severe symptomatic AS.   Given his

## 2023-12-30 NOTE — PROGRESS NOTES
Internal Medicine Progress Note    Patient's name: Jeannie Ramsay  : 1945  Chief complaints (on day of admission): Positive For Covid-19 (Tested positive for covid 3 days ago, increased weakness per daughter who he lives with. Normally uses walker to get around and has been in bed x 3 days. Not eating or drinking/EMS bgl 138) and Dehydration  Admission date: 2023  Date of service: 2023   Room: [address] MED SURG/TELE  Primary care physician: Daniel Leo MD  Reason for visit: Follow-up for covid 19 infection    Subjective  Conrado Perry was seen and examined. Patient laying in bed. He does not want to eat the food stating that everything tastes horrible. He remains weak. Review of Systems  There are no new complaints of chest pain, shortness of breath, abdominal pain, nausea, vomiting, diarrhea, constipation.     Hospital Medications  Current Facility-Administered Medications   Medication Dose Route Frequency Provider Last Rate Last Admin    amoxicillin (AMOXIL) capsule 500 mg  500 mg Oral 3 times per day Natalya Adamson MD   500 mg at 23 0538    furosemide (LASIX) tablet 20 mg  20 mg Oral Daily Gae Primitivo, APRN - CNP   20 mg at 23 4805    metoprolol succinate (TOPROL XL) extended release tablet 25 mg  25 mg Oral QAM Gae Primitivo, APRN - CNP   25 mg at 23 0952    potassium chloride (KLOR-CON M) extended release tablet 10 mEq  10 mEq Oral Daily Gae Primitivo, APRN - CNP   10 mEq at 23 0952    rosuvastatin (CRESTOR) tablet 10 mg  10 mg Oral QAM Gae Primitivo, APRN - CNP   10 mg at 23 0952    tamsulosin (FLOMAX) capsule 0.4 mg  0.4 mg Oral QAM Gae Primitivo, APRN - CNP   0.4 mg at 23 4637    apixaban (ELIQUIS) tablet 5 mg  5 mg Oral BID Gae Primitivo, APRN - CNP   5 mg at 23    sodium chloride flush 0.9 % injection 10 mL  10 mL IntraVENous 2 times per day Gae Primitivo, APRN - CNP   10 mL at 23    sodium chloride

## 2023-12-30 NOTE — PLAN OF CARE
Problem: Discharge Planning  Goal: Discharge to home or other facility with appropriate resources  Outcome: Progressing  Flowsheets  Taken 12/29/2023 2226 by Mira Meckel, RN  Discharge to home or other facility with appropriate resources: Refer to discharge planning if patient needs post-hospital services based on physician order or complex needs related to functional status, cognitive ability or social support system  Taken 12/29/2023 1003 by Brayden Parra RN  Discharge to home or other facility with appropriate resources: Refer to discharge planning if patient needs post-hospital services based on physician order or complex needs related to functional status, cognitive ability or social support system     Problem: Skin/Tissue Integrity  Goal: Absence of new skin breakdown  Description: 1. Monitor for areas of redness and/or skin breakdown  2. Assess vascular access sites hourly  3. Every 4-6 hours minimum:  Change oxygen saturation probe site  4. Every 4-6 hours:  If on nasal continuous positive airway pressure, respiratory therapy assess nares and determine need for appliance change or resting period.   Outcome: Progressing     Problem: Safety - Adult  Goal: Free from fall injury  Outcome: Progressing     Problem: Chronic Conditions and Co-morbidities  Goal: Patient's chronic conditions and co-morbidity symptoms are monitored and maintained or improved  Outcome: Progressing  Flowsheets  Taken 12/29/2023 2226 by Mira Meckel, [facility] - Patient's Chronic Conditions and Co-Morbidity Symptoms are Monitored and Maintained or Improved: Monitor and assess patient's chronic conditions and comorbid symptoms for stability, deterioration, or improvement  Taken 12/29/2023 1003 by Vinod Cullen [facility] - Patient's Chronic Conditions and Co-Morbidity Symptoms are Monitored and Maintained or Improved: Monitor and assess patient's chronic conditions and comorbid symptoms for stability, deterioration, or improvement

## 2023-12-30 NOTE — PROGRESS NOTES
Dr. David Bridges notified of cardio ok to dc and precert obtained. Electronically signed by Smooth Reyes RN on 12/30/2023 at 3:09 PM    Transport setup with [facility] - ETA 3 to 4 hours : 7-8pm    Emergency Contact Stacie, patient, and primary RN updated.      Electronically signed by Smooth Reyes RN on 12/30/2023 at 4:26 PM

## 2023-12-31 NOTE — PLAN OF CARE
Problem: Discharge Planning  Goal: Discharge to home or other facility with appropriate resources  12/30/2023 2140 by Damari Whitt RN  Outcome: Completed  12/30/2023 2139 by Damari Whitt RN  Outcome: Adequate for Discharge  Flowsheets (Taken 12/30/2023 2137)  Discharge to home or other facility with appropriate resources: Refer to discharge planning if patient needs post-hospital services based on physician order or complex needs related to functional status, cognitive ability or social support system     Problem: Skin/Tissue Integrity  Goal: Absence of new skin breakdown  Description: 1.  Monitor for areas of redness and/or skin breakdown  2.  Assess vascular access sites hourly  3.  Every 4-6 hours minimum:  Change oxygen saturation probe site  4.  Every 4-6 hours:  If on nasal continuous positive airway pressure, respiratory therapy assess nares and determine need for appliance change or resting period.  12/30/2023 2140 by Damari Whitt RN  Outcome: Completed  12/30/2023 2139 by Damari Whitt RN  Outcome: Adequate for Discharge     Problem: Safety - Adult  Goal: Free from fall injury  12/30/2023 2140 by Damari Whitt RN  Outcome: Completed  12/30/2023 2139 by Damari Whitt RN  Outcome: Adequate for Discharge     Problem: Chronic Conditions and Co-morbidities  Goal: Patient's chronic conditions and co-morbidity symptoms are monitored and maintained or improved  12/30/2023 2140 by Damari Whitt RN  Outcome: Completed  12/30/2023 2139 by Damari Whitt RN  Outcome: Adequate for Discharge  Flowsheets (Taken 12/30/2023 2137)  Care Plan - Patient's Chronic Conditions and Co-Morbidity Symptoms are Monitored and Maintained or Improved: Monitor and assess patient's chronic conditions and comorbid symptoms for stability, deterioration, or improvement     Problem: Pain  Goal: Verbalizes/displays adequate comfort level or baseline comfort level  12/30/2023 2140 by

## 2023-12-31 NOTE — PLAN OF CARE
Problem: Discharge Planning  Goal: Discharge to home or other facility with appropriate resources  Outcome: Adequate for Discharge  Flowsheets (Taken 12/30/2023 2137)  Discharge to home or other facility with appropriate resources: Refer to discharge planning if patient needs post-hospital services based on physician order or complex needs related to functional status, cognitive ability or social support system     Problem: Skin/Tissue Integrity  Goal: Absence of new skin breakdown  Description: 1.  Monitor for areas of redness and/or skin breakdown  2.  Assess vascular access sites hourly  3.  Every 4-6 hours minimum:  Change oxygen saturation probe site  4.  Every 4-6 hours:  If on nasal continuous positive airway pressure, respiratory therapy assess nares and determine need for appliance change or resting period.  Outcome: Adequate for Discharge     Problem: Safety - Adult  Goal: Free from fall injury  Outcome: Adequate for Discharge     Problem: Chronic Conditions and Co-morbidities  Goal: Patient's chronic conditions and co-morbidity symptoms are monitored and maintained or improved  Outcome: Adequate for Discharge  Flowsheets (Taken 12/30/2023 2137)  Care Plan - Patient's Chronic Conditions and Co-Morbidity Symptoms are Monitored and Maintained or Improved: Monitor and assess patient's chronic conditions and comorbid symptoms for stability, deterioration, or improvement     Problem: Pain  Goal: Verbalizes/displays adequate comfort level or baseline comfort level  Outcome: Adequate for Discharge

## 2024-01-02 NOTE — PROGRESS NOTES
Physician Progress Note      PATIENT:               SAMI ULLOA  CSN #:                  [id number]  :                       1945  ADMIT DATE:       2023 7:11 PM  DISCH DATE:        2023 9:41 PM  RESPONDING  PROVIDER #:        Christi Gandhi DO          QUERY TEXT:    Patient admitted with COVID 19. Documentation reflects Urinary tract infection   associated with indwelling urethral catheter per ED note. If possible, please   document in the progress notes and discharge summary if Urinary tract   infection associated with indwelling urethral catheter was:    The medical record reflects the following:  Risk Factors: chronic samuel poa  Clinical Indicators: UA with moderate leukocytes/+ nitrites, UC >100,000   Klebsiella, per ED \"...Urinary tract infection associated with indwelling   urethral catheter...\"  Treatment: IV Ceftriaxone    Thank you,  Tea Cobb RN, BSN, CDIS  Clinical Documentation Integrity  [email or web address]  Options provided:  -- Urinary tract infection associated with indwelling urethral catheter   confirmed after study  -- Urinary tract infection associated with indwelling urethral catheter ruled   out after study  -- Other - I will add my own diagnosis  -- Disagree - Not applicable / Not valid  -- Disagree - Clinically unable to determine / Unknown  -- Refer to Clinical Documentation Reviewer    PROVIDER RESPONSE TEXT:    Urinary tract infection associated with indwelling urethral catheter confirmed   after study.    Query created by: Tea Cobb 2023 2:02 PM      Electronically signed by:  Christi Gandhi DO 2024 8:20 AM

## 2024-01-04 ENCOUNTER — OUTSIDE SERVICES (OUTPATIENT)
Dept: PRIMARY CARE CLINIC | Age: 79
End: 2024-01-04

## 2024-01-04 ENCOUNTER — TELEPHONE (OUTPATIENT)
Dept: CARDIOLOGY CLINIC | Age: 79
End: 2024-01-04

## 2024-01-04 DIAGNOSIS — R62.7 FAILURE TO THRIVE IN ADULT: ICD-10-CM

## 2024-01-04 DIAGNOSIS — U07.1 COVID: Primary | ICD-10-CM

## 2024-01-04 DIAGNOSIS — F02.C0 SEVERE ALZHEIMER'S DEMENTIA WITHOUT BEHAVIORAL DISTURBANCE, PSYCHOTIC DISTURBANCE, MOOD DISTURBANCE, OR ANXIETY, UNSPECIFIED TIMING OF DEMENTIA ONSET (HCC): ICD-10-CM

## 2024-01-04 DIAGNOSIS — E43 SEVERE PROTEIN-CALORIE MALNUTRITION (HCC): ICD-10-CM

## 2024-01-04 DIAGNOSIS — G30.9 SEVERE ALZHEIMER'S DEMENTIA WITHOUT BEHAVIORAL DISTURBANCE, PSYCHOTIC DISTURBANCE, MOOD DISTURBANCE, OR ANXIETY, UNSPECIFIED TIMING OF DEMENTIA ONSET (HCC): ICD-10-CM

## 2024-01-04 DIAGNOSIS — E03.9 ACQUIRED HYPOTHYROIDISM: ICD-10-CM

## 2024-01-04 DIAGNOSIS — R53.1 WEAKNESS: ICD-10-CM

## 2024-01-04 DIAGNOSIS — N39.0 URINARY TRACT INFECTION WITHOUT HEMATURIA, SITE UNSPECIFIED: ICD-10-CM

## 2024-01-04 DIAGNOSIS — I10 PRIMARY HYPERTENSION: ICD-10-CM

## 2024-01-04 NOTE — TELEPHONE ENCOUNTER
Patient's daughter (Stacie) called back and left a voicemail stating that she did not wish to schedule at this time, she will call after patient is released from rehab.

## 2024-01-09 ENCOUNTER — TELEPHONE (OUTPATIENT)
Dept: OTHER | Age: 79
End: 2024-01-09

## 2024-01-09 ENCOUNTER — HOSPITAL ENCOUNTER (OUTPATIENT)
Dept: OTHER | Age: 79
Setting detail: THERAPIES SERIES
Discharge: HOME | End: 2024-01-09
Payer: MEDICARE

## 2024-01-09 VITALS
RESPIRATION RATE: 18 BRPM | BODY MASS INDEX: 24.82 KG/M2 | SYSTOLIC BLOOD PRESSURE: 98 MMHG | DIASTOLIC BLOOD PRESSURE: 58 MMHG | WEIGHT: 173 LBS | HEART RATE: 67 BPM | OXYGEN SATURATION: 98 %

## 2024-01-09 LAB
ANION GAP SERPL CALCULATED.3IONS-SCNC: 13 MMOL/L (ref 7–16)
BNP SERPL-MCNC: 4928 PG/ML (ref 0–450)
BUN SERPL-MCNC: 14 MG/DL (ref 6–23)
CALCIUM SERPL-MCNC: 10.3 MG/DL (ref 8.6–10.2)
CHLORIDE SERPL-SCNC: 103 MMOL/L (ref 98–107)
CO2 SERPL-SCNC: 20 MMOL/L (ref 22–29)
CREAT SERPL-MCNC: 0.9 MG/DL (ref 0.7–1.2)
GFR SERPL CREATININE-BSD FRML MDRD: >60 ML/MIN/1.73M2
GLUCOSE SERPL-MCNC: 87 MG/DL (ref 74–99)
POTASSIUM SERPL-SCNC: 4 MMOL/L (ref 3.5–5)
SODIUM SERPL-SCNC: 136 MMOL/L (ref 132–146)

## 2024-01-09 PROCEDURE — 36415 COLL VENOUS BLD VENIPUNCTURE: CPT

## 2024-01-09 PROCEDURE — 83880 ASSAY OF NATRIURETIC PEPTIDE: CPT

## 2024-01-09 PROCEDURE — 99205 OFFICE O/P NEW HI 60 MIN: CPT

## 2024-01-09 PROCEDURE — 80048 BASIC METABOLIC PNL TOTAL CA: CPT

## 2024-01-09 RX ORDER — ENEMA 19; 7 G/133ML; G/133ML
1 ENEMA RECTAL PRN
COMMUNITY

## 2024-01-09 RX ORDER — METOPROLOL SUCCINATE 25 MG/1
25 TABLET, EXTENDED RELEASE ORAL NIGHTLY
COMMUNITY

## 2024-01-09 RX ORDER — LOSARTAN POTASSIUM 100 MG/1
50 TABLET ORAL DAILY
Qty: 30 TABLET | Status: SHIPPED | COMMUNITY
Start: 2024-01-09

## 2024-01-09 RX ORDER — ACETAMINOPHEN 325 MG/1
650 TABLET ORAL EVERY 4 HOURS PRN
COMMUNITY

## 2024-01-09 RX ORDER — BISACODYL 5 MG/1
5 TABLET, DELAYED RELEASE ORAL DAILY PRN
COMMUNITY

## 2024-01-09 RX ORDER — M-VIT,TX,IRON,MINS/CALC/FOLIC 27MG-0.4MG
1 TABLET ORAL DAILY
COMMUNITY

## 2024-01-09 ASSESSMENT — PATIENT HEALTH QUESTIONNAIRE - PHQ9
1. LITTLE INTEREST OR PLEASURE IN DOING THINGS: NOT AT ALL
2. FEELING DOWN, DEPRESSED OR HOPELESS: NOT AT ALL
SUM OF ALL RESPONSES TO PHQ9 QUESTIONS 1 & 2: 0

## 2024-01-09 NOTE — DISCHARGE INSTRUCTIONS
INFORMATION:  Avoid getting sick from colds and the flu. Stay away from friends or family that you know may have a contagious illness  Get plenty of rest   Get a flu shot each year.  Get a pneumococcal vaccine shot. If you have had one before, ask your doctor whether you need another dose.       My Goal for Self-management of Heart Failure Includes 5 steps :    1. Notice a change in symptoms ( weight gain, short of breath, leg swelling, decreased activity level, bloating....)    2. Evaluate the change: (use the Heart Failure Zones )     3. Decide to take action: decide what your options are, such as: (call your doctor for an extra visit, take a prescribed medication, such as your water pill if your doctor has given you directions to do so, CALL YOUR DOCTOR)    4. Come up with a strategy:  (now you call the doctor for advice / appointment. This is where you take action!!!  Do not wait, catch the symptom early and treat it before it worsens.    5. Evaluate the response: The next day, check your Heart Failure Zones: are you in the GREEN ZONE (safe zone)?  Worsening symptoms of YELLOW ZONE? Or have you moved to the RED ZONE and need to call 911 or go to the Emergency Room for evaluation? Call your doctor's office to update them on your symptoms of heart failure.          Learning About Heart Failure Zones  What are heart failure zones?     Heart failure zones give you an easy way to see changes in your heart failure symptoms. They also tell you when you need to get help. Check every day to see which zone you are in.  Green zone. You are doing well. This is where you want to be.  Your weight is stable. It's not going up or down.  You breathe easily.  You are sleeping well. You are able to lie flat without shortness of breath.  You can do your usual activities.  Yellow zone. Be careful. Your symptoms are changing. Call your doctor.  You have new or increased shortness of breath.  You are dizzy or lightheaded, or you feel

## 2024-01-09 NOTE — TELEPHONE ENCOUNTER
Patient's daughter Stacie called to cancel [facility] CHF consultation today. She says she will call back to reschedule as her dad will soon be released from [facility] and they did not have this appointment listed for today and no transport but she anticipates him being released later this week.    Electronically signed by Christi Alvarez RN on 1/9/2024 at 9:29 AM

## 2024-01-09 NOTE — PROGRESS NOTES
Congestive Heart Failure Clinic   [facility]      Referring Provider: Curly Chaudhry, APRN-CNP  Primary Care Physician: Sage Easley Jr., MD   Cardiologist: Dr. Pham  Nephrologist:       HISTORY OF PRESENT ILLNESS:     Jona Hobbs is a 78 y.o. 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    In the CHF clinic today he denies any adverse symptoms except:  [] Dizziness or lightheadedness   [] Syncope or near syncope  [x] Recent Fall  [] Shortness of breath at rest   [x] Dyspnea with exertion  [] Decline in functional capacity (unable to perform activities they had previously been able to do)  [x] Fatigue   [] Orthopnea  [] PND  [] Nocturnal cough  [] Hemoptysis  [] Chest pain, pressure, or discomfort  [] Palpitations  [] Abdominal distention  [] Abdominal bloating  [] Early satiety  [] Blood in stool   [] Diarrhea  [] Constipation  [] Nausea/Vomiting  [] Decreased urinary response to oral diuretic   [] Scrotal swelling   [] Lower extremity edema  [] Used PRN doses of oral diuretic   [] Weight gain    Wt Readings from Last 3 Encounters:   01/09/24 78.5 kg (173 lb)   12/30/23 84.1 kg (185 lb 6 oz)   12/11/23 79.2 kg (174 lb 9.7 oz)           SOCIAL HISTORY:  [x] Denies tobacco, alcohol or illicit drug abuse  [] Tobacco use:  [] ETOH use:  [] Illicit drug use:        MEDICATIONS:    Allergies   Allergen Reactions    Cipro [Ciprofloxacin Hcl] Other (See Comments)     UNKNOWN REACTION    Hydrocodone-Acetaminophen Other (See Comments)     UNKNOWN REACTION    Quinapril Other (See Comments)     UNKNOWN REACTION    Tylenol [Acetaminophen] Other (See Comments)     UNKNOWN REACTION     Prior to Visit Medications    Medication Sig Taking? Authorizing Provider   bisacodyl (DULCOLAX) 5 MG EC tablet Take

## 2024-01-09 NOTE — RESULT ENCOUNTER NOTE
CHF clinic visit and labs reviewed.     BP 98/58  Pulse 67    BNP 89833>>78578>>4928    BUN 16>>11>>14  Cr 0.8>>0.7>>0.6   Potassium 4.0     Decrease losartan to 50 mg daily due to hypotension with severe AS     Follow up as scheduled.     Thank you!

## 2024-01-10 ASSESSMENT — ENCOUNTER SYMPTOMS: COUGH: 1

## 2024-01-10 ASSESSMENT — VISUAL ACUITY: OU: 1

## 2024-01-10 NOTE — PROGRESS NOTES
Visit Date: 1/4/24  Jona Hobbs  1945  male 78 y.o.      Subjective:    CC: Patient presents with uti and weakness and covid. Patient presents for follow up of htn, hyperlipidemia, and bph.    HPI:  Urinary Tract Infection  This is a new (admitted to the hospital for altered mental status he was found to have uti started on antibiotics) problem. The current episode started in the past 7 days. The problem has been gradually improving since onset.   Hypertension  This is a chronic problem. The current episode started more than 1 year ago. The problem is unchanged. The problem is controlled. There are no associated agents to hypertension. Risk factors for coronary artery disease include obesity and male gender. Treatments tried: taking medications no medication side effects. The current treatment provides mild improvement. There are no compliance problems.  Identifiable causes of hypertension include a thyroid problem.   Thyroid Problem  Presents for follow-up visit. The symptoms have been stable. His past medical history is significant for dementia.   Benign Prostatic Hypertrophy  This is a chronic problem. The current episode started more than 1 year ago. The problem has been waxing and waning since onset.   Extremity Weakness  This is a new problem. The current episode started in the past 7 days. The problem occurs constantly. The problem has been unchanged. Associated symptoms include coughing. Nothing aggravates the symptoms. Treatments tried: pt/ot eval and treat.   Cough  Chronicity: COVID+ The current episode started in the past 7 days. The problem has been gradually improving. The treatment provided mild relief.   Memory Loss    Patient reports onset of memory loss was more than 1 year ago. Onset quality is gradual.     Symptoms associated with memory loss include changes in short-term memory and changes in long-term memory.     Patient lives with adult children.       ROS:  Review of Systems   Unable

## 2024-01-11 ENCOUNTER — OUTSIDE SERVICES (OUTPATIENT)
Dept: PRIMARY CARE CLINIC | Age: 79
End: 2024-01-11
Payer: MEDICARE

## 2024-01-11 DIAGNOSIS — N40.1 BENIGN PROSTATIC HYPERPLASIA WITH LOWER URINARY TRACT SYMPTOMS, SYMPTOM DETAILS UNSPECIFIED: ICD-10-CM

## 2024-01-11 DIAGNOSIS — R62.7 FAILURE TO THRIVE IN ADULT: ICD-10-CM

## 2024-01-11 DIAGNOSIS — N30.00 ACUTE CYSTITIS WITHOUT HEMATURIA: ICD-10-CM

## 2024-01-11 DIAGNOSIS — I10 PRIMARY HYPERTENSION: ICD-10-CM

## 2024-01-11 DIAGNOSIS — E03.9 HYPOTHYROIDISM, UNSPECIFIED TYPE: ICD-10-CM

## 2024-01-11 DIAGNOSIS — G30.9 SEVERE ALZHEIMER'S DEMENTIA WITHOUT BEHAVIORAL DISTURBANCE, PSYCHOTIC DISTURBANCE, MOOD DISTURBANCE, OR ANXIETY, UNSPECIFIED TIMING OF DEMENTIA ONSET (HCC): ICD-10-CM

## 2024-01-11 DIAGNOSIS — R53.1 WEAKNESS: Primary | ICD-10-CM

## 2024-01-11 DIAGNOSIS — F02.C0 SEVERE ALZHEIMER'S DEMENTIA WITHOUT BEHAVIORAL DISTURBANCE, PSYCHOTIC DISTURBANCE, MOOD DISTURBANCE, OR ANXIETY, UNSPECIFIED TIMING OF DEMENTIA ONSET (HCC): ICD-10-CM

## 2024-01-11 DIAGNOSIS — E43 SEVERE PROTEIN-CALORIE MALNUTRITION (HCC): ICD-10-CM

## 2024-01-11 DIAGNOSIS — N13.9 ACUTE UNILATERAL OBSTRUCTIVE UROPATHY: ICD-10-CM

## 2024-01-11 DIAGNOSIS — E03.9 ACQUIRED HYPOTHYROIDISM: ICD-10-CM

## 2024-01-11 PROCEDURE — 99309 SBSQ NF CARE MODERATE MDM 30: CPT | Performed by: INTERNAL MEDICINE

## 2024-01-15 ENCOUNTER — OUTSIDE SERVICES (OUTPATIENT)
Dept: PRIMARY CARE CLINIC | Age: 79
End: 2024-01-15

## 2024-01-15 DIAGNOSIS — E03.9 ACQUIRED HYPOTHYROIDISM: ICD-10-CM

## 2024-01-15 DIAGNOSIS — F02.C0 SEVERE ALZHEIMER'S DEMENTIA WITHOUT BEHAVIORAL DISTURBANCE, PSYCHOTIC DISTURBANCE, MOOD DISTURBANCE, OR ANXIETY, UNSPECIFIED TIMING OF DEMENTIA ONSET (HCC): ICD-10-CM

## 2024-01-15 DIAGNOSIS — E03.9 HYPOTHYROIDISM, UNSPECIFIED TYPE: ICD-10-CM

## 2024-01-15 DIAGNOSIS — I10 PRIMARY HYPERTENSION: ICD-10-CM

## 2024-01-15 DIAGNOSIS — G30.9 SEVERE ALZHEIMER'S DEMENTIA WITHOUT BEHAVIORAL DISTURBANCE, PSYCHOTIC DISTURBANCE, MOOD DISTURBANCE, OR ANXIETY, UNSPECIFIED TIMING OF DEMENTIA ONSET (HCC): ICD-10-CM

## 2024-01-15 DIAGNOSIS — N40.1 BENIGN PROSTATIC HYPERPLASIA WITH LOWER URINARY TRACT SYMPTOMS, SYMPTOM DETAILS UNSPECIFIED: ICD-10-CM

## 2024-01-15 DIAGNOSIS — R53.1 WEAKNESS: Primary | ICD-10-CM

## 2024-01-15 DIAGNOSIS — R62.7 FAILURE TO THRIVE IN ADULT: ICD-10-CM

## 2024-01-15 DIAGNOSIS — E43 SEVERE PROTEIN-CALORIE MALNUTRITION (HCC): ICD-10-CM

## 2024-01-17 ENCOUNTER — HOSPITAL ENCOUNTER (OUTPATIENT)
Dept: OTHER | Age: 79
Setting detail: THERAPIES SERIES
Discharge: HOME | End: 2024-01-17
Payer: MEDICARE

## 2024-01-17 NOTE — PROGRESS NOTES
Visit Date: 1/11/24  Jona Hobbs  1945  male 78 y.o.      Subjective:    CC: Patient presents with weakness . Patient presents for follow up of htn, hyperlipidemia, and bph.    HPI:  Hypertension  This is a chronic problem. The current episode started more than 1 year ago. The problem is unchanged. The problem is controlled. There are no associated agents to hypertension. Risk factors for coronary artery disease include obesity and male gender. Treatments tried: taking medications no medication side effects. The current treatment provides mild improvement. There are no compliance problems.  Identifiable causes of hypertension include a thyroid problem.   Thyroid Problem  Presents for follow-up visit. The symptoms have been stable. His past medical history is significant for dementia.   Benign Prostatic Hypertrophy  This is a chronic problem. The current episode started more than 1 year ago. The problem has been waxing and waning since onset.   Extremity Weakness  This is a new problem. The current episode started 1 to 4 weeks ago. The problem occurs constantly. The problem has been gradually improving. Nothing aggravates the symptoms. Treatments tried: working with therapy. The treatment provided mild relief.   Memory Loss    Patient reports onset of memory loss was more than 1 year ago. Onset quality is gradual.     Symptoms associated with memory loss include changes in short-term memory and changes in long-term memory.     Patient lives with adult children.       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

## 2024-01-17 NOTE — PROGRESS NOTES
Visit Date: 1/15/24  Jona Hobbs  1945  male 78 y.o.      Subjective:    CC: Patient presents with weakness . Patient presents for follow up of htn, hyperlipidemia, and bph.    HPI:  Hypertension  This is a chronic problem. The current episode started more than 1 year ago. The problem is unchanged. The problem is controlled. There are no associated agents to hypertension. Risk factors for coronary artery disease include obesity and male gender. Treatments tried: taking medications no medication side effects. The current treatment provides mild improvement. There are no compliance problems.  Identifiable causes of hypertension include a thyroid problem.   Thyroid Problem  Presents for follow-up visit. The symptoms have been stable. His past medical history is significant for dementia.   Benign Prostatic Hypertrophy  This is a chronic problem. The current episode started more than 1 year ago. The problem has been waxing and waning since onset.   Extremity Weakness  This is a new problem. The current episode started 1 to 4 weeks ago. The problem occurs constantly. The problem has been gradually improving. Nothing aggravates the symptoms. Treatments tried: working with therapy. The treatment provided mild relief.   Memory Loss    Patient reports onset of memory loss was more than 1 year ago. Onset quality is gradual.     Symptoms associated with memory loss include changes in short-term memory and changes in long-term memory.     Patient lives with adult children.       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

## 2024-01-23 ENCOUNTER — CARE COORDINATION (OUTPATIENT)
Dept: CASE MANAGEMENT | Age: 79
End: 2024-01-23

## 2024-01-23 NOTE — CARE COORDINATION
Care Transitions Post-Acute Facility Discharge Call    2024    Patient: Jona Hobbs Patient : 1945   MRN: [id number]  Reason for Admission: FTT, COVID19  Discharge Date: 23 RARS: Readmission Risk Score: 27.4    Acute Care Course:    to  MICHELLE FTT, PE, CoVID19 afib rvr, indwell catheter    HFU made:   CHF but no PCP noted., but PCP not in system    Sig Hx:   BPH, ANGUS, Alzheimer's    DME:     Conversation:   Left HIPPA compliant message regarding the nature of the call and a request for a return call with my contact information      BRIDGET Galindo, [phone]  [facility] / [facility] Transition Nurse         Follow up plan:  Will re-attempt           Care Transitions Post Acute Facility Transition    [facility]  Post Acute Admit Date: 23  Post Acute Discharge Date: 24  Do you have all of your prescriptions and are they filled?: Yes   Post Acute Services: Home Health (Comment: [facility])            Care Transitions Interventions         Future Appointments   Date Time Provider Department Center   2024 10:00 AM Sveta Chaudhry, APRN - CNP [facility]   3/13/2024 12:40 PM Yonny Pham MD [facility]     BRIDGET Galindo, RN   [facility]ino Trotter/ [facility] Transition Nurse  [phone]

## 2024-01-24 ENCOUNTER — CARE COORDINATION (OUTPATIENT)
Dept: CASE MANAGEMENT | Age: 79
End: 2024-01-24

## 2024-01-24 ENCOUNTER — HOSPITAL ENCOUNTER (OUTPATIENT)
Dept: OTHER | Age: 79
Discharge: HOME | End: 2024-01-24

## 2024-01-24 NOTE — CARE COORDINATION
Left HIPPA compliant message regarding the nature of the call and a request for a return call with my contact information      JARVIS GalindoN, [phone]  [facility] Transition Nurse

## 2024-01-26 PROBLEM — N39.0 UTI (URINARY TRACT INFECTION): Status: RESOLVED | Noted: 2023-11-10 | Resolved: 2024-01-26

## 2024-02-07 ENCOUNTER — TELEPHONE (OUTPATIENT)
Dept: OTHER | Age: 79
End: 2024-02-07

## 2024-02-07 NOTE — TELEPHONE ENCOUNTER
Patient's daughter cancelled CHF clinic appointment with nurse practitioner saying \" we have an appointment with cardiologist next month and prefer for him to mange everything\".    Electronically signed by Christi Alvarez RN on 2/7/2024 at 3:50 PM

## 2024-02-08 ENCOUNTER — HOSPITAL ENCOUNTER (OUTPATIENT)
Dept: OTHER | Age: 79
Setting detail: THERAPIES SERIES
Discharge: HOME | End: 2024-02-08

## 2024-03-12 ENCOUNTER — TELEPHONE (OUTPATIENT)
Dept: CARDIOLOGY CLINIC | Age: 79
End: 2024-03-12

## 2024-03-12 NOTE — TELEPHONE ENCOUNTER
Pt''s daughter called, she had an injury to her leg has appt for herself 03-13-24.  Will call back to r/s

## 2024-05-31 NOTE — CARE COORDINATION
I was called by nursing for admission orders. I placed orders based off of Dr. Hinds Fruits note.
Social work / Discharge Planning:          Discharge plan is home to resume services with [facility]. Family refuses AUDELIA. Will need PRESTON prior to discharge. ID is following as well as urology.     Electronically signed by FENG Riojas on 11/30/2023 at 9:47 AM
Social work / Discharge Planning:          Patient is a readmission from home with family in a one floor home. He has a walker and wc. Patient is active with  [facility] and had a past stay at [facility]. ID consulted. Per IDR, patient had a fall at home. May need PT/OT evaluations ordered when appropriate to assist in determining discharge needs.      Electronically signed by FENG Cardoso on 11/28/2023 at 10:33 AM
Social work / Discharge Planning:         [facility] updated on anticipated discharge. PRESTON order on the chart.     Electronically signed by FENG Raman on 12/1/2023 at 1:15 PM
Social work / Discharge planning:        Social work met with patient and his daughter at bedside and explained role. Daughter provided all information. Patient did not speak. He lives with his daughter in a one floor home using a walker and wc. Patient is active with [facility]. AM PAC 15/24. Social work discussed the option of AUDELIA. Daughter states patient will discharge home to resume services with [facility]. Patient will need PRESTON prior to discharge. Daughter states she will transport home.     Resource information provided regarding mobile meals per daughter request.    Electronically signed by FENG Simon on 11/29/2023 at 12:13 PM
87

## 2024-07-20 ENCOUNTER — HOSPITAL ENCOUNTER (EMERGENCY)
Age: 79
Discharge: HOME | End: 2024-07-20
Attending: STUDENT IN AN ORGANIZED HEALTH CARE EDUCATION/TRAINING PROGRAM
Payer: MEDICARE

## 2024-07-20 ENCOUNTER — APPOINTMENT (OUTPATIENT)
Dept: ULTRASOUND IMAGING | Age: 79
End: 2024-07-20
Payer: MEDICARE

## 2024-07-20 VITALS
DIASTOLIC BLOOD PRESSURE: 94 MMHG | BODY MASS INDEX: 25.05 KG/M2 | OXYGEN SATURATION: 99 % | TEMPERATURE: 97.3 F | SYSTOLIC BLOOD PRESSURE: 151 MMHG | RESPIRATION RATE: 16 BRPM | WEIGHT: 175 LBS | HEIGHT: 70 IN | HEART RATE: 62 BPM

## 2024-07-20 DIAGNOSIS — N50.82 PAIN IN SCROTUM: ICD-10-CM

## 2024-07-20 DIAGNOSIS — N45.3 EPIDIDYMO-ORCHITIS: Primary | ICD-10-CM

## 2024-07-20 LAB
ALBUMIN SERPL-MCNC: 3.4 G/DL (ref 3.5–5.2)
ALP SERPL-CCNC: 88 U/L (ref 40–129)
ALT SERPL-CCNC: 27 U/L (ref 0–40)
ANION GAP SERPL CALCULATED.3IONS-SCNC: 12 MMOL/L (ref 7–16)
AST SERPL-CCNC: 32 U/L (ref 0–39)
BACTERIA URNS QL MICRO: ABNORMAL
BASOPHILS # BLD: 0.02 K/UL (ref 0–0.2)
BASOPHILS NFR BLD: 1 % (ref 0–2)
BILIRUB SERPL-MCNC: 0.3 MG/DL (ref 0–1.2)
BILIRUB UR QL STRIP: NEGATIVE
BUN SERPL-MCNC: 21 MG/DL (ref 6–23)
CALCIUM SERPL-MCNC: 9.5 MG/DL (ref 8.6–10.2)
CHLORIDE SERPL-SCNC: 101 MMOL/L (ref 98–107)
CLARITY UR: ABNORMAL
CO2 SERPL-SCNC: 23 MMOL/L (ref 22–29)
COLOR UR: YELLOW
CREAT SERPL-MCNC: 1.2 MG/DL (ref 0.7–1.2)
EKG ATRIAL RATE: 61 BPM
EKG P AXIS: 27 DEGREES
EKG P-R INTERVAL: 212 MS
EKG Q-T INTERVAL: 452 MS
EKG QRS DURATION: 110 MS
EKG QTC CALCULATION (BAZETT): 455 MS
EKG R AXIS: -22 DEGREES
EKG T AXIS: 39 DEGREES
EKG VENTRICULAR RATE: 61 BPM
EOSINOPHIL # BLD: 0.12 K/UL (ref 0.05–0.5)
EOSINOPHILS RELATIVE PERCENT: 3 % (ref 0–6)
EPI CELLS #/AREA URNS HPF: ABNORMAL /HPF
ERYTHROCYTE [DISTWIDTH] IN BLOOD BY AUTOMATED COUNT: 17 % (ref 11.5–15)
GFR, ESTIMATED: 58 ML/MIN/1.73M2
GLUCOSE SERPL-MCNC: 94 MG/DL (ref 74–99)
GLUCOSE UR STRIP-MCNC: NEGATIVE MG/DL
HCT VFR BLD AUTO: 35.5 % (ref 37–54)
HGB BLD-MCNC: 11.3 G/DL (ref 12.5–16.5)
HGB UR QL STRIP.AUTO: ABNORMAL
IMM GRANULOCYTES # BLD AUTO: <0.03 K/UL (ref 0–0.58)
IMM GRANULOCYTES NFR BLD: 0 % (ref 0–5)
KETONES UR STRIP-MCNC: NEGATIVE MG/DL
LEUKOCYTE ESTERASE UR QL STRIP: ABNORMAL
LYMPHOCYTES NFR BLD: 0.7 K/UL (ref 1.5–4)
LYMPHOCYTES RELATIVE PERCENT: 18 % (ref 20–42)
MCH RBC QN AUTO: 26.2 PG (ref 26–35)
MCHC RBC AUTO-ENTMCNC: 31.8 G/DL (ref 32–34.5)
MCV RBC AUTO: 82.4 FL (ref 80–99.9)
MONOCYTES NFR BLD: 0.51 K/UL (ref 0.1–0.95)
MONOCYTES NFR BLD: 13 % (ref 2–12)
NEUTROPHILS NFR BLD: 66 % (ref 43–80)
NEUTS SEG NFR BLD: 2.59 K/UL (ref 1.8–7.3)
NITRITE UR QL STRIP: POSITIVE
PH UR STRIP: 6.5 [PH] (ref 5–9)
PLATELET # BLD AUTO: 222 K/UL (ref 130–450)
PMV BLD AUTO: 11.2 FL (ref 7–12)
POTASSIUM SERPL-SCNC: 4.5 MMOL/L (ref 3.5–5)
PROT SERPL-MCNC: 7.1 G/DL (ref 6.4–8.3)
PROT UR STRIP-MCNC: 100 MG/DL
RBC # BLD AUTO: 4.31 M/UL (ref 3.8–5.8)
RBC #/AREA URNS HPF: ABNORMAL /HPF
SODIUM SERPL-SCNC: 136 MMOL/L (ref 132–146)
SP GR UR STRIP: 1.02 (ref 1–1.03)
UROBILINOGEN UR STRIP-ACNC: 0.2 EU/DL (ref 0–1)
WBC #/AREA URNS HPF: ABNORMAL /HPF
WBC OTHER # BLD: 4 K/UL (ref 4.5–11.5)

## 2024-07-20 PROCEDURE — 2580000003 HC RX 258: Performed by: STUDENT IN AN ORGANIZED HEALTH CARE EDUCATION/TRAINING PROGRAM

## 2024-07-20 PROCEDURE — 87086 URINE CULTURE/COLONY COUNT: CPT

## 2024-07-20 PROCEDURE — 76870 US EXAM SCROTUM: CPT

## 2024-07-20 PROCEDURE — 96374 THER/PROPH/DIAG INJ IV PUSH: CPT

## 2024-07-20 PROCEDURE — 93005 ELECTROCARDIOGRAM TRACING: CPT | Performed by: STUDENT IN AN ORGANIZED HEALTH CARE EDUCATION/TRAINING PROGRAM

## 2024-07-20 PROCEDURE — 81001 URINALYSIS AUTO W/SCOPE: CPT

## 2024-07-20 PROCEDURE — 85025 COMPLETE CBC W/AUTO DIFF WBC: CPT

## 2024-07-20 PROCEDURE — 80053 COMPREHEN METABOLIC PANEL: CPT

## 2024-07-20 PROCEDURE — 99284 EMERGENCY DEPT VISIT MOD MDM: CPT

## 2024-07-20 PROCEDURE — 6360000002 HC RX W HCPCS: Performed by: STUDENT IN AN ORGANIZED HEALTH CARE EDUCATION/TRAINING PROGRAM

## 2024-07-20 PROCEDURE — 93975 VASCULAR STUDY: CPT

## 2024-07-20 PROCEDURE — 6370000000 HC RX 637 (ALT 250 FOR IP): Performed by: STUDENT IN AN ORGANIZED HEALTH CARE EDUCATION/TRAINING PROGRAM

## 2024-07-20 RX ORDER — DOXYCYCLINE HYCLATE 100 MG
100 TABLET ORAL 2 TIMES DAILY
Qty: 20 TABLET | Refills: 0 | Status: SHIPPED | OUTPATIENT
Start: 2024-07-20 | End: 2024-07-30

## 2024-07-20 RX ORDER — TRAMADOL HYDROCHLORIDE 50 MG/1
50 TABLET ORAL EVERY 8 HOURS PRN
Qty: 9 TABLET | Refills: 0 | Status: SHIPPED | OUTPATIENT
Start: 2024-07-20 | End: 2024-07-23

## 2024-07-20 RX ORDER — FENTANYL CITRATE 50 UG/ML
50 INJECTION, SOLUTION INTRAMUSCULAR; INTRAVENOUS ONCE
Status: COMPLETED | OUTPATIENT
Start: 2024-07-20 | End: 2024-07-20

## 2024-07-20 RX ORDER — 0.9 % SODIUM CHLORIDE 0.9 %
1000 INTRAVENOUS SOLUTION INTRAVENOUS ONCE
Status: COMPLETED | OUTPATIENT
Start: 2024-07-20 | End: 2024-07-20

## 2024-07-20 RX ORDER — DOXYCYCLINE HYCLATE 100 MG/1
100 CAPSULE ORAL ONCE
Status: COMPLETED | OUTPATIENT
Start: 2024-07-20 | End: 2024-07-20

## 2024-07-20 RX ADMIN — SODIUM CHLORIDE 1000 ML: 9 INJECTION, SOLUTION INTRAVENOUS at 13:26

## 2024-07-20 RX ADMIN — FENTANYL CITRATE 50 MCG: 50 INJECTION INTRAMUSCULAR; INTRAVENOUS at 13:27

## 2024-07-20 RX ADMIN — DOXYCYCLINE 100 MG: 100 CAPSULE ORAL at 17:31

## 2024-07-20 ASSESSMENT — LIFESTYLE VARIABLES
HOW MANY STANDARD DRINKS CONTAINING ALCOHOL DO YOU HAVE ON A TYPICAL DAY: PATIENT DOES NOT DRINK
HOW OFTEN DO YOU HAVE A DRINK CONTAINING ALCOHOL: NEVER

## 2024-07-20 ASSESSMENT — ENCOUNTER SYMPTOMS
SHORTNESS OF BREATH: 0
ABDOMINAL PAIN: 0
VOMITING: 0
NAUSEA: 0
CONSTIPATION: 0
COUGH: 0
DIARRHEA: 0

## 2024-07-20 ASSESSMENT — PAIN SCALES - GENERAL: PAINLEVEL_OUTOF10: 9

## 2024-07-20 ASSESSMENT — PAIN DESCRIPTION - LOCATION: LOCATION: GROIN

## 2024-07-20 NOTE — DISCHARGE INSTRUCTIONS
Please return to the ER for any new or worsening symptoms including but not limited to Fever  If prescribed, please be sure to  your prescriptions from the pharmacy  Please follow-up with  UROLOGY  as instructed

## 2024-07-20 NOTE — ED PROVIDER NOTES
recommend just that he follow-up outpatient.  They are agreeable with this and advise giving doxycycline given that the patient has a ciprofloxacin allergy.  Patient was given his initial dose here in the ER.    Patient was treated with IV fentanyl, on reevaluation symptoms are significantly improved.    At this time, patient's workup is otherwise reassuring and I do feel he is stable and appropriate for discharge.  Prescription provided for doxycycline; he was also given a prescription for tramadol to be taken as needed for severe pain.  Results and plan were discussed with the patient and his daughter who is at bedside, they voiced understanding and are amenable.  Strict return precautions were given.  They will follow-up outpatient with urology.      While not exhaustive, the following diagnoses and their severity were considered: Epididymoorchitis, scrotal abscess, scrotal cellulitis.      Independent interpretation of Laboratory tests by Cassidy Novak DO: Please see ED course for documentation of interpreted abnormal and/or relevant lab results.    Independent interpretation of Radiology tests by Cassidy Novak DO: as above         Non-plain film images such as CT, Ultrasound and MRI are read by the radiologist. Plain radiographic images are visualized and preliminarily interpreted by the ED Provider with the above-noted findings. Interpretation per the Radiologist below, if available at the time of this note are included below.      EKG reviewed and interpreted by me, TIME:  This EKG is signed by me, Cassidy Novak DO.     See ED course for EKG documentation.    All labs & imaging were reviewed and interpreted by me, see RESULTS. I have personally reviewed all laboratory and imaging results for this patient.       Are there any additional factors to consider that affect care (uninsured, homeless, illiterate, history from another source, etc.) (If yes, which ones).  Yes, patient has history of  dementia, which limits history and ROS may be unreliable.       This patient's ED course included: a personal history and physicial examination, re-evaluation prior to disposition, multiple bedside re-evaluations, IV medications, and complex medical decision making and emergency management    This patient has remained hemodynamically stable during their ED course.    Counseling:   The emergency provider has spoken with the patient and his daughter and discussed today’s results, in addition to providing specific details for the plan of care and counseling regarding the diagnosis and prognosis.  Questions are answered at this time and they are agreeable with the plan.    CONSULTS:   IP CONSULT TO UROLOGY       Please see ED course for any additional MDM documentation.       CRITICAL CARE TIME (.cct)     0 minutes            I am the Primary Clinician of Record.    FINAL IMPRESSION      1. Epididymo-orchitis    2. Pain in scrotum          DISPOSITION/PLAN     DISPOSITION Decision To Discharge 07/20/2024 05:23:09 PM    Disposition: Discharge to home  Patient condition is stable      PATIENT REFERRED TO:  Buster Harris MD  [address] [facility] [address]  [phone]    Call in 2 days  For follow-up    [facility] Emergency Department  [address] [facility] [address]  [phone]  Go to   As needed, If symptoms worsen      DISCHARGE MEDICATIONS:  Discharge Medication List as of 7/20/2024  5:20 PM        START taking these medications    Details   doxycycline hyclate (VIBRA-TABS) 100 MG tablet Take 1 tablet by mouth 2 times daily for 10 days, Disp-20 tablet, R-0Normal      traMADol (ULTRAM) 50 MG tablet Take 1 tablet by mouth every 8 hours as needed for Pain for up to 3 days. Intended supply: 3 days. Take lowest dose possible to manage pain Max Daily Amount: 150 mg, Disp-9 tablet, R-0Normal             DISCONTINUED MEDICATIONS:  Discharge Medication List as of

## 2024-07-22 LAB
EKG ATRIAL RATE: 61 BPM
EKG P AXIS: 27 DEGREES
EKG P-R INTERVAL: 212 MS
EKG Q-T INTERVAL: 452 MS
EKG QRS DURATION: 110 MS
EKG QTC CALCULATION (BAZETT): 455 MS
EKG R AXIS: -22 DEGREES
EKG T AXIS: 39 DEGREES
EKG VENTRICULAR RATE: 61 BPM
MICROORGANISM SPEC CULT: ABNORMAL
MICROORGANISM SPEC CULT: ABNORMAL
SERVICE CMNT-IMP: ABNORMAL
SPECIMEN DESCRIPTION: ABNORMAL

## 2024-07-22 PROCEDURE — 93010 ELECTROCARDIOGRAM REPORT: CPT | Performed by: INTERNAL MEDICINE

## 2024-09-27 NOTE — DISCHARGE INSTRUCTIONS
Continue Duong catheter  Follow-up with primary care doctor facility  If you notice any new worrisome symptom please return to emergency department for evaluation
yes

## 2024-10-01 ENCOUNTER — HOSPITAL ENCOUNTER (EMERGENCY)
Age: 79
Discharge: HOME | End: 2024-10-01
Attending: STUDENT IN AN ORGANIZED HEALTH CARE EDUCATION/TRAINING PROGRAM
Payer: MEDICARE

## 2024-10-01 VITALS
TEMPERATURE: 97.6 F | DIASTOLIC BLOOD PRESSURE: 72 MMHG | OXYGEN SATURATION: 98 % | HEART RATE: 65 BPM | RESPIRATION RATE: 16 BRPM | WEIGHT: 172 LBS | BODY MASS INDEX: 24.68 KG/M2 | SYSTOLIC BLOOD PRESSURE: 146 MMHG

## 2024-10-01 DIAGNOSIS — T83.9XXA PROBLEM WITH FOLEY CATHETER, INITIAL ENCOUNTER (HCC): Primary | ICD-10-CM

## 2024-10-01 PROCEDURE — 6370000000 HC RX 637 (ALT 250 FOR IP): Performed by: STUDENT IN AN ORGANIZED HEALTH CARE EDUCATION/TRAINING PROGRAM

## 2024-10-01 PROCEDURE — 99283 EMERGENCY DEPT VISIT LOW MDM: CPT

## 2024-10-01 PROCEDURE — 51702 INSERT TEMP BLADDER CATH: CPT

## 2024-10-01 RX ORDER — LIDOCAINE HYDROCHLORIDE 20 MG/ML
JELLY TOPICAL PRN
Status: DISCONTINUED | OUTPATIENT
Start: 2024-10-01 | End: 2024-10-01 | Stop reason: HOSPADM

## 2024-10-01 RX ADMIN — LIDOCAINE HYDROCHLORIDE: 20 JELLY TOPICAL at 18:54

## 2024-10-01 ASSESSMENT — PAIN DESCRIPTION - DESCRIPTORS: DESCRIPTORS: DISCOMFORT;SORE

## 2024-10-01 ASSESSMENT — PAIN - FUNCTIONAL ASSESSMENT
PAIN_FUNCTIONAL_ASSESSMENT: PREVENTS OR INTERFERES WITH MANY ACTIVE NOT PASSIVE ACTIVITIES
PAIN_FUNCTIONAL_ASSESSMENT: 0-10

## 2024-10-01 ASSESSMENT — PAIN DESCRIPTION - LOCATION
LOCATION: ABDOMEN
LOCATION: PENIS

## 2024-10-01 ASSESSMENT — PAIN DESCRIPTION - ONSET: ONSET: SUDDEN

## 2024-10-01 ASSESSMENT — PAIN SCALES - GENERAL
PAINLEVEL_OUTOF10: 6
PAINLEVEL_OUTOF10: 3

## 2024-10-01 ASSESSMENT — PAIN DESCRIPTION - FREQUENCY: FREQUENCY: CONTINUOUS

## 2024-10-01 ASSESSMENT — PAIN DESCRIPTION - PAIN TYPE: TYPE: ACUTE PAIN

## 2024-10-01 ASSESSMENT — PAIN DESCRIPTION - ORIENTATION: ORIENTATION: MID

## 2024-10-01 NOTE — ED PROVIDER NOTES
has spoken with the patient and his daughter and discussed today’s results, in addition to providing specific details for the plan of care and counseling regarding the diagnosis and prognosis.  Questions are answered at this time and they are agreeable with the plan.    CONSULTS:   None       Please see ED course for any additional MDM documentation.       CRITICAL CARE TIME (.cct)     0 minutes            I am the Primary Clinician of Record.    FINAL IMPRESSION      1. Problem with Duong catheter, initial encounter (HCC)          DISPOSITION/PLAN     DISPOSITION Decision To Discharge 10/01/2024 07:15:10 PM    Disposition: Discharge to home  Patient condition is stable      PATIENT REFERRED TO:  [facility] UROLOGY  [address] [address]  [phone]  Call in 1 day  For follow-up    [facility] Emergency Department  [address] [place] [address]  [phone]  Go to   As needed, If symptoms worsen      DISCHARGE MEDICATIONS:  Discharge Medication List as of 10/1/2024  7:15 PM          DISCONTINUED MEDICATIONS:  Discharge Medication List as of 10/1/2024  7:15 PM                      Cassidy Novak D.O.     Emergency Medicine      10/2/2024 4:04 PM      NOTE: This report was transcribed using voice recognition software. Every effort was made to ensure accuracy; however, inadvertent computerized transcription errors may be present            Cassidy Novak DO  10/02/24 1604

## 2024-10-01 NOTE — DISCHARGE INSTRUCTIONS
Please return to the ER for any new or worsening symptoms  If prescribed, please be sure to  your prescriptions from the pharmacy  Please follow-up with  UROLOGY  as instructed

## 2024-10-02 ASSESSMENT — ENCOUNTER SYMPTOMS
ABDOMINAL PAIN: 1
NAUSEA: 0
DIARRHEA: 0
CONSTIPATION: 0
VOMITING: 0

## 2024-11-21 ENCOUNTER — HOSPITAL ENCOUNTER (INPATIENT)
Age: 79
LOS: 5 days | Discharge: OTHER INSTITUTION | DRG: 064 | End: 2024-11-26
Attending: EMERGENCY MEDICINE | Admitting: INTERNAL MEDICINE
Payer: MEDICARE

## 2024-11-21 ENCOUNTER — APPOINTMENT (OUTPATIENT)
Dept: CT IMAGING | Age: 79
DRG: 064 | End: 2024-11-21
Attending: EMERGENCY MEDICINE
Payer: MEDICARE

## 2024-11-21 ENCOUNTER — APPOINTMENT (OUTPATIENT)
Dept: GENERAL RADIOLOGY | Age: 79
DRG: 064 | End: 2024-11-21
Payer: MEDICARE

## 2024-11-21 ENCOUNTER — APPOINTMENT (OUTPATIENT)
Dept: CT IMAGING | Age: 79
DRG: 064 | End: 2024-11-21
Payer: MEDICARE

## 2024-11-21 DIAGNOSIS — I63.9 CEREBROVASCULAR ACCIDENT (CVA), UNSPECIFIED MECHANISM (HCC): ICD-10-CM

## 2024-11-21 DIAGNOSIS — J69.0 ASPIRATION PNEUMONIA OF BOTH LUNGS, UNSPECIFIED ASPIRATION PNEUMONIA TYPE, UNSPECIFIED PART OF LUNG (HCC): ICD-10-CM

## 2024-11-21 DIAGNOSIS — R41.82 ALTERED MENTAL STATUS, UNSPECIFIED ALTERED MENTAL STATUS TYPE: Primary | ICD-10-CM

## 2024-11-21 LAB
ALBUMIN SERPL-MCNC: 3.7 G/DL (ref 3.5–5.2)
ALP SERPL-CCNC: 90 U/L (ref 40–129)
ALT SERPL-CCNC: 17 U/L (ref 0–40)
ANION GAP SERPL CALCULATED.3IONS-SCNC: 10 MMOL/L (ref 7–16)
AST SERPL-CCNC: 22 U/L (ref 0–39)
BACTERIA URNS QL MICRO: ABNORMAL
BASOPHILS # BLD: 0.03 K/UL (ref 0–0.2)
BASOPHILS NFR BLD: 1 % (ref 0–2)
BILIRUB SERPL-MCNC: 0.3 MG/DL (ref 0–1.2)
BILIRUB UR QL STRIP: NEGATIVE
BNP SERPL-MCNC: 5761 PG/ML (ref 0–450)
BUN SERPL-MCNC: 20 MG/DL (ref 6–23)
CALCIUM SERPL-MCNC: 9.8 MG/DL (ref 8.6–10.2)
CHLORIDE SERPL-SCNC: 102 MMOL/L (ref 98–107)
CLARITY UR: ABNORMAL
CO2 SERPL-SCNC: 26 MMOL/L (ref 22–29)
COLOR UR: YELLOW
CREAT SERPL-MCNC: 1.2 MG/DL (ref 0.7–1.2)
EOSINOPHIL # BLD: 0.19 K/UL (ref 0.05–0.5)
EOSINOPHILS RELATIVE PERCENT: 4 % (ref 0–6)
ERYTHROCYTE [DISTWIDTH] IN BLOOD BY AUTOMATED COUNT: 13.6 % (ref 11.5–15)
GFR, ESTIMATED: 62 ML/MIN/1.73M2
GLUCOSE SERPL-MCNC: 162 MG/DL (ref 74–99)
GLUCOSE UR STRIP-MCNC: NEGATIVE MG/DL
HCT VFR BLD AUTO: 37.7 % (ref 37–54)
HGB BLD-MCNC: 12.2 G/DL (ref 12.5–16.5)
HGB UR QL STRIP.AUTO: ABNORMAL
IMM GRANULOCYTES # BLD AUTO: <0.03 K/UL (ref 0–0.58)
IMM GRANULOCYTES NFR BLD: 0 % (ref 0–5)
INR PPP: 1.1
KETONES UR STRIP-MCNC: NEGATIVE MG/DL
LACTATE BLDV-SCNC: 2.1 MMOL/L (ref 0.5–2.2)
LACTATE BLDV-SCNC: 2.9 MMOL/L (ref 0.5–2.2)
LEUKOCYTE ESTERASE UR QL STRIP: ABNORMAL
LIPASE SERPL-CCNC: 24 U/L (ref 13–60)
LYMPHOCYTES NFR BLD: 1.6 K/UL (ref 1.5–4)
LYMPHOCYTES RELATIVE PERCENT: 32 % (ref 20–42)
MCH RBC QN AUTO: 28.6 PG (ref 26–35)
MCHC RBC AUTO-ENTMCNC: 32.4 G/DL (ref 32–34.5)
MCV RBC AUTO: 88.5 FL (ref 80–99.9)
MONOCYTES NFR BLD: 0.58 K/UL (ref 0.1–0.95)
MONOCYTES NFR BLD: 12 % (ref 2–12)
NEUTROPHILS NFR BLD: 52 % (ref 43–80)
NEUTS SEG NFR BLD: 2.64 K/UL (ref 1.8–7.3)
NITRITE UR QL STRIP: POSITIVE
PARTIAL THROMBOPLASTIN TIME: 31.5 SEC (ref 24.5–35.1)
PH UR STRIP: 6.5 [PH] (ref 5–9)
PLATELET # BLD AUTO: 198 K/UL (ref 130–450)
PMV BLD AUTO: 10.8 FL (ref 7–12)
POTASSIUM SERPL-SCNC: 3.7 MMOL/L (ref 3.5–5)
PROT SERPL-MCNC: 6.8 G/DL (ref 6.4–8.3)
PROT UR STRIP-MCNC: 30 MG/DL
PROTHROMBIN TIME: 12.3 SEC (ref 9.3–12.4)
RBC # BLD AUTO: 4.26 M/UL (ref 3.8–5.8)
RBC #/AREA URNS HPF: ABNORMAL /HPF
SODIUM SERPL-SCNC: 138 MMOL/L (ref 132–146)
SP GR UR STRIP: 1.01 (ref 1–1.03)
TROPONIN I SERPL HS-MCNC: 43 NG/L (ref 0–11)
TROPONIN I SERPL HS-MCNC: 43 NG/L (ref 0–11)
UROBILINOGEN UR STRIP-ACNC: 0.2 EU/DL (ref 0–1)
WBC #/AREA URNS HPF: ABNORMAL /HPF
WBC OTHER # BLD: 5.1 K/UL (ref 4.5–11.5)

## 2024-11-21 PROCEDURE — 85730 THROMBOPLASTIN TIME PARTIAL: CPT

## 2024-11-21 PROCEDURE — 6360000002 HC RX W HCPCS: Performed by: EMERGENCY MEDICINE

## 2024-11-21 PROCEDURE — 93005 ELECTROCARDIOGRAM TRACING: CPT | Performed by: EMERGENCY MEDICINE

## 2024-11-21 PROCEDURE — 83880 ASSAY OF NATRIURETIC PEPTIDE: CPT

## 2024-11-21 PROCEDURE — 83690 ASSAY OF LIPASE: CPT

## 2024-11-21 PROCEDURE — 4A03X5D MEASUREMENT OF ARTERIAL FLOW, INTRACRANIAL, EXTERNAL APPROACH: ICD-10-PCS | Performed by: INTERNAL MEDICINE

## 2024-11-21 PROCEDURE — 70496 CT ANGIOGRAPHY HEAD: CPT

## 2024-11-21 PROCEDURE — 96360 HYDRATION IV INFUSION INIT: CPT

## 2024-11-21 PROCEDURE — 99222 1ST HOSP IP/OBS MODERATE 55: CPT | Performed by: PSYCHIATRY & NEUROLOGY

## 2024-11-21 PROCEDURE — 2580000003 HC RX 258: Performed by: EMERGENCY MEDICINE

## 2024-11-21 PROCEDURE — 99285 EMERGENCY DEPT VISIT HI MDM: CPT

## 2024-11-21 PROCEDURE — 70450 CT HEAD/BRAIN W/O DYE: CPT

## 2024-11-21 PROCEDURE — 6360000004 HC RX CONTRAST MEDICATION: Performed by: RADIOLOGY

## 2024-11-21 PROCEDURE — 2060000000 HC ICU INTERMEDIATE R&B

## 2024-11-21 PROCEDURE — 6370000000 HC RX 637 (ALT 250 FOR IP): Performed by: EMERGENCY MEDICINE

## 2024-11-21 PROCEDURE — 84484 ASSAY OF TROPONIN QUANT: CPT

## 2024-11-21 PROCEDURE — 74176 CT ABD & PELVIS W/O CONTRAST: CPT

## 2024-11-21 PROCEDURE — 71250 CT THORAX DX C-: CPT

## 2024-11-21 PROCEDURE — 71045 X-RAY EXAM CHEST 1 VIEW: CPT

## 2024-11-21 PROCEDURE — 85025 COMPLETE CBC W/AUTO DIFF WBC: CPT

## 2024-11-21 PROCEDURE — 83605 ASSAY OF LACTIC ACID: CPT

## 2024-11-21 PROCEDURE — 80053 COMPREHEN METABOLIC PANEL: CPT

## 2024-11-21 PROCEDURE — 99223 1ST HOSP IP/OBS HIGH 75: CPT | Performed by: INTERNAL MEDICINE

## 2024-11-21 PROCEDURE — 85610 PROTHROMBIN TIME: CPT

## 2024-11-21 PROCEDURE — 87086 URINE CULTURE/COLONY COUNT: CPT

## 2024-11-21 PROCEDURE — 70498 CT ANGIOGRAPHY NECK: CPT

## 2024-11-21 PROCEDURE — 81001 URINALYSIS AUTO W/SCOPE: CPT

## 2024-11-21 RX ORDER — FUROSEMIDE 40 MG/1
20 TABLET ORAL DAILY
Status: DISCONTINUED | OUTPATIENT
Start: 2024-11-22 | End: 2024-11-26 | Stop reason: HOSPADM

## 2024-11-21 RX ORDER — 0.9 % SODIUM CHLORIDE 0.9 %
1000 INTRAVENOUS SOLUTION INTRAVENOUS ONCE
Status: COMPLETED | OUTPATIENT
Start: 2024-11-21 | End: 2024-11-21

## 2024-11-21 RX ORDER — BISACODYL 5 MG/1
5 TABLET, DELAYED RELEASE ORAL DAILY PRN
Status: DISCONTINUED | OUTPATIENT
Start: 2024-11-21 | End: 2024-11-26 | Stop reason: HOSPADM

## 2024-11-21 RX ORDER — SODIUM CHLORIDE 9 MG/ML
INJECTION, SOLUTION INTRAVENOUS PRN
Status: DISCONTINUED | OUTPATIENT
Start: 2024-11-21 | End: 2024-11-26 | Stop reason: HOSPADM

## 2024-11-21 RX ORDER — ONDANSETRON 4 MG/1
4 TABLET, ORALLY DISINTEGRATING ORAL EVERY 8 HOURS PRN
Status: DISCONTINUED | OUTPATIENT
Start: 2024-11-21 | End: 2024-11-26 | Stop reason: HOSPADM

## 2024-11-21 RX ORDER — TAMSULOSIN HYDROCHLORIDE 0.4 MG/1
0.4 CAPSULE ORAL DAILY
Status: DISCONTINUED | OUTPATIENT
Start: 2024-11-22 | End: 2024-11-26 | Stop reason: HOSPADM

## 2024-11-21 RX ORDER — IOPAMIDOL 755 MG/ML
60 INJECTION, SOLUTION INTRAVASCULAR
Status: COMPLETED | OUTPATIENT
Start: 2024-11-21 | End: 2024-11-21

## 2024-11-21 RX ORDER — POTASSIUM CHLORIDE 1500 MG/1
20 TABLET, EXTENDED RELEASE ORAL DAILY
Status: DISCONTINUED | OUTPATIENT
Start: 2024-11-22 | End: 2024-11-26 | Stop reason: HOSPADM

## 2024-11-21 RX ORDER — SODIUM CHLORIDE 0.9 % (FLUSH) 0.9 %
5-40 SYRINGE (ML) INJECTION EVERY 12 HOURS SCHEDULED
Status: DISCONTINUED | OUTPATIENT
Start: 2024-11-21 | End: 2024-11-26 | Stop reason: HOSPADM

## 2024-11-21 RX ORDER — POLYETHYLENE GLYCOL 3350 17 G/17G
17 POWDER, FOR SOLUTION ORAL DAILY PRN
Status: DISCONTINUED | OUTPATIENT
Start: 2024-11-21 | End: 2024-11-26 | Stop reason: HOSPADM

## 2024-11-21 RX ORDER — CALCIUM CARBONATE 500 MG/1
500 TABLET, CHEWABLE ORAL 3 TIMES DAILY PRN
Status: DISCONTINUED | OUTPATIENT
Start: 2024-11-21 | End: 2024-11-26 | Stop reason: HOSPADM

## 2024-11-21 RX ORDER — MIRTAZAPINE 15 MG/1
30 TABLET, FILM COATED ORAL NIGHTLY
Status: DISCONTINUED | OUTPATIENT
Start: 2024-11-21 | End: 2024-11-26 | Stop reason: HOSPADM

## 2024-11-21 RX ORDER — M-VIT,TX,IRON,MINS/CALC/FOLIC 27MG-0.4MG
1 TABLET ORAL DAILY
Status: DISCONTINUED | OUTPATIENT
Start: 2024-11-22 | End: 2024-11-26 | Stop reason: HOSPADM

## 2024-11-21 RX ORDER — POTASSIUM CHLORIDE 1500 MG/1
20 TABLET, EXTENDED RELEASE ORAL DAILY
COMMUNITY

## 2024-11-21 RX ORDER — LEVOTHYROXINE SODIUM 25 UG/1
25 TABLET ORAL DAILY
Status: DISCONTINUED | OUTPATIENT
Start: 2024-11-22 | End: 2024-11-26 | Stop reason: HOSPADM

## 2024-11-21 RX ORDER — HYDRALAZINE HYDROCHLORIDE 20 MG/ML
10 INJECTION INTRAMUSCULAR; INTRAVENOUS EVERY 6 HOURS PRN
Status: DISCONTINUED | OUTPATIENT
Start: 2024-11-21 | End: 2024-11-26 | Stop reason: HOSPADM

## 2024-11-21 RX ORDER — BENZONATATE 100 MG/1
100 CAPSULE ORAL 3 TIMES DAILY PRN
Status: DISCONTINUED | OUTPATIENT
Start: 2024-11-21 | End: 2024-11-26 | Stop reason: HOSPADM

## 2024-11-21 RX ORDER — ASPIRIN 81 MG/1
81 TABLET, CHEWABLE ORAL DAILY
Status: DISCONTINUED | OUTPATIENT
Start: 2024-11-22 | End: 2024-11-26 | Stop reason: HOSPADM

## 2024-11-21 RX ORDER — MIRTAZAPINE 30 MG/1
30 TABLET, FILM COATED ORAL NIGHTLY
COMMUNITY

## 2024-11-21 RX ORDER — ONDANSETRON 2 MG/ML
4 INJECTION INTRAMUSCULAR; INTRAVENOUS EVERY 6 HOURS PRN
Status: DISCONTINUED | OUTPATIENT
Start: 2024-11-21 | End: 2024-11-26 | Stop reason: HOSPADM

## 2024-11-21 RX ORDER — METOPROLOL SUCCINATE 25 MG/1
25 TABLET, EXTENDED RELEASE ORAL NIGHTLY
Status: DISCONTINUED | OUTPATIENT
Start: 2024-11-21 | End: 2024-11-26 | Stop reason: HOSPADM

## 2024-11-21 RX ORDER — POTASSIUM CHLORIDE 7.45 MG/ML
10 INJECTION INTRAVENOUS PRN
Status: DISCONTINUED | OUTPATIENT
Start: 2024-11-21 | End: 2024-11-26 | Stop reason: HOSPADM

## 2024-11-21 RX ORDER — ASPIRIN 81 MG/1
324 TABLET, CHEWABLE ORAL ONCE
Status: COMPLETED | OUTPATIENT
Start: 2024-11-21 | End: 2024-11-21

## 2024-11-21 RX ORDER — POTASSIUM CHLORIDE 1500 MG/1
40 TABLET, EXTENDED RELEASE ORAL PRN
Status: DISCONTINUED | OUTPATIENT
Start: 2024-11-21 | End: 2024-11-26 | Stop reason: HOSPADM

## 2024-11-21 RX ORDER — SODIUM CHLORIDE 0.9 % (FLUSH) 0.9 %
5-40 SYRINGE (ML) INJECTION PRN
Status: DISCONTINUED | OUTPATIENT
Start: 2024-11-21 | End: 2024-11-26 | Stop reason: HOSPADM

## 2024-11-21 RX ORDER — HYDRALAZINE HYDROCHLORIDE 20 MG/ML
10 INJECTION INTRAMUSCULAR; INTRAVENOUS EVERY 6 HOURS PRN
Status: DISCONTINUED | OUTPATIENT
Start: 2024-11-21 | End: 2024-11-21

## 2024-11-21 RX ORDER — LOSARTAN POTASSIUM 50 MG/1
100 TABLET ORAL DAILY
Status: DISCONTINUED | OUTPATIENT
Start: 2024-11-21 | End: 2024-11-26 | Stop reason: HOSPADM

## 2024-11-21 RX ORDER — MAGNESIUM SULFATE IN WATER 40 MG/ML
2000 INJECTION, SOLUTION INTRAVENOUS PRN
Status: DISCONTINUED | OUTPATIENT
Start: 2024-11-21 | End: 2024-11-26 | Stop reason: HOSPADM

## 2024-11-21 RX ORDER — ROSUVASTATIN CALCIUM 10 MG/1
10 TABLET, COATED ORAL NIGHTLY
Status: DISCONTINUED | OUTPATIENT
Start: 2024-11-21 | End: 2024-11-26 | Stop reason: HOSPADM

## 2024-11-21 RX ADMIN — SODIUM CHLORIDE 3000 MG: 9 INJECTION, SOLUTION INTRAVENOUS at 17:14

## 2024-11-21 RX ADMIN — IOPAMIDOL 60 ML: 755 INJECTION, SOLUTION INTRAVENOUS at 11:15

## 2024-11-21 RX ADMIN — SODIUM CHLORIDE 1000 ML: 9 INJECTION, SOLUTION INTRAVENOUS at 11:33

## 2024-11-21 RX ADMIN — ASPIRIN 81 MG CHEWABLE TABLET 324 MG: 81 TABLET CHEWABLE at 11:43

## 2024-11-21 NOTE — PROGRESS NOTES
4 Eyes Skin Assessment     NAME:  Jona Hobbs  YOB: 1945  MEDICAL RECORD NUMBER:  [id number]    The patient is being assessed for  Admission    I agree that at least one RN has performed a thorough Head to Toe Skin Assessment on the patient. ALL assessment sites listed below have been assessed.      Areas assessed by both nurses:    Head, Face, Ears, Shoulders, Back, Chest, Arms, Elbows, Hands, Sacrum. Buttock, Coccyx, Ischium, Legs. Feet and Heels, and Under Medical Devices         Does the Patient have a Wound? No noted wound(s)       Lg Prevention initiated by RN: Yes  Wound Care Orders initiated by RN: Yes    Pressure Injury (Stage 3,4, Unstageable, DTI, NWPT, and Complex wounds) if present, place Wound referral order by RN under : No    New Ostomies, if present place, Ostomy referral order under : No     Nurse 1 eSignature: Electronically signed by Ana Avery RN on 11/21/24 at 6:54 PM EST    **SHARE this note so that the co-signing nurse can place an eSignature**    Nurse 2 eSignature: Electronically signed by Adrianna Sethi RN on 11/21/24 at 7:01 PM EST

## 2024-11-21 NOTE — ED NOTES
Radiology Procedure Waiver   Name: Jona Hobbs  : 1945  MRN: [id number]    Date:  24    Time: 10:58 AM EST    Benefits of immediately proceeding with Radiology exam(s) without pre-testing outweigh the risks or are not indicated as specified below and therefore the following is/are being waived:    [] Pregnancy test   [] Patients LMP on-time and regular.   [] Patient had Tubal Ligation or has other Contraception Device.   [] Patient  is Menopausal or Premenarcheal.    [] Patient had Full or Partial Hysterectomy.    [] Protocol for Iodine allergy    [] MRI Questionnaire     [x] BUN/Creatinine   [] Patient age w/no hx of renal dysfunction.   [] Patient on Dialysis.   [] Recent Normal Labs.  Electronically signed by Vernell Anderson MD on 24 at 10:58 AM EST          EMERGENT     Vernell Anderson MD  [phone]

## 2024-11-21 NOTE — ED PROVIDER NOTES
HPI:  11/21/24, Time: 11:06 AM SANGITA Hobbs is a 79 y.o. male presenting to the ED for altered mental status.  Patient presents from home.  History obtained from EMS.  Per EMS, last known well was 10:15 AM today.  Patient was sitting at the kitchen table with family when he went unresponsive and was noted to have weakness on the left.  Patient also had an episode of nonbloody emesis.  No baseline deficits per EMS.  Chart review indicates a history of severe dementia.  Blood sugar was 180 per EMS.  Patient was mildly hypotensive per EMS, so EMS initiated IV fluids.  No other medications given.    Stroke team called shortly after patient arrival.    --------------------------------------------- PAST HISTORY ---------------------------------------------  Past Medical History:  has a past medical history of Bacteremia due to Gram-negative bacteria, CHF (congestive heart failure) ([facility]), Dementia ([facility]), Electrolyte imbalance, Fever, Sepsis ([facility]), and Severe Alzheimer's dementia without behavioral disturbance, psychotic disturbance, mood disturbance, or anxiety ([facility]).    Past Surgical History:  has a past surgical history that includes Total knee arthroplasty; hernia repair (Bilateral); and Ulnar nerve repair.    Social History:  reports that he has never smoked. He has never used smokeless tobacco. He reports that he does not drink alcohol and does not use drugs.    Family History: family history is not on file.     The patient’s home medications have been reviewed.    Allergies: Cipro [ciprofloxacin hcl], Hydrocodone-acetaminophen, Quinapril, and Tylenol [acetaminophen]    -------------------------------------------------- RESULTS -------------------------------------------------  All laboratory and radiology results have been personally reviewed by myself   LABS:  Results for orders placed or performed during the hospital encounter of 11/21/24   CBC with Auto Differential   Result Value Ref Range    WBC 5.1

## 2024-11-21 NOTE — ED NOTES
Pt returned from CT with this RN. Pt placed on cardiac monitor and BP cycling. Pt had episode of emesis prior to arrival. Pt cleaned and placed into hospital attire. Pt's soiled clothes placed in a hospital belongings bag and on counter in room 10.

## 2024-11-21 NOTE — CONSULTS
Stroke Neurology Consult Note  11/21/2024 4:02 PM  Pt Name: Jona Hobbs  MRN: [id number]  YOB: 1945  Date of evaluation: 11/21/2024  Primary Care Physician: Sage Easley Jr., MD          Stroke referral received from Cathie Schneider DO based upon patient's presenting stroke like symptomology.     Jona Hobbs is a 79 y.o. male who presents with acute onset aphasia and right sided weakness, on placing his head flat now his symptoms are starting to improve  Reported dementia and limited code , h/o sah.          Prior Functional Status(Modified Mariano Scale):  5=Severe disability; bedridden, incontinent and requiring constant nursing care and attention    Allergies   Allergen Reactions    Cipro [Ciprofloxacin Hcl] Other (See Comments)     UNKNOWN REACTION    Hydrocodone-Acetaminophen Other (See Comments)     UNKNOWN REACTION    Quinapril Other (See Comments)     UNKNOWN REACTION    Tylenol [Acetaminophen] Other (See Comments)     UNKNOWN REACTION       Medications  Prior to Admission medications    Medication Sig Start Date End Date Taking? Authorizing Provider   mirtazapine (REMERON) 30 MG tablet Take 1 tablet by mouth nightly    ProviderYoshi MD   potassium chloride (KLOR-CON M) 20 MEQ extended release tablet Take 1 tablet by mouth daily    Yoshi Edwards MD   bisacodyl (DULCOLAX) 5 MG EC tablet Take 1 tablet by mouth daily as needed for Constipation    Yoshi Edwards MD   metoprolol succinate (TOPROL XL) 25 MG extended release tablet Take 1 tablet by mouth at bedtime    ProviderYoshi MD   Multiple Vitamins-Minerals (THERAPEUTIC MULTIVITAMIN-MINERALS) tablet Take 1 tablet by mouth daily    Yoshi Edwards MD   rosuvastatin (CRESTOR) 10 MG tablet Take 1 tablet by mouth nightly    Yoshi Edwards MD   acetaminophen (TYLENOL) 325 MG tablet Take 2 tablets by mouth every 4 hours as needed for Pain or Fever    ProviderYoshi MD   losartan (COZAAR) 100 MG    TROPONIN - Abnormal; Notable for the following components:    Troponin, High Sensitivity 43 (*)     All other components within normal limits   CULTURE, URINE   PROTIME-INR   LIPASE   APTT   URINALYSIS WITH MICROSCOPIC   LACTIC ACID       IV tnk INCLUSION criteria met No:    The following EXCLUSION criteria/contraindications were noted:h/o ICH/SAHBleed    Assessment:  Working Diagnosis: Ischemic stroke    Acute Ischemic Left ICA stroke or TIA    Recommendations:      No tnk due to history       No lvo      MRS is 5 from dementia  Medical management is recommended over carotid revascularization       ASA + DOAC if tolerable for plaque stablization     Discussed with     IV fluids and head flat as much as possible to enhance cerebral perfusion       Neurology consult MRI brain     Consultation completed by Cale Spangler MD via  in person consultation  .      Electronically signed by Cale Spangler MD on 11/21/2024 at 4:02 PM

## 2024-11-21 NOTE — H&P
Est, Glom Filt Rate 62 >60 mL/min/1.73m2    Calcium 9.8 8.6 - 10.2 mg/dL    Total Protein 6.8 6.4 - 8.3 g/dL    Albumin 3.7 3.5 - 5.2 g/dL    Total Bilirubin 0.3 0.0 - 1.2 mg/dL    Alkaline Phosphatase 90 40 - 129 U/L    ALT 17 0 - 40 U/L    AST 22 0 - 39 U/L   Protime-INR    Collection Time: 11/21/24 11:00 AM   Result Value Ref Range    Protime 12.3 9.3 - 12.4 sec    INR 1.1    Lipase    Collection Time: 11/21/24 11:00 AM   Result Value Ref Range    Lipase 24 13 - 60 U/L   Troponin    Collection Time: 11/21/24 11:00 AM   Result Value Ref Range    Troponin, High Sensitivity 43 (H) 0 - 11 ng/L   Brain Natriuretic Peptide    Collection Time: 11/21/24 11:00 AM   Result Value Ref Range    NT Pro-BNP 5,761 (H) 0 - 450 pg/mL   APTT    Collection Time: 11/21/24 11:00 AM   Result Value Ref Range    APTT 31.5 24.5 - 35.1 sec   EKG 12 Lead    Collection Time: 11/21/24 11:35 AM   Result Value Ref Range    Ventricular Rate 64 BPM    Atrial Rate 64 BPM    P-R Interval 200 ms    QRS Duration 108 ms    Q-T Interval 406 ms    QTc Calculation (Bazett) 418 ms    P Axis 6 degrees    R Axis -23 degrees    T Axis 14 degrees   Lactic Acid    Collection Time: 11/21/24 12:04 PM   Result Value Ref Range    Lactic Acid 2.9 (H) 0.5 - 2.2 mmol/L   Troponin    Collection Time: 11/21/24 12:55 PM   Result Value Ref Range    Troponin, High Sensitivity 43 (H) 0 - 11 ng/L       Imaging  CT Head W/O Contrast    Result Date: 11/21/2024  EXAMINATION: CT OF THE HEAD WITHOUT [id number]/21/2024 11:15 am TECHNIQUE: CT of the head was performed without the administration of intravenous contrast. Automated exposure control, iterative reconstruction, and/or weight based adjustment of the mA/kV was utilized to reduce the radiation dose to as low as reasonably achievable. COMPARISON: CT brain torres 07/20/2023. HISTORY: ORDERING SYSTEM PROVIDED HISTORY: AMS, left sided weakness, left facial droop TECHNOLOGIST PROVIDED HISTORY: Has a \"code stroke\" or \"stroke  alert\" been called?->Yes Reason for exam:->AMS, left sided weakness, left facial droop Decision Support Exception - unselect if not a suspected or confirmed emergency medical condition->Emergency Medical Condition (MA) What reading provider will be dictating this exam?->CRC FINDINGS: There are age-appropriate generalized atrophic changes of the brain.  There is focal encephalomalacia along the posteroinferior left occipital lobe. There are no findings of an acute or subacute infarct in there is no mass effect or midline shift or intra or extra-axial hemorrhage.  There is mild symmetric periventricular deep white matter chronic microvascular ischemic changes particularly along the frontal horns.  Ventricular caliber is stable. There is mild bilateral ethmoid sinus and sphenoid sinus mucosal inflammatory disease.  Mastoid air cells are well pneumatized. Calvarium is intact.     1. No evidence of an acute or subacute intracranial abnormality. 2. Multifocal paranasal sinus mucosal inflammatory disease without acute paranasal sinusitis.           Assessment and Plan  Patient is a 79 y.o. male who presented with AMS.   The active problem list is as follows:    Principal Problem:    Acute CVA (cerebrovascular accident) (HCC)  Active Problems:    Benign essential hypertension    Hypothyroidism    Severe Alzheimer's dementia without behavioral disturbance, psychotic disturbance, mood disturbance, or anxiety (HCC)  Resolved Problems:    * No resolved hospital problems. *        Acute CVA- Age indeterminate occlusion involving the distal left V3 segment as well as the left V4 segment. Severe stenosis at the origin of the vertebral arteries bilaterally. severe near occlusive stenosis of the proximal left cervical ICA. Areas of moderate stenosis involving the left intracranial ICA. Moderate stenosis involving the A3 segment of the right anterior cerebral   artery.  Patient is not a TNK candidate because he is on Eliquis.

## 2024-11-22 PROBLEM — R41.82 ALTERED MENTAL STATUS: Status: ACTIVE | Noted: 2024-11-22

## 2024-11-22 LAB
ALBUMIN SERPL-MCNC: 3.4 G/DL (ref 3.5–5.2)
ALP SERPL-CCNC: 73 U/L (ref 40–129)
ALT SERPL-CCNC: 15 U/L (ref 0–40)
ANION GAP SERPL CALCULATED.3IONS-SCNC: 11 MMOL/L (ref 7–16)
AST SERPL-CCNC: 19 U/L (ref 0–39)
BILIRUB SERPL-MCNC: 0.6 MG/DL (ref 0–1.2)
BUN SERPL-MCNC: 26 MG/DL (ref 6–23)
CALCIUM SERPL-MCNC: 9.5 MG/DL (ref 8.6–10.2)
CHLORIDE SERPL-SCNC: 106 MMOL/L (ref 98–107)
CHOLEST SERPL-MCNC: 106 MG/DL
CO2 SERPL-SCNC: 24 MMOL/L (ref 22–29)
CREAT SERPL-MCNC: 1.3 MG/DL (ref 0.7–1.2)
EKG ATRIAL RATE: 64 BPM
EKG P AXIS: 6 DEGREES
EKG P-R INTERVAL: 200 MS
EKG Q-T INTERVAL: 406 MS
EKG QRS DURATION: 108 MS
EKG QTC CALCULATION (BAZETT): 418 MS
EKG R AXIS: -23 DEGREES
EKG T AXIS: 14 DEGREES
EKG VENTRICULAR RATE: 64 BPM
ERYTHROCYTE [DISTWIDTH] IN BLOOD BY AUTOMATED COUNT: 14 % (ref 11.5–15)
GFR, ESTIMATED: 57 ML/MIN/1.73M2
GLUCOSE SERPL-MCNC: 118 MG/DL (ref 74–99)
HBA1C MFR BLD: 5.1 % (ref 4–5.6)
HCT VFR BLD AUTO: 33.1 % (ref 37–54)
HDLC SERPL-MCNC: 43 MG/DL
HGB BLD-MCNC: 10.9 G/DL (ref 12.5–16.5)
LDLC SERPL CALC-MCNC: 56 MG/DL
MAGNESIUM SERPL-MCNC: 2 MG/DL (ref 1.6–2.6)
MCH RBC QN AUTO: 29.1 PG (ref 26–35)
MCHC RBC AUTO-ENTMCNC: 32.9 G/DL (ref 32–34.5)
MCV RBC AUTO: 88.5 FL (ref 80–99.9)
PHOSPHATE SERPL-MCNC: 2.9 MG/DL (ref 2.5–4.5)
PLATELET # BLD AUTO: 155 K/UL (ref 130–450)
PMV BLD AUTO: 11.7 FL (ref 7–12)
POTASSIUM SERPL-SCNC: 4.2 MMOL/L (ref 3.5–5)
PROT SERPL-MCNC: 6.2 G/DL (ref 6.4–8.3)
RBC # BLD AUTO: 3.74 M/UL (ref 3.8–5.8)
SODIUM SERPL-SCNC: 141 MMOL/L (ref 132–146)
TRIGL SERPL-MCNC: 37 MG/DL
TSH SERPL DL<=0.05 MIU/L-ACNC: 0.82 UIU/ML (ref 0.27–4.2)
VLDLC SERPL CALC-MCNC: 7 MG/DL
WBC OTHER # BLD: 7.4 K/UL (ref 4.5–11.5)

## 2024-11-22 PROCEDURE — 84100 ASSAY OF PHOSPHORUS: CPT

## 2024-11-22 PROCEDURE — 84443 ASSAY THYROID STIM HORMONE: CPT

## 2024-11-22 PROCEDURE — 6360000002 HC RX W HCPCS: Performed by: INTERNAL MEDICINE

## 2024-11-22 PROCEDURE — 97530 THERAPEUTIC ACTIVITIES: CPT

## 2024-11-22 PROCEDURE — 36415 COLL VENOUS BLD VENIPUNCTURE: CPT

## 2024-11-22 PROCEDURE — 83036 HEMOGLOBIN GLYCOSYLATED A1C: CPT

## 2024-11-22 PROCEDURE — 97167 OT EVAL HIGH COMPLEX 60 MIN: CPT

## 2024-11-22 PROCEDURE — 93010 ELECTROCARDIOGRAM REPORT: CPT | Performed by: INTERNAL MEDICINE

## 2024-11-22 PROCEDURE — 2580000003 HC RX 258: Performed by: INTERNAL MEDICINE

## 2024-11-22 PROCEDURE — 83735 ASSAY OF MAGNESIUM: CPT

## 2024-11-22 PROCEDURE — 80061 LIPID PANEL: CPT

## 2024-11-22 PROCEDURE — 99233 SBSQ HOSP IP/OBS HIGH 50: CPT | Performed by: INTERNAL MEDICINE

## 2024-11-22 PROCEDURE — 85027 COMPLETE CBC AUTOMATED: CPT

## 2024-11-22 PROCEDURE — 99232 SBSQ HOSP IP/OBS MODERATE 35: CPT | Performed by: CLINICAL NURSE SPECIALIST

## 2024-11-22 PROCEDURE — 80053 COMPREHEN METABOLIC PANEL: CPT

## 2024-11-22 PROCEDURE — 2060000000 HC ICU INTERMEDIATE R&B

## 2024-11-22 PROCEDURE — 97161 PT EVAL LOW COMPLEX 20 MIN: CPT

## 2024-11-22 RX ORDER — DEXTROSE MONOHYDRATE AND SODIUM CHLORIDE 5; .9 G/100ML; G/100ML
INJECTION, SOLUTION INTRAVENOUS CONTINUOUS
Status: DISCONTINUED | OUTPATIENT
Start: 2024-11-22 | End: 2024-11-26 | Stop reason: HOSPADM

## 2024-11-22 RX ORDER — 0.9 % SODIUM CHLORIDE 0.9 %
500 INTRAVENOUS SOLUTION INTRAVENOUS ONCE
Status: COMPLETED | OUTPATIENT
Start: 2024-11-22 | End: 2024-11-22

## 2024-11-22 RX ADMIN — SODIUM CHLORIDE, PRESERVATIVE FREE 10 ML: 5 INJECTION INTRAVENOUS at 10:26

## 2024-11-22 RX ADMIN — AMPICILLIN SODIUM AND SULBACTAM SODIUM 3000 MG: 2; 1 INJECTION, POWDER, FOR SOLUTION INTRAMUSCULAR; INTRAVENOUS at 14:58

## 2024-11-22 RX ADMIN — AMPICILLIN SODIUM AND SULBACTAM SODIUM 3000 MG: 2; 1 INJECTION, POWDER, FOR SOLUTION INTRAMUSCULAR; INTRAVENOUS at 10:20

## 2024-11-22 RX ADMIN — SODIUM CHLORIDE 500 ML: 9 INJECTION, SOLUTION INTRAVENOUS at 03:01

## 2024-11-22 RX ADMIN — DEXTROSE AND SODIUM CHLORIDE: 5; 900 INJECTION, SOLUTION INTRAVENOUS at 15:52

## 2024-11-22 RX ADMIN — AMPICILLIN SODIUM AND SULBACTAM SODIUM 3000 MG: 2; 1 INJECTION, POWDER, FOR SOLUTION INTRAMUSCULAR; INTRAVENOUS at 02:20

## 2024-11-22 RX ADMIN — AMPICILLIN SODIUM AND SULBACTAM SODIUM 3000 MG: 2; 1 INJECTION, POWDER, FOR SOLUTION INTRAMUSCULAR; INTRAVENOUS at 20:26

## 2024-11-22 NOTE — PROGRESS NOTES
Physical Therapy  Physical Therapy Initial Assessment     Name: Jona Hobbs  : 1945  MRN: [id number]      Date of Service: 2024    Evaluating PT:  Christiane Browne, PT, DPT, [id number]    Room #:  8504/8504-A  Diagnosis:  Acute CVA (cerebrovascular accident) ([facility]) [I63.9]  PMHx/PSHx:    Past Medical History:   Diagnosis Date    Bacteremia due to Gram-negative bacteria 2023    CHF (congestive heart failure) ([facility])     Dementia ([facility])     Electrolyte imbalance     Fever 2023    Sepsis ([facility]) 2023    Severe Alzheimer's dementia without behavioral disturbance, psychotic disturbance, mood disturbance, or anxiety ([facility]) 2023      Past Surgical History:   Procedure Laterality Date    HERNIA REPAIR Bilateral     TOTAL KNEE ARTHROPLASTY      bilateral    ULNAR NERVE REPAIR        Procedure/Surgery:  none this admission   Precautions:  Fall Risk, cognition, + Alarms, Duong, monitor BP  Equipment Needs:  TBD    SUBJECTIVE:    Pt lives with his daughter in a 1 story home with 1 stairs to enter and 0 rail.  Bed is on 1 floor and bath is on 1 floor.  Pt ambulated with WW PTA. Pt reports that his daughter is always there and assists with everything.    OBJECTIVE:   Initial Evaluation  Date: 24 Treatment Short Term/ Long Term   Goals   AM-PAC 6 Clicks      Was pt agreeable to Eval/treatment? yes     Does pt have pain? No c/o pain     Bed Mobility  Rolling: NT  Supine to sit: ModA  Sit to supine: MaxA  Scooting: MaxA toward EOB  Rolling: SBA  Supine to sit: SBA  Sit to supine: SBA  Scooting: SBA   Transfers Sit to stand: NT  Stand to sit: NT  Stand pivot: NT  Sit to stand: SBA with AAD  Stand to sit: SBA with AAD  Stand pivot: SBA with AAD   Ambulation   NT  50+ feet with AAD SBA   Stair negotiation: ascended and descended  NT  3+ steps with 1 rail Avila   ROM BUE:  Refer to OT note  BLE:  limited by weakness     Strength BUE:  Refer to OT note  BLE:  NT 2/2 decreased command follow    of care and goals.    CPT codes:  [x] Low Complexity PT evaluation 99451  [] Moderate Complexity PT evaluation 38450  [] High Complexity PT evaluation 59082  [] PT Re-evaluation 89365  [] Gait training 89381 0 minutes  [] Manual therapy 18543 0 minutes  [x] Therapeutic activities 49610 10 minutes  [] Therapeutic exercises 80176 0 minutes  [] Neuromuscular reeducation 35621 0 minutes     Christiane Browne PT, DPT  [id number]

## 2024-11-22 NOTE — PROGRESS NOTES
Jona Hobbs is a 79 y.o. right handed male     Patient was reportedly at the kitchen table with family when he suddenly went unresponsive and they noted weakness on the left.    On arrival to ED CT and CTAs were obtained which did demonstrate severe stenosis of vertebral and carotid artery-he was evaluated by neuroendovascular and given his modified Mariano score of 5 was not a candidate for intervention but rather medical management.    Chart review did indicate severe dementia    There is no family present at this time    MRI of the brain is pending    Allergies as of 11/21/2024 - Fully Reviewed 10/01/2024   Allergen Reaction Noted    Cipro [ciprofloxacin hcl] Other (See Comments) 12/08/2023    Hydrocodone-acetaminophen Other (See Comments) 04/19/2023    Quinapril Other (See Comments) 08/30/2000    Tylenol [acetaminophen] Other (See Comments) 11/27/2023       Objective:     BP (!) 96/51   Pulse 93   Temp 97.9 °F (36.6 °C) (Temporal)   Resp 22   Wt 88 kg (194 lb 0.1 oz)   SpO2 97%   BMI 27.84 kg/m²      General appearance: awake but not following commands  Head: Normocephalic, without obvious abnormality, atraumatic  Extremities: no cyanosis or edema  Pulses: 2+ and symmetric  Skin: no rashes or lesions    Mental Status: Opens eyes with verbal stimulation    Speech dysarthric  Not answering questions    Cranial Nerves:  I: smell    II: visual acuity     II: visual fields Full   II: pupils CORRY   III,VII: ptosis None   III,IV,VI: extraocular muscles  EOMI without nystagmus    V: mastication Normal   V: facial light touch sensation  Normal   V,VII: corneal reflex  Present   VII: facial muscle function - upper     VII: facial muscle function - lower Normal   VIII: hearing Normal   IX: soft palate elevation  Normal   IX,X: gag reflex    XI: trapezius strength     XI: sternocleidomastoid strength    XI: neck extension strength     XII: tongue strength       Motor:  Moving all limbs symmetrically arms more than  proximal left cervical ICA.  4. Areas of moderate stenosis involving the left intracranial ICA.  5. Moderate stenosis involving the A3 segment of the right anterior cerebral  artery.    I personally reviewed the patient's lab and imaging studies at this time.    Assessment:     Patient mated with sudden alteration in mentation in the setting of severe dementia-prior to admission York score was 5 therefore medical management was advised as opposed to intervention given his severe stenotic vertebral and carotid artery   Given his focal weakness noted by family MRI of the brain is pending    Plan:     MRI of the brain is pending    He is currently maintained on Eliquis as well as aspirin    He is also maintained on Crestor    Will continue to monitor      MARIA GUADALUPE Carcamo - CNS  10:51 AM  11/22/2024

## 2024-11-22 NOTE — PROGRESS NOTES
OCCUPATIONAL THERAPY INITIAL EVALUATION    [facility] [address], [place]       Date:2024                                                  Patient Name: Jona Hobbs  MRN: [id number]  : 1945  Room: [address] [place] [address]    Evaluating OT: Dereje Palacios OTR/L [id number]    Referring Provider: Cahtie Schneider DO      Specific Provider Orders/Date: OT evaluation and treatment 24 1630    Diagnosis:  Acute CVA (cerebrovascular accident) (HCC) [I63.9]      Pertinent Medical History:  has a past medical history of Bacteremia due to Gram-negative bacteria, CHF (congestive heart failure) ([facility]), Dementia ([facility]), Electrolyte imbalance, Fever, Sepsis ([facility]), and Severe Alzheimer's dementia without behavioral disturbance, psychotic disturbance, mood disturbance, or anxiety ([facility]).   Past Surgical History:   Procedure Laterality Date    HERNIA REPAIR Bilateral     TOTAL KNEE ARTHROPLASTY      bilateral    ULNAR NERVE REPAIR         Pt admitted to the hospital  with unresponsiveness at home at kitchen table and L weakness   MRI brain pending     Orders received, chart reviewed, eval complete.     Precautions:  Fall Risk, cognition, monitor BP     Assessment of current deficits   [x] Functional mobility   [x]ADLs  [x] Strength               [x]Cognition   [x] Functional transfers   [] IADLs         [x] Safety Awareness   [x]Endurance   [] Fine Motor Coordination [x] Balance [] Vision/perception   []Sensation    [x] Gross Motor Coordination [] ROM  [] Delirium                  [] Motor Control     OT PLAN OF CARE   OT POC based on physician orders, patient diagnosis and results of clinical assessment    Frequency/Duration 1-3 days/wk for 2 weeks PRN   Specific OT Treatment to include:   * Instruction/training on adapted ADL techniques and AE recommendations to increase functional independence within precautions       * Training on energy  Clinical decision making  Co-morbidities affecting occupational performance  Modification or assistance to complete eval    Low Complexity   1 to 3 []  Low []  None []  None []   Moderate Complexity   3 to 5 []  Mod []  Maybe []  Min to Mod []   High Complexity   5 or more [x]  High [x]  Yes [x]  Max [x]     The above evaluation is classified as high complexity based off the noted performance deficits, personal factors, co-morbidities, assistance required, and other factors as noted in the clinical evaluation and functional testing.     Time In: 0850  Time Out: 0915  Total Treatment Time: 10 minutes    Min Units   OT Eval Low 40460       OT Eval Moderate 10375      OT Eval High 97167  x 1   OT Re-Eval 32869       Therapeutic Ex 87390       Therapeutic Activities 72189  10 1   ADL/Self Care 32707      Orthotic Management 05456       Neuro Re-Ed 89698       Non-Billable Time          Evaluation Time includes thorough review of current medical information, gathering information on past medical history/social history and prior level of function, completion of standardized testing/informal observation of tasks, assessment of data and education on plan of care and goals.          Dereje Palacios OTR/L [id number]

## 2024-11-22 NOTE — ACP (ADVANCE CARE PLANNING)
Advance Care Planning   Healthcare Decision Maker:    Primary Decision Maker: Stacie Carroll - Sumit - [phone]    Click here to complete Healthcare Decision Makers including selection of the Healthcare Decision Maker Relationship (ie \"Primary\").          daughter said she brought in advance directives to the nursing station  yesterday. .FENG Villanueva  11/22/2024

## 2024-11-22 NOTE — PLAN OF CARE
Problem: Chronic Conditions and Co-morbidities  Goal: Patient's chronic conditions and co-morbidity symptoms are monitored and maintained or improved  Outcome: Progressing  Flowsheets (Taken 11/22/2024 0900)  Care Plan - Patient's Chronic Conditions and Co-Morbidity Symptoms are Monitored and Maintained or Improved:   Monitor and assess patient's chronic conditions and comorbid symptoms for stability, deterioration, or improvement   Collaborate with multidisciplinary team to address chronic and comorbid conditions and prevent exacerbation or deterioration   Update acute care plan with appropriate goals if chronic or comorbid symptoms are exacerbated and prevent overall improvement and discharge     Problem: Discharge Planning  Goal: Discharge to home or other facility with appropriate resources  Outcome: Progressing  Flowsheets (Taken 11/22/2024 0900)  Discharge to home or other facility with appropriate resources:   Identify barriers to discharge with patient and caregiver   Arrange for needed discharge resources and transportation as appropriate   Identify discharge learning needs (meds, wound care, etc)

## 2024-11-22 NOTE — PROGRESS NOTES
4 Eyes Skin Assessment     NAME:  Jona Hobbs  YOB: 1945  MEDICAL RECORD NUMBER:  [id number]    The patient is being assessed for  Admission    I agree that at least one RN has performed a thorough Head to Toe Skin Assessment on the patient. ALL assessment sites listed below have been assessed.      Areas assessed by both nurses:    Head, Face, Ears, Shoulders, Back, Chest, Arms, Elbows, Hands, Sacrum. Buttock, Coccyx, Ischium, and Legs. Feet and Heels        Does the Patient have a Wound? No noted wound(s)       Lg Prevention initiated by RN: Yes  Wound Care Orders initiated by RN: No    Pressure Injury (Stage 3,4, Unstageable, DTI, NWPT, and Complex wounds) if present, place Wound referral order by RN under : No    New Ostomies, if present place, Ostomy referral order under : No     Nurse 1 eSignature: Electronically signed by Radha Kennedy RN on 11/22/24 at 12:55 PM EST    **SHARE this note so that the co-signing nurse can place an eSignature**    Nurse 2 eSignature: {Esignature:[id number]}

## 2024-11-22 NOTE — CARE COORDINATION
I called daughter, Stacie, to complete the assessment since pt has dementia. He lives with his daughter and son in law who are not working. It is a ranch home with 1 step to enter the home. Pt is not active with [facility] but is active with [facility]. Pt uses a fww to get around but per Stacie spends 90% of the day in bed watching t.v. there is a walk in shower but she sponge bathes him. Stacie prepares all the  meals and medications. She is a only child. Pt has a transfer w/c when out of the home. PT and OT recommends [facility] but Stacie is not in agreement to [facility] and wants [facility] for therapies. I have requested order for PT and OT but will need to see if there is any speech needs. Speech to eval. Mri of the brain is pending and neurology to see. .FENG Villanueva  11/22/2024    Case Management Assessment  Initial Evaluation    Date/Time of Evaluation: 11/22/2024 11:01 AM  Assessment Completed by: FENG Villanueva    If patient is discharged prior to next notation, then this note serves as note for discharge by case management.    Patient Name: Jona Hobbs                   YOB: 1945  Diagnosis: Acute CVA (cerebrovascular accident) (HCC) [I63.9]                   Date / Time: 11/21/2024 11:01 AM    Patient Admission Status: Inpatient   Readmission Risk (Low < 19, Mod (19-27), High > 27): Readmission Risk Score: 18.3    Current PCP: Sage Easley Jr., MD  PCP verified by ? Yes    Chart Reviewed: Yes      History Provided by: Child/Family  Patient Orientation: Person, Place    Patient Cognition: Dementia / Early Alzheimer's    Hospitalization in the last 30 days (Readmission):  No    If yes, Readmission Assessment in  Navigator will be completed.    Advance Directives:      Code Status: Limited   Patient's Primary Decision Maker is: Legal Next of Kin (daughter brought in the advance directives to the nursing station last night.)    Primary Decision Maker: Stacie Carroll - Child -

## 2024-11-22 NOTE — PROGRESS NOTES
4 Eyes Skin Assessment     NAME:  Jona Hobbs  YOB: 1945  MEDICAL RECORD NUMBER:  [id number]    The patient is being assessed for  Admission    I agree that at least one RN has performed a thorough Head to Toe Skin Assessment on the patient. ALL assessment sites listed below have been assessed.      Areas assessed by both nurses:    Head, Face, Ears, Shoulders, Back, Chest, Arms, Elbows, Hands, Sacrum. Buttock, Coccyx, Ischium, and Legs. Feet and Heels        Does the Patient have a Wound? No noted wound(s)       Lg Prevention initiated by RN: Yes  Wound Care Orders initiated by RN: No    Pressure Injury (Stage 3,4, Unstageable, DTI, NWPT, and Complex wounds) if present, place Wound referral order by RN under : No    New Ostomies, if present place, Ostomy referral order under : No     Nurse 1 eSignature: Electronically signed by Radha Kennedy RN on 11/22/24 at 4:13 PM EST    **SHARE this note so that the co-signing nurse can place an eSignature**    Nurse 2 eSignature: Electronically signed by Elidia Cristina RN on 11/22/24 at 6:57 PM EST

## 2024-11-22 NOTE — PROGRESS NOTES
Speech Language Pathology  NAME:  Jona Hobbs  :  1945  DATE: 2024  ROOM:  [facility]-A    Pt unavailable at 0945 for Clinical Swallow Evaluation Discussed with patient nurse in person     REASON:  Sleeping/ Lethargic- would not awaken for safe/ appropriate SLP assessment.    Will re-attempt as appropriate.       Thank You    Perla Chang M.S., CCC-SLP  Speech-Language Pathologist  SP. [id number]

## 2024-11-22 NOTE — PROGRESS NOTES
[facility] Hospitalist Progress Note    Admitting Date and Time: 11/21/2024 11:01 AM  Admit Dx: Acute CVA (cerebrovascular accident) (HCC) [I63.9]    Subjective:  Patient is being followed for Acute CVA (cerebrovascular accident) (HCC) [I63.9]   Pt feels patient able to open his eyes and can answer with gestures but no verbal communication.      ROS: denies fever, chills, cp, sob, n/v, HA unless stated above.      sodium chloride flush  5-40 mL IntraVENous 2 times per day    apixaban  5 mg Oral BID    furosemide  20 mg Oral Daily    levothyroxine  25 mcg Oral Daily    [Held by provider] losartan  100 mg Oral Daily    mirtazapine  30 mg Oral Nightly    [Held by provider] metoprolol succinate  25 mg Oral Nightly    therapeutic multivitamin-minerals  1 tablet Oral Daily    potassium chloride  20 mEq Oral Daily    rosuvastatin  10 mg Oral Nightly    tamsulosin  0.4 mg Oral Daily    ampicillin-sulbactam  3,000 mg IntraVENous Q6H    aspirin  81 mg Oral Daily     benzocaine-menthol, 1 lozenge, Q2H PRN  benzonatate, 100 mg, TID PRN  calcium carbonate, 500 mg, TID PRN  melatonin, 3 mg, Nightly PRN  Polyvinyl Alcohol-Povidone PF, 1 drop, PRN  sodium chloride, 1 spray, PRN  sodium chloride flush, 5-40 mL, PRN  sodium chloride, , PRN  potassium chloride, 40 mEq, PRN   Or  potassium alternative oral replacement, 40 mEq, PRN   Or  potassium chloride, 10 mEq, PRN  magnesium sulfate, 2,000 mg, PRN  ondansetron, 4 mg, Q8H PRN   Or  ondansetron, 4 mg, Q6H PRN  polyethylene glycol, 17 g, Daily PRN  bisacodyl, 5 mg, Daily PRN  hydrALAZINE, 10 mg, Q6H PRN         Objective:    BP (!) 108/50   Pulse 64   Temp 97.6 °F (36.4 °C) (Temporal)   Resp 19   Wt 88 kg (194 lb 0.1 oz)   SpO2 96%   BMI 27.84 kg/m²     General Appearance: alert and oriented to person, place and time and in no acute distress  Skin: warm and dry  Head: normocephalic and atraumatic  Eyes: pupils equal, round, and reactive to light, extraocular eye

## 2024-11-22 NOTE — PROGRESS NOTES
Speech Language Pathology  NAME:  Jona Hobbs  :  1945  DATE: 2024  ROOM:  8504/8504-A    Pt unavailable at 1515 for Clinical Swallow Evaluation Discussed with with physician and caregiver/family     REASON:  Sleeping/ Lethargic- would not awaken for safe/ appropriate SLP assessment.      Second attempt to complete clinical swallow evaluation. Daughter and physician present in room. Therapist educated daughter as to why it is unsafe to feed patient when he is not alert. Physician is in agreement.     Will re-attempt as appropriate.       Thank You    Eric Parra M.S., CCC-SLP/L   Speech-Language Pathologist  [id number].[id number]

## 2024-11-23 ENCOUNTER — APPOINTMENT (OUTPATIENT)
Age: 79
DRG: 064 | End: 2024-11-23
Attending: INTERNAL MEDICINE
Payer: MEDICARE

## 2024-11-23 LAB
ECHO AV AREA PEAK VELOCITY: 0.8 CM2
ECHO AV AREA VTI: 0.9 CM2
ECHO AV AREA/BSA PEAK VELOCITY: 0.4 CM2/M2
ECHO AV AREA/BSA VTI: 0.5 CM2/M2
ECHO AV MEAN GRADIENT: 39 MMHG
ECHO AV MEAN VELOCITY: 2.9 M/S
ECHO AV PEAK GRADIENT: 73 MMHG
ECHO AV PEAK VELOCITY: 4.3 M/S
ECHO AV VELOCITY RATIO: 0.21
ECHO AV VTI: 98.5 CM
ECHO BSA: 2 M2
ECHO EST RA PRESSURE: 3 MMHG
ECHO LA DIAMETER INDEX: 2.46 CM/M2
ECHO LA DIAMETER: 4.9 CM
ECHO LA VOL A-L A2C: 91 ML (ref 18–58)
ECHO LA VOL A-L A4C: 70 ML (ref 18–58)
ECHO LA VOL BP: 82 ML (ref 18–58)
ECHO LA VOL MOD A2C: 89 ML (ref 18–58)
ECHO LA VOL MOD A4C: 70 ML (ref 18–58)
ECHO LA VOL/BSA BIPLANE: 41 ML/M2 (ref 16–34)
ECHO LA VOLUME AREA LENGTH: 84 ML
ECHO LA VOLUME INDEX A-L A2C: 46 ML/M2 (ref 16–34)
ECHO LA VOLUME INDEX A-L A4C: 35 ML/M2 (ref 16–34)
ECHO LA VOLUME INDEX AREA LENGTH: 42 ML/M2 (ref 16–34)
ECHO LA VOLUME INDEX MOD A2C: 45 ML/M2 (ref 16–34)
ECHO LA VOLUME INDEX MOD A4C: 35 ML/M2 (ref 16–34)
ECHO LV EDV A2C: 245 ML
ECHO LV EDV A4C: 217 ML
ECHO LV EDV BP: 235 ML (ref 67–155)
ECHO LV EDV INDEX A4C: 109 ML/M2
ECHO LV EDV INDEX BP: 118 ML/M2
ECHO LV EDV NDEX A2C: 123 ML/M2
ECHO LV EF PHYSICIAN: 35 %
ECHO LV EJECTION FRACTION A2C: 36 %
ECHO LV EJECTION FRACTION A4C: 35 %
ECHO LV EJECTION FRACTION BIPLANE: 37 % (ref 55–100)
ECHO LV ESV A2C: 157 ML
ECHO LV ESV A4C: 141 ML
ECHO LV ESV BP: 148 ML (ref 22–58)
ECHO LV ESV INDEX A2C: 79 ML/M2
ECHO LV ESV INDEX A4C: 71 ML/M2
ECHO LV ESV INDEX BP: 74 ML/M2
ECHO LV FRACTIONAL SHORTENING: 23 % (ref 28–44)
ECHO LV INTERNAL DIMENSION DIASTOLE INDEX: 3.22 CM/M2
ECHO LV INTERNAL DIMENSION DIASTOLIC: 6.4 CM (ref 4.2–5.9)
ECHO LV INTERNAL DIMENSION SYSTOLIC INDEX: 2.46 CM/M2
ECHO LV INTERNAL DIMENSION SYSTOLIC: 4.9 CM
ECHO LV ISOVOLUMETRIC RELAXATION TIME (IVRT): 91.3 MS
ECHO LV IVSD: 1.1 CM (ref 0.6–1)
ECHO LV IVSS: 1.6 CM
ECHO LV MASS 2D: 311.7 G (ref 88–224)
ECHO LV MASS INDEX 2D: 156.6 G/M2 (ref 49–115)
ECHO LV POSTERIOR WALL DIASTOLIC: 1.1 CM (ref 0.6–1)
ECHO LV POSTERIOR WALL SYSTOLIC: 1.5 CM
ECHO LV RELATIVE WALL THICKNESS RATIO: 0.34
ECHO LVOT AREA: 3.8 CM2
ECHO LVOT AV VTI INDEX: 0.23
ECHO LVOT DIAM: 2.2 CM
ECHO LVOT MEAN GRADIENT: 2 MMHG
ECHO LVOT PEAK GRADIENT: 3 MMHG
ECHO LVOT PEAK VELOCITY: 0.9 M/S
ECHO LVOT STROKE VOLUME INDEX: 43.1 ML/M2
ECHO LVOT SV: 85.9 ML
ECHO LVOT VTI: 22.6 CM
ECHO MV "A" WAVE DURATION: 133.2 MSEC
ECHO MV A VELOCITY: 1.12 M/S
ECHO MV AREA PHT: 5.1 CM2
ECHO MV AREA VTI: 2.5 CM2
ECHO MV E DECELERATION TIME (DT): 270.1 MS
ECHO MV E VELOCITY: 0.91 M/S
ECHO MV E/A RATIO: 0.81
ECHO MV LVOT VTI INDEX: 1.51
ECHO MV MAX VELOCITY: 1.3 M/S
ECHO MV MEAN GRADIENT: 3 MMHG
ECHO MV MEAN VELOCITY: 0.8 M/S
ECHO MV PEAK GRADIENT: 7 MMHG
ECHO MV PRESSURE HALF TIME (PHT): 43.5 MS
ECHO MV VTI: 34.2 CM
ECHO PVEIN A DURATION: 129.4 MS
ECHO PVEIN A VELOCITY: 0.3 M/S
ECHO PVEIN PEAK D VELOCITY: 0.2 M/S
ECHO PVEIN PEAK S VELOCITY: 0.3 M/S
ECHO PVEIN S/D RATIO: 1.5
ECHO RIGHT VENTRICULAR SYSTOLIC PRESSURE (RVSP): 24 MMHG
ECHO RV INTERNAL DIMENSION: 4.1 CM
ECHO RV MID DIMENSION: 4.6 CM
ECHO TV REGURGITANT MAX VELOCITY: 2.27 M/S
ECHO TV REGURGITANT PEAK GRADIENT: 21 MMHG
MICROORGANISM SPEC CULT: ABNORMAL
SERVICE CMNT-IMP: ABNORMAL
SPECIMEN DESCRIPTION: ABNORMAL

## 2024-11-23 PROCEDURE — 99233 SBSQ HOSP IP/OBS HIGH 50: CPT | Performed by: INTERNAL MEDICINE

## 2024-11-23 PROCEDURE — 2580000003 HC RX 258: Performed by: INTERNAL MEDICINE

## 2024-11-23 PROCEDURE — 93306 TTE W/DOPPLER COMPLETE: CPT | Performed by: INTERNAL MEDICINE

## 2024-11-23 PROCEDURE — 6360000002 HC RX W HCPCS: Performed by: INTERNAL MEDICINE

## 2024-11-23 PROCEDURE — 6370000000 HC RX 637 (ALT 250 FOR IP): Performed by: INTERNAL MEDICINE

## 2024-11-23 PROCEDURE — 2700000000 HC OXYGEN THERAPY PER DAY

## 2024-11-23 PROCEDURE — 92610 EVALUATE SWALLOWING FUNCTION: CPT

## 2024-11-23 PROCEDURE — 92523 SPEECH SOUND LANG COMPREHEN: CPT

## 2024-11-23 PROCEDURE — 2060000000 HC ICU INTERMEDIATE R&B

## 2024-11-23 PROCEDURE — 99231 SBSQ HOSP IP/OBS SF/LOW 25: CPT | Performed by: CLINICAL NURSE SPECIALIST

## 2024-11-23 PROCEDURE — 93306 TTE W/DOPPLER COMPLETE: CPT

## 2024-11-23 RX ADMIN — APIXABAN 5 MG: 5 TABLET, FILM COATED ORAL at 20:38

## 2024-11-23 RX ADMIN — AMPICILLIN SODIUM AND SULBACTAM SODIUM 3000 MG: 2; 1 INJECTION, POWDER, FOR SOLUTION INTRAMUSCULAR; INTRAVENOUS at 05:07

## 2024-11-23 RX ADMIN — FUROSEMIDE 20 MG: 40 TABLET ORAL at 10:10

## 2024-11-23 RX ADMIN — ASPIRIN 81 MG CHEWABLE TABLET 81 MG: 81 TABLET CHEWABLE at 10:12

## 2024-11-23 RX ADMIN — DEXTROSE AND SODIUM CHLORIDE: 5; 900 INJECTION, SOLUTION INTRAVENOUS at 05:03

## 2024-11-23 RX ADMIN — TAMSULOSIN HYDROCHLORIDE 0.4 MG: 0.4 CAPSULE ORAL at 10:12

## 2024-11-23 RX ADMIN — ROSUVASTATIN 10 MG: 10 TABLET, FILM COATED ORAL at 20:38

## 2024-11-23 RX ADMIN — PIPERACILLIN AND TAZOBACTAM 4500 MG: 4; .5 INJECTION, POWDER, FOR SOLUTION INTRAVENOUS at 10:27

## 2024-11-23 RX ADMIN — SODIUM CHLORIDE, PRESERVATIVE FREE 10 ML: 5 INJECTION INTRAVENOUS at 20:38

## 2024-11-23 RX ADMIN — POTASSIUM CHLORIDE 20 MEQ: 20 TABLET, EXTENDED RELEASE ORAL at 10:13

## 2024-11-23 RX ADMIN — APIXABAN 5 MG: 5 TABLET, FILM COATED ORAL at 10:12

## 2024-11-23 RX ADMIN — MIRTAZAPINE 30 MG: 15 TABLET, FILM COATED ORAL at 20:38

## 2024-11-23 NOTE — PROGRESS NOTES
[facility] Hospitalist Progress Note    Admitting Date and Time: 11/21/2024 11:01 AM  Admit Dx: Acute CVA (cerebrovascular accident) (HCC) [I63.9]    Subjective:  Patient is being followed for Acute CVA (cerebrovascular accident) (HCC) [I63.9]   Pt feels patient able to open his eyes and can answer with gestures but no verbal communication.      ROS: denies fever, chills, cp, sob, n/v, HA unless stated above.      piperacillin-tazobactam  4,500 mg IntraVENous Q8H    sodium chloride flush  5-40 mL IntraVENous 2 times per day    apixaban  5 mg Oral BID    furosemide  20 mg Oral Daily    levothyroxine  25 mcg Oral Daily    [Held by provider] losartan  100 mg Oral Daily    mirtazapine  30 mg Oral Nightly    [Held by provider] metoprolol succinate  25 mg Oral Nightly    therapeutic multivitamin-minerals  1 tablet Oral Daily    potassium chloride  20 mEq Oral Daily    rosuvastatin  10 mg Oral Nightly    tamsulosin  0.4 mg Oral Daily    aspirin  81 mg Oral Daily     benzocaine-menthol, 1 lozenge, Q2H PRN  benzonatate, 100 mg, TID PRN  calcium carbonate, 500 mg, TID PRN  melatonin, 3 mg, Nightly PRN  Polyvinyl Alcohol-Povidone PF, 1 drop, PRN  sodium chloride, 1 spray, PRN  sodium chloride flush, 5-40 mL, PRN  sodium chloride, , PRN  potassium chloride, 40 mEq, PRN   Or  potassium alternative oral replacement, 40 mEq, PRN   Or  potassium chloride, 10 mEq, PRN  magnesium sulfate, 2,000 mg, PRN  ondansetron, 4 mg, Q8H PRN   Or  ondansetron, 4 mg, Q6H PRN  polyethylene glycol, 17 g, Daily PRN  bisacodyl, 5 mg, Daily PRN  hydrALAZINE, 10 mg, Q6H PRN         Objective:    BP (!) 146/54   Pulse 64   Temp 98 °F (36.7 °C) (Oral)   Resp 20   Ht 1.778 m (5' 10\")   Wt 80.7 kg (178 lb)   SpO2 96%   BMI 25.54 kg/m²     General Appearance: alert and oriented to person, place and time and in no acute distress  Skin: warm and dry  Head: normocephalic and atraumatic  Eyes: pupils equal, round, and reactive to light,

## 2024-11-23 NOTE — PROGRESS NOTES
Antibiotic Extended Infusion Policy     This patient is on medication that requires renal, weight, and/or indication dose adjustment.      Date Body Weight IBW  Adjusted BW SCr  CrCl Dialysis status BMI   11/23/2024 80.7 kg (178 lb) Ideal body weight: 73 kg (160 lb 15 oz)  Adjusted ideal body weight: 76.1 kg (167 lb 12.2 oz) Serum creatinine: 1.3 mg/dL (H) 11/22/24 0502  Estimated creatinine clearance: 46 mL/min (A) N/a Body mass index is 25.54 kg/m².       Pharmacy has dose-adjusted the following medication(s):    Ordered Medication: Zosyn 3375mg q8H     Order Changed/converted to: Zosyn 4500mg q8h    These changes were made per protocol according to the [facility]   [employer] Extended Infusion Dose Adjustment Policy.     *Please note this dose may need readjusted if patient's condition changes.    Please contact pharmacy with any questions regarding these changes.    Dixie Quinones [facility]  11/23/2024  9:59 AM

## 2024-11-23 NOTE — PROGRESS NOTES
Jona Hobbs is a 79 y.o. right handed male     Patient was reportedly at the kitchen table with family when he suddenly went unresponsive and they noted weakness on the left.    On arrival to ED CT and CTAs were obtained which did demonstrate severe stenosis of vertebral and carotid artery-he was evaluated by neuroendovascular and given his modified Mariano score of 5 was not a candidate for intervention but rather medical management.    Chart review did indicate severe dementia    There is no family present this a.m. when evaluated    MRI of the brain is pending    Allergies as of 11/21/2024 - Fully Reviewed 10/01/2024   Allergen Reaction Noted    Cipro [ciprofloxacin hcl] Other (See Comments) 12/08/2023    Hydrocodone-acetaminophen Other (See Comments) 04/19/2023    Quinapril Other (See Comments) 08/30/2000    Tylenol [acetaminophen] Other (See Comments) 11/27/2023       Objective:     /71   Pulse 68   Temp 99.1 °F (37.3 °C) (Oral)   Resp 18   Ht 1.778 m (5' 10\")   Wt 80.7 kg (178 lb)   SpO2 97%   BMI 25.54 kg/m²      General appearance: awake but not following commands  Head: Normocephalic, without obvious abnormality, atraumatic  Extremities: no cyanosis or edema  Pulses: 2+ and symmetric  Skin: no rashes or lesions    Mental Status: Opens eyes with verbal stimulation    Speech dysarthric  Not answering questions for me    Cranial Nerves:  I: smell    II: visual acuity     II: visual fields Full   II: pupils CORRY   III,VII: ptosis None   III,IV,VI: extraocular muscles  EOMI without nystagmus    V: mastication Normal   V: facial light touch sensation  Normal   V,VII: corneal reflex  Present   VII: facial muscle function - upper     VII: facial muscle function - lower Normal   VIII: hearing Normal   IX: soft palate elevation  Normal   IX,X: gag reflex    XI: trapezius strength     XI: sternocleidomastoid strength    XI: neck extension strength     XII: tongue strength       Motor:  Moving all limbs  -  arms more than legs  Spastic quadriparesis    Sensory:  Appreciates tuning forks in all limbs    DTR:   No reflexes    No Babinski  No Cazares's     Laboratory/Radiology:     CBC with Differential:    Lab Results   Component Value Date/Time    WBC 7.4 11/22/2024 05:02 AM    RBC 3.74 11/22/2024 05:02 AM    HGB 10.9 11/22/2024 05:02 AM    HCT 33.1 11/22/2024 05:02 AM     11/22/2024 05:02 AM    MCV 88.5 11/22/2024 05:02 AM    MCH 29.1 11/22/2024 05:02 AM    MCHC 32.9 11/22/2024 05:02 AM    RDW 14.0 11/22/2024 05:02 AM    LYMPHOPCT 32 11/21/2024 11:00 AM    MONOPCT 12 11/21/2024 11:00 AM    EOSPCT 4 11/21/2024 11:00 AM    BASOPCT 1 11/21/2024 11:00 AM    MONOSABS 0.58 11/21/2024 11:00 AM    LYMPHSABS 1.60 11/21/2024 11:00 AM    EOSABS 0.19 11/21/2024 11:00 AM    BASOSABS 0.03 11/21/2024 11:00 AM     CMP:    Lab Results   Component Value Date/Time     11/22/2024 05:02 AM    K 4.2 11/22/2024 05:02 AM    K 4.0 03/23/2023 04:51 AM     11/22/2024 05:02 AM    CO2 24 11/22/2024 05:02 AM    BUN 26 11/22/2024 05:02 AM    CREATININE 1.3 11/22/2024 05:02 AM    LABGLOM 57 11/22/2024 05:02 AM    LABGLOM >60 01/09/2024 12:45 PM    GLUCOSE 118 11/22/2024 05:02 AM    CALCIUM 9.5 11/22/2024 05:02 AM    BILITOT 0.6 11/22/2024 05:02 AM    ALKPHOS 73 11/22/2024 05:02 AM    AST 19 11/22/2024 05:02 AM    ALT 15 11/22/2024 05:02 AM     HgBA1c:    Lab Results   Component Value Date/Time    LABA1C 5.1 11/22/2024 05:02 AM     FLP:    Lab Results   Component Value Date/Time    TRIG 37 11/22/2024 05:02 AM    HDL 43 11/22/2024 05:02 AM       CT Head:  1. No evidence of an acute or subacute intracranial abnormality.  2. Multifocal paranasal sinus mucosal inflammatory disease without acute  paranasal sinusitis.    CTA  1. Age indeterminate occlusion involving the distal left V3 segment as well  as the left V4 segment.  2. Severe stenosis at the origin of the vertebral arteries bilaterally.  3. There is severe near occlusive

## 2024-11-23 NOTE — PROGRESS NOTES
SPEECH/LANGUAGE PATHOLOGY  CLINICAL ASSESSMENT OF SWALLOWING FUNCTION   and PLAN OF CARE      PATIENT NAME:  Jona Hobbs  (male)     MRN:  [id number]    :  1945  (79 y.o.)  STATUS:  Inpatient: Room 8504/8504-A    TODAY'S DATE:  2024  ORDER DATE, DESCRIPTION AND REFERRING PROVIDER: 24 1630    SLP eval and treat  Start:  24,   End:  24 163,   ONE TIME,   Standing Count:  1 Occurrences,   R       Jennie, Cathie DO, DO  REASON FOR REFERRAL: cva   EVALUATING THERAPIST: Perla Chang, SLP                 RESULTS:    DYSPHAGIA DIAGNOSIS:   Clinical indicators of mild  oral phase dysphagia  and Clinical indicators of minimal-mild pharyngeal phase dysphagia       DIET RECOMMENDATIONS:  Minced and moist consistency solids (IDDSI level 5) with  thin liquids (IDDSI level 0),- NO STRAWS     MEDICATION ADMINISTRATION, and Administer medication crushed, as able, with pudding/applesauce     FEEDING RECOMMENDATIONS:     Assistance level:  Supervision is needed during all oral intake      Compensatory strategies recommended: Thorough oral care to prevent colonization of oral bacteria   Upright in bed/ chair as tolerated  Fully alert for all PO  Slow rate of intake   SINGLE cup sips  SMALL bites  NO STRAW      Discussed recommendations with:  patient nurse in person    SPEECH THERAPY  PLAN OF CARE   The dysphagia POC is established based on physician order, dysphagia diagnosis and results of clinical assessment     Skilled SLP intervention for dysphagia management on acute care up to 5 x per week until goals met, pt plateaus in function and/or discharged from hospital    Conditions Requiring Skilled Therapeutic Intervention for dysphagia:    Patient is performing below functional baseline d/t  current acute condition, respiratory compromise, multiple medications, and/or increased dependency upon caregivers.    Specific dysphagia interventions to include:     compensatory swallowing strategies

## 2024-11-23 NOTE — PROGRESS NOTES
SPEECH/LANGUAGE PATHOLOGY  SPEECH/LANGUAGE/COGNITIVE EVALUATION   and PLAN OF CARE      PATIENT NAME:  Jona Hobbs  (male)     MRN:  [id number]    :  1945  (79 y.o.)  STATUS:  Inpatient: Room 8504/8504-A    TODAY'S DATE:  2024  ORDER DATE, DESCRIPTION AND REFERRING PROVIDER : ORDER DATE, DESCRIPTION AND REFERRING PROVIDER: 24 1630                   SLP eval and treat  Start:  24 163,   End:  24 1630,   ONE TIME,   Standing Count:  1 Occurrences,   R       Jennie, Cathie DO, DO  REASON FOR REFERRAL:  cva  EVALUATING THERAPIST: BRI Mccarthy    ADMITTING DIAGNOSIS: Acute CVA (cerebrovascular accident) (HCC) [I63.9]    VISIT DIAGNOSIS:   Visit Diagnoses         Codes    Aspiration pneumonia of both lungs, unspecified aspiration pneumonia type, unspecified part of lung (HCC)     J69.0    Cerebrovascular accident (CVA), unspecified mechanism (HCC)     I63.9               SPEECH THERAPY  PLAN OF CARE   The speech therapy  POC is established based on physician order, speech pathology diagnosis and results of clinical assessment     SPEECH PATHOLOGY DIAGNOSIS:    Moderate-severe cognitive-linguistic  deficits, baseline dementia per chart review.     Speech Pathology intervention is recommended up to 6 times per week for LOS or when goals are met with emphasis on the following:      Conditions Requiring Skilled Therapeutic Intervention for speech, language and/or cognition    Cognitive linguistic impairment  Decreased thought organization    Specific Speech Therapy Interventions to Include:   Therapeutic tasks for Cognition    Specific instructions for next treatment:     To initiate POC    SHORT/LONG TERM GOALS  Pt will improve orientation to spatial and temporal surroundings with use of external memory aides.  Pt will improve problem solving/thought organization during structured and unstructured tasks with 80% accuracy   Pt will improve receptive and expressive language skills with

## 2024-11-24 PROCEDURE — 2580000003 HC RX 258: Performed by: INTERNAL MEDICINE

## 2024-11-24 PROCEDURE — 2700000000 HC OXYGEN THERAPY PER DAY

## 2024-11-24 PROCEDURE — 6370000000 HC RX 637 (ALT 250 FOR IP): Performed by: INTERNAL MEDICINE

## 2024-11-24 PROCEDURE — 6360000002 HC RX W HCPCS: Performed by: INTERNAL MEDICINE

## 2024-11-24 PROCEDURE — 2060000000 HC ICU INTERMEDIATE R&B

## 2024-11-24 PROCEDURE — 99232 SBSQ HOSP IP/OBS MODERATE 35: CPT | Performed by: CLINICAL NURSE SPECIALIST

## 2024-11-24 PROCEDURE — 99233 SBSQ HOSP IP/OBS HIGH 50: CPT | Performed by: INTERNAL MEDICINE

## 2024-11-24 RX ADMIN — PIPERACILLIN AND TAZOBACTAM 4500 MG: 4; .5 INJECTION, POWDER, FOR SOLUTION INTRAVENOUS at 11:23

## 2024-11-24 RX ADMIN — ROSUVASTATIN 10 MG: 10 TABLET, FILM COATED ORAL at 21:39

## 2024-11-24 RX ADMIN — PIPERACILLIN AND TAZOBACTAM 4500 MG: 4; .5 INJECTION, POWDER, FOR SOLUTION INTRAVENOUS at 18:44

## 2024-11-24 RX ADMIN — MIRTAZAPINE 30 MG: 15 TABLET, FILM COATED ORAL at 21:39

## 2024-11-24 RX ADMIN — Medication 1 TABLET: at 11:19

## 2024-11-24 RX ADMIN — ASPIRIN 81 MG CHEWABLE TABLET 81 MG: 81 TABLET CHEWABLE at 11:19

## 2024-11-24 RX ADMIN — POTASSIUM CHLORIDE 20 MEQ: 20 TABLET, EXTENDED RELEASE ORAL at 11:19

## 2024-11-24 RX ADMIN — LEVOTHYROXINE SODIUM 25 MCG: 0.03 TABLET ORAL at 05:18

## 2024-11-24 RX ADMIN — TAMSULOSIN HYDROCHLORIDE 0.4 MG: 0.4 CAPSULE ORAL at 11:19

## 2024-11-24 RX ADMIN — DEXTROSE AND SODIUM CHLORIDE: 5; 900 INJECTION, SOLUTION INTRAVENOUS at 21:40

## 2024-11-24 RX ADMIN — APIXABAN 5 MG: 5 TABLET, FILM COATED ORAL at 11:19

## 2024-11-24 RX ADMIN — PIPERACILLIN AND TAZOBACTAM 4500 MG: 4; .5 INJECTION, POWDER, FOR SOLUTION INTRAVENOUS at 02:06

## 2024-11-24 RX ADMIN — APIXABAN 5 MG: 5 TABLET, FILM COATED ORAL at 21:39

## 2024-11-24 RX ADMIN — SODIUM CHLORIDE, PRESERVATIVE FREE 10 ML: 5 INJECTION INTRAVENOUS at 21:39

## 2024-11-24 RX ADMIN — SODIUM CHLORIDE, PRESERVATIVE FREE 10 ML: 5 INJECTION INTRAVENOUS at 11:22

## 2024-11-24 RX ADMIN — FUROSEMIDE 20 MG: 40 TABLET ORAL at 11:15

## 2024-11-24 NOTE — PROGRESS NOTES
the left V4 segment.  2. Severe stenosis at the origin of the vertebral arteries bilaterally.  3. There is severe near occlusive stenosis of the proximal left cervical ICA.  4. Areas of moderate stenosis involving the left intracranial ICA.  5. Moderate stenosis involving the A3 segment of the right anterior cerebral  artery.    I personally reviewed the patient's lab and imaging studies at this time.    Assessment:     Patient mated with sudden alteration in mentation in the setting of severe dementia-prior to admission McLouth score was 5 therefore medical management was advised as opposed to intervention given his severe stenotic vertebral and carotid artery   Given his focal weakness noted by family MRI of the brain is pending    Plan:     MRI of the brain is still pending    He is currently maintained on Eliquis as well as aspirin    He is also maintained on Crestor    Will continue to monitor and review imaging    Miguel Sanderson, MARIA GUADALUPE - CNS  10:47 AM  11/24/2024

## 2024-11-24 NOTE — PROGRESS NOTES
Hospitalist Progress Note    Admitting Date and Time: 11/21/2024 11:01 AM  Admit Dx: Acute CVA (cerebrovascular accident) (HCC) [I63.9]    Subjective:  Patient is being followed for Acute CVA (cerebrovascular accident) (HCC) [I63.9]   Pt feels little better than yesterday, can communicate verbally.  As per nurse he did not eat well because he does not like the food.      ROS: denies fever, chills, cp, sob, n/v, HA unless stated above.      piperacillin-tazobactam  4,500 mg IntraVENous Q8H    sodium chloride flush  5-40 mL IntraVENous 2 times per day    apixaban  5 mg Oral BID    furosemide  20 mg Oral Daily    levothyroxine  25 mcg Oral Daily    [Held by provider] losartan  100 mg Oral Daily    mirtazapine  30 mg Oral Nightly    [Held by provider] metoprolol succinate  25 mg Oral Nightly    therapeutic multivitamin-minerals  1 tablet Oral Daily    potassium chloride  20 mEq Oral Daily    rosuvastatin  10 mg Oral Nightly    tamsulosin  0.4 mg Oral Daily    aspirin  81 mg Oral Daily     benzocaine-menthol, 1 lozenge, Q2H PRN  benzonatate, 100 mg, TID PRN  calcium carbonate, 500 mg, TID PRN  melatonin, 3 mg, Nightly PRN  Polyvinyl Alcohol-Povidone PF, 1 drop, PRN  sodium chloride, 1 spray, PRN  sodium chloride flush, 5-40 mL, PRN  sodium chloride, , PRN  potassium chloride, 40 mEq, PRN   Or  potassium alternative oral replacement, 40 mEq, PRN   Or  potassium chloride, 10 mEq, PRN  magnesium sulfate, 2,000 mg, PRN  ondansetron, 4 mg, Q8H PRN   Or  ondansetron, 4 mg, Q6H PRN  polyethylene glycol, 17 g, Daily PRN  bisacodyl, 5 mg, Daily PRN  hydrALAZINE, 10 mg, Q6H PRN         Objective:    BP (!) 166/66   Pulse 63   Temp 99.8 °F (37.7 °C) (Temporal)   Resp 15   Ht 1.778 m (5' 10\")   Wt 80.7 kg (178 lb)   SpO2 96%   BMI 25.54 kg/m²     General Appearance: alert and oriented to person, place and time and in no acute distress  Skin: warm and dry  Head: normocephalic and atraumatic  Eyes: pupils

## 2024-11-24 NOTE — PROGRESS NOTES
Pt admitted with samuel, chronic. Message sent to Dr Talley to see if order can be obtained for samuel

## 2024-11-25 ENCOUNTER — APPOINTMENT (OUTPATIENT)
Dept: MRI IMAGING | Age: 79
DRG: 064 | End: 2024-11-25
Payer: MEDICARE

## 2024-11-25 VITALS
OXYGEN SATURATION: 98 % | SYSTOLIC BLOOD PRESSURE: 149 MMHG | HEART RATE: 64 BPM | HEIGHT: 70 IN | DIASTOLIC BLOOD PRESSURE: 63 MMHG | TEMPERATURE: 98.2 F | WEIGHT: 178 LBS | RESPIRATION RATE: 18 BRPM | BODY MASS INDEX: 25.48 KG/M2

## 2024-11-25 LAB
ANION GAP SERPL CALCULATED.3IONS-SCNC: 7 MMOL/L (ref 7–16)
BASOPHILS # BLD: 0.01 K/UL (ref 0–0.2)
BASOPHILS NFR BLD: 0 % (ref 0–2)
BUN SERPL-MCNC: 12 MG/DL (ref 6–23)
CALCIUM SERPL-MCNC: 9 MG/DL (ref 8.6–10.2)
CHLORIDE SERPL-SCNC: 110 MMOL/L (ref 98–107)
CO2 SERPL-SCNC: 26 MMOL/L (ref 22–29)
CREAT SERPL-MCNC: 0.9 MG/DL (ref 0.7–1.2)
EOSINOPHIL # BLD: 0.17 K/UL (ref 0.05–0.5)
EOSINOPHILS RELATIVE PERCENT: 4 % (ref 0–6)
ERYTHROCYTE [DISTWIDTH] IN BLOOD BY AUTOMATED COUNT: 13.2 % (ref 11.5–15)
GFR, ESTIMATED: 86 ML/MIN/1.73M2
GLUCOSE SERPL-MCNC: 88 MG/DL (ref 74–99)
HCT VFR BLD AUTO: 32.5 % (ref 37–54)
HGB BLD-MCNC: 10.3 G/DL (ref 12.5–16.5)
IMM GRANULOCYTES # BLD AUTO: <0.03 K/UL (ref 0–0.58)
IMM GRANULOCYTES NFR BLD: 0 % (ref 0–5)
LYMPHOCYTES NFR BLD: 0.78 K/UL (ref 1.5–4)
LYMPHOCYTES RELATIVE PERCENT: 17 % (ref 20–42)
MCH RBC QN AUTO: 28.5 PG (ref 26–35)
MCHC RBC AUTO-ENTMCNC: 31.7 G/DL (ref 32–34.5)
MCV RBC AUTO: 90 FL (ref 80–99.9)
MONOCYTES NFR BLD: 0.46 K/UL (ref 0.1–0.95)
MONOCYTES NFR BLD: 10 % (ref 2–12)
NEUTROPHILS NFR BLD: 69 % (ref 43–80)
NEUTS SEG NFR BLD: 3.2 K/UL (ref 1.8–7.3)
PLATELET # BLD AUTO: 128 K/UL (ref 130–450)
PMV BLD AUTO: 11.5 FL (ref 7–12)
POTASSIUM SERPL-SCNC: 3.6 MMOL/L (ref 3.5–5)
RBC # BLD AUTO: 3.61 M/UL (ref 3.8–5.8)
SODIUM SERPL-SCNC: 143 MMOL/L (ref 132–146)
WBC OTHER # BLD: 4.6 K/UL (ref 4.5–11.5)

## 2024-11-25 PROCEDURE — 6360000002 HC RX W HCPCS: Performed by: INTERNAL MEDICINE

## 2024-11-25 PROCEDURE — 70551 MRI BRAIN STEM W/O DYE: CPT

## 2024-11-25 PROCEDURE — 36415 COLL VENOUS BLD VENIPUNCTURE: CPT

## 2024-11-25 PROCEDURE — 6370000000 HC RX 637 (ALT 250 FOR IP): Performed by: INTERNAL MEDICINE

## 2024-11-25 PROCEDURE — 2580000003 HC RX 258: Performed by: INTERNAL MEDICINE

## 2024-11-25 PROCEDURE — 99232 SBSQ HOSP IP/OBS MODERATE 35: CPT

## 2024-11-25 PROCEDURE — 51702 INSERT TEMP BLADDER CATH: CPT

## 2024-11-25 PROCEDURE — 97530 THERAPEUTIC ACTIVITIES: CPT

## 2024-11-25 PROCEDURE — 85025 COMPLETE CBC W/AUTO DIFF WBC: CPT

## 2024-11-25 PROCEDURE — 99233 SBSQ HOSP IP/OBS HIGH 50: CPT | Performed by: INTERNAL MEDICINE

## 2024-11-25 PROCEDURE — 2060000000 HC ICU INTERMEDIATE R&B

## 2024-11-25 PROCEDURE — 2700000000 HC OXYGEN THERAPY PER DAY

## 2024-11-25 PROCEDURE — 97535 SELF CARE MNGMENT TRAINING: CPT

## 2024-11-25 PROCEDURE — 80048 BASIC METABOLIC PNL TOTAL CA: CPT

## 2024-11-25 RX ADMIN — FUROSEMIDE 20 MG: 40 TABLET ORAL at 09:39

## 2024-11-25 RX ADMIN — TAMSULOSIN HYDROCHLORIDE 0.4 MG: 0.4 CAPSULE ORAL at 09:39

## 2024-11-25 RX ADMIN — PIPERACILLIN AND TAZOBACTAM 4500 MG: 4; .5 INJECTION, POWDER, FOR SOLUTION INTRAVENOUS at 03:48

## 2024-11-25 RX ADMIN — APIXABAN 5 MG: 5 TABLET, FILM COATED ORAL at 09:39

## 2024-11-25 RX ADMIN — PIPERACILLIN AND TAZOBACTAM 4500 MG: 4; .5 INJECTION, POWDER, FOR SOLUTION INTRAVENOUS at 20:03

## 2024-11-25 RX ADMIN — POTASSIUM CHLORIDE 20 MEQ: 20 TABLET, EXTENDED RELEASE ORAL at 09:39

## 2024-11-25 RX ADMIN — Medication 1 TABLET: at 09:39

## 2024-11-25 RX ADMIN — ASPIRIN 81 MG CHEWABLE TABLET 81 MG: 81 TABLET CHEWABLE at 09:39

## 2024-11-25 RX ADMIN — ROSUVASTATIN 10 MG: 10 TABLET, FILM COATED ORAL at 20:04

## 2024-11-25 RX ADMIN — PIPERACILLIN AND TAZOBACTAM 4500 MG: 4; .5 INJECTION, POWDER, FOR SOLUTION INTRAVENOUS at 10:35

## 2024-11-25 RX ADMIN — MIRTAZAPINE 30 MG: 15 TABLET, FILM COATED ORAL at 20:04

## 2024-11-25 RX ADMIN — APIXABAN 5 MG: 5 TABLET, FILM COATED ORAL at 20:04

## 2024-11-25 RX ADMIN — LEVOTHYROXINE SODIUM 25 MCG: 0.03 TABLET ORAL at 05:51

## 2024-11-25 NOTE — PROGRESS NOTES
MRI order has been in since 11/21. Notes in chart and have called the floor to get completed. Cannot scan patient without MRI screening done.

## 2024-11-25 NOTE — CARE COORDINATION
SOCIAL WORK/CASEMANAGEMENT TRANSITION OF CARE PLANNING( JAMEE AKINS, [facility] [phone]):  plan is home with [facility]. Will need orders for home nsg, PT , OT and speech. Pt is on a minced and moist diet. MRI of the brain is pending. Pt is on iv zosyn and 2l o2 nc. Without home o2. Neurology is following. Pt has a samuel. Positive urine cx. PT and OT saw pt on 11/22 with ampac of 8 and 10. FENG Villanueva  11/25/2024    I met with daughter this a.m. and the plan is now to go to [facility]. List provided and she wants in this order: [facility], [facility], [facility] [place], and [facility]. Precert is needed.  All discharge paper work with [facility] in place. Updated PT and OT to put notes in today.FENG Villanueva  11/25/2024  Pt was accepted by [facility], precert to be started once therapy notes are in from today. FENG Villanueva  11/25/2024    Authorization for [facility] approved and good thru 11/28. FENG Villanueva  11/25/2024            The Plan for Transition of Care is related to the following treatment goals: [facility]   The Patient and/or patient representative  was provided with a choice of provider and agrees   with the discharge plan.  Yes  Freedom of choice list was provided with basic dialogue that supports the patient's individualized plan of care/goals, treatment preferences and shares the quality data associated with the providers. [x] Yes

## 2024-11-25 NOTE — PROGRESS NOTES
Occupational Therapy  OT BEDSIDE TREATMENT NOTE   [facility]  [address] [place], [place]       Date:2024  Patient Name: Jona Hobbs  MRN: [id number]  : 1945  Room: [address] [place] [address]-A     Per OT Eval:    Evaluating OT: Dereje Palacios OTR/L [id number]     Referring Provider: Cathie Scnheider DO                                       Specific Provider Orders/Date: OT evaluation and treatment 24 1630     Diagnosis:  Acute CVA (cerebrovascular accident) (HCC) [I63.9]       Pertinent Medical History:  has a past medical history of Bacteremia due to Gram-negative bacteria, CHF (congestive heart failure) ([facility]), Dementia ([facility]), Electrolyte imbalance, Fever, Sepsis ([facility]), and Severe Alzheimer's dementia without behavioral disturbance, psychotic disturbance, mood disturbance, or anxiety ([facility]).   Past Surgical History         Past Surgical History:   Procedure Laterality Date    HERNIA REPAIR Bilateral      TOTAL KNEE ARTHROPLASTY         bilateral    ULNAR NERVE REPAIR                Pt admitted to the hospital  with unresponsiveness at home at kitchen table and L weakness   MRI brain pending      Orders received, chart reviewed, eval complete.      Precautions:  Fall Risk, cognition, monitor BP      Assessment of current deficits   [x] Functional mobility             [x]ADLs           [x] Strength                  [x]Cognition   [x] Functional transfers           [] IADLs         [x] Safety Awareness   [x]Endurance   [] Fine Motor Coordination    [x] Balance      [] Vision/perception    []Sensation     [x] Gross Motor Coordination [] ROM          [] Delirium                  [] Motor Control      OT PLAN OF CARE   OT POC based on physician orders, patient diagnosis and results of clinical assessment     Frequency/Duration 1-3 days/wk for 2 weeks PRN   Specific OT Treatment to include:   * Instruction/training on adapted ADL techniques and  NT   Sitting: Min/SBA  Standing: Min A  With walker  Sitting: Mod I       Standing: Min A    Activity Tolerance Fair -  low BP supine: 103/51 HR 63  BP sitting EOB 81/56 HR 80   BP after functional activity and BUE exercise seated EOB 94/56 HR 69   Fair-  Overall weakness & deconditioning     /73 HR 64 supine  /68 HR 61 seated  /81 HR 68 standing     Fair +   Visual/  Perceptual wears glasses                   BUE  ROM/Strength/  Fine motor Coordination RUE: ROM WFL      Strength: grossly 3+/5      Strength: WFL      Coordination:  impaired     LUE: ROM WFL      Strength: grossly 3+/5      Strength: WFL      Coordination: impaired        Min A for AAROM in shoulder seated EOB x10 reps    Pt will participate in BUE exercise with supervision to increase strength, ROM, and coordination for increased independence in functional mobility and ADLs    Safety   Fair -  Fair- Fair + during functional activity       Education:  Pt was educated through out treatment regarding proper technique & safety with bed mobility, functional transfers, increasing sitting & standing tolerance/balance, importance of OOB activity & ADL compensatory strategies to ease tasks, improve safety & prevent falls to return home safely.      Comments: Upon arrival pt was in bed & agreeable for therapy, nsg approved. At end of session pt was returned to bed, HOB upright, B UE elevated & all lines and tubes intact, call light within reach. Bed alarm set.     Pt has made slow progress towards set goals.   Continue with current plan of care    Treatment Time In:11:50            Treatment Time Out: 12:20           Treatment Charges: Mins Units   Ther Ex  [id number]     Manual Therapy [id number]     Thera Activities 92112 10 1   ADL/Home Mgt 38914 20 1   Neuro Re-ed 11492     Group Therapy      Orthotic manage/training  32200     Non-Billable Time     Total Timed Treatment 30 2       Radha Sargent, PANDYA/L [id number]

## 2024-11-25 NOTE — PROGRESS NOTES
Jona Hobbs is a 79 y.o. right handed male     Neurology following for left-sided weakness and unresponsiveness    PMH of CHF, Alzheimer's dementia, hypertension, PE, hypothyroidism    Assessment:     Altered mental status  Patient presented with sudden alteration in mentation in the setting of severe dementia-prior to admission Barrow score was 5 therefore medical management was advised as opposed to intervention given his severe stenotic vertebral and carotid artery  Given his focal weakness noted by family MRI of the brain is pending    Plan:     MRI brain still pending  Continue Eliquis and aspirin  Continue statin  Neurology will follow    History of Present Illness:     Patient presented to the ER on 11/21/2024 after experiencing altered mental status at home.  Patient was at his kitchen table when he became unresponsive, aphasia, and family noticed left-sided weakness.  He also had an episode of emesis.  Patient has a history of severe dementia    His CT and CTAs demonstrated severe stenosis of vertebral and carotid arteries.  Neuroendovascular was consulted and given his modified Barrow was a 5 from dementia and he was not a candidate for intervention but rather medical management.      Subjective:     Patient sitting up in bed awake and alert.  He is alert to person and place but unable to tell me the month or the year.  He continues to experience generalized weakness legs more than his arms.    He is able to follow one-step commands    He is still awaiting his MRI brain, spoke with bedside nurse MRI screening still needs to be completed      Objective:       /87   Pulse 70   Temp 97.9 °F (36.6 °C)   Resp 18   Ht 1.778 m (5' 10\")   Wt 80.7 kg (178 lb)   SpO2 95%   BMI 25.54 kg/m²       General appearance: alert, appears stated age, cooperative and no distress  Head: normocephalic, without obvious abnormality, atraumatic  Eyes: conjunctivae/corneas clear  Neck: no adenopathy,  supple,  LYMPHOPCT 17 11/25/2024 04:45 AM    MONOPCT 10 11/25/2024 04:45 AM    EOSPCT 4 11/25/2024 04:45 AM    BASOPCT 0 11/25/2024 04:45 AM    MONOSABS 0.46 11/25/2024 04:45 AM    LYMPHSABS 0.78 11/25/2024 04:45 AM    EOSABS 0.17 11/25/2024 04:45 AM    BASOSABS 0.01 11/25/2024 04:45 AM     CMP:    Lab Results   Component Value Date/Time     11/25/2024 04:45 AM    K 3.6 11/25/2024 04:45 AM    K 4.0 03/23/2023 04:51 AM     11/25/2024 04:45 AM    CO2 26 11/25/2024 04:45 AM    BUN 12 11/25/2024 04:45 AM    CREATININE 0.9 11/25/2024 04:45 AM    LABGLOM 86 11/25/2024 04:45 AM    LABGLOM >60 01/09/2024 12:45 PM    GLUCOSE 88 11/25/2024 04:45 AM    CALCIUM 9.0 11/25/2024 04:45 AM    BILITOT 0.6 11/22/2024 05:02 AM    ALKPHOS 73 11/22/2024 05:02 AM    AST 19 11/22/2024 05:02 AM    ALT 15 11/22/2024 05:02 AM     HgBA1c:    Lab Results   Component Value Date/Time    LABA1C 5.1 11/22/2024 05:02 AM     Lab Results   Component Value Date    CHOL 106 11/22/2024    TRIG 37 11/22/2024    HDL 43 11/22/2024    LDL 56 11/22/2024    VLDL 7 11/22/2024     CT Head:  1. No evidence of an acute or subacute intracranial abnormality.  2. Multifocal paranasal sinus mucosal inflammatory disease without acute  paranasal sinusitis.     CTA neck  1. Age indeterminate occlusion involving the distal left V3 segment as well  as the left V4 segment.  2. Severe stenosis at the origin of the vertebral arteries bilaterally.  3. There is severe near occlusive stenosis of the proximal left cervical ICA.  4. Areas of moderate stenosis involving the left intracranial ICA.  5. Moderate stenosis involving the A3 segment of the right anterior cerebral  artery.      All labs and imaging studies reviewed independently today                   MARIA GUADALUPE Jauregui - CNP  11:07 AM  11/25/2024

## 2024-11-25 NOTE — PROGRESS NOTES
[facility] Hospitalist Progress Note    Admitting Date and Time: 11/21/2024 11:01 AM  Admit Dx: Acute CVA (cerebrovascular accident) (HCC) [I63.9]    Subjective:  Patient is being followed for Acute CVA (cerebrovascular accident) (HCC) [I63.9]   Pt feels little better than yesterday, can communicate verbally.  As per nurse he did not eat well because he does not like the food.      ROS: denies fever, chills, cp, sob, n/v, HA unless stated above.      piperacillin-tazobactam  4,500 mg IntraVENous Q8H    sodium chloride flush  5-40 mL IntraVENous 2 times per day    apixaban  5 mg Oral BID    furosemide  20 mg Oral Daily    levothyroxine  25 mcg Oral Daily    [Held by provider] losartan  100 mg Oral Daily    mirtazapine  30 mg Oral Nightly    [Held by provider] metoprolol succinate  25 mg Oral Nightly    therapeutic multivitamin-minerals  1 tablet Oral Daily    potassium chloride  20 mEq Oral Daily    rosuvastatin  10 mg Oral Nightly    tamsulosin  0.4 mg Oral Daily    aspirin  81 mg Oral Daily     benzocaine-menthol, 1 lozenge, Q2H PRN  benzonatate, 100 mg, TID PRN  calcium carbonate, 500 mg, TID PRN  melatonin, 3 mg, Nightly PRN  Polyvinyl Alcohol-Povidone PF, 1 drop, PRN  sodium chloride, 1 spray, PRN  sodium chloride flush, 5-40 mL, PRN  sodium chloride, , PRN  potassium chloride, 40 mEq, PRN   Or  potassium alternative oral replacement, 40 mEq, PRN   Or  potassium chloride, 10 mEq, PRN  magnesium sulfate, 2,000 mg, PRN  ondansetron, 4 mg, Q8H PRN   Or  ondansetron, 4 mg, Q6H PRN  polyethylene glycol, 17 g, Daily PRN  bisacodyl, 5 mg, Daily PRN  hydrALAZINE, 10 mg, Q6H PRN         Objective:    /78   Pulse 74   Temp 98 °F (36.7 °C)   Resp 18   Ht 1.778 m (5' 10\")   Wt 80.7 kg (178 lb)   SpO2 96%   BMI 25.54 kg/m²     General Appearance: Looks sick  Skin: warm and dry  Head: normocephalic and atraumatic  Eyes: pupils equal, round, and reactive to light, extraocular eye movements intact,  given to family

## 2024-11-25 NOTE — DISCHARGE INSTR - COC
Continuity of Care Form    Patient Name: Jona Hobbs   :  1945  MRN:  [id number]    Admit date:  2024  Discharge date:  ***    Code Status Order: Limited   Advance Directives:   Advance Care Flowsheet Documentation             Admitting Physician:  Cathie Schneider DO  PCP: Sage Easley Jr., MD    Discharging Nurse: ***  Discharging Hospital Unit/Room#: 8504/8504-A  Discharging Unit Phone Number: ***    Emergency Contact:   Extended Emergency Contact Information  Primary Emergency Contact: Stacie Carroll  Address: [address] [place] [address]  Home Phone: [phone]  Relation: Child  Preferred language: English    Past Surgical History:  Past Surgical History:   Procedure Laterality Date    HERNIA REPAIR Bilateral     TOTAL KNEE ARTHROPLASTY      bilateral    ULNAR NERVE REPAIR         Immunization History:   Immunization History   Administered Date(s) Administered    TD 5LF, TENIVAC, (age 7y+), IM, 0.5mL 2023       Active Problems:  Patient Active Problem List   Diagnosis Code    SAH (subarachnoid hemorrhage) ([facility]) I60.9    Fall at home W19.XXXA, Y92.009    Urinary tract infection due to Pseudomonas aeruginosa N39.0, B96.5    Pyelonephritis N12    Sepsis ([facility]) A41.9    Benign essential hypertension I10    Benign prostatic hyperplasia without urinary obstruction N40.0    Hypothyroidism E03.9    Sleep apnea G47.30    Bacteremia due to Gram-negative bacteria R78.81    Severe Alzheimer's dementia without behavioral disturbance, psychotic disturbance, mood disturbance, or anxiety ([facility]) G30.9, F02.C0    Severe protein-calorie malnutrition ([facility]) E43    Duong catheter problem ([facility]) T83.9XXA    Urinary retention R33.9    Acute prostatitis N41.0    Confusion R41.0    Acute cystitis with hematuria N30.01    Acute on chronic diastolic CHF (congestive heart failure) ([facility]) I50.33    Pulmonary embolism ([facility]) I26.99    COVID-19 U07.1    Failure to

## 2024-11-25 NOTE — PROGRESS NOTES
Physical Therapy  Physical Therapy Treatment     Name: Jona Hobbs  : 1945  MRN: [id number]      Date of Service: 2024    Evaluating PT:  Christiane Browne, PT, DPT, [id number]    Room #:  8504/8504-A  Diagnosis:  Acute CVA (cerebrovascular accident) ([facility]) [I63.9]  PMHx/PSHx:    Past Medical History:   Diagnosis Date    Bacteremia due to Gram-negative bacteria 2023    CHF (congestive heart failure) ([facility])     Dementia ([facility])     Electrolyte imbalance     Fever 2023    Sepsis ([facility]) 2023    Severe Alzheimer's dementia without behavioral disturbance, psychotic disturbance, mood disturbance, or anxiety ([facility]) 2023      Past Surgical History:   Procedure Laterality Date    HERNIA REPAIR Bilateral     TOTAL KNEE ARTHROPLASTY      bilateral    ULNAR NERVE REPAIR        Procedure/Surgery:  none this admission   Precautions:  Fall Risk, cognition, + Alarms, Duong, monitor BP, incontinent   Equipment Needs:  TBD    SUBJECTIVE:    Pt lives with his daughter in a 1 story home with 1 stairs to enter and 0 rail.  Bed is on 1 floor and bath is on 1 floor.  Pt ambulated with [facility] PTA. Pt reports that his daughter is always there and assists with everything.    OBJECTIVE:   Initial Evaluation  Date: 24 Treatment Date: 24 Short Term/ Long Term   Goals   AM-PAC 6 Clicks     Was pt agreeable to Eval/treatment? yes yes    Does pt have pain? No c/o pain No c/o pain    Bed Mobility  Rolling: NT  Supine to sit: ModA  Sit to supine: MaxA  Scooting: MaxA toward EOB Rolling: ModA  Supine to sit: MaxA  Sit to supine: MaxA  Scooting: MaxA toward EOB Rolling: SBA  Supine to sit: SBA  Sit to supine: SBA  Scooting: SBA   Transfers Sit to stand: NT  Stand to sit: NT  Stand pivot: NT Sit to stand: ModAx2  Stand to sit: ModAx2  Stand pivot: NT Sit to stand: SBA with AAD  Stand to sit: SBA with AAD  Stand pivot: SBA with AAD   Ambulation   NT NT 50+ feet with AAD SBA   Stair negotiation: ascended and

## 2024-11-25 NOTE — CONSULTS
Comprehensive Nutrition Assessment    Type and Reason for Visit:  Initial, Wound    Nutrition Recommendations/Plan:   Continue current diet order (minced and moist) per SLP recs  Added Ensure Clear BID to supplement PO intake     Malnutrition Assessment:  Malnutrition Status: Severe malnutrition  Context:  Acute Illness     Findings of the 6 clinical characteristics of malnutrition:  Energy Intake:  50% or less of estimated energy requirements for 5 or more days  Weight Loss:  Unable to assess (unable to obtain 2/2 to lack of data)     Body Fat Loss:  Moderate body fat loss Orbital, Triceps   Muscle Mass Loss:  Moderate muscle mass loss Temples (temporalis), Clavicles (pectoralis & deltoids), Scapula (trapezius)  Fluid Accumulation:  No fluid accumulation     Strength:  Not Performed    Nutrition Assessment:    Pt admitted for AMS from home. PMH: failure to thrive, dementia, CHF, SAH/SDH, severe alzheimer's demendia, psychotic disturbance, mood disturbance, and anxiety. Per MAR review, pt does not like hospital food but agreeable to drinking Ensure Clear 2/2 to preference for juice. Noted SLP eval (11/23) following and diet order for dysphagia (minced and moist). At time of RD visit, pt conitnues to report he does not like hospital food but agreeable to drinking Ensure Clear ONS. Hx severe PCM 8/18/2023; meets critirea for severe PCM for current admission. RD added Ensure Clear BID to optimize PO intake. Following for nutritional goals of care and within protocol.    Nutrition Related Findings:    (-2.6)I&O, AAOx0, GCS 15, HEENT glasses, Gi WDL, trace edema to LE, wound to L nose, R wounds to bridge of nose, Labs: Glucose WNL, A1C 5.1, Vitamin D low Wound Type: Open Wounds, Skin Tears (noted open wounds to bridge of nose)       Current Nutrition Intake & Therapies:    Average Meal Intake: 1-25% (Pt reports poor po intake <25% at meals while in hospital)  Average Supplements Intake: None Ordered  ADULT DIET;

## 2024-11-26 ENCOUNTER — APPOINTMENT (OUTPATIENT)
Dept: NEUROLOGY | Age: 79
DRG: 064 | End: 2024-11-26
Payer: MEDICARE

## 2024-11-26 ENCOUNTER — APPOINTMENT (OUTPATIENT)
Dept: GENERAL RADIOLOGY | Age: 79
DRG: 064 | End: 2024-11-26
Payer: MEDICARE

## 2024-11-26 PROBLEM — I25.10 CAD (CORONARY ARTERY DISEASE): Status: ACTIVE | Noted: 2024-11-26

## 2024-11-26 PROBLEM — F02.A0 MILD ALZHEIMER'S DEMENTIA WITHOUT BEHAVIORAL DISTURBANCE, PSYCHOTIC DISTURBANCE, MOOD DISTURBANCE, OR ANXIETY (HCC): Status: ACTIVE | Noted: 2023-08-17

## 2024-11-26 PROBLEM — I25.5 ISCHEMIC CARDIOMYOPATHY: Status: ACTIVE | Noted: 2024-11-26

## 2024-11-26 PROCEDURE — 95819 EEG AWAKE AND ASLEEP: CPT | Performed by: PSYCHIATRY & NEUROLOGY

## 2024-11-26 PROCEDURE — 2580000003 HC RX 258: Performed by: INTERNAL MEDICINE

## 2024-11-26 PROCEDURE — 71045 X-RAY EXAM CHEST 1 VIEW: CPT

## 2024-11-26 PROCEDURE — 99232 SBSQ HOSP IP/OBS MODERATE 35: CPT

## 2024-11-26 PROCEDURE — 99239 HOSP IP/OBS DSCHRG MGMT >30: CPT | Performed by: INTERNAL MEDICINE

## 2024-11-26 PROCEDURE — 95819 EEG AWAKE AND ASLEEP: CPT

## 2024-11-26 PROCEDURE — 6370000000 HC RX 637 (ALT 250 FOR IP): Performed by: INTERNAL MEDICINE

## 2024-11-26 PROCEDURE — 6360000002 HC RX W HCPCS: Performed by: INTERNAL MEDICINE

## 2024-11-26 RX ORDER — ASPIRIN 81 MG/1
81 TABLET, CHEWABLE ORAL DAILY
DISCHARGE
Start: 2024-11-26

## 2024-11-26 RX ADMIN — TAMSULOSIN HYDROCHLORIDE 0.4 MG: 0.4 CAPSULE ORAL at 12:10

## 2024-11-26 RX ADMIN — LEVOTHYROXINE SODIUM 25 MCG: 0.03 TABLET ORAL at 06:12

## 2024-11-26 RX ADMIN — APIXABAN 5 MG: 5 TABLET, FILM COATED ORAL at 12:10

## 2024-11-26 RX ADMIN — ASPIRIN 81 MG CHEWABLE TABLET 81 MG: 81 TABLET CHEWABLE at 12:10

## 2024-11-26 RX ADMIN — PIPERACILLIN AND TAZOBACTAM 4500 MG: 4; .5 INJECTION, POWDER, FOR SOLUTION INTRAVENOUS at 03:26

## 2024-11-26 RX ADMIN — POTASSIUM CHLORIDE 20 MEQ: 20 TABLET, EXTENDED RELEASE ORAL at 12:11

## 2024-11-26 RX ADMIN — SODIUM CHLORIDE, PRESERVATIVE FREE 10 ML: 5 INJECTION INTRAVENOUS at 12:10

## 2024-11-26 RX ADMIN — Medication 1 TABLET: at 12:10

## 2024-11-26 RX ADMIN — FUROSEMIDE 20 MG: 40 TABLET ORAL at 12:10

## 2024-11-26 NOTE — PROGRESS NOTES
without mass   Pulmonary/Chest: clear to auscultation bilaterally- no wheezes, rales or rhonchi, normal air movement, no respiratory distress  Cardiovascular: normal rate, normal S1 and S2 and no carotid bruits  Abdomen: soft, non-tender, non-distended, normal bowel sounds, no masses or organomegaly  Extremities: no cyanosis, no clubbing and no edema  Neurologic: Limited by altered mental status.        Recent Labs     11/25/24  0445      K 3.6   *   CO2 26   BUN 12   CREATININE 0.9   GLUCOSE 88   CALCIUM 9.0       Recent Labs     11/25/24  0445   WBC 4.6   RBC 3.61*   HGB 10.3*   HCT 32.5*   MCV 90.0   MCH 28.5   MCHC 31.7*   RDW 13.2   *   MPV 11.5           Assessment:    Principal Problem:    Acute CVA (cerebrovascular accident) (HCC)  Active Problems:    Benign essential hypertension    Hypothyroidism    Severe Alzheimer's dementia without behavioral disturbance, psychotic disturbance, mood disturbance, or anxiety (HCC)    Severe protein-calorie malnutrition (HCC)    Altered mental status  Resolved Problems:    * No resolved hospital problems. *      Plan:  1.  Acute stroke: Right MCA distribution on MRI not a candidate for tPA due to history of subarachnoid hemorrhage.  Patient also on Eliquis due to atrial fibrillation.  echocardiography done and reviewed. PT OT.  Neurology consult appreciated  2.  Altered mental status: Patient have dementia due to Alzheimer disease at baseline.  Multifactorial due to acute stroke.  3.  History of pulmonary embolism: Continue Eliquis.  4.  Hypothyroidism: Continue Synthroid 25 mcg p.o. daily.  5.  Lethargic: Patient open his eyes on questioning and did not start taking food orally, IV fluid going on and start feeding him after swallow evaluation if he passed.  6.  UTI: Patient on Zosyn completed 5 days  8.  Acute respiratory failure due to aspiration pneumonia: Continue Zosyn IV.  Chest x-ray without infiltrate repeat chest x-ray  9.  Thrombocytopenia: His  platelet progressively coming down, 1 98-1 55 now 1 28,000, monitor for any bleeding as patient on Eliquis.  10.  CAD with medical treatment  11.  Ischemic cardiomyopathy GDMT  12.  Severe AS deemed poor candidate for TAVR or surgical repair.  Medical management      NOTE: This report was transcribed using voice recognition software. Every effort was made to ensure accuracy; however, inadvertent computerized transcription errors may be present.  Electronically signed by John Batres MD on 11/26/2024 at 9:40 AM

## 2024-11-26 NOTE — PROGRESS NOTES
Jona Hobbs is a 79 y.o. right handed male     Neurology following for left-sided weakness and unresponsiveness    PMH of CHF, Alzheimer's dementia, hypertension, PE, hypothyroidism    Assessment:     Altered mental status  Patient presented with sudden alteration in mentation in the setting of severe dementia-prior to admission.  Hiller score was 5 therefore medical management was advised as opposed to intervention given his severe stenotic vertebral and carotid artery    Acute ischemic stroke  Presented with left-sided weakness  MRI positive for acute infarct right MCA    Plan:     Continue Eliquis and aspirin  Consider Eliquis failure with new stroke found on MRI brain  Consider changing to another OAC  EEG  Continue statin  Neurology will follow    History of Present Illness:     Patient presented to the ER on 11/21/2024 after experiencing altered mental status at home.  Patient was at his kitchen table when he became unresponsive, aphasia, and family noticed left-sided weakness.  He also had an episode of emesis.  Patient has a history of severe dementia    His CT and CTAs demonstrated severe stenosis of vertebral and carotid arteries.  Neuroendovascular was consulted and given his modified Hiller was a 5 from dementia and he was not a candidate for intervention but rather medical management.      Subjective:     Patient lying in bed sleeping but awakens easily.  He is oriented x 4 today and is able to follow all commands without difficulty.    He is able to follow one-step commands    New acute infarct found on MRI brain, consider possible Eliquis failure    There is no family at the bedside    Objective:       BP (!) 149/63   Pulse 64   Temp 98.2 °F (36.8 °C) (Tympanic)   Resp 18   Ht 1.778 m (5' 10\")   Wt 80.7 kg (178 lb)   SpO2 98%   BMI 25.54 kg/m²       General appearance: alert, appears stated age, cooperative and no distress  Head: normocephalic, without obvious abnormality, atraumatic  Eyes:  independently today         Miryam Howell, MARIA GUADALUPE - CNP  10:53 AM  11/26/2024

## 2024-11-26 NOTE — PROGRESS NOTES
Speech Language Pathology  NAME:  Jona Hobbs  :  1945  DATE: 2024  ROOM:  [facility]-A    Pt unavailable at 1050 for Speech therapy  and Dysphagia therapy     REASON:  Off unit for testing/ procedure. Per charge nurse, patient is off the floor for EEG.     Will re-attempt as appropriate.       Thank You    Perla Chang M.S., CCC-SLP  Speech-Language Pathologist  SP. [id number]

## 2024-11-26 NOTE — CARE COORDINATION
SOCIAL WORK/CASEMANAGEMENT TRANSITION OF CARE PLANNING( JAMEE AKINS, [phone]):  plan is to [facility], precert has been obtained and good thru 11/28. MRI of the brain which showed: Small areas of acute, nonhemorrhagic patchy infarction in the mid right MCA superior division. Pt is on iv zosyn and dextrose. Neurology is following. EEG is done and needs read.  All discharge paper work with [facility] in place. .FENG Villanueva  11/26/2024    Discharge to [facility] today. [facility]  at 4 p.m. I called the daughter and she is in agreement. Rn notified and the kelsey . Snf;loc. .FENG Villanueva  11/26/2024

## 2024-11-27 NOTE — PROCEDURES
PROCEDURE NOTE  Date: 11/27/2024   Name: Jona Hobbs  YOB: 1945    Procedures:    Routine EEG with video:       Method:   This recording was done using 16 channel of EEG recording and 1 channel of EKG recording. Photic stimulation is performed as activation procedure.  The tracing captures periods of wakefulness.     Interpretation:   There is a posterior dominant rhythm of 8 Hz alpha activity. There is moderate amount of fronto central Beta activity seen in this recording.   There is evidence of intermittent bilateral temporal slowing more pronounced in the right temporal channels than in the left temporal channels seen in this recording.  There are intermittent sharp wave discharges phase reversing in F7/T3 channels in this recording.  There are no electrographic seizures in this recording.  Photic stimulation demonstrates increased frequency of the sharp wave discharges.       EKG demonstrates irregular heart rhythm at 60 bpm.     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 There were no electrographic seizures in this recording.    It intermittent bilateral temporal slowing which can be suggestive of cortical and subcortical dysfunction involving the bilateral temporal lobe more pronounced on the right than on the left.  But also can be seen with dementia.   An incidental note is to be made of the irregular heart rate      Dulce Lorenzo MD

## 2024-12-04 NOTE — PROGRESS NOTES
Physician Progress Note      PATIENT:               SAMI ULLOA  CSN #:                  [id number]  :                       1945  ADMIT DATE:       2024 11:01 AM  DISCH DATE:        2024 5:21 PM  RESPONDING  PROVIDER #:        Jany Talley MD          QUERY TEXT:    Patient admitted with AMS. Noted documentation of acute respiratory failure in    IM notes.  In order to support the diagnosis of acute respiratory   failure, please include additional clinical indicators in your documentation.    Or please document if the diagnosis of acute respiratory failure has been   ruled out after further study.    The medical record reflects the following:  Risk Factors: aspiration pneumonia  Clinical Indicators: respirations 14-30, 94% 2L, unlabored breathing, per IM    \"...Acute respiratory failure due to aspiration pneumonia: Continue   Zosyn IV...\"  Treatment: O2 therapy  Options provided:  -- Acute Respiratory Failure as evidenced by, Please document evidence.  -- Acute Respiratory Failure ruled out after study  -- Other - I will add my own diagnosis  -- Disagree - Not applicable / Not valid  -- Disagree - Clinically unable to determine / Unknown  -- Refer to Clinical Documentation Reviewer    PROVIDER RESPONSE TEXT:    This patient is in acute respiratory failure as evidenced by requred oxygen   via Nasal cannula to maintain saturation. due to pneumonia.    Query created by: Jordyn 2024 9:32 AM      Electronically signed by:  Jany Talley MD 2024 9:27 AM

## 2024-12-06 ENCOUNTER — CLINICAL DOCUMENTATION (OUTPATIENT)
Dept: FAMILY MEDICINE CLINIC | Age: 79
End: 2024-12-06

## 2024-12-06 NOTE — PROGRESS NOTES
Progress Note  Date:2024        Patient Name:Jona Hobbs     YOB: 1945     Age:79 y.o.        Subjective    Subjective:  Symptoms:  No shortness of breath, cough, chest pain or diarrhea.    Diet:  No nausea or vomiting.         Patient was seen and examined sitting up in a chair near his bedside. He stated that he feels miserable, does not feel great. He feels very low and depressed. States that he would like to share his feelings with his wife but [place] is not alive [place]. Talked to patient about starting him on a medication to help with his mood but he refused, stating that he does not want anything.     Spoke with his daughter who said that he has been depressed for a long time since his son  early and then wife  about 7 years ago.      Review of Systems   Constitutional:  Negative for activity change, appetite change, fatigue and fever.   HENT:  Negative for congestion, ear discharge, ear pain, hearing loss, postnasal drip, rhinorrhea, sneezing and sore throat.    Eyes:  Negative for pain.   Respiratory:  Negative for cough, shortness of breath and wheezing.    Cardiovascular:  Negative for chest pain, palpitations and leg swelling.   Gastrointestinal:  Negative for abdominal pain, constipation, diarrhea, nausea and vomiting.   Genitourinary:  Negative for dysuria and hematuria.   Musculoskeletal:  Negative for back pain, myalgias and neck pain.   Skin:  Negative for rash.   Neurological:  Negative for dizziness, tremors, syncope and headaches.   Psychiatric/Behavioral:  Negative for agitation and sleep disturbance. The patient is not nervous/anxious.         Feels low and depressed     Objective         Vitals Last 24 Hours:  TEMPERATURE:  @FLOWSTAT(6:24)@  RESPIRATIONS RANGE: @FLOWSTAT(9:24)@  PULSE OXIMETRY RANGE: @FLOWSTAT(10:24)@  PULSE RANGE: @FLOWSTAT(8:24)@  BLOOD PRESSURE RANGE: @SBPMAX(24)@; @DBPMAX(24)@  I/O (24Hr):  No intake or output data in the 24 hours

## 2024-12-09 ENCOUNTER — OUTSIDE SERVICES (OUTPATIENT)
Dept: FAMILY MEDICINE CLINIC | Age: 79
End: 2024-12-09

## 2024-12-09 DIAGNOSIS — F02.A0 MILD ALZHEIMER'S DEMENTIA WITHOUT BEHAVIORAL DISTURBANCE, PSYCHOTIC DISTURBANCE, MOOD DISTURBANCE, OR ANXIETY, UNSPECIFIED TIMING OF DEMENTIA ONSET (HCC): ICD-10-CM

## 2024-12-09 DIAGNOSIS — I69.319 COGNITIVE DEFICIT S/P CVA (CEREBROVASCULAR ACCIDENT): ICD-10-CM

## 2024-12-09 DIAGNOSIS — G30.9 MILD ALZHEIMER'S DEMENTIA WITHOUT BEHAVIORAL DISTURBANCE, PSYCHOTIC DISTURBANCE, MOOD DISTURBANCE, OR ANXIETY, UNSPECIFIED TIMING OF DEMENTIA ONSET (HCC): ICD-10-CM

## 2024-12-09 DIAGNOSIS — E43 SEVERE PROTEIN-CALORIE MALNUTRITION (HCC): ICD-10-CM

## 2024-12-09 DIAGNOSIS — T83.9XXS PROBLEM WITH FOLEY CATHETER, SEQUELA: Primary | ICD-10-CM

## 2024-12-09 PROBLEM — I50.32 CHRONIC DIASTOLIC CONGESTIVE HEART FAILURE (HCC): Status: ACTIVE | Noted: 2023-12-08

## 2024-12-09 ASSESSMENT — ENCOUNTER SYMPTOMS
DIARRHEA: 0
NAUSEA: 0
BACK PAIN: 0
WHEEZING: 0
VOMITING: 0
SORE THROAT: 0
RHINORRHEA: 0
CONSTIPATION: 0
COUGH: 0
EYE PAIN: 0
SHORTNESS OF BREATH: 0
ABDOMINAL PAIN: 0

## 2024-12-09 NOTE — PROGRESS NOTES
Progress Note  Date:2024  Patient Name:Jona Hobbs     YOB: 1945     Age:79 y.o.        Subjective    Subjective:  Symptoms:  No shortness of breath, cough, chest pain or diarrhea.    Diet:  No nausea or vomiting.         Patient was seen and examined sitting up in a chair near his bedside. He stated that he feels miserable, does not feel great. He feels very low and depressed. States that he would like to share his feelings with his wife but [place] is not [place]. Talked to patient about starting him on a medication to help with his mood but he refused, stating that he does not want anything.     Spoke with his daughter who said that he has been depressed for a long time since his son  early and then wife  about 7 years ago.      Review of Systems   Constitutional:  Negative for activity change, appetite change, fatigue and fever.   HENT:  Negative for congestion, ear discharge, ear pain, hearing loss, postnasal drip, rhinorrhea, sneezing and sore throat.    Eyes:  Negative for pain.   Respiratory:  Negative for cough, shortness of breath and wheezing.    Cardiovascular:  Negative for chest pain, palpitations and leg swelling.   Gastrointestinal:  Negative for abdominal pain, constipation, diarrhea, nausea and vomiting.   Genitourinary:  Negative for dysuria and hematuria.   Musculoskeletal:  Negative for back pain, myalgias and neck pain.   Skin:  Negative for rash.   Neurological:  Negative for dizziness, tremors, syncope and headaches.   Psychiatric/Behavioral:  Negative for agitation and sleep disturbance. The patient is not nervous/anxious.         Feels low and depressed     Objective         Vitals Last 24 Hours:  TEMPERATURE:  @FLOWSTAT(6:24)@  RESPIRATIONS RANGE: @FLOWSTAT(9:24)@  PULSE OXIMETRY RANGE: @FLOWSTAT(10:24)@  PULSE RANGE: @FLOWSTAT(8:24)@  BLOOD PRESSURE RANGE: @SBPMAX(24)@; @DBPMAX(24)@  I/O (24Hr):  No intake or output data in the 24 hours ending

## 2024-12-17 ENCOUNTER — OUTSIDE SERVICES (OUTPATIENT)
Dept: FAMILY MEDICINE CLINIC | Age: 79
End: 2024-12-17

## 2024-12-17 DIAGNOSIS — I50.32 CHRONIC DIASTOLIC CONGESTIVE HEART FAILURE (HCC): ICD-10-CM

## 2024-12-17 DIAGNOSIS — G30.9 MILD ALZHEIMER'S DEMENTIA WITHOUT BEHAVIORAL DISTURBANCE, PSYCHOTIC DISTURBANCE, MOOD DISTURBANCE, OR ANXIETY, UNSPECIFIED TIMING OF DEMENTIA ONSET (HCC): Primary | ICD-10-CM

## 2024-12-17 DIAGNOSIS — E43 SEVERE PROTEIN-CALORIE MALNUTRITION (HCC): ICD-10-CM

## 2024-12-17 DIAGNOSIS — F02.A0 MILD ALZHEIMER'S DEMENTIA WITHOUT BEHAVIORAL DISTURBANCE, PSYCHOTIC DISTURBANCE, MOOD DISTURBANCE, OR ANXIETY, UNSPECIFIED TIMING OF DEMENTIA ONSET (HCC): Primary | ICD-10-CM

## 2024-12-27 ENCOUNTER — HOSPITAL ENCOUNTER (INPATIENT)
Age: 79
LOS: 3 days | Discharge: NURSING, REHAB OR LONG-TERM CARE | DRG: 065 | End: 2024-12-30
Attending: EMERGENCY MEDICINE | Admitting: INTERNAL MEDICINE
Payer: MEDICARE

## 2024-12-27 ENCOUNTER — APPOINTMENT (OUTPATIENT)
Dept: GENERAL RADIOLOGY | Age: 79
DRG: 065 | End: 2024-12-27
Payer: MEDICARE

## 2024-12-27 ENCOUNTER — APPOINTMENT (OUTPATIENT)
Dept: CT IMAGING | Age: 79
DRG: 065 | End: 2024-12-27
Payer: MEDICARE

## 2024-12-27 DIAGNOSIS — I63.9 ACUTE ISCHEMIC STROKE (HCC): Primary | ICD-10-CM

## 2024-12-27 PROBLEM — R47.01 APHASIA DETERMINED BY EXAMINATION: Status: ACTIVE | Noted: 2024-12-27

## 2024-12-27 PROBLEM — I65.22 LEFT CAROTID ARTERY STENOSIS: Status: ACTIVE | Noted: 2024-12-27

## 2024-12-27 PROBLEM — R29.898 BILATERAL LEG WEAKNESS: Status: ACTIVE | Noted: 2024-12-27

## 2024-12-27 LAB
ALBUMIN SERPL-MCNC: 3.4 G/DL (ref 3.5–5.2)
ALBUMIN SERPL-MCNC: NORMAL G/DL (ref 3.5–5.2)
ALBUMIN/GLOB SERPL: NORMAL {RATIO} (ref 1–2.5)
ALP SERPL-CCNC: 76 U/L (ref 40–129)
ALP SERPL-CCNC: NORMAL U/L (ref 40–129)
ALT SERPL-CCNC: 17 U/L (ref 0–40)
ALT SERPL-CCNC: NORMAL U/L (ref 5–41)
ANION GAP SERPL CALCULATED.3IONS-SCNC: 10 MMOL/L (ref 7–16)
ANION GAP SERPL CALCULATED.3IONS-SCNC: NORMAL MMOL/L (ref 9–17)
AST SERPL-CCNC: 21 U/L (ref 0–39)
AST SERPL-CCNC: NORMAL U/L
BASOPHILS # BLD: 0.01 K/UL (ref 0–0.2)
BASOPHILS NFR BLD: 0 % (ref 0–2)
BILIRUB SERPL-MCNC: 0.3 MG/DL (ref 0–1.2)
BILIRUB SERPL-MCNC: NORMAL MG/DL (ref 0.3–1.2)
BUN SERPL-MCNC: 21 MG/DL (ref 6–23)
BUN SERPL-MCNC: NORMAL MG/DL (ref 8–23)
BUN/CREAT SERPL: NORMAL (ref 9–20)
CALCIUM SERPL-MCNC: 9.6 MG/DL (ref 8.6–10.2)
CALCIUM SERPL-MCNC: NORMAL MG/DL (ref 8.6–10.4)
CHLORIDE SERPL-SCNC: 104 MMOL/L (ref 98–107)
CHLORIDE SERPL-SCNC: NORMAL MMOL/L (ref 98–107)
CHP ED QC CHECK: 146
CO2 SERPL-SCNC: 26 MMOL/L (ref 22–29)
CO2 SERPL-SCNC: NORMAL MMOL/L (ref 20–31)
CREAT SERPL-MCNC: 1 MG/DL (ref 0.7–1.2)
CREAT SERPL-MCNC: NORMAL MG/DL (ref 0.7–1.2)
EOSINOPHIL # BLD: 0.18 K/UL (ref 0.05–0.5)
EOSINOPHILS RELATIVE PERCENT: 5 % (ref 0–6)
ERYTHROCYTE [DISTWIDTH] IN BLOOD BY AUTOMATED COUNT: 14 % (ref 11.5–15)
GFR, ESTIMATED: 77 ML/MIN/1.73M2
GFR, ESTIMATED: NORMAL ML/MIN/1.73M2
GLUCOSE BLD-MCNC: 146 MG/DL (ref 74–99)
GLUCOSE SERPL-MCNC: 116 MG/DL (ref 74–99)
GLUCOSE SERPL-MCNC: NORMAL MG/DL (ref 70–99)
HCT VFR BLD AUTO: 38.8 % (ref 37–54)
HGB BLD-MCNC: 12.5 G/DL (ref 12.5–16.5)
IMM GRANULOCYTES # BLD AUTO: <0.03 K/UL (ref 0–0.58)
IMM GRANULOCYTES NFR BLD: 0 % (ref 0–5)
INR PPP: 1.5
LYMPHOCYTES NFR BLD: 1.19 K/UL (ref 1.5–4)
LYMPHOCYTES RELATIVE PERCENT: 30 % (ref 20–42)
MCH RBC QN AUTO: 28.6 PG (ref 26–35)
MCHC RBC AUTO-ENTMCNC: 32.2 G/DL (ref 32–34.5)
MCV RBC AUTO: 88.8 FL (ref 80–99.9)
MONOCYTES NFR BLD: 0.32 K/UL (ref 0.1–0.95)
MONOCYTES NFR BLD: 8 % (ref 2–12)
NEUTROPHILS NFR BLD: 57 % (ref 43–80)
NEUTS SEG NFR BLD: 2.3 K/UL (ref 1.8–7.3)
PARTIAL THROMBOPLASTIN TIME: 34.8 SEC (ref 24.5–35.1)
PLATELET # BLD AUTO: 197 K/UL (ref 130–450)
PMV BLD AUTO: 11 FL (ref 7–12)
POTASSIUM SERPL-SCNC: 4.5 MMOL/L (ref 3.5–5)
POTASSIUM SERPL-SCNC: NORMAL MMOL/L (ref 3.7–5.3)
PROT SERPL-MCNC: 6.6 G/DL (ref 6.4–8.3)
PROT SERPL-MCNC: NORMAL G/DL (ref 6.4–8.3)
PROTHROMBIN TIME: 16.1 SEC (ref 9.3–12.4)
RBC # BLD AUTO: 4.37 M/UL (ref 3.8–5.8)
SODIUM SERPL-SCNC: 140 MMOL/L (ref 132–146)
SODIUM SERPL-SCNC: NORMAL MMOL/L (ref 135–144)
TROPONIN I SERPL HS-MCNC: 34 NG/L (ref 0–11)
TROPONIN I SERPL HS-MCNC: 35 NG/L (ref 0–11)
TROPONIN I SERPL HS-MCNC: NORMAL NG/L (ref 0–22)
WBC OTHER # BLD: 4 K/UL (ref 4.5–11.5)

## 2024-12-27 PROCEDURE — 2580000003 HC RX 258: Performed by: INTERNAL MEDICINE

## 2024-12-27 PROCEDURE — 36415 COLL VENOUS BLD VENIPUNCTURE: CPT

## 2024-12-27 PROCEDURE — 6370000000 HC RX 637 (ALT 250 FOR IP): Performed by: INTERNAL MEDICINE

## 2024-12-27 PROCEDURE — 93005 ELECTROCARDIOGRAM TRACING: CPT | Performed by: EMERGENCY MEDICINE

## 2024-12-27 PROCEDURE — 70450 CT HEAD/BRAIN W/O DYE: CPT

## 2024-12-27 PROCEDURE — 85610 PROTHROMBIN TIME: CPT

## 2024-12-27 PROCEDURE — 80053 COMPREHEN METABOLIC PANEL: CPT

## 2024-12-27 PROCEDURE — 85730 THROMBOPLASTIN TIME PARTIAL: CPT

## 2024-12-27 PROCEDURE — 82947 ASSAY GLUCOSE BLOOD QUANT: CPT

## 2024-12-27 PROCEDURE — 70496 CT ANGIOGRAPHY HEAD: CPT

## 2024-12-27 PROCEDURE — 0042T CT BRAIN PERFUSION: CPT

## 2024-12-27 PROCEDURE — 6360000004 HC RX CONTRAST MEDICATION: Performed by: RADIOLOGY

## 2024-12-27 PROCEDURE — 99285 EMERGENCY DEPT VISIT HI MDM: CPT

## 2024-12-27 PROCEDURE — 84484 ASSAY OF TROPONIN QUANT: CPT

## 2024-12-27 PROCEDURE — 71045 X-RAY EXAM CHEST 1 VIEW: CPT

## 2024-12-27 PROCEDURE — 99448 NTRPROF PH1/NTRNET/EHR 21-30: CPT | Performed by: STUDENT IN AN ORGANIZED HEALTH CARE EDUCATION/TRAINING PROGRAM

## 2024-12-27 PROCEDURE — 70498 CT ANGIOGRAPHY NECK: CPT

## 2024-12-27 PROCEDURE — 99223 1ST HOSP IP/OBS HIGH 75: CPT | Performed by: INTERNAL MEDICINE

## 2024-12-27 PROCEDURE — 85025 COMPLETE CBC W/AUTO DIFF WBC: CPT

## 2024-12-27 PROCEDURE — 2580000003 HC RX 258: Performed by: EMERGENCY MEDICINE

## 2024-12-27 PROCEDURE — 2060000000 HC ICU INTERMEDIATE R&B

## 2024-12-27 RX ORDER — FUROSEMIDE 40 MG/1
20 TABLET ORAL EVERY OTHER DAY
Status: DISCONTINUED | OUTPATIENT
Start: 2024-12-28 | End: 2024-12-30 | Stop reason: HOSPADM

## 2024-12-27 RX ORDER — BENZONATATE 100 MG/1
100 CAPSULE ORAL 3 TIMES DAILY PRN
Status: DISCONTINUED | OUTPATIENT
Start: 2024-12-27 | End: 2024-12-30 | Stop reason: HOSPADM

## 2024-12-27 RX ORDER — CALCIUM CARBONATE 500 MG/1
500 TABLET, CHEWABLE ORAL 3 TIMES DAILY PRN
Status: DISCONTINUED | OUTPATIENT
Start: 2024-12-27 | End: 2024-12-30 | Stop reason: HOSPADM

## 2024-12-27 RX ORDER — OXYBUTYNIN CHLORIDE 5 MG/1
5 TABLET ORAL 2 TIMES DAILY
Status: ON HOLD | COMMUNITY

## 2024-12-27 RX ORDER — ONDANSETRON 2 MG/ML
4 INJECTION INTRAMUSCULAR; INTRAVENOUS EVERY 6 HOURS PRN
Status: DISCONTINUED | OUTPATIENT
Start: 2024-12-27 | End: 2024-12-30 | Stop reason: HOSPADM

## 2024-12-27 RX ORDER — TAMSULOSIN HYDROCHLORIDE 0.4 MG/1
0.4 CAPSULE ORAL DAILY
Status: DISCONTINUED | OUTPATIENT
Start: 2024-12-28 | End: 2024-12-30 | Stop reason: HOSPADM

## 2024-12-27 RX ORDER — 0.9 % SODIUM CHLORIDE 0.9 %
1000 INTRAVENOUS SOLUTION INTRAVENOUS ONCE
Status: COMPLETED | OUTPATIENT
Start: 2024-12-27 | End: 2024-12-27

## 2024-12-27 RX ORDER — ROSUVASTATIN CALCIUM 10 MG/1
10 TABLET, COATED ORAL DAILY
Status: DISCONTINUED | OUTPATIENT
Start: 2024-12-27 | End: 2024-12-30 | Stop reason: HOSPADM

## 2024-12-27 RX ORDER — SODIUM CHLORIDE 9 MG/ML
INJECTION, SOLUTION INTRAVENOUS CONTINUOUS
Status: DISCONTINUED | OUTPATIENT
Start: 2024-12-27 | End: 2024-12-30

## 2024-12-27 RX ORDER — MAGNESIUM SULFATE IN WATER 40 MG/ML
2000 INJECTION, SOLUTION INTRAVENOUS PRN
Status: DISCONTINUED | OUTPATIENT
Start: 2024-12-27 | End: 2024-12-30 | Stop reason: HOSPADM

## 2024-12-27 RX ORDER — ASPIRIN 81 MG/1
81 TABLET, CHEWABLE ORAL DAILY
Status: DISCONTINUED | OUTPATIENT
Start: 2024-12-28 | End: 2024-12-30 | Stop reason: HOSPADM

## 2024-12-27 RX ORDER — OXYBUTYNIN CHLORIDE 5 MG/1
5 TABLET ORAL 2 TIMES DAILY
Status: DISCONTINUED | OUTPATIENT
Start: 2024-12-27 | End: 2024-12-30 | Stop reason: HOSPADM

## 2024-12-27 RX ORDER — MIRTAZAPINE 15 MG/1
30 TABLET, FILM COATED ORAL NIGHTLY
Status: DISCONTINUED | OUTPATIENT
Start: 2024-12-27 | End: 2024-12-30 | Stop reason: HOSPADM

## 2024-12-27 RX ORDER — IBUPROFEN 200 MG
200 TABLET ORAL DAILY
Status: ON HOLD | COMMUNITY

## 2024-12-27 RX ORDER — POLYETHYLENE GLYCOL 3350 17 G/17G
17 POWDER, FOR SOLUTION ORAL DAILY PRN
Status: DISCONTINUED | OUTPATIENT
Start: 2024-12-27 | End: 2024-12-30 | Stop reason: HOSPADM

## 2024-12-27 RX ORDER — POTASSIUM CHLORIDE 1500 MG/1
40 TABLET, EXTENDED RELEASE ORAL PRN
Status: DISCONTINUED | OUTPATIENT
Start: 2024-12-27 | End: 2024-12-30 | Stop reason: HOSPADM

## 2024-12-27 RX ORDER — SODIUM CHLORIDE 9 MG/ML
INJECTION, SOLUTION INTRAVENOUS PRN
Status: DISCONTINUED | OUTPATIENT
Start: 2024-12-27 | End: 2024-12-30 | Stop reason: HOSPADM

## 2024-12-27 RX ORDER — POTASSIUM CHLORIDE 1500 MG/1
20 TABLET, EXTENDED RELEASE ORAL DAILY
Status: DISCONTINUED | OUTPATIENT
Start: 2024-12-28 | End: 2024-12-30 | Stop reason: HOSPADM

## 2024-12-27 RX ORDER — POTASSIUM CHLORIDE 7.45 MG/ML
10 INJECTION INTRAVENOUS PRN
Status: DISCONTINUED | OUTPATIENT
Start: 2024-12-27 | End: 2024-12-30 | Stop reason: HOSPADM

## 2024-12-27 RX ORDER — IOPAMIDOL 755 MG/ML
100 INJECTION, SOLUTION INTRAVASCULAR
Status: COMPLETED | OUTPATIENT
Start: 2024-12-27 | End: 2024-12-27

## 2024-12-27 RX ORDER — SODIUM CHLORIDE 0.9 % (FLUSH) 0.9 %
5-40 SYRINGE (ML) INJECTION EVERY 12 HOURS SCHEDULED
Status: DISCONTINUED | OUTPATIENT
Start: 2024-12-27 | End: 2024-12-30 | Stop reason: HOSPADM

## 2024-12-27 RX ORDER — ONDANSETRON 4 MG/1
4 TABLET, ORALLY DISINTEGRATING ORAL EVERY 8 HOURS PRN
Status: DISCONTINUED | OUTPATIENT
Start: 2024-12-27 | End: 2024-12-30 | Stop reason: HOSPADM

## 2024-12-27 RX ORDER — SODIUM CHLORIDE 0.9 % (FLUSH) 0.9 %
5-40 SYRINGE (ML) INJECTION PRN
Status: DISCONTINUED | OUTPATIENT
Start: 2024-12-27 | End: 2024-12-30 | Stop reason: HOSPADM

## 2024-12-27 RX ORDER — LEVOTHYROXINE SODIUM 25 UG/1
25 TABLET ORAL DAILY
Status: DISCONTINUED | OUTPATIENT
Start: 2024-12-28 | End: 2024-12-30 | Stop reason: HOSPADM

## 2024-12-27 RX ORDER — METOPROLOL SUCCINATE 25 MG/1
12.5 TABLET, EXTENDED RELEASE ORAL DAILY
Status: DISCONTINUED | OUTPATIENT
Start: 2024-12-28 | End: 2024-12-30 | Stop reason: HOSPADM

## 2024-12-27 RX ADMIN — IOPAMIDOL 100 ML: 755 INJECTION, SOLUTION INTRAVENOUS at 11:32

## 2024-12-27 RX ADMIN — ROSUVASTATIN 10 MG: 10 TABLET, FILM COATED ORAL at 20:35

## 2024-12-27 RX ADMIN — APIXABAN 5 MG: 5 TABLET, FILM COATED ORAL at 20:35

## 2024-12-27 RX ADMIN — SODIUM CHLORIDE: 9 INJECTION, SOLUTION INTRAVENOUS at 15:56

## 2024-12-27 RX ADMIN — MIRTAZAPINE 30 MG: 15 TABLET, FILM COATED ORAL at 20:36

## 2024-12-27 RX ADMIN — SODIUM CHLORIDE: 9 INJECTION, SOLUTION INTRAVENOUS at 17:53

## 2024-12-27 RX ADMIN — SODIUM CHLORIDE 1000 ML: 9 INJECTION, SOLUTION INTRAVENOUS at 12:13

## 2024-12-27 RX ADMIN — OXYBUTYNIN CHLORIDE 5 MG: 5 TABLET ORAL at 19:00

## 2024-12-27 NOTE — ED PROVIDER NOTES
[facility]  EMERGENCY DEPARTMENT ENCOUNTER        Pt Name: Jona Hobbs  MRN: [id number]  Birthdate 1945  Date of evaluation: 12/27/2024  Provider: Michael Hooper MD  PCP: Sage Easley Jr., MD  Note Started: 11:19 AM EST 12/27/24    CHIEF COMPLAINT       Chief Complaint   Patient presents with    Extremity Weakness     1035 LKW sudden confusion, emesis, weakness, eliquis, tia 1 month ago. Has chronic samuel.       HISTORY OF PRESENT ILLNESS: 1 or more Elements        Jona Hobbs is a 79 y.o. male who presents for strokelike symptoms.  Last known well was 10:45 AM.  Sudden onset of unresponsiveness and sudden onset of not moving any of his extremities.  History of stroke.  History of recent stroke.  On Eliquis.  Arrives awake, eyes open, not following commands, flaccid paralysis on all extremities.  He is breathing spontaneously.  He does not provide any history and is not speaking at all.  Stroke protocol initiated.  NIH stroke scale 25 on arrival.      Nursing Notes were all reviewed and agreed with or any disagreements were addressed in the HPI.      REVIEW OF EXTERNAL NOTE :       12/19/24 internal medicine discharge summary      Chart Review/External Note Review    Last Echo reviewed by Me:  Lab Results   Component Value Date    LVEF 48 03/20/2023             Controlled Substance Monitoring:    Acute and Chronic Pain Monitoring:        No data to display                    REVIEW OF SYSTEMS :      Positives and Pertinent negatives as per HPI.     SURGICAL HISTORY     Past Surgical History:   Procedure Laterality Date    HERNIA REPAIR Bilateral     TOTAL KNEE ARTHROPLASTY      bilateral    ULNAR NERVE REPAIR         CURRENTMEDICATIONS       Current Discharge Medication List        CONTINUE these medications which have NOT CHANGED    Details   oxyBUTYnin (DITROPAN) 5 MG tablet Take 1 tablet by mouth in the morning and at bedtime      ibuprofen (ADVIL;MOTRIN) 200 MG tablet Take 1 tablet by mouth  proximal left cervical internal carotid   artery.   3. Stenosis of approximately 70% involving proximal right internal carotid   artery which appears to of worsened compared to prior   4. Severe stenosis at origin of right and left vertebral arteries.   5. 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   6. Stenosis is seen involving cavernous segment of left ICA.         CTA NECK W CONTRAST   Final Result   1. Abnormal perfusion is seen involving left frontal, left parietal, and left   temporal lobes as well as patchy areas of abnormal perfusion in right frontal   and right parietal lobes which may indicate large areas of stroke.  MRI   recommended for further evaluation.   2. Stable 80 - 90% stenosis involving proximal left cervical internal carotid   artery.   3. Stenosis of approximately 70% involving proximal right internal carotid   artery which appears to of worsened compared to prior   4. Severe stenosis at origin of right and left vertebral arteries.   5. 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   6. Stenosis is seen involving cavernous segment of left ICA.         CT BRAIN PERFUSION   Final Result   1. Abnormal perfusion is seen involving left frontal, left parietal, and left   temporal lobes as well as patchy areas of abnormal perfusion in right frontal   and right parietal lobes which may indicate large areas of stroke.  MRI   recommended for further evaluation.   2. Stable 80 - 90% stenosis involving proximal left cervical internal carotid   artery.   3. Stenosis of approximately 70% involving proximal right internal carotid   artery which appears to of worsened compared to prior   4. Severe stenosis at origin of right and left vertebral arteries.   5. 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   6. Stenosis is seen involving cavernous segment of left ICA.         MRI BRAIN WO CONTRAST    (Results Pending)     No results found.    No results found.      PAST MEDICAL HISTORY/Chronic Conditions

## 2024-12-27 NOTE — ED NOTES
Pt. Temperature 95.9F rectal. Dr. Hooper notified, bear hugger applied to patient to correct temperature.

## 2024-12-27 NOTE — ED NOTES
Pt. Temperature 95.6 F rectal. Dr. Hooper notified. Bear hugger set at 43.0 C and warm blankets applied to patient.

## 2024-12-27 NOTE — H&P
Inpatient H&P      PCP:  Sage Easley Jr., MD  Admitting Physician:  Cathie Schneider DO  Consultants:  Neurology  Chief Complaint:    Chief Complaint   Patient presents with    Extremity Weakness     1035 LKW sudden confusion, emesis, weakness, eliquis, tia 1 month ago. Has chronic samuel.       History of Present Illness  Jona Hobbs is a 79 y.o. male who presents to [facility] ER complaining of AMS.    Jona Hobbs has a past medical history that includes hypertension, history of PE, hypothyroidism, Alzheimer's    Jona presents ER with strokelike symptoms.  Last known well 10:45 AM.  He had a sudden onset of unresponsiveness and sudden onset of not moving his extremities.  He does have a history of recent stroke and is on Eliquis.  He arrived to the ER with eyes open but not following commands and had flaccid paralysis of all EXTR extremities.  Not speaking at all upon arrival.  He was hypothermic and hypotensive.  Stroke alert was called.  Imaging concerning for stroke.  Stable 80 to 90% stenosis of proximal left ICA.  Stenosis of approximately 70% involving right internal carotid artery appears worsened compared to prior.  Severe stenosis at origin of right and left vertebral arteries.  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  Interventional neurology was consulted, no current plan for intervention.  During my examination he is now responding and following commands with improvement of blood pressure.  He will be admitted to neuro ICU  Discussed patient's case with ED physician.    ER Course  Upon presentation to the ER, routine labwork was performed which revealed WBC 4.0, glucose 146, CMP pending.  Imaging results are as outlined below in the Imaging section of this note.    Upon arrival to the ER, patient was 105/61.  The patient received IVF in the emergency room and was admitted to [facility].    Last Hospital Admission -I personally reviewed previous admission from 11/21/2024   Acute CVA  is occlusion of intracranial left vertebral artery.     1. Abnormal perfusion is seen involving left frontal, left parietal, and left temporal lobes as well as patchy areas of abnormal perfusion in right frontal and right parietal lobes which may indicate large areas of stroke.  MRI recommended for further evaluation. 2. Stable 80 - 90% stenosis involving proximal left cervical internal carotid artery. 3. Stenosis of approximately 70% involving proximal right internal carotid artery which appears to of worsened compared to prior 4. Severe stenosis at origin of right and left vertebral arteries. 5. 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 6. Stenosis is seen involving cavernous segment of left ICA.     CT HEAD WO CONTRAST    Result Date: 12/27/2024  EXAMINATION: CT OF THE HEAD WITHOUT CONTRAST  12/27/2024 11:25 am TECHNIQUE: CT of the head was performed without the administration of intravenous contrast. Automated exposure control, iterative reconstruction, and/or weight based adjustment of the mA/kV was utilized to reduce the radiation dose to as low as reasonably achievable. COMPARISON: Cranial CT 11/21/2024 and MR brain torres 05/20/2024 HISTORY: ORDERING SYSTEM PROVIDED HISTORY: CVA TECHNOLOGIST PROVIDED HISTORY: Has a \"code stroke\" or \"stroke alert\" been called?->Yes Reason for exam:->CVA Decision Support Exception - unselect if not a suspected or confirmed emergency medical condition->Emergency Medical Condition (MA) What reading provider will be dictating this exam?->CRC FINDINGS: There is interval change from CT and MR exams 1 month earlier.  There is developing low-density edema of the left temporal lobe white matter.  The left frontal and left parietal regions show additional white matter edema to a lesser extent.  The CT pattern is in keeping with left MCA distribution acute infarct without hemorrhage.  No midline shift or significant mass effect is seen.  No current findings of hydrocephalus or

## 2024-12-27 NOTE — CONSULTS
SURGICAL INTENSIVE CARE  CONSULT NOTE      Date of admission:  12/27/2024  Reason for CVICU transfer:  CVA    Physician Consulted: Dr. Noyola   Reason for Consult: Neurocritical care      SUBJECTIVE:  Jona Hobbs is a 79 y.o. male who presents to the CVICU following a stroke alert.  Patient initially presented to the hospital with stroke strokelike symptoms.  Per report he was briefly unresponsive and suffered a sudden onset of global flaccid paralysis.  Stroke protocol was initiated per the ED. CT brain fusion, CTA neck, CTA head and CT head were ordered. Imaging with normal perfusion along the left frontal, parietal and temporal as well as perfusion in the right frontal and right parietal lobes.  There is stable stenosis involving the left cervical internal carotid artery in addition there is stenosis in the right internal carotid artery, along with an occluded left vertebral artery at C2.  Interventional neurology was consulted with no plans for intervention.  The patient was admitted to the CVICU for closer monitoring.   Patient states that he lives with his daughter and uses a walker to get around.  Denies any history of tobacco or alcohol use.  Patient does have a chronic Duong catheter.    Medical history is significant for CVA, bilateral inguinal hernia repair, Alzheimer's, A-fib/PE on Eliquis, CAD on aspirin. Severe AS deemed poor candidate for TAVR or surgical repair. Echo on 11/23/24 showed an estimated ejection fraction of 35-40%     Patient was recently seen on 11/23 for an acute right MCA stroke, and at that time was not deemed a candidate of tPA due to history of subarachnoid hemorrhage, respiratory failure and AMS, the patient was treated with Zosyn and discharged to a SNF on 11/26, with instructions to start taking aspirin and Entresto.    On my exam the patient is awake and alert he is moving all extremities, denies any blurry vision or changes in vision, denies any headaches.       Past Medical

## 2024-12-27 NOTE — ED NOTES
Stroke/ Dayanna Alert Time:1120      Time Neurologist called:1120 sukiti  :    Time CT Notified: 1120      BRAIN alert time: (If applicable)

## 2024-12-28 ENCOUNTER — APPOINTMENT (OUTPATIENT)
Dept: MRI IMAGING | Age: 79
DRG: 065 | End: 2024-12-28
Payer: MEDICARE

## 2024-12-28 PROBLEM — I63.239 STENOSIS OF INTERNAL CAROTID ARTERY WITH CEREBRAL INFARCTION (HCC): Status: ACTIVE | Noted: 2024-12-28

## 2024-12-28 PROBLEM — G45.9 TIA (TRANSIENT ISCHEMIC ATTACK): Status: ACTIVE | Noted: 2024-12-28

## 2024-12-28 LAB
ANION GAP SERPL CALCULATED.3IONS-SCNC: 12 MMOL/L (ref 7–16)
BASOPHILS # BLD: 0.02 K/UL (ref 0–0.2)
BASOPHILS NFR BLD: 1 % (ref 0–2)
BUN SERPL-MCNC: 18 MG/DL (ref 6–23)
CALCIUM SERPL-MCNC: 9.7 MG/DL (ref 8.6–10.2)
CHLORIDE SERPL-SCNC: 106 MMOL/L (ref 98–107)
CO2 SERPL-SCNC: 21 MMOL/L (ref 22–29)
CREAT SERPL-MCNC: 0.9 MG/DL (ref 0.7–1.2)
EOSINOPHIL # BLD: 0.11 K/UL (ref 0.05–0.5)
EOSINOPHILS RELATIVE PERCENT: 3 % (ref 0–6)
ERYTHROCYTE [DISTWIDTH] IN BLOOD BY AUTOMATED COUNT: 14.2 % (ref 11.5–15)
FOLATE SERPL-MCNC: 11.5 NG/ML (ref 4.8–24.2)
GFR, ESTIMATED: 87 ML/MIN/1.73M2
GLUCOSE SERPL-MCNC: 89 MG/DL (ref 74–99)
HCT VFR BLD AUTO: 36.2 % (ref 37–54)
HGB BLD-MCNC: 11.4 G/DL (ref 12.5–16.5)
IMM GRANULOCYTES # BLD AUTO: <0.03 K/UL (ref 0–0.58)
IMM GRANULOCYTES NFR BLD: 1 % (ref 0–5)
LYMPHOCYTES NFR BLD: 0.83 K/UL (ref 1.5–4)
LYMPHOCYTES RELATIVE PERCENT: 21 % (ref 20–42)
MCH RBC QN AUTO: 28.4 PG (ref 26–35)
MCHC RBC AUTO-ENTMCNC: 31.5 G/DL (ref 32–34.5)
MCV RBC AUTO: 90.3 FL (ref 80–99.9)
MONOCYTES NFR BLD: 0.37 K/UL (ref 0.1–0.95)
MONOCYTES NFR BLD: 9 % (ref 2–12)
NEUTROPHILS NFR BLD: 67 % (ref 43–80)
NEUTS SEG NFR BLD: 2.7 K/UL (ref 1.8–7.3)
PLATELET # BLD AUTO: 154 K/UL (ref 130–450)
PMV BLD AUTO: 10.8 FL (ref 7–12)
POTASSIUM SERPL-SCNC: 4 MMOL/L (ref 3.5–5)
RBC # BLD AUTO: 4.01 M/UL (ref 3.8–5.8)
SODIUM SERPL-SCNC: 139 MMOL/L (ref 132–146)
TSH SERPL DL<=0.05 MIU/L-ACNC: 0.96 UIU/ML (ref 0.27–4.2)
VIT B12 SERPL-MCNC: 428 PG/ML (ref 211–946)
WBC OTHER # BLD: 4.1 K/UL (ref 4.5–11.5)

## 2024-12-28 PROCEDURE — 2500000003 HC RX 250 WO HCPCS: Performed by: INTERNAL MEDICINE

## 2024-12-28 PROCEDURE — 99233 SBSQ HOSP IP/OBS HIGH 50: CPT | Performed by: PSYCHIATRY & NEUROLOGY

## 2024-12-28 PROCEDURE — 99232 SBSQ HOSP IP/OBS MODERATE 35: CPT | Performed by: INTERNAL MEDICINE

## 2024-12-28 PROCEDURE — 97165 OT EVAL LOW COMPLEX 30 MIN: CPT

## 2024-12-28 PROCEDURE — 92610 EVALUATE SWALLOWING FUNCTION: CPT

## 2024-12-28 PROCEDURE — 70551 MRI BRAIN STEM W/O DYE: CPT

## 2024-12-28 PROCEDURE — 85025 COMPLETE CBC W/AUTO DIFF WBC: CPT

## 2024-12-28 PROCEDURE — 92523 SPEECH SOUND LANG COMPREHEN: CPT

## 2024-12-28 PROCEDURE — 82607 VITAMIN B-12: CPT

## 2024-12-28 PROCEDURE — 2060000000 HC ICU INTERMEDIATE R&B

## 2024-12-28 PROCEDURE — 36415 COLL VENOUS BLD VENIPUNCTURE: CPT

## 2024-12-28 PROCEDURE — 6370000000 HC RX 637 (ALT 250 FOR IP): Performed by: INTERNAL MEDICINE

## 2024-12-28 PROCEDURE — 97530 THERAPEUTIC ACTIVITIES: CPT

## 2024-12-28 PROCEDURE — 80048 BASIC METABOLIC PNL TOTAL CA: CPT

## 2024-12-28 PROCEDURE — 84443 ASSAY THYROID STIM HORMONE: CPT

## 2024-12-28 PROCEDURE — 82746 ASSAY OF FOLIC ACID SERUM: CPT

## 2024-12-28 RX ADMIN — ASPIRIN 81 MG CHEWABLE TABLET 81 MG: 81 TABLET CHEWABLE at 10:13

## 2024-12-28 RX ADMIN — APIXABAN 5 MG: 5 TABLET, FILM COATED ORAL at 19:42

## 2024-12-28 RX ADMIN — OXYBUTYNIN CHLORIDE 5 MG: 5 TABLET ORAL at 10:13

## 2024-12-28 RX ADMIN — OXYBUTYNIN CHLORIDE 5 MG: 5 TABLET ORAL at 19:48

## 2024-12-28 RX ADMIN — POTASSIUM CHLORIDE 20 MEQ: 1500 TABLET, EXTENDED RELEASE ORAL at 10:13

## 2024-12-28 RX ADMIN — ROSUVASTATIN 10 MG: 10 TABLET, FILM COATED ORAL at 19:42

## 2024-12-28 RX ADMIN — TAMSULOSIN HYDROCHLORIDE 0.4 MG: 0.4 CAPSULE ORAL at 10:13

## 2024-12-28 RX ADMIN — MIRTAZAPINE 30 MG: 15 TABLET, FILM COATED ORAL at 19:43

## 2024-12-28 RX ADMIN — LEVOTHYROXINE SODIUM 25 MCG: 0.03 TABLET ORAL at 05:25

## 2024-12-28 RX ADMIN — SODIUM CHLORIDE, PRESERVATIVE FREE 10 ML: 5 INJECTION INTRAVENOUS at 19:43

## 2024-12-28 RX ADMIN — SODIUM CHLORIDE, PRESERVATIVE FREE 10 ML: 5 INJECTION INTRAVENOUS at 10:15

## 2024-12-28 RX ADMIN — APIXABAN 5 MG: 5 TABLET, FILM COATED ORAL at 10:13

## 2024-12-28 RX ADMIN — METOPROLOL SUCCINATE 12.5 MG: 25 TABLET, EXTENDED RELEASE ORAL at 10:13

## 2024-12-28 NOTE — PLAN OF CARE
Problem: Safety - Adult  Goal: Free from fall injury  12/28/2024 0016 by Christi Sweet, RN  Outcome: Progressing     Problem: Skin/Tissue Integrity  Goal: Absence of new skin breakdown  Description: 1.  Monitor for areas of redness and/or skin breakdown  2.  Assess vascular access sites hourly  3.  Every 4-6 hours minimum:  Change oxygen saturation probe site  4.  Every 4-6 hours:  If on nasal continuous positive airway pressure, respiratory therapy assess nares and determine need for appliance change or resting period.  Outcome: Progressing     Problem: ABCDS Injury Assessment  Goal: Absence of physical injury  Outcome: Progressing

## 2024-12-28 NOTE — PROGRESS NOTES
Notified Dr. Schneider, Dr. Cuevas, Dr. Vora, and on call surgical resident of patient's admission to CVICU. Awaiting for admit to ICU order. Ok to transfer patient out of ICU per Dr. Schneider, Dr. Cuevas, and Dr. Vora

## 2024-12-28 NOTE — PROGRESS NOTES
Occupational Therapy  OCCUPATIONAL THERAPY INITIAL EVALUATION    [facility]  [address] [place], [place]      Date:2024                                                Patient Name: Jona Hobbs  MRN: [id number]  : 1945  Room: [address] [place] [address]    Evaluating OT:Mary Mendez OTR/L #[id number]    Referring Provider: Cathie Schneider DO   Specific Provider Orders/Date: OT eval and treat 24     Diagnosis: Acute ischemic stroke (HCC) [I63.9]  Acute CVA (cerebrovascular accident) (HCC) [I63.9]   Pt admitted to hospital on 24 for stroke like symptoms      Pertinent Medical History:  has a past medical history of Bacteremia due to Gram-negative bacteria, CHF (congestive heart failure) (HCC), Dementia (HCC), Electrolyte imbalance, Fever, Sepsis (HCC), and Severe Alzheimer's dementia without behavioral disturbance, psychotic disturbance, mood disturbance, or anxiety (HCC).       Precautions:  Fall Risk, samuel (chronic), cognition, h/o recent CVA, +alarms  Pt recently d/c'd to SNF on  following admission for acute R MCA stroke    Assessment of current deficits    [x] Functional mobility  [x]ADLs  [x] Strength               [x]Cognition    [x] Functional transfers   [x] IADLs         [x] Safety Awareness   [x]Endurance    [x] Fine Coordination              [x] Balance      [] Vision/perception   []Sensation     []Gross Motor Coordination  [x] ROM  [] Delirium                   [x] Motor Control     OT PLAN OF CARE   OT POC based on physician orders, patient diagnosis and results of clinical assessment    Frequency/Duration 1-3 days/wk for 2 weeks PRN   Specific OT Treatment Interventions to include:   * Instruction/training on adapted ADL techniques and AE recommendations to increase functional independence within precautions       * Training on energy conservation strategies, correct breathing pattern and techniques to improve  tubes intact.  Overall patient demonstrated decreased independence and safety during completion of ADL/functional transfer/mobility tasks.  Pt would benefit from continued skilled OT to increase safety and independence with completion of ADL/IADL tasks for functional independence and quality of life.    Treatment: OT treatment provided this date includes: Facilitation of bed mobility (education/cues for body mechanics, safety and sequencing), unsupported sitting balance (addressing posture, weight shifting, dynamic reaching to prep for ADL's), functional transfers (w/ education/cues for safety/hand placement), standing tolerance tasks (addressing posture, balance and activity tolerance while incorporating light functional reaching impacting ADLs and functional activity) and lateral sidesteps to HOB with w/w (w/ education/cuing on posture, w/w management, sequencing and safety).  Therapist facilitated self-care retraining: UB/LB self-care tasks, simulated toileting task and seated grooming tasks while educating pt on modified techniques, posture, safety and energy conservation techniques. Skilled monitoring of HR, O2 sats and pts response to treatment. Pt on room air. O2 sat=WFL, HR=60s to 70s.      Rehab Potential: Good for established goals     Patient / Family Goal: not stated      Patient and/or family were instructed on functional diagnosis, prognosis/goals and OT plan of care. Demonstrated poor understanding.     Eval Complexity: Low    Time In: 10:45  Time Out: 11:11  Total Treatment Time: 11 minutes    Min Units   OT Eval Low 97165  X  1   OT Eval Medium 47438      OT Eval High 45956      OT Re-Eval 36819       Therapeutic Ex 94191       Therapeutic Activities 09093  8  1   ADL/Self Care 63703  3 0    Orthotic Management 51793       Manual [id number]     Neuro Re-Ed 86187       Non-Billable Time          Evaluation Time additionally includes thorough review of current medical information, gathering information on

## 2024-12-28 NOTE — PROGRESS NOTES
Patient admitted to CV with the following belongings:  Glasses, Shirt, and Socks. The following belongings admitted with the patient, ALL other belongings were sent home with family..

## 2024-12-28 NOTE — CONSULTS
I was earlier called by the emergency department team about this patient.  He presented with aphasia and no movement of any of the extremities.    He has very poor functional status at baseline.  Known severe dementia, limited code, history of subarachnoid hemorrhage in the past and modified Custer score of 5.    No evidence of LVO on vascular imaging.  Known severe left ICA stenosis from before.  Significant hypoperfusion throughout the entire left hemisphere and the right JUSTIN territory.    Suggest conservative medical management.  No endovascular intervention is needed.  Please contact general neurology service for further evaluation/management.    Total time spent: 25 minutes    Jer Cuevas M.D.  Vascular Neurology & Neurointerventional Surgery

## 2024-12-28 NOTE — PROGRESS NOTES
4 Eyes Skin Assessment     NAME:  Jona Hobbs  YOB: 1945  MEDICAL RECORD NUMBER:  [id number]    The patient is being assessed for  Transfer to New Unit    I agree that at least one RN has performed a thorough Head to Toe Skin Assessment on the patient. ALL assessment sites listed below have been assessed.      Areas assessed by both nurses:    Head, Face, Ears, Shoulders, Back, Chest, Arms, Elbows, Hands, Sacrum. Buttock, Coccyx, Ischium, Legs. Feet and Heels, and Under Medical Devices         Does the Patient have a Wound? No noted wound(s)       Lg Prevention initiated by RN: Yes  Wound Care Orders initiated by RN: No    Pressure Injury (Stage 3,4, Unstageable, DTI, NWPT, and Complex wounds) if present, place Wound referral order by RN under : No    New Ostomies, if present place, Ostomy referral order under : No     Nurse 1 eSignature: Electronically signed by Christi Sweet RN on 12/27/24 at 8:59 PM EST    **SHARE this note so that the co-signing nurse can place an eSignature**    Nurse 2 eSignature: Electronically signed by Marvin Falcon RN on 12/27/24 at 9:05 PM EST

## 2024-12-28 NOTE — PROGRESS NOTES
Patient admitted with chronic samuel catheter. Catheter secured to patient's leg. Unknown placement date or reason at this time..

## 2024-12-28 NOTE — PROGRESS NOTES
Patient admitted to CVICU. Patient connected to bedside monitor. Call light given to patient. Alarm parameters set.

## 2024-12-28 NOTE — PROGRESS NOTES
SPEECH/LANGUAGE PATHOLOGY  CLINICAL ASSESSMENT OF SWALLOWING FUNCTION   and PLAN OF CARE      PATIENT NAME:  Jona Hobbs  (male)     MRN:  [id number]    :  1945  (79 y.o.)  STATUS:  Inpatient: Room 8516/8516-B    TODAY'S DATE:  2024  ORDER DATE, DESCRIPTION AND REFERRING PROVIDER: 24, swallowing-dysphagia evaluation and treatment, Dr. Gil Moran  REASON FOR REFERRAL: CVA   EVALUATING THERAPIST: BRI Cid                 RESULTS:    DYSPHAGIA DIAGNOSIS:   Clinical indicators of mild-moderate oropharyngeal phase dysphagia       DIET RECOMMENDATIONS:  Regular consistency solids (IDDSI level 7) with  nectar consistency (mildly thick - IDDSI level 2) liquids     FEEDING RECOMMENDATIONS:     Assistance level:  Stand by/ Hand over Hand assistance is needed during all oral intake      Compensatory strategies recommended:   Upright in bed/ chair as tolerated  Slow rate of intake   SINGLE cup sips  SMALL bites  NO STRAW      Discussed recommendations with:  patient nurse via phone    SPEECH THERAPY  PLAN OF CARE   The dysphagia POC is established based on physician order, dysphagia diagnosis and results of clinical assessment     Skilled SLP intervention for dysphagia management on acute care up to 5 x per week until goals met, pt plateaus in function and/or discharged from hospital    Conditions Requiring Skilled Therapeutic Intervention for dysphagia:    Patient is performing below functional baseline d/t  current acute condition, respiratory compromise, multiple medications, and/or increased dependency upon caregivers.  Coughing during PO intake      Specific dysphagia interventions to include:     compensatory swallowing strategies to improve airway protection and swallow function.  Training in positioning for improved integrity of swallow  ongoing mealtime assessment to provide diet modification and compensatory strategy implementation to minimize risk of aspiration associated with PO

## 2024-12-28 NOTE — PROGRESS NOTES
SPEECH/LANGUAGE PATHOLOGY  SPEECH/LANGUAGE/COGNITIVE EVALUATION   and PLAN OF CARE      PATIENT NAME:  Jona Hobbs  (male)     MRN:  [id number]    :  1945  (79 y.o.)  STATUS:  Inpatient: Room 8516/8516-B    TODAY'S DATE:  2024  ORDER DATE, DESCRIPTION AND REFERRING PROVIDER : 24, Dr. Cathie Schneider, SLP eval and treat 1 time  REASON FOR REFERRAL:  CVA  EVALUATING THERAPIST: BRI Cid    ADMITTING DIAGNOSIS: Acute ischemic stroke (HCC) [I63.9]  Acute CVA (cerebrovascular accident) (HCC) [I63.9]    VISIT DIAGNOSIS:   Visit Diagnoses         Codes    Acute ischemic stroke (HCC)    -  Primary I63.9               SPEECH THERAPY  PLAN OF CARE   The speech therapy  POC is established based on physician order, speech pathology diagnosis and results of clinical assessment     SPEECH PATHOLOGY DIAGNOSIS:    moderate cognitive linguistic deficit    Speech Pathology intervention is recommended up to 6 times per week for LOS or when goals are met with emphasis on the following:      Conditions Requiring Skilled Therapeutic Intervention for speech, language and/or cognition    Cognitive linguistic impairment  Decreased safety awareness  Decreased short term memory  Decreased problem solving skills   Decreased thought organization    Specific Speech Therapy Interventions to Include:   Therapeutic tasks for Cognition    Specific instructions for next treatment:     To initiate POC    SHORT/LONG TERM GOALS  Pt will improve orientation to spatial and temporal surroundings with use of external memory aides.  Pt will improve immediate, short term, recent memory during structured and unstructured tasks with 75% accuracy   Pt will improve problem solving/thought organization during structured and unstructured tasks with 75% accuracy     Patient goals: Patient/family involved in developing goals and treatment plan:   Treatment goals discussed with Patient    The Patient understand(s) the diagnosis, prognosis  and plan of care   The patient/family Agreed with above,     This plan may be re-evaluated and revised as warranted.        Rehabilitation Potential/Prognosis: good                CLINICAL ASSESSMENT:  MOTOR SPEECH       Oral Peripheral Examination   Generalized oral weakness    Parameters of Speech Production  Respiration:  Adequate for speech production  Articulation:  Within functional limits  Resonance:  Within functional limits  Quality:   Within functional limits  Pitch:    Within functional limits  Intensity: Within functional limits  Fluency:  Intact  Prosody Intact    Speech Intelligibility      Good given unstructured task and unknown context             RECEPTIVE LANGUAGE    Comprehension of Yes/No Questions:   Within functional limits    Process  Simple Verbal Commands:   Within functional limits  Process Intermediate Verbal Commands:   Within functional limits  Process Complex Verbal Commands:    Within functional limits and Latent    Comprehension of Conversation:     Latent      EXPRESSIVE LANGUAGE     Serials: Functional    Imitation:  Words   Functional   Sentences Functional    Naming:  (Modality used:  Verbal)  Confrontation Naming  Functional  Functional Description  Functional  Response Naming: Functional    Conversation:      Conversation was within functional limits    COGNITION     Attention/Orientation  Attention: Sustained attention   Orientation:  Oriented to Person, Place, Date, Reason for hospitalization    Memory   Immediate Recall: Repeated 3/3    Delayed Recall:   Recalled 1/3  with max cues    Long Term Recall:   Recalled Address and Family with increased time    Organization/Problem Solving/Reasoning   Verbal Sequencing:   Impaired        Verbal Problem solving:   Impaired          CLINICAL OBSERVATIONS NOTED DURING THE EVALUATION  Latent responses                  EDUCATION:   The Speech Language Pathologist (SLP) completed education regarding results of evaluation and that

## 2024-12-28 NOTE — CONSULTS
History:  No family history on file.    Social History:  Social History     Tobacco Use    Smoking status: Never    Smokeless tobacco: Never   Vaping Use    Vaping status: Never Used   Substance Use Topics    Alcohol use: Never    Drug use: Never        Current Medications:      Current Facility-Administered Medications   Medication Dose Route Frequency Provider Last Rate Last Admin    benzocaine-menthol (CEPACOL SORE THROAT) lozenge 1 lozenge  1 lozenge Oral Q2H PRN Cathie Schneider, DO        benzonatate (TESSALON) capsule 100 mg  100 mg Oral TID PRN Cathie Schneider, DO        calcium carbonate (TUMS) chewable tablet 500 mg  500 mg Oral TID PRN Cathie Schneider, DO        melatonin tablet 3 mg  3 mg Oral Nightly PRN Cathie Schneider, DO        Polyvinyl Alcohol-Povidone PF (REFRESH) 1.4-0.6 % ophthalmic solution 1 drop  1 drop Both Eyes PRN Cathie Schneider, DO        sodium chloride (OCEAN, BABY AYR) 0.65 % nasal spray 1 spray  1 spray Each Nostril PRN Cathie Schneider, DO        sodium chloride flush 0.9 % injection 5-40 mL  5-40 mL IntraVENous 2 times per day Cathie Schneider, DO   10 mL at 12/28/24 1015    sodium chloride flush 0.9 % injection 5-40 mL  5-40 mL IntraVENous PRN Cathie Schneider, DO        0.9 % sodium chloride infusion   IntraVENous PRN Cathie Schneider, DO        potassium chloride (KLOR-CON M) extended release tablet 40 mEq  40 mEq Oral PRN Cathie Schneider, DO        Or    potassium bicarb-citric acid (EFFER-K) effervescent tablet 40 mEq  40 mEq Oral PRN Cathie Schneider, DO        Or    potassium chloride 10 mEq/100 mL IVPB (Peripheral Line)  10 mEq IntraVENous PRN Cathie Schneider, DO        magnesium sulfate 2000 mg in 50 mL IVPB premix  2,000 mg IntraVENous PRN Cathie Schneider, DO        ondansetron (ZOFRAN-ODT) disintegrating tablet 4 mg  4 mg Oral Q8H PRN Cathie Schneider, DO        Or    ondansetron (ZOFRAN) injection 4 mg  4 mg IntraVENous Q6H PRN Jennie  Cathie DO, DO        polyethylene glycol (GLYCOLAX) packet 17 g  17 g Oral Daily PRN Cathie Schneider, DO        0.9 % sodium chloride infusion   IntraVENous Continuous Cathie Schneider, DO 75 mL/hr at 12/27/24 1753 New Bag at 12/27/24 1753    apixaban (ELIQUIS) tablet 5 mg  5 mg Oral BID Cathie Schneider, DO   5 mg at 12/28/24 1013    [Held by provider] furosemide (LASIX) tablet 20 mg  20 mg Oral Every Other Day Cathie Schneider, DO        levothyroxine (SYNTHROID) tablet 25 mcg  25 mcg Oral Daily Cathie Schneider, DO   25 mcg at 12/28/24 0525    mirtazapine (REMERON) tablet 30 mg  30 mg Oral Nightly Cathie Schneider, DO   30 mg at 12/27/24 2036    oxyBUTYnin (DITROPAN) tablet 5 mg  5 mg Oral BID Cathie Schneider, DO   5 mg at 12/28/24 1013    potassium chloride (KLOR-CON M) extended release tablet 20 mEq  20 mEq Oral Daily Cathie Schneider, DO   20 mEq at 12/28/24 1013    tamsulosin (FLOMAX) capsule 0.4 mg  0.4 mg Oral Daily Cathie Schneider, DO   0.4 mg at 12/28/24 1013    [Held by provider] sacubitril-valsartan (ENTRESTO) 24-26 MG per tablet 1 tablet  1 tablet Oral BID Cathie Schneider, DO        rosuvastatin (CRESTOR) tablet 10 mg  10 mg Oral Daily Cathie Schneider, DO   10 mg at 12/27/24 2035    metoprolol succinate (TOPROL XL) extended release tablet 12.5 mg  12.5 mg Oral Daily Cathie Schneider, DO   12.5 mg at 12/28/24 1013    aspirin chewable tablet 81 mg  81 mg Oral Daily Cathie Schneider, DO   81 mg at 12/28/24 1013        Allergies:      Allergies   Allergen Reactions    Hydrocodone-Acetaminophen Other (See Comments)     UNKNOWN REACTION    Amoxicillin-Pot Clavulanate     Erythromycin     Hydrocodone     Cipro [Ciprofloxacin Hcl] Other (See Comments)     UNKNOWN REACTION    Quinapril Other (See Comments)     UNKNOWN REACTION    Tylenol [Acetaminophen] Other (See Comments)     UNKNOWN REACTION        Physical Examination  Vitals   Vitals:    12/28/24 0006 12/28/24 0746 12/28/24

## 2024-12-28 NOTE — PROGRESS NOTES
HOSPITALIST PROGRESS NOTES     ADMITTING DATE AND TIME: 12/27/2024 11:26 AM  had concerns including Extremity Weakness (1035 LKW sudden confusion, emesis, weakness, eliquis, tia 1 month ago. Has chronic samuel.).    ADMIT DX: Acute ischemic stroke (HCC) [I63.9]  Acute CVA (cerebrovascular accident) (HCC) [I63.9]      SUBJECTIVE:  Patient is being followed for Acute ischemic stroke (HCC) [I63.9]  Acute CVA (cerebrovascular accident) (HCC) [I63.9]     Patient was seen examined and evaluated  Recent lab results, charts and pertinent diagnostic imaging reviewed   Ancillary service notes reviewed   Anxious  Moving his ext     Care reviewed with nursing staff, medical and surgical specialty care, primary care and the patient's family as available.   ROS: denies fever, chills, cp, sob, n/v, HA unless stated above.      sodium chloride flush  5-40 mL IntraVENous 2 times per day    apixaban  5 mg Oral BID    [Held by provider] furosemide  20 mg Oral Every Other Day    levothyroxine  25 mcg Oral Daily    mirtazapine  30 mg Oral Nightly    oxyBUTYnin  5 mg Oral BID    potassium chloride  20 mEq Oral Daily    tamsulosin  0.4 mg Oral Daily    [Held by provider] sacubitril-valsartan  1 tablet Oral BID    rosuvastatin  10 mg Oral Daily    metoprolol succinate  12.5 mg Oral Daily    aspirin  81 mg Oral Daily     benzocaine-menthol, 1 lozenge, Q2H PRN  benzonatate, 100 mg, TID PRN  calcium carbonate, 500 mg, TID PRN  melatonin, 3 mg, Nightly PRN  Polyvinyl Alcohol-Povidone PF, 1 drop, PRN  sodium chloride, 1 spray, PRN  sodium chloride flush, 5-40 mL, PRN  sodium chloride, , PRN  potassium chloride, 40 mEq, PRN   Or  potassium alternative oral replacement, 40 mEq, PRN   Or  potassium chloride, 10 mEq, PRN  magnesium sulfate, 2,000 mg, PRN  ondansetron, 4 mg, Q8H PRN   Or  ondansetron, 4 mg, Q6H  hypokinesis of all myocardial segments and moderate left ventricular systolic dysfunction.  An estimated ejection fraction of 35-40% is calculated.  Indeterminate diastolic function is present.    Right Ventricle: The right ventricle is mildly dilated with normal right ventricular function.    Aortic Valve: Aortic morphology is suboptimally visualized with significant leaflet calcification.  A mean transaortic gradient of 38 mmHg is calculated, dimensionless index 0.21 with an estimated aortic valve area of 0.8 cm² suggesting severe low gradient aortic stenosis.  Mild aortic insufficiency is present.    Mitral Valve: Mitral leaflets are structurally normal with moderate calcification of the mitral annulus and mild to moderate centrally directed mitral regurgitation    Tricuspid Valve: The tricuspid valve is structurally normal with mild tricuspid regurgitation    Left Atrium: The left atrium is mildly dilated with a volume index of 40 mL/m².  The interatrial septum is intact.  No evidence of an intracardiac shunt is identified via saline contrast administration including Valsalva.    Right Atrium: The right atrium is mildly dilated.    Pericardium: A small, hemodynamically insignificant pericardial effusion is identified.    Signed by: Gabriel Weston MD on 11/23/2024  3:21 PM      SYNOPSIS:  79 y.o. male who presents to [facility] ER complaining of AMS. With underlying hypertension, PE, hypothyroidism, Alzheimer's. Last known well 10:45 AM.  He had a sudden onset of unresponsiveness and sudden onset of not moving his extremities.  He does have a history of recent stroke and is on Eliquis.  He arrived to the ER with eyes open but not following commands and had flaccid paralysis of all EXTR extremities.  Not speaking at all upon arrival. He was hypothermic and hypotensive. Stroke alert was called. Imaging concerning for stroke. Stable 80 to 90% stenosis of proximal left ICA. Stenosis of approximately 70% involving right

## 2024-12-29 LAB
ANION GAP SERPL CALCULATED.3IONS-SCNC: 10 MMOL/L (ref 7–16)
BASOPHILS # BLD: 0.02 K/UL (ref 0–0.2)
BASOPHILS NFR BLD: 1 % (ref 0–2)
BUN SERPL-MCNC: 14 MG/DL (ref 6–23)
CALCIUM SERPL-MCNC: 9.5 MG/DL (ref 8.6–10.2)
CHLORIDE SERPL-SCNC: 108 MMOL/L (ref 98–107)
CO2 SERPL-SCNC: 22 MMOL/L (ref 22–29)
CREAT SERPL-MCNC: 0.9 MG/DL (ref 0.7–1.2)
EOSINOPHIL # BLD: 0.15 K/UL (ref 0.05–0.5)
EOSINOPHILS RELATIVE PERCENT: 4 % (ref 0–6)
ERYTHROCYTE [DISTWIDTH] IN BLOOD BY AUTOMATED COUNT: 14.1 % (ref 11.5–15)
GFR, ESTIMATED: 86 ML/MIN/1.73M2
GLUCOSE SERPL-MCNC: 83 MG/DL (ref 74–99)
HCT VFR BLD AUTO: 32.2 % (ref 37–54)
HGB BLD-MCNC: 10.6 G/DL (ref 12.5–16.5)
IMM GRANULOCYTES # BLD AUTO: <0.03 K/UL (ref 0–0.58)
IMM GRANULOCYTES NFR BLD: 0 % (ref 0–5)
LYMPHOCYTES NFR BLD: 0.82 K/UL (ref 1.5–4)
LYMPHOCYTES RELATIVE PERCENT: 21 % (ref 20–42)
MCH RBC QN AUTO: 28.8 PG (ref 26–35)
MCHC RBC AUTO-ENTMCNC: 32.9 G/DL (ref 32–34.5)
MCV RBC AUTO: 87.5 FL (ref 80–99.9)
MONOCYTES NFR BLD: 0.33 K/UL (ref 0.1–0.95)
MONOCYTES NFR BLD: 8 % (ref 2–12)
NEUTROPHILS NFR BLD: 66 % (ref 43–80)
NEUTS SEG NFR BLD: 2.62 K/UL (ref 1.8–7.3)
PLATELET # BLD AUTO: 144 K/UL (ref 130–450)
PMV BLD AUTO: 11.1 FL (ref 7–12)
POTASSIUM SERPL-SCNC: 3.9 MMOL/L (ref 3.5–5)
RBC # BLD AUTO: 3.68 M/UL (ref 3.8–5.8)
SODIUM SERPL-SCNC: 140 MMOL/L (ref 132–146)
WBC OTHER # BLD: 4 K/UL (ref 4.5–11.5)

## 2024-12-29 PROCEDURE — 97530 THERAPEUTIC ACTIVITIES: CPT

## 2024-12-29 PROCEDURE — 99231 SBSQ HOSP IP/OBS SF/LOW 25: CPT | Performed by: INTERNAL MEDICINE

## 2024-12-29 PROCEDURE — 36415 COLL VENOUS BLD VENIPUNCTURE: CPT

## 2024-12-29 PROCEDURE — 80048 BASIC METABOLIC PNL TOTAL CA: CPT

## 2024-12-29 PROCEDURE — 97161 PT EVAL LOW COMPLEX 20 MIN: CPT

## 2024-12-29 PROCEDURE — 6370000000 HC RX 637 (ALT 250 FOR IP): Performed by: INTERNAL MEDICINE

## 2024-12-29 PROCEDURE — 2060000000 HC ICU INTERMEDIATE R&B

## 2024-12-29 PROCEDURE — 85025 COMPLETE CBC W/AUTO DIFF WBC: CPT

## 2024-12-29 PROCEDURE — 2500000003 HC RX 250 WO HCPCS: Performed by: INTERNAL MEDICINE

## 2024-12-29 PROCEDURE — 97535 SELF CARE MNGMENT TRAINING: CPT

## 2024-12-29 RX ORDER — LABETALOL HYDROCHLORIDE 5 MG/ML
20 INJECTION, SOLUTION INTRAVENOUS EVERY 4 HOURS PRN
Status: DISCONTINUED | OUTPATIENT
Start: 2024-12-29 | End: 2024-12-30 | Stop reason: HOSPADM

## 2024-12-29 RX ADMIN — OXYBUTYNIN CHLORIDE 5 MG: 5 TABLET ORAL at 09:09

## 2024-12-29 RX ADMIN — MIRTAZAPINE 30 MG: 15 TABLET, FILM COATED ORAL at 20:35

## 2024-12-29 RX ADMIN — METOPROLOL SUCCINATE 12.5 MG: 25 TABLET, EXTENDED RELEASE ORAL at 09:09

## 2024-12-29 RX ADMIN — ROSUVASTATIN 10 MG: 10 TABLET, FILM COATED ORAL at 20:36

## 2024-12-29 RX ADMIN — ASPIRIN 81 MG CHEWABLE TABLET 81 MG: 81 TABLET CHEWABLE at 09:08

## 2024-12-29 RX ADMIN — APIXABAN 5 MG: 5 TABLET, FILM COATED ORAL at 09:08

## 2024-12-29 RX ADMIN — SODIUM CHLORIDE, PRESERVATIVE FREE 10 ML: 5 INJECTION INTRAVENOUS at 20:36

## 2024-12-29 RX ADMIN — APIXABAN 5 MG: 5 TABLET, FILM COATED ORAL at 20:36

## 2024-12-29 RX ADMIN — SODIUM CHLORIDE, PRESERVATIVE FREE 10 ML: 5 INJECTION INTRAVENOUS at 09:10

## 2024-12-29 RX ADMIN — LEVOTHYROXINE SODIUM 25 MCG: 0.03 TABLET ORAL at 05:00

## 2024-12-29 RX ADMIN — OXYBUTYNIN CHLORIDE 5 MG: 5 TABLET ORAL at 20:36

## 2024-12-29 RX ADMIN — TAMSULOSIN HYDROCHLORIDE 0.4 MG: 0.4 CAPSULE ORAL at 09:08

## 2024-12-29 RX ADMIN — POTASSIUM CHLORIDE 20 MEQ: 1500 TABLET, EXTENDED RELEASE ORAL at 09:08

## 2024-12-29 ASSESSMENT — PAIN SCALES - GENERAL
PAINLEVEL_OUTOF10: 0

## 2024-12-29 NOTE — PROGRESS NOTES
Spoke with patient's daughter, Stacie, gave her an update and addressed all questions and concerns. She would like to speak with case management about therapy. She states it is ok if he needs at home therapy or facility therapy. Stacie prefers [facility] where he has been before.     Updated her that we have a message out to CM

## 2024-12-29 NOTE — PROGRESS NOTES
HOSPITALIST PROGRESS NOTES     ADMITTING DATE AND TIME: 12/27/2024 11:26 AM  had concerns including Extremity Weakness (1035 LKW sudden confusion, emesis, weakness, eliquis, tia 1 month ago. Has chronic samuel.).    ADMIT DX: Acute ischemic stroke (HCC) [I63.9]  Acute CVA (cerebrovascular accident) (HCC) [I63.9]      SUBJECTIVE:  Patient is being followed for Acute ischemic stroke (HCC) [I63.9]  Acute CVA (cerebrovascular accident) (HCC) [I63.9]     Patient was seen examined and evaluated  Recent lab results, charts and pertinent diagnostic imaging reviewed   Ancillary service notes reviewed   Anxious  Moving his ext     Care reviewed with nursing staff, medical and surgical specialty care, primary care and the patient's family as available.   ROS: denies fever, chills, cp, sob, n/v, HA unless stated above.      sodium chloride flush  5-40 mL IntraVENous 2 times per day    apixaban  5 mg Oral BID    [Held by provider] furosemide  20 mg Oral Every Other Day    levothyroxine  25 mcg Oral Daily    mirtazapine  30 mg Oral Nightly    oxyBUTYnin  5 mg Oral BID    potassium chloride  20 mEq Oral Daily    tamsulosin  0.4 mg Oral Daily    [Held by provider] sacubitril-valsartan  1 tablet Oral BID    rosuvastatin  10 mg Oral Daily    metoprolol succinate  12.5 mg Oral Daily    aspirin  81 mg Oral Daily     benzocaine-menthol, 1 lozenge, Q2H PRN  benzonatate, 100 mg, TID PRN  calcium carbonate, 500 mg, TID PRN  melatonin, 3 mg, Nightly PRN  Polyvinyl Alcohol-Povidone PF, 1 drop, PRN  sodium chloride, 1 spray, PRN  sodium chloride flush, 5-40 mL, PRN  sodium chloride, , PRN  potassium chloride, 40 mEq, PRN   Or  potassium alternative oral replacement, 40 mEq, PRN   Or  potassium chloride, 10 mEq, PRN  magnesium sulfate, 2,000 mg, PRN  ondansetron, 4 mg, Q8H PRN   Or  ondansetron, 4 mg, Q6H  PRN  polyethylene glycol, 17 g, Daily PRN         OBJECTIVE:    BP (!) 140/74   Pulse 72   Temp 98.1 °F (36.7 °C) (Oral)   Resp 17   Ht 1.778 m (5' 10\")   Wt 83.7 kg (184 lb 8.4 oz)   SpO2 97%   BMI 26.48 kg/m²     General Appearance: alert and oriented to person, place and time and in no acute distress  Skin: warm and dry  Head: normocephalic and atraumatic  Eyes: pupils equal, round, and reactive to light, extraocular eye movements intact, conjunctivae normal  Neck: neck supple and non tender without mass   Pulmonary/Chest: clear to auscultation bilaterally- no wheezes, rales or rhonchi, normal air movement, no respiratory distress  Cardiovascular: normal rate, normal S1 and S2 and no carotid bruits  Abdomen: soft, non-tender, non-distended, normal bowel sounds, no masses or organomegaly  Extremities: no cyanosis, no clubbing and no edema  Neurologic: no cranial nerve deficit and speech normal    Recent Labs     12/27/24  1305 12/28/24  1011 12/29/24  0638    139 140   K 4.5 4.0 3.9    106 108*   CO2 26 21* 22   BUN 21 18 14   CREATININE 1.0 0.9 0.9   GLUCOSE 116* 89 83   CALCIUM 9.6 9.7 9.5       Recent Labs     12/27/24  1119 12/28/24  1011 12/29/24  0638   WBC 4.0* 4.1* 4.0*   RBC 4.37 4.01 3.68*   HGB 12.5 11.4* 10.6*   HCT 38.8 36.2* 32.2*   MCV 88.8 90.3 87.5   MCH 28.6 28.4 28.8   MCHC 32.2 31.5* 32.9   RDW 14.0 14.2 14.1    154 144   MPV 11.0 10.8 11.1       I/O last 3 completed shifts:  In: 60 [P.O.:60]  Out: 2675 [Urine:2675]  No intake/output data recorded.    11/21/24    ECHO (TTE) COMPLETE (PRN CONTRAST/BUBBLE/STRAIN/3D) 11/23/2024  3:21 PM (Final)    Interpretation Summary    Left Ventricle: The left ventricle is moderately dilated both at end diastole and in systole with endocardial surface is the upper limits of normal in thickness.  No regional wall motion abnormalities are identified with global hypokinesis of all myocardial segments and moderate left ventricular

## 2024-12-29 NOTE — PROGRESS NOTES
Occupational Therapy  OCCUPATIONAL THERAPY TREATMENT SESSION    [facility]  [address], [place]      OT BEDSIDE TREATMENT NOTE      Date:2024  Patient Name: Jona Hobbs  MRN: [id number]  : 1945  Room: [facility]     Per OT Eval:      Evaluating OT:Mary Mendez, OTR/L #[id number]     Referring Provider: Cathie Schneider DO             Specific Provider Orders/Date: OT eval and treat 24      Diagnosis: Acute ischemic stroke (HCC) [I63.9]  Acute CVA (cerebrovascular accident) (HCC) [I63.9]   Pt admitted to hospital on 24 for stroke like symptoms        Pertinent Medical History:  has a past medical history of Bacteremia due to Gram-negative bacteria, CHF (congestive heart failure) (HCC), Dementia (HCC), Electrolyte imbalance, Fever, Sepsis (HCC), and Severe Alzheimer's dementia without behavioral disturbance, psychotic disturbance, mood disturbance, or anxiety (HCC).         Precautions:  Fall Risk, samuel (chronic), cognition, h/o recent CVA, +alarms  Pt recently d/c'd to SNF on  following admission for acute R MCA stroke     Assessment of current deficits    [x] Functional mobility         [x]ADLs           [x] Strength                  [x]Cognition    [x] Functional transfers       [x] IADLs         [x] Safety Awareness   [x]Endurance    [x] Fine Coordination                      [x] Balance      [] Vision/perception   [x]Sensation      []Gross Motor Coordination          [x] ROM           [] Delirium                   [x] Motor Control      OT PLAN OF CARE   OT POC based on physician orders, patient diagnosis and results of clinical assessment     Frequency/Duration 1-3 days/wk for 2 weeks PRN   Specific OT Treatment Interventions to include:   * Instruction/training on adapted ADL techniques and AE recommendations to increase functional independence within precautions       * Training on energy conservation  strategies, correct breathing pattern and techniques to improve independence/tolerance for self-care routine  * Functional transfer/mobility training/DME recommendations for increased independence, safety, and fall prevention  * Patient/Family education to increase follow through with safety techniques and functional independence  * Recommendation of environmental modifications for increased safety with functional transfers/mobility and ADLs  * Cognitive retraining/development of therapeutic activities to improve problem solving, judgement, memory, and attention for increased safety/participation in ADL/IADL tasks  * Therapeutic exercise to improve motor endurance, ROM, and functional strength for ADLs/functional transfers  * Therapeutic activities to facilitate/challenge dynamic balance, stand tolerance for increased safety and independence with ADLs  * Therapeutic activities to facilitate gross/fine motor skills for increased independence with ADLs  * Positioning to improve skin integrity, interaction with environment and functional independence        OTMRS   Modified [place] Scale (MRS)  Score     Description  0             No symptoms  1             No significant disability despite symptoms  2             Slight disability; able to look after own affairs  3             Moderate disability; able to ambulate without assist/ requires assist with ADLs  4             Moderate/Severe disability;requires assist to ambulate/assist with ADLs  5             Severe disability;bedridden/incontinent   6               Score:   4     Recommended Adaptive Equipment: TBD      Home Living: Pt lives with daughter in a 1 floor home. 3 ROSLYN, 1 handrail   Bathroom setup: walk in shower    Equipment owned: w/w     Prior Level of Function: independent/mod I with ADLs , assist with IADLs; ambulated w/ w/w   Driving: no  Pt provided PLOF/home setup-pt is a questionable historian.     Pain Level: Pt c/o no pain this session.

## 2024-12-29 NOTE — CARE COORDINATION
CM Update: Floor nurse spoke with Stacie (daughter) regarding transition of care. Stacie said if patient needs SNF they prefer [facility]. If the patient is going home they have a [facility] [place] [facility]. MARTHA/UNA to follow. Micha Vick [phone]

## 2024-12-29 NOTE — PROGRESS NOTES
Physical Therapy  Physical Therapy Initial Assessment     Name: Jona Hobbs  : 1945  MRN: [id number]      Date of Service: 2024    Evaluating PT:  Christiane Browne, PT, DPT, [id number]    Room #:  8516/8516-B  Diagnosis:  Acute ischemic stroke ([facility]) [I63.9]  Acute CVA (cerebrovascular accident) ([facility]) [I63.9]  PMHx/PSHx:    Past Medical History:   Diagnosis Date    Bacteremia due to Gram-negative bacteria 2023    CHF (congestive heart failure) ([facility])     Dementia ([facility])     Electrolyte imbalance     Fever 2023    Sepsis ([facility]) 2023    Severe Alzheimer's dementia without behavioral disturbance, psychotic disturbance, mood disturbance, or anxiety ([facility]) 2023      Past Surgical History:   Procedure Laterality Date    HERNIA REPAIR Bilateral     TOTAL KNEE ARTHROPLASTY      bilateral    ULNAR NERVE REPAIR        Procedure/Surgery:  none this admission   Precautions:  Fall risk, chronic samuel, h/o Recent CVA, incontinence, cognition, + Alarms  Equipment Needs:  TBD    SUBJECTIVE:    Pt lives with his daughter in a 1 story home with 3 stairs to enter and 1 rail.  Bed is on 1 floor and bath is on 1 floor.  Pt ambulated with WW PTA.    OBJECTIVE:   Initial Evaluation  Date: 24 Treatment Short Term/ Long Term   Goals   AM-PAC 6 Clicks      Was pt agreeable to Eval/treatment? yes     Does pt have pain? No c/o pain      Bed Mobility  Rolling: NT  Supine to sit: ModA  Sit to supine: ModA  Scooting: ModA  Rolling: SBA   Supine to sit: SBA   Sit to supine: SBA   Scooting: SBA    Transfers Sit to stand: ModA  Stand to sit: ModA  Stand pivot: Scotty WW  Sit to stand: SBA   Stand to sit: SBA   Stand pivot: SBA AAD   Ambulation    25'x2 with WW Scotty  150+ feet with AAD SBA   Stair negotiation: ascended and descended  NT  3+ steps with 1 rail Scotty   ROM BUE:  Refer to OT note  BLE:  WFL     Strength BUE:  Refer to OT note  BLE:  WFL     Balance Sitting EOB:  SBA/Scotty  Dynamic Standing:  Scotty WW

## 2024-12-30 VITALS
DIASTOLIC BLOOD PRESSURE: 77 MMHG | SYSTOLIC BLOOD PRESSURE: 160 MMHG | BODY MASS INDEX: 26.42 KG/M2 | HEIGHT: 70 IN | OXYGEN SATURATION: 96 % | WEIGHT: 184.53 LBS | HEART RATE: 65 BPM | TEMPERATURE: 98.1 F | RESPIRATION RATE: 18 BRPM

## 2024-12-30 LAB
ANION GAP SERPL CALCULATED.3IONS-SCNC: 10 MMOL/L (ref 7–16)
BASOPHILS # BLD: 0.01 K/UL (ref 0–0.2)
BASOPHILS NFR BLD: 0 % (ref 0–2)
BUN SERPL-MCNC: 11 MG/DL (ref 6–23)
CALCIUM SERPL-MCNC: 9.3 MG/DL (ref 8.6–10.2)
CHLORIDE SERPL-SCNC: 106 MMOL/L (ref 98–107)
CO2 SERPL-SCNC: 22 MMOL/L (ref 22–29)
CREAT SERPL-MCNC: 0.9 MG/DL (ref 0.7–1.2)
EKG ATRIAL RATE: 69 BPM
EKG P AXIS: 63 DEGREES
EKG P-R INTERVAL: 164 MS
EKG Q-T INTERVAL: 444 MS
EKG QRS DURATION: 118 MS
EKG QTC CALCULATION (BAZETT): 475 MS
EKG R AXIS: -24 DEGREES
EKG T AXIS: 32 DEGREES
EKG VENTRICULAR RATE: 69 BPM
EOSINOPHIL # BLD: 0.17 K/UL (ref 0.05–0.5)
EOSINOPHILS RELATIVE PERCENT: 4 % (ref 0–6)
ERYTHROCYTE [DISTWIDTH] IN BLOOD BY AUTOMATED COUNT: 14 % (ref 11.5–15)
GFR, ESTIMATED: 87 ML/MIN/1.73M2
GLUCOSE SERPL-MCNC: 85 MG/DL (ref 74–99)
HCT VFR BLD AUTO: 32.2 % (ref 37–54)
HGB BLD-MCNC: 10.5 G/DL (ref 12.5–16.5)
IMM GRANULOCYTES # BLD AUTO: <0.03 K/UL (ref 0–0.58)
IMM GRANULOCYTES NFR BLD: 0 % (ref 0–5)
LYMPHOCYTES NFR BLD: 0.8 K/UL (ref 1.5–4)
LYMPHOCYTES RELATIVE PERCENT: 21 % (ref 20–42)
MCH RBC QN AUTO: 29 PG (ref 26–35)
MCHC RBC AUTO-ENTMCNC: 32.6 G/DL (ref 32–34.5)
MCV RBC AUTO: 89 FL (ref 80–99.9)
MONOCYTES NFR BLD: 0.39 K/UL (ref 0.1–0.95)
MONOCYTES NFR BLD: 10 % (ref 2–12)
NEUTROPHILS NFR BLD: 64 % (ref 43–80)
NEUTS SEG NFR BLD: 2.49 K/UL (ref 1.8–7.3)
PLATELET # BLD AUTO: 130 K/UL (ref 130–450)
PMV BLD AUTO: 11.3 FL (ref 7–12)
POTASSIUM SERPL-SCNC: 3.6 MMOL/L (ref 3.5–5)
RBC # BLD AUTO: 3.62 M/UL (ref 3.8–5.8)
SODIUM SERPL-SCNC: 138 MMOL/L (ref 132–146)
WBC OTHER # BLD: 3.9 K/UL (ref 4.5–11.5)

## 2024-12-30 PROCEDURE — 36415 COLL VENOUS BLD VENIPUNCTURE: CPT

## 2024-12-30 PROCEDURE — 6370000000 HC RX 637 (ALT 250 FOR IP): Performed by: INTERNAL MEDICINE

## 2024-12-30 PROCEDURE — 93010 ELECTROCARDIOGRAM REPORT: CPT | Performed by: INTERNAL MEDICINE

## 2024-12-30 PROCEDURE — 80048 BASIC METABOLIC PNL TOTAL CA: CPT

## 2024-12-30 PROCEDURE — 85025 COMPLETE CBC W/AUTO DIFF WBC: CPT

## 2024-12-30 PROCEDURE — 99232 SBSQ HOSP IP/OBS MODERATE 35: CPT | Performed by: INTERNAL MEDICINE

## 2024-12-30 PROCEDURE — 2500000003 HC RX 250 WO HCPCS: Performed by: INTERNAL MEDICINE

## 2024-12-30 RX ADMIN — TAMSULOSIN HYDROCHLORIDE 0.4 MG: 0.4 CAPSULE ORAL at 07:51

## 2024-12-30 RX ADMIN — OXYBUTYNIN CHLORIDE 5 MG: 5 TABLET ORAL at 07:51

## 2024-12-30 RX ADMIN — SODIUM CHLORIDE, PRESERVATIVE FREE 10 ML: 5 INJECTION INTRAVENOUS at 07:52

## 2024-12-30 RX ADMIN — SACUBITRIL AND VALSARTAN 1 TABLET: 24; 26 TABLET, FILM COATED ORAL at 09:46

## 2024-12-30 RX ADMIN — METOPROLOL SUCCINATE 12.5 MG: 25 TABLET, EXTENDED RELEASE ORAL at 07:51

## 2024-12-30 RX ADMIN — ASPIRIN 81 MG CHEWABLE TABLET 81 MG: 81 TABLET CHEWABLE at 07:51

## 2024-12-30 RX ADMIN — LEVOTHYROXINE SODIUM 25 MCG: 0.03 TABLET ORAL at 05:29

## 2024-12-30 RX ADMIN — APIXABAN 5 MG: 5 TABLET, FILM COATED ORAL at 07:51

## 2024-12-30 RX ADMIN — POTASSIUM CHLORIDE 20 MEQ: 1500 TABLET, EXTENDED RELEASE ORAL at 07:51

## 2024-12-30 ASSESSMENT — PAIN SCALES - GENERAL: PAINLEVEL_OUTOF10: 0

## 2024-12-30 NOTE — PROGRESS NOTES
HOSPITALIST PROGRESS NOTES     ADMITTING DATE AND TIME: 12/27/2024 11:26 AM  had concerns including Extremity Weakness (1035 LKW sudden confusion, emesis, weakness, eliquis, tia 1 month ago. Has chronic samuel.).    ADMIT DX: Acute ischemic stroke (HCC) [I63.9]  Acute CVA (cerebrovascular accident) (HCC) [I63.9]      SUBJECTIVE:  Patient is being followed for Acute ischemic stroke (HCC) [I63.9]  Acute CVA (cerebrovascular accident) (HCC) [I63.9]     Patient was seen examined and evaluated  Recent lab results, charts and pertinent diagnostic imaging reviewed   Ancillary service notes reviewed   Anxious  Moving his ext     Care reviewed with nursing staff, medical and surgical specialty care, primary care and the patient's family as available.   ROS: denies fever, chills, cp, sob, n/v, HA unless stated above.      sodium chloride flush  5-40 mL IntraVENous 2 times per day    apixaban  5 mg Oral BID    [Held by provider] furosemide  20 mg Oral Every Other Day    levothyroxine  25 mcg Oral Daily    mirtazapine  30 mg Oral Nightly    oxyBUTYnin  5 mg Oral BID    potassium chloride  20 mEq Oral Daily    tamsulosin  0.4 mg Oral Daily    [Held by provider] sacubitril-valsartan  1 tablet Oral BID    rosuvastatin  10 mg Oral Daily    metoprolol succinate  12.5 mg Oral Daily    aspirin  81 mg Oral Daily     labetalol, 20 mg, Q4H PRN  benzocaine-menthol, 1 lozenge, Q2H PRN  benzonatate, 100 mg, TID PRN  calcium carbonate, 500 mg, TID PRN  melatonin, 3 mg, Nightly PRN  Polyvinyl Alcohol-Povidone PF, 1 drop, PRN  sodium chloride, 1 spray, PRN  sodium chloride flush, 5-40 mL, PRN  sodium chloride, , PRN  potassium chloride, 40 mEq, PRN   Or  potassium alternative oral replacement, 40 mEq, PRN   Or  potassium chloride, 10 mEq, PRN  magnesium sulfate, 2,000 mg, PRN  ondansetron, 4 mg, Q8H PRN    worsened compared to prior. Severe stenosis at origin of right and left vertebral arteries. 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  Interventional neurology was consulted, no current plan for intervention and recommending conservation management.                                                ASSESSMENT AND PLAN:    Principal Problem:    Acute CVA (cerebrovascular accident) (HCC)  Active Problems:    Left carotid artery stenosis    Aphasia determined by examination    Bilateral leg weakness    TIA (transient ischemic attack)    Stenosis of internal carotid artery with cerebral infarction (HCC)  Resolved Problems:    * No resolved hospital problems. *    Altered mental status, resolved   Recrudescence of prior CVA due to hypotension  Severe underlying dementia, depression, TIA/stroke  no current plans for intervention. Continue Eliquis  Brain MRI with no acute ischemia or hemorrhage  encourage oral intake   Neurology following, appreciate recommendations  PT OT and SLP evaluation    Valvular heart disease, heart failure with moderately reduced EF  Severe MV CAD, Severe AS, MR, MS   Compensated and euvolemic   Deemed a poor surgical and TAVR candidate previously  Conservative management  GDMT continue     Chronic co-morbidities:  Hypertension, Hyperlipidemia  Hypothyroidism- TSH  WNL. Continue Synthroid  BPH- on flomax  Alzheimer's dementia  History of SAH  History of PE- on eliquis  History AAA    Goal of care:  Limited code    DISPOSITION: Need PT OT assessment and placement                                            This note was generated by the epic EMR system/Dragon speech recognition and may contain inherent errors or omissions not intended by the user. Grammatical errors, random word insertions, deletions, pronoun errors and incomplete sentences are occasional consequences of this technology due to software limitations. Not all errors are caught and corrected. If there are questions or concerns about the

## 2024-12-30 NOTE — PROGRESS NOTES
Patient received the Sacrament of the Anointing of the Sick by Father Orlando Swan on Sunday, December 29, 2024.    If additional support is requested or needed please reach out to Spiritual Health ([id number]).    Chap. Faustino Ramirez MDIV, BCC

## 2024-12-30 NOTE — DISCHARGE SUMMARY
[facility] Hospitalist Physician Discharge Summary       Sage Easley Jr., MD  [address] [address]  [phone]    Follow up in 1 week(s)      [facility]  [address]  [facility] [address]  Follow up      [facility]  [address]  [address]  [facility] [address]  [phone]        Activity level: As tolerated     Dispo: Home      Condition on discharge: Stable     Patient ID:  Jona Hobbs  [id number]  79 y.o.  1945    Admit date: 12/27/2024    Discharge date and time:  12/30/2024  1:24 PM    Admission Diagnoses: Principal Problem:    Acute CVA (cerebrovascular accident) (HCC)  Active Problems:    Left carotid artery stenosis    Aphasia determined by examination    Bilateral leg weakness    TIA (transient ischemic attack)    Stenosis of internal carotid artery with cerebral infarction (HCC)  Resolved Problems:    * No resolved hospital problems. *      Discharge Diagnoses: Principal Problem:    Acute CVA (cerebrovascular accident) (HCC)  Active Problems:    Left carotid artery stenosis    Aphasia determined by examination    Bilateral leg weakness    TIA (transient ischemic attack)    Stenosis of internal carotid artery with cerebral infarction (HCC)  Resolved Problems:    * No resolved hospital problems. *      Consults:  IP CONSULT TO INTERNAL MEDICINE  IP CONSULT TO NEUROLOGY  IP CONSULT TO HOME CARE NEEDS    Hospital Course:   Patient Jona Hobbs is a 79 y.o. presented with Acute ischemic stroke (HCC) [I63.9]  Acute CVA (cerebrovascular accident) (HCC) [I63.9]     79 y.o. male who presents to [facility] ER complaining of AMS. With underlying hypertension, PE, hypothyroidism, Alzheimer's. Last known well 10:45 AM.  He had a sudden onset of unresponsiveness and sudden onset of not moving his extremities.  He does have a history of recent stroke and is on Eliquis.  He arrived to the ER with eyes open but not following  midline shift or significant mass effect. STROKE PROTOCOL: Stroke protocol performed and CT findings were telephoned by Dr. Steward to the ER referring service Dr. Hooper, at 1248 hours, Eastern standard time.       Patient Instructions:      Medication List        START taking these medications      aspirin 81 MG chewable tablet  Take 1 tablet by mouth daily            CONTINUE taking these medications      apixaban 5 MG Tabs tablet  Commonly known as: ELIQUIS  Take 1 tablet by mouth 2 times daily     furosemide 20 MG tablet  Commonly known as: Lasix  Take 1 tablet by mouth daily     ibuprofen 200 MG tablet  Commonly known as: ADVIL;MOTRIN     levothyroxine 25 MCG tablet  Commonly known as: SYNTHROID     metoprolol succinate 25 MG extended release tablet  Commonly known as: TOPROL XL     mirtazapine 30 MG tablet  Commonly known as: REMERON     oxyBUTYnin 5 MG tablet  Commonly known as: DITROPAN     potassium chloride 20 MEQ extended release tablet  Commonly known as: KLOR-CON M     rosuvastatin 10 MG tablet  Commonly known as: CRESTOR     sacubitril-valsartan 24-26 MG per tablet  Commonly known as: ENTRESTO  Take 1 tablet by mouth 2 times daily     tamsulosin 0.4 MG capsule  Commonly known as: FLOMAX              INTERNAL MEDICINE FOLLOW UP/INSTRUCTIONS:  Follow-up with primary care physician within 1 week of discharge from hospital  Please review changes to pre-hospital admission medications and prescriptions for new medications upon discharge from the hospital with PCP  Please review results of labs and imaging studies with PCP  Follow-up with consultants as directed by them   If recurrence or worsening of symptoms please call primary care physician or return to the ER immediately  Diet: No diet orders on file      37 minutes was spent in preparing discharge papers, discussing discharge with patient, medication review, etc.    This note was generated by the epic EMR system/Dragon speech recognition and may contain

## 2024-12-30 NOTE — CARE COORDINATION
Chart reviewed and case reviewed in IDR.  Patient admitted from home with TIA.  Patient lives with his daughter Stacie and her  in a one story home.  Patient has a FWW, WC, C-pap, and 2L O2 per [place] at  home through [facility].  Patient has a history of [facility] and rehab at [facility] at the [facility].  Patient is currently active with [facility] and Stacie would like for the patient to return home if he is able.  His baseline is doing 50% of the work and walking about 15-20 feet with a walker and assistance.  Stacie is there to assist him.  They are agreeable to adding speech therapy.  Call placed to Christi, liaison with [facility] and referral completed.  She will be up to see the patient in a little while.  New HC orders received and in the chart.  Will continue to follow for further transition of care planning needs.         Sandra Ricketts RN.  P:  [phone]            Case Management Assessment  Initial Evaluation    Date/Time of Evaluation: 12/30/2024 11:51 AM  Assessment Completed by: Sandra Ricketts RN    If patient is discharged prior to next notation, then this note serves as note for discharge by case management.    Patient Name: Jona Hobbs                   YOB: 1945  Diagnosis: Acute ischemic stroke (HCC) [I63.9]  Acute CVA (cerebrovascular accident) (HCC) [I63.9]                   Date / Time: 12/27/2024 11:26 AM    Patient Admission Status: Inpatient   Readmission Risk (Low < 19, Mod (19-27), High > 27): Readmission Risk Score: 19.8    Current PCP: Sage Easley Jr., MD  PCP verified by CM? Yes (Sage Easley Jr., MD)    Chart Reviewed: Yes      History Provided by: Child/Family  Patient Orientation: Person, Place    Patient Cognition: Short Term Memory Deficit    Hospitalization in the last 30 days (Readmission):  No    If yes, Readmission Assessment in  Navigator will be completed.    Advance Directives:      Code Status: Limited  following treatment goals of Acute ischemic stroke (HCC) [I63.9]  Acute CVA (cerebrovascular accident) (HCC) [I63.9]    IF APPLICABLE: The Patient and/or patient representative Jona and his family were provided with a choice of provider and agrees with the discharge plan. Freedom of choice list with basic dialogue that supports the patient's individualized plan of care/goals and shares the quality data associated with the providers was provided to:     Patient Representative Name:       The Patient and/or Patient Representative Agree with the Discharge Plan?      Sandra Ricketts RN  Case Management Department  Ph: [phone]   Fax: [phone]

## 2025-01-01 ENCOUNTER — HOSPITAL ENCOUNTER (INPATIENT)
Age: 80
LOS: 6 days | Discharge: HOME HEALTH | DRG: 871 | End: 2025-01-07
Attending: EMERGENCY MEDICINE | Admitting: INTERNAL MEDICINE
Payer: MEDICARE

## 2025-01-01 ENCOUNTER — APPOINTMENT (OUTPATIENT)
Dept: GENERAL RADIOLOGY | Age: 80
DRG: 871 | End: 2025-01-01
Payer: MEDICARE

## 2025-01-01 ENCOUNTER — APPOINTMENT (OUTPATIENT)
Dept: CT IMAGING | Age: 80
DRG: 871 | End: 2025-01-01
Payer: MEDICARE

## 2025-01-01 DIAGNOSIS — K52.9 GASTROENTERITIS: ICD-10-CM

## 2025-01-01 DIAGNOSIS — I71.40 ABDOMINAL AORTIC ANEURYSM (AAA) WITHOUT RUPTURE, UNSPECIFIED PART (HCC): ICD-10-CM

## 2025-01-01 DIAGNOSIS — A41.9 SEPTICEMIA (HCC): ICD-10-CM

## 2025-01-01 DIAGNOSIS — N39.0 COMPLICATED UTI (URINARY TRACT INFECTION): Primary | ICD-10-CM

## 2025-01-01 PROBLEM — Z86.79 HISTORY OF SUBDURAL HEMATOMA: Status: ACTIVE | Noted: 2025-01-01

## 2025-01-01 PROBLEM — I38 VHD (VALVULAR HEART DISEASE): Status: ACTIVE | Noted: 2025-01-01

## 2025-01-01 PROBLEM — R65.21 SEPTIC SHOCK (HCC): Status: ACTIVE | Noted: 2025-01-01

## 2025-01-01 PROBLEM — Z86.711 HISTORY OF PULMONARY EMBOLISM: Status: ACTIVE | Noted: 2025-01-01

## 2025-01-01 PROBLEM — R19.7 DIARRHEA: Status: ACTIVE | Noted: 2025-01-01

## 2025-01-01 PROBLEM — F02.80 ALZHEIMER'S DEMENTIA (HCC): Status: ACTIVE | Noted: 2023-08-17

## 2025-01-01 PROBLEM — R41.82 AMS (ALTERED MENTAL STATUS): Status: ACTIVE | Noted: 2025-01-01

## 2025-01-01 PROBLEM — E78.5 HLD (HYPERLIPIDEMIA): Status: ACTIVE | Noted: 2025-01-01

## 2025-01-01 PROBLEM — N30.00 ACUTE CYSTITIS: Status: ACTIVE | Noted: 2023-11-09

## 2025-01-01 PROBLEM — I50.20 HFREF (HEART FAILURE WITH REDUCED EJECTION FRACTION) (HCC): Status: ACTIVE | Noted: 2025-01-01

## 2025-01-01 LAB
ALBUMIN SERPL-MCNC: 3.5 G/DL (ref 3.5–5.2)
ALP SERPL-CCNC: 83 U/L (ref 40–129)
ALT SERPL-CCNC: 14 U/L (ref 0–40)
ANION GAP SERPL CALCULATED.3IONS-SCNC: 12 MMOL/L (ref 7–16)
AST SERPL-CCNC: 22 U/L (ref 0–39)
B PARAP IS1001 DNA NPH QL NAA+NON-PROBE: NOT DETECTED
B PERT DNA SPEC QL NAA+PROBE: NOT DETECTED
BACTERIA URNS QL MICRO: ABNORMAL
BASOPHILS # BLD: 0.02 K/UL (ref 0–0.2)
BASOPHILS NFR BLD: 0 % (ref 0–2)
BILIRUB SERPL-MCNC: 0.4 MG/DL (ref 0–1.2)
BILIRUB UR QL STRIP: NEGATIVE
BNP SERPL-MCNC: 8241 PG/ML (ref 0–450)
BUN SERPL-MCNC: 13 MG/DL (ref 6–23)
C PNEUM DNA NPH QL NAA+NON-PROBE: NOT DETECTED
CALCIUM SERPL-MCNC: 9.7 MG/DL (ref 8.6–10.2)
CHLORIDE SERPL-SCNC: 104 MMOL/L (ref 98–107)
CHP ED QC CHECK: NORMAL
CLARITY UR: CLEAR
CO2 SERPL-SCNC: 22 MMOL/L (ref 22–29)
COLOR UR: YELLOW
CREAT SERPL-MCNC: 1.2 MG/DL (ref 0.7–1.2)
EOSINOPHIL # BLD: 0.11 K/UL (ref 0.05–0.5)
EOSINOPHILS RELATIVE PERCENT: 2 % (ref 0–6)
ERYTHROCYTE [DISTWIDTH] IN BLOOD BY AUTOMATED COUNT: 14.2 % (ref 11.5–15)
FLUAV RNA NPH QL NAA+NON-PROBE: NOT DETECTED
FLUAV RNA RESP QL NAA+PROBE: NOT DETECTED
FLUBV RNA NPH QL NAA+NON-PROBE: NOT DETECTED
FLUBV RNA RESP QL NAA+PROBE: NOT DETECTED
GFR, ESTIMATED: 63 ML/MIN/1.73M2
GLUCOSE BLD-MCNC: 137 MG/DL
GLUCOSE BLD-MCNC: 139 MG/DL (ref 74–99)
GLUCOSE SERPL-MCNC: 165 MG/DL (ref 74–99)
GLUCOSE UR STRIP-MCNC: NEGATIVE MG/DL
HADV DNA NPH QL NAA+NON-PROBE: NOT DETECTED
HCOV 229E RNA NPH QL NAA+NON-PROBE: NOT DETECTED
HCOV HKU1 RNA NPH QL NAA+NON-PROBE: NOT DETECTED
HCOV NL63 RNA NPH QL NAA+NON-PROBE: NOT DETECTED
HCOV OC43 RNA NPH QL NAA+NON-PROBE: NOT DETECTED
HCT VFR BLD AUTO: 37.5 % (ref 37–54)
HGB BLD-MCNC: 12.2 G/DL (ref 12.5–16.5)
HGB UR QL STRIP.AUTO: ABNORMAL
HMPV RNA NPH QL NAA+NON-PROBE: NOT DETECTED
HPIV1 RNA NPH QL NAA+NON-PROBE: NOT DETECTED
HPIV2 RNA NPH QL NAA+NON-PROBE: NOT DETECTED
HPIV3 RNA NPH QL NAA+NON-PROBE: NOT DETECTED
HPIV4 RNA NPH QL NAA+NON-PROBE: NOT DETECTED
IMM GRANULOCYTES # BLD AUTO: <0.03 K/UL (ref 0–0.58)
IMM GRANULOCYTES NFR BLD: 0 % (ref 0–5)
KETONES UR STRIP-MCNC: NEGATIVE MG/DL
LACTATE BLDV-SCNC: 2.3 MMOL/L (ref 0.5–1.9)
LACTATE BLDV-SCNC: 2.7 MMOL/L (ref 0.5–1.9)
LEUKOCYTE ESTERASE UR QL STRIP: ABNORMAL
LIPASE SERPL-CCNC: 13 U/L (ref 13–60)
LYMPHOCYTES NFR BLD: 0.68 K/UL (ref 1.5–4)
LYMPHOCYTES RELATIVE PERCENT: 10 % (ref 20–42)
M PNEUMO DNA NPH QL NAA+NON-PROBE: NOT DETECTED
MAGNESIUM SERPL-MCNC: 1.9 MG/DL (ref 1.6–2.6)
MCH RBC QN AUTO: 28.4 PG (ref 26–35)
MCHC RBC AUTO-ENTMCNC: 32.5 G/DL (ref 32–34.5)
MCV RBC AUTO: 87.4 FL (ref 80–99.9)
MONOCYTES NFR BLD: 0.28 K/UL (ref 0.1–0.95)
MONOCYTES NFR BLD: 4 % (ref 2–12)
NEUTROPHILS NFR BLD: 84 % (ref 43–80)
NEUTS SEG NFR BLD: 5.93 K/UL (ref 1.8–7.3)
NITRITE UR QL STRIP: POSITIVE
PH UR STRIP: 6 [PH] (ref 5–9)
PLATELET # BLD AUTO: 215 K/UL (ref 130–450)
PMV BLD AUTO: 11 FL (ref 7–12)
POTASSIUM SERPL-SCNC: 3.7 MMOL/L (ref 3.5–5)
PROCALCITONIN SERPL-MCNC: 0.09 NG/ML (ref 0–0.08)
PROT SERPL-MCNC: 7 G/DL (ref 6.4–8.3)
PROT UR STRIP-MCNC: 30 MG/DL
RBC # BLD AUTO: 4.29 M/UL (ref 3.8–5.8)
RBC #/AREA URNS HPF: ABNORMAL /HPF
RSV RNA NPH QL NAA+NON-PROBE: NOT DETECTED
RV+EV RNA NPH QL NAA+NON-PROBE: NOT DETECTED
SARS-COV-2 RNA NPH QL NAA+NON-PROBE: NOT DETECTED
SARS-COV-2 RNA RESP QL NAA+PROBE: NOT DETECTED
SODIUM SERPL-SCNC: 138 MMOL/L (ref 132–146)
SOURCE: NORMAL
SP GR UR STRIP: 1.02 (ref 1–1.03)
SPECIMEN DESCRIPTION: NORMAL
SPECIMEN DESCRIPTION: NORMAL
TROPONIN I SERPL HS-MCNC: 33 NG/L (ref 0–11)
TROPONIN I SERPL HS-MCNC: 38 NG/L (ref 0–11)
UROBILINOGEN UR STRIP-ACNC: 0.2 EU/DL (ref 0–1)
WBC #/AREA URNS HPF: ABNORMAL /HPF
WBC OTHER # BLD: 7 K/UL (ref 4.5–11.5)

## 2025-01-01 PROCEDURE — 82962 GLUCOSE BLOOD TEST: CPT

## 2025-01-01 PROCEDURE — 2000000000 HC ICU R&B

## 2025-01-01 PROCEDURE — 93005 ELECTROCARDIOGRAM TRACING: CPT

## 2025-01-01 PROCEDURE — 6360000002 HC RX W HCPCS

## 2025-01-01 PROCEDURE — 74177 CT ABD & PELVIS W/CONTRAST: CPT

## 2025-01-01 PROCEDURE — 2580000003 HC RX 258

## 2025-01-01 PROCEDURE — 83735 ASSAY OF MAGNESIUM: CPT

## 2025-01-01 PROCEDURE — 87040 BLOOD CULTURE FOR BACTERIA: CPT

## 2025-01-01 PROCEDURE — 6370000000 HC RX 637 (ALT 250 FOR IP): Performed by: NURSE PRACTITIONER

## 2025-01-01 PROCEDURE — 87636 SARSCOV2 & INF A&B AMP PRB: CPT

## 2025-01-01 PROCEDURE — 96374 THER/PROPH/DIAG INJ IV PUSH: CPT

## 2025-01-01 PROCEDURE — 2700000000 HC OXYGEN THERAPY PER DAY

## 2025-01-01 PROCEDURE — 71045 X-RAY EXAM CHEST 1 VIEW: CPT

## 2025-01-01 PROCEDURE — 6360000002 HC RX W HCPCS: Performed by: EMERGENCY MEDICINE

## 2025-01-01 PROCEDURE — 83605 ASSAY OF LACTIC ACID: CPT

## 2025-01-01 PROCEDURE — 87045 FECES CULTURE AEROBIC BACT: CPT

## 2025-01-01 PROCEDURE — 87427 SHIGA-LIKE TOXIN AG IA: CPT

## 2025-01-01 PROCEDURE — 81001 URINALYSIS AUTO W/SCOPE: CPT

## 2025-01-01 PROCEDURE — 87150 DNA/RNA AMPLIFIED PROBE: CPT

## 2025-01-01 PROCEDURE — 87329 GIARDIA AG IA: CPT

## 2025-01-01 PROCEDURE — 99222 1ST HOSP IP/OBS MODERATE 55: CPT | Performed by: NURSE PRACTITIONER

## 2025-01-01 PROCEDURE — 87046 STOOL CULTR AEROBIC BACT EA: CPT

## 2025-01-01 PROCEDURE — 83690 ASSAY OF LIPASE: CPT

## 2025-01-01 PROCEDURE — 70450 CT HEAD/BRAIN W/O DYE: CPT

## 2025-01-01 PROCEDURE — 0202U NFCT DS 22 TRGT SARS-COV-2: CPT

## 2025-01-01 PROCEDURE — 80053 COMPREHEN METABOLIC PANEL: CPT

## 2025-01-01 PROCEDURE — 87077 CULTURE AEROBIC IDENTIFY: CPT

## 2025-01-01 PROCEDURE — 6360000004 HC RX CONTRAST MEDICATION: Performed by: RADIOLOGY

## 2025-01-01 PROCEDURE — 96375 TX/PRO/DX INJ NEW DRUG ADDON: CPT

## 2025-01-01 PROCEDURE — G0378 HOSPITAL OBSERVATION PER HR: HCPCS

## 2025-01-01 PROCEDURE — 2500000003 HC RX 250 WO HCPCS

## 2025-01-01 PROCEDURE — 6360000002 HC RX W HCPCS: Performed by: INTERNAL MEDICINE

## 2025-01-01 PROCEDURE — 84145 PROCALCITONIN (PCT): CPT

## 2025-01-01 PROCEDURE — 85025 COMPLETE CBC W/AUTO DIFF WBC: CPT

## 2025-01-01 PROCEDURE — 2580000003 HC RX 258: Performed by: INTERNAL MEDICINE

## 2025-01-01 PROCEDURE — 84484 ASSAY OF TROPONIN QUANT: CPT

## 2025-01-01 PROCEDURE — 83880 ASSAY OF NATRIURETIC PEPTIDE: CPT

## 2025-01-01 PROCEDURE — 87086 URINE CULTURE/COLONY COUNT: CPT

## 2025-01-01 PROCEDURE — 99285 EMERGENCY DEPT VISIT HI MDM: CPT

## 2025-01-01 PROCEDURE — 2500000003 HC RX 250 WO HCPCS: Performed by: NURSE PRACTITIONER

## 2025-01-01 PROCEDURE — 2500000003 HC RX 250 WO HCPCS: Performed by: INTERNAL MEDICINE

## 2025-01-01 RX ORDER — NOREPINEPHRINE BITARTRATE 0.06 MG/ML
1-100 INJECTION, SOLUTION INTRAVENOUS CONTINUOUS
Status: DISCONTINUED | OUTPATIENT
Start: 2025-01-01 | End: 2025-01-03

## 2025-01-01 RX ORDER — POTASSIUM CHLORIDE 7.45 MG/ML
10 INJECTION INTRAVENOUS PRN
Status: DISCONTINUED | OUTPATIENT
Start: 2025-01-01 | End: 2025-01-07 | Stop reason: HOSPADM

## 2025-01-01 RX ORDER — METOPROLOL SUCCINATE 25 MG/1
12.5 TABLET, EXTENDED RELEASE ORAL DAILY
Status: DISCONTINUED | OUTPATIENT
Start: 2025-01-01 | End: 2025-01-07 | Stop reason: HOSPADM

## 2025-01-01 RX ORDER — LEVOTHYROXINE SODIUM 25 UG/1
25 TABLET ORAL DAILY
Status: DISCONTINUED | OUTPATIENT
Start: 2025-01-01 | End: 2025-01-07 | Stop reason: HOSPADM

## 2025-01-01 RX ORDER — POTASSIUM CHLORIDE 1500 MG/1
40 TABLET, EXTENDED RELEASE ORAL PRN
Status: DISCONTINUED | OUTPATIENT
Start: 2025-01-01 | End: 2025-01-07 | Stop reason: HOSPADM

## 2025-01-01 RX ORDER — 0.9 % SODIUM CHLORIDE 0.9 %
1000 INTRAVENOUS SOLUTION INTRAVENOUS ONCE
Status: COMPLETED | OUTPATIENT
Start: 2025-01-01 | End: 2025-01-01

## 2025-01-01 RX ORDER — SODIUM CHLORIDE 9 MG/ML
INJECTION, SOLUTION INTRAVENOUS PRN
Status: DISCONTINUED | OUTPATIENT
Start: 2025-01-01 | End: 2025-01-01

## 2025-01-01 RX ORDER — IOPAMIDOL 755 MG/ML
75 INJECTION, SOLUTION INTRAVASCULAR
Status: COMPLETED | OUTPATIENT
Start: 2025-01-01 | End: 2025-01-01

## 2025-01-01 RX ORDER — POLYETHYLENE GLYCOL 3350 17 G/17G
17 POWDER, FOR SOLUTION ORAL DAILY PRN
Status: DISCONTINUED | OUTPATIENT
Start: 2025-01-01 | End: 2025-01-07 | Stop reason: HOSPADM

## 2025-01-01 RX ORDER — ACETAMINOPHEN 325 MG/1
650 TABLET ORAL EVERY 6 HOURS PRN
Status: DISCONTINUED | OUTPATIENT
Start: 2025-01-01 | End: 2025-01-07 | Stop reason: HOSPADM

## 2025-01-01 RX ORDER — OXYBUTYNIN CHLORIDE 5 MG/1
5 TABLET ORAL 2 TIMES DAILY
Status: DISCONTINUED | OUTPATIENT
Start: 2025-01-01 | End: 2025-01-07 | Stop reason: HOSPADM

## 2025-01-01 RX ORDER — ROSUVASTATIN CALCIUM 20 MG/1
10 TABLET, COATED ORAL DAILY
Status: DISCONTINUED | OUTPATIENT
Start: 2025-01-01 | End: 2025-01-07 | Stop reason: HOSPADM

## 2025-01-01 RX ORDER — TAMSULOSIN HYDROCHLORIDE 0.4 MG/1
0.4 CAPSULE ORAL DAILY
Status: DISCONTINUED | OUTPATIENT
Start: 2025-01-01 | End: 2025-01-07 | Stop reason: HOSPADM

## 2025-01-01 RX ORDER — SODIUM CHLORIDE 0.9 % (FLUSH) 0.9 %
5-40 SYRINGE (ML) INJECTION EVERY 12 HOURS SCHEDULED
Status: DISCONTINUED | OUTPATIENT
Start: 2025-01-01 | End: 2025-01-07 | Stop reason: HOSPADM

## 2025-01-01 RX ORDER — ENOXAPARIN SODIUM 100 MG/ML
40 INJECTION SUBCUTANEOUS DAILY
Status: DISCONTINUED | OUTPATIENT
Start: 2025-01-01 | End: 2025-01-01

## 2025-01-01 RX ORDER — ASPIRIN 81 MG/1
81 TABLET, CHEWABLE ORAL DAILY
Status: DISCONTINUED | OUTPATIENT
Start: 2025-01-01 | End: 2025-01-07 | Stop reason: HOSPADM

## 2025-01-01 RX ORDER — SODIUM CHLORIDE 0.9 % (FLUSH) 0.9 %
5-40 SYRINGE (ML) INJECTION PRN
Status: DISCONTINUED | OUTPATIENT
Start: 2025-01-01 | End: 2025-01-01

## 2025-01-01 RX ORDER — SODIUM CHLORIDE 0.9 % (FLUSH) 0.9 %
5-40 SYRINGE (ML) INJECTION PRN
Status: DISCONTINUED | OUTPATIENT
Start: 2025-01-01 | End: 2025-01-07 | Stop reason: HOSPADM

## 2025-01-01 RX ORDER — ONDANSETRON 2 MG/ML
4 INJECTION INTRAMUSCULAR; INTRAVENOUS ONCE
Status: COMPLETED | OUTPATIENT
Start: 2025-01-01 | End: 2025-01-01

## 2025-01-01 RX ORDER — ACETAMINOPHEN 650 MG/1
650 SUPPOSITORY RECTAL EVERY 6 HOURS PRN
Status: DISCONTINUED | OUTPATIENT
Start: 2025-01-01 | End: 2025-01-07 | Stop reason: HOSPADM

## 2025-01-01 RX ORDER — 0.9 % SODIUM CHLORIDE 0.9 %
500 INTRAVENOUS SOLUTION INTRAVENOUS ONCE
Status: COMPLETED | OUTPATIENT
Start: 2025-01-01 | End: 2025-01-01

## 2025-01-01 RX ORDER — FUROSEMIDE 20 MG/1
20 TABLET ORAL EVERY OTHER DAY
Status: DISCONTINUED | OUTPATIENT
Start: 2025-01-01 | End: 2025-01-07 | Stop reason: HOSPADM

## 2025-01-01 RX ORDER — ONDANSETRON 4 MG/1
4 TABLET, ORALLY DISINTEGRATING ORAL EVERY 8 HOURS PRN
Status: DISCONTINUED | OUTPATIENT
Start: 2025-01-01 | End: 2025-01-07 | Stop reason: HOSPADM

## 2025-01-01 RX ORDER — MAGNESIUM SULFATE IN WATER 40 MG/ML
2000 INJECTION, SOLUTION INTRAVENOUS PRN
Status: DISCONTINUED | OUTPATIENT
Start: 2025-01-01 | End: 2025-01-07 | Stop reason: HOSPADM

## 2025-01-01 RX ORDER — SODIUM CHLORIDE 9 MG/ML
INJECTION, SOLUTION INTRAVENOUS PRN
Status: DISCONTINUED | OUTPATIENT
Start: 2025-01-01 | End: 2025-01-07 | Stop reason: HOSPADM

## 2025-01-01 RX ORDER — SODIUM CHLORIDE 0.9 % (FLUSH) 0.9 %
5-40 SYRINGE (ML) INJECTION EVERY 12 HOURS SCHEDULED
Status: DISCONTINUED | OUTPATIENT
Start: 2025-01-01 | End: 2025-01-01

## 2025-01-01 RX ORDER — ONDANSETRON 2 MG/ML
4 INJECTION INTRAMUSCULAR; INTRAVENOUS EVERY 6 HOURS PRN
Status: DISCONTINUED | OUTPATIENT
Start: 2025-01-01 | End: 2025-01-07 | Stop reason: HOSPADM

## 2025-01-01 RX ORDER — MIRTAZAPINE 15 MG/1
30 TABLET, FILM COATED ORAL NIGHTLY
Status: DISCONTINUED | OUTPATIENT
Start: 2025-01-01 | End: 2025-01-07 | Stop reason: HOSPADM

## 2025-01-01 RX ADMIN — ONDANSETRON 4 MG: 2 INJECTION INTRAMUSCULAR; INTRAVENOUS at 08:15

## 2025-01-01 RX ADMIN — WATER 2000 MG: 1 INJECTION INTRAMUSCULAR; INTRAVENOUS; SUBCUTANEOUS at 09:14

## 2025-01-01 RX ADMIN — SODIUM CHLORIDE 1000 ML: 9 INJECTION, SOLUTION INTRAVENOUS at 08:00

## 2025-01-01 RX ADMIN — SODIUM CHLORIDE 1500 MG: 9 INJECTION, SOLUTION INTRAVENOUS at 21:34

## 2025-01-01 RX ADMIN — LEVOTHYROXINE SODIUM 25 MCG: 0.03 TABLET ORAL at 19:24

## 2025-01-01 RX ADMIN — OXYBUTYNIN CHLORIDE 5 MG: 5 TABLET ORAL at 17:25

## 2025-01-01 RX ADMIN — TAMSULOSIN HYDROCHLORIDE 0.4 MG: 0.4 CAPSULE ORAL at 14:11

## 2025-01-01 RX ADMIN — CEFEPIME 2000 MG: 2 INJECTION, POWDER, FOR SOLUTION INTRAVENOUS at 21:39

## 2025-01-01 RX ADMIN — APIXABAN 5 MG: 5 TABLET, FILM COATED ORAL at 14:11

## 2025-01-01 RX ADMIN — FUROSEMIDE 20 MG: 20 TABLET ORAL at 14:11

## 2025-01-01 RX ADMIN — SODIUM CHLORIDE, PRESERVATIVE FREE 5 ML: 5 INJECTION INTRAVENOUS at 19:28

## 2025-01-01 RX ADMIN — SODIUM CHLORIDE 500 ML: 9 INJECTION, SOLUTION INTRAVENOUS at 08:17

## 2025-01-01 RX ADMIN — ROSUVASTATIN 10 MG: 20 TABLET, FILM COATED ORAL at 19:24

## 2025-01-01 RX ADMIN — Medication 5 MCG/MIN: at 19:58

## 2025-01-01 RX ADMIN — ACETAMINOPHEN 650 MG: 325 TABLET ORAL at 17:55

## 2025-01-01 RX ADMIN — MIRTAZAPINE 30 MG: 15 TABLET, FILM COATED ORAL at 19:24

## 2025-01-01 RX ADMIN — ASPIRIN 81 MG CHEWABLE TABLET 81 MG: 81 TABLET CHEWABLE at 14:11

## 2025-01-01 RX ADMIN — IOPAMIDOL 75 ML: 755 INJECTION, SOLUTION INTRAVENOUS at 12:07

## 2025-01-01 NOTE — H&P
[facility] Hospitalist Group History and Physical      CHIEF COMPLAINT: AMS    History of Present Illness: This is a 79-year-old male with a past medical history of bacteremia, HFrEF, dementia, VHD, HTN, HLD, hypothyroidism, SAH, Hx PE on Eliquis, Hx AAA, BPH who presented due to altered mental status, diarrhea, vomiting, and hypotension.  Patient was recently hospitalized 12/27/2024 - 12/30/2024 for altered mental status.Imaging concerning for stroke. Stable 80 to 90% stenosis of proximal left ICA. Stenosis of approximately 70% involving right internal carotid artery appears worsened compared to prior. Severe stenosis at origin of right and left vertebral arteries. 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 Interventional neurology was consulted, no current plan for intervention and recommending conservation management.  Patient returns today with AMS once again.  EMS was called by patient's daughter she thought he was having another stroke.  Per EMS patient was reportedly having vomiting and diarrhea.  Patient is seen laying on gurney.  He is alert and making eye contact.  Nods his head appropriately occasionally.  He is not responding verbally.  He appears to be in no acute distress.  Unsure of patient's baseline mentation.  No family is at bedside.    ED course is as follows: Vital signs-T99.3, , /89, SpO2 95% on 3 L nasal cannula.  LA 2.7> 2.3, procalcitonin 0.09, BNP 8241, troponin 38> 33.  UA positive nitrates, moderate leuks, 10-20 WBCs, and bacteria.  BC and UC pending.  CT head is negative.  CT A/P pending.    Patient will be admitted for further management.    Informant(s) for H&P:Patient    REVIEW OF SYSTEMS:  A comprehensive review of systems was negative except for: what is in the HPI      PMH:  Past Medical History:   Diagnosis Date    Bacteremia due to Gram-negative bacteria 08/17/2023    CHF (congestive heart failure) (HCC)     Dementia (HCC)     Electrolyte imbalance     Fever          ASSESSMENT:      Principal Problem:    AMS (altered mental status)  Active Problems:    Diarrhea    HTN (hypertension)    BPH (benign prostatic hyperplasia)    Hypothyroidism    Alzheimer's dementia (HCC)    Acute cystitis    HFrEF (heart failure with reduced ejection fraction) (HCC)    VHD (valvular heart disease)    HLD (hyperlipidemia)    History of pulmonary embolism    History of subdural hematoma  Resolved Problems:    * No resolved hospital problems. *      PLAN:    AMS: Likely 2/2 UTI/dehydration from diarrhea.  CT head is negative.  Recurrent CVA's-recent neurowork-up on last admission.  Seen by neurology on 12/27-pt has poor functional status at baseline with severe dementia and conservative manage was recommended.  Blood cultures pending.  Will get RVP.  Acute cystitis with Hx recurrent UTI: Indwelling Duong.  UA positive. UCx pending.  Start patient on Rocephin.  Await urine cultures.  HTN: With recent hypotension possibly 2/2 dehydration from diarrhea.  Resume Toprol-XL 12.5 mg daily.  Blood pressure now more stable after fluid administration.  Continue to monitor.  Diarrhea: Stool studies ordered.  Hold off on antidiarrheal until C. difficile results.  Last known antibiotics administration back in November.  HFrEF: BNP 8241.  Resume Toprol-XL 12.5 mg daily, Lasix 20 mg every other day, and Entresto 24/26 mg twice daily.monitor I&O and daily weight.  VHD: Severe AS, MR, MS-deemed a poor surgical and TAVR candidate  HLD: Resume outpatient dose of ASA 81 mg daily and Crestor 10 mg daily  Hypothyroidism: Resume outpatient dose of Synthroid  BPH: Resume outpatient dose of Ditropan 5 mg twice daily and Flomax 0.4 mg daily.  Hx Alzheimer's/dementia: Advanced stages.  Hx SDH  Hx PE on Eliquis  Hx AAA    Code Status: Full code  DVT prophylaxis: Eliquis    In review of EMR, evaluation, management, and diagnosis. Care plan has been discussed with attending. Time spent 55 minutes     NOTE: This report was

## 2025-01-01 NOTE — ED PROVIDER NOTES
[facility] EMERGENCY DEPARTMENT  EMERGENCY DEPARTMENT ENCOUNTER        Pt Name: Jona Hobbs  MRN: [id number]  Birthdate 1945  Date of evaluation: 1/1/2025  Provider: Shayy Walton MD  Attending Provider: Cathie Schneider DO  PCP: Sage Easley Jr., MD  Note Started: 7:32 AM EST 1/1/25    CHIEF COMPLAINT       Chief Complaint   Patient presents with    Altered Mental Status     Pt here for a stroke yesterday and discharged, daughter called EMS because she thought he was having another stroke.  [place] negative for EMS     Diarrhea     Per EMS, just running out of pt.      Vomiting    Hypotension     83/48 in triage        HISTORY OF PRESENT ILLNESS: 1 or more Elements   History From: Nursing report        Jona Hobbs is a 79 y.o. male with a past medical history of prior CVA, Alzheimer's disease, CHF, hypertension, on anticoagulation presenting with altered mental status, diarrhea, vomiting.  Patient was recently admitted for stroke and then discharged back yesterday.  Patient's daughter called EMS because she thought that the patient was having another stroke.  He was reportedly having vomiting and EMS reported the patient is having  loose diarrhea.    Nursing Notes were all reviewed and agreed with or any disagreements were addressed in the HPI.      REVIEW OF EXTERNAL NOTE :       Discharge summary on 12/30/2024 reviewed, patient was admitted for acute CVA which is felt to be secondary to hypotension.  Per discharge summary, patient was alert and oriented to person, place, and time.    PDMP Monitoring:    Last PDMP  as Reviewed:  Review User Review Instant Review Result            Urine Drug Screenings (1 yr)    No resulted procedures found.       Medication Contract and Consent for Opioid Use Documents Filed        No documents found                      REVIEW OF SYSTEMS :           Positives and Pertinent negatives as per HPI.     SURGICAL HISTORY  adjustment of the mA/kV was utilized to reduce the radiation dose to as low as reasonably achievable.; CTA of the neck was performed with the administration of intravenous contrast. Multiplanar reformatted images are provided for review.  MIP images are provided for review. Stenosis of the internal carotid arteries measured using NASCET criteria. Automated exposure control, iterative reconstruction, and/or weight based adjustment of the mA/kV was utilized to reduce the radiation dose to as low as reasonably achievable. COMPARISON: Correlation made with prior from 11/21/2024 HISTORY: ORDERING SYSTEM PROVIDED HISTORY: CVA TECHNOLOGIST PROVIDED HISTORY: Reason for exam:->CVA Decision Support Exception - unselect if not a suspected or confirmed emergency medical condition->Emergency Medical Condition (MA) What reading provider will be dictating this exam?->CRC FINDINGS: CT brain perfusion: Abnormal perfusion is seen in left temporal lobe, left parietal lobe, and left frontal lobe as well as patchy areas of abnormal persists fusion in right frontal and right parietal lobes.  T-max greater in 6 seconds is 467 cc.  Mismatch volume is 467 cc. CTA neck: Redemonstration of stenosis of approximately 80 90% involving proximal left cervical internal carotid artery due to severe calcified and noncalcified atherosclerotic plaque.  Redemonstration of stenosis of approximately 70% involving proximal right cervical internal carotid artery which appears to of worsened slightly compared to prior.  There is severe stenosis at origin of right and left vertebral arteries.  Left vertebral artery is occluded at level of C2.  Right vertebral artery appears patent. CTA head: There is stenosis involving cavernous segment of left internal carotid artery.  Middle cerebral arteries appear to be patent.  Anterior cerebral arteries are patent.  Posterior cerebral arteries are patent.  Basilar artery appears patent without obvious stenosis.  There  HEAD WITH CONTRAST WITH PERFUSION; CTA OF THE HEAD WITH CONTRAST; CTA OF THE NECK 12/27/2024 11:25 am: TECHNIQUE: CTA of the head/brain was performed with the administration of intravenous contrast. Multiplanar reformatted images are provided for review.  MIP images are provided for review. Automated exposure control, iterative reconstruction, and/or weight based adjustment of the mA/kV was utilized to reduce the radiation dose to as low as reasonably achievable.; CTA of the neck was performed with the administration of intravenous contrast. Multiplanar reformatted images are provided for review.  MIP images are provided for review. Stenosis of the internal carotid arteries measured using NASCET criteria. Automated exposure control, iterative reconstruction, and/or weight based adjustment of the mA/kV was utilized to reduce the radiation dose to as low as reasonably achievable. COMPARISON: Correlation made with prior from 11/21/2024 HISTORY: ORDERING SYSTEM PROVIDED HISTORY: CVA TECHNOLOGIST PROVIDED HISTORY: Reason for exam:->CVA Decision Support Exception - unselect if not a suspected or confirmed emergency medical condition->Emergency Medical Condition (MA) What reading provider will be dictating this exam?->CRC FINDINGS: CT brain perfusion: Abnormal perfusion is seen in left temporal lobe, left parietal lobe, and left frontal lobe as well as patchy areas of abnormal persists fusion in right frontal and right parietal lobes.  T-max greater in 6 seconds is 467 cc.  Mismatch volume is 467 cc. CTA neck: Redemonstration of stenosis of approximately 80 90% involving proximal left cervical internal carotid artery due to severe calcified and noncalcified atherosclerotic plaque.  Redemonstration of stenosis of approximately 70% involving proximal right cervical internal carotid artery which appears to of worsened slightly compared to prior.  There is severe stenosis at origin of right and left vertebral arteries.  Left

## 2025-01-02 LAB
ALBUMIN SERPL-MCNC: 2.9 G/DL (ref 3.5–5.2)
ALP SERPL-CCNC: 61 U/L (ref 40–129)
ALT SERPL-CCNC: 11 U/L (ref 0–40)
ANION GAP SERPL CALCULATED.3IONS-SCNC: 11 MMOL/L (ref 7–16)
AST SERPL-CCNC: 23 U/L (ref 0–39)
BASOPHILS # BLD: 0.01 K/UL (ref 0–0.2)
BASOPHILS NFR BLD: 0 % (ref 0–2)
BILIRUB SERPL-MCNC: 0.4 MG/DL (ref 0–1.2)
BUN SERPL-MCNC: 18 MG/DL (ref 6–23)
C PARVUM AG STL QL IA: NEGATIVE
CALCIUM SERPL-MCNC: 8.9 MG/DL (ref 8.6–10.2)
CHLORIDE SERPL-SCNC: 107 MMOL/L (ref 98–107)
CO2 SERPL-SCNC: 19 MMOL/L (ref 22–29)
CREAT SERPL-MCNC: 1 MG/DL (ref 0.7–1.2)
DATE LAST DOSE: NORMAL
EOSINOPHIL # BLD: 0.01 K/UL (ref 0.05–0.5)
EOSINOPHILS RELATIVE PERCENT: 0 % (ref 0–6)
ERYTHROCYTE [DISTWIDTH] IN BLOOD BY AUTOMATED COUNT: 14.5 % (ref 11.5–15)
G LAMBLIA AG STL QL IA: NEGATIVE
GFR, ESTIMATED: 75 ML/MIN/1.73M2
GLUCOSE SERPL-MCNC: 102 MG/DL (ref 74–99)
HCT VFR BLD AUTO: 32.7 % (ref 37–54)
HGB BLD-MCNC: 10.8 G/DL (ref 12.5–16.5)
IMM GRANULOCYTES # BLD AUTO: <0.03 K/UL (ref 0–0.58)
IMM GRANULOCYTES NFR BLD: 0 % (ref 0–5)
LACTATE BLDV-SCNC: 1.3 MMOL/L (ref 0.5–2.2)
LYMPHOCYTES NFR BLD: 0.48 K/UL (ref 1.5–4)
LYMPHOCYTES RELATIVE PERCENT: 10 % (ref 20–42)
MAGNESIUM SERPL-MCNC: 1.8 MG/DL (ref 1.6–2.6)
MCH RBC QN AUTO: 29 PG (ref 26–35)
MCHC RBC AUTO-ENTMCNC: 33 G/DL (ref 32–34.5)
MCV RBC AUTO: 87.9 FL (ref 80–99.9)
MONOCYTES NFR BLD: 0.35 K/UL (ref 0.1–0.95)
MONOCYTES NFR BLD: 8 % (ref 2–12)
NEUTROPHILS NFR BLD: 82 % (ref 43–80)
NEUTS SEG NFR BLD: 3.82 K/UL (ref 1.8–7.3)
PHOSPHATE SERPL-MCNC: 2.5 MG/DL (ref 2.5–4.5)
PLATELET # BLD AUTO: 182 K/UL (ref 130–450)
PMV BLD AUTO: 11.1 FL (ref 7–12)
POTASSIUM SERPL-SCNC: 3.8 MMOL/L (ref 3.5–5)
PROT SERPL-MCNC: 6 G/DL (ref 6.4–8.3)
RBC # BLD AUTO: 3.72 M/UL (ref 3.8–5.8)
RBC # BLD: ABNORMAL 10*6/UL
SODIUM SERPL-SCNC: 137 MMOL/L (ref 132–146)
SOURCE, 60200063: NORMAL
SOURCE: NORMAL
SPECIMEN DESCRIPTION: NORMAL
SPECIMEN DESCRIPTION: NORMAL
TME LAST DOSE: NORMAL H
VANCOMYCIN DOSE: NORMAL MG
VANCOMYCIN SERPL-MCNC: 13.4 UG/ML (ref 5–40)
WBC OTHER # BLD: 4.7 K/UL (ref 4.5–11.5)

## 2025-01-02 PROCEDURE — 83605 ASSAY OF LACTIC ACID: CPT

## 2025-01-02 PROCEDURE — 87081 CULTURE SCREEN ONLY: CPT

## 2025-01-02 PROCEDURE — 87328 CRYPTOSPORIDIUM AG IA: CPT

## 2025-01-02 PROCEDURE — 99232 SBSQ HOSP IP/OBS MODERATE 35: CPT | Performed by: STUDENT IN AN ORGANIZED HEALTH CARE EDUCATION/TRAINING PROGRAM

## 2025-01-02 PROCEDURE — 85025 COMPLETE CBC W/AUTO DIFF WBC: CPT

## 2025-01-02 PROCEDURE — 6370000000 HC RX 637 (ALT 250 FOR IP): Performed by: NURSE PRACTITIONER

## 2025-01-02 PROCEDURE — 80202 ASSAY OF VANCOMYCIN: CPT

## 2025-01-02 PROCEDURE — 6360000002 HC RX W HCPCS: Performed by: INTERNAL MEDICINE

## 2025-01-02 PROCEDURE — 87324 CLOSTRIDIUM AG IA: CPT

## 2025-01-02 PROCEDURE — 2000000000 HC ICU R&B

## 2025-01-02 PROCEDURE — 87329 GIARDIA AG IA: CPT

## 2025-01-02 PROCEDURE — 83735 ASSAY OF MAGNESIUM: CPT

## 2025-01-02 PROCEDURE — 2580000003 HC RX 258: Performed by: INTERNAL MEDICINE

## 2025-01-02 PROCEDURE — 2500000003 HC RX 250 WO HCPCS: Performed by: NURSE PRACTITIONER

## 2025-01-02 PROCEDURE — 2700000000 HC OXYGEN THERAPY PER DAY

## 2025-01-02 PROCEDURE — 87449 NOS EACH ORGANISM AG IA: CPT

## 2025-01-02 PROCEDURE — 87425 ROTAVIRUS AG IA: CPT

## 2025-01-02 PROCEDURE — 51702 INSERT TEMP BLADDER CATH: CPT

## 2025-01-02 PROCEDURE — 84100 ASSAY OF PHOSPHORUS: CPT

## 2025-01-02 PROCEDURE — 80053 COMPREHEN METABOLIC PANEL: CPT

## 2025-01-02 RX ORDER — IPRATROPIUM BROMIDE AND ALBUTEROL SULFATE 2.5; .5 MG/3ML; MG/3ML
1 SOLUTION RESPIRATORY (INHALATION) EVERY 4 HOURS PRN
Status: DISCONTINUED | OUTPATIENT
Start: 2025-01-02 | End: 2025-01-07 | Stop reason: HOSPADM

## 2025-01-02 RX ORDER — SODIUM CHLORIDE, SODIUM LACTATE, POTASSIUM CHLORIDE, CALCIUM CHLORIDE 600; 310; 30; 20 MG/100ML; MG/100ML; MG/100ML; MG/100ML
INJECTION, SOLUTION INTRAVENOUS CONTINUOUS
Status: ACTIVE | OUTPATIENT
Start: 2025-01-02 | End: 2025-01-04

## 2025-01-02 RX ADMIN — SODIUM CHLORIDE, PRESERVATIVE FREE 10 ML: 5 INJECTION INTRAVENOUS at 21:39

## 2025-01-02 RX ADMIN — CEFEPIME 2000 MG: 2 INJECTION, POWDER, FOR SOLUTION INTRAVENOUS at 21:46

## 2025-01-02 RX ADMIN — APIXABAN 5 MG: 5 TABLET, FILM COATED ORAL at 00:39

## 2025-01-02 RX ADMIN — CEFEPIME 2000 MG: 2 INJECTION, POWDER, FOR SOLUTION INTRAVENOUS at 09:06

## 2025-01-02 RX ADMIN — APIXABAN 5 MG: 5 TABLET, FILM COATED ORAL at 21:39

## 2025-01-02 RX ADMIN — LEVOTHYROXINE SODIUM 25 MCG: 0.03 TABLET ORAL at 06:47

## 2025-01-02 RX ADMIN — OXYBUTYNIN CHLORIDE 5 MG: 5 TABLET ORAL at 21:41

## 2025-01-02 RX ADMIN — VANCOMYCIN HYDROCHLORIDE 1250 MG: 1.25 INJECTION, POWDER, LYOPHILIZED, FOR SOLUTION INTRAVENOUS at 11:57

## 2025-01-02 RX ADMIN — OXYBUTYNIN CHLORIDE 5 MG: 5 TABLET ORAL at 00:39

## 2025-01-02 RX ADMIN — ROSUVASTATIN 10 MG: 20 TABLET, FILM COATED ORAL at 08:57

## 2025-01-02 RX ADMIN — ACETAMINOPHEN 650 MG: 325 TABLET ORAL at 22:14

## 2025-01-02 RX ADMIN — SODIUM CHLORIDE, PRESERVATIVE FREE 10 ML: 5 INJECTION INTRAVENOUS at 11:16

## 2025-01-02 RX ADMIN — SODIUM CHLORIDE, POTASSIUM CHLORIDE, SODIUM LACTATE AND CALCIUM CHLORIDE: 600; 310; 30; 20 INJECTION, SOLUTION INTRAVENOUS at 12:09

## 2025-01-02 RX ADMIN — OXYBUTYNIN CHLORIDE 5 MG: 5 TABLET ORAL at 11:16

## 2025-01-02 RX ADMIN — ASPIRIN 81 MG CHEWABLE TABLET 81 MG: 81 TABLET CHEWABLE at 08:57

## 2025-01-02 RX ADMIN — TAMSULOSIN HYDROCHLORIDE 0.4 MG: 0.4 CAPSULE ORAL at 08:57

## 2025-01-02 RX ADMIN — MIRTAZAPINE 30 MG: 15 TABLET, FILM COATED ORAL at 21:39

## 2025-01-02 RX ADMIN — APIXABAN 5 MG: 5 TABLET, FILM COATED ORAL at 08:57

## 2025-01-02 RX ADMIN — CEFEPIME 2000 MG: 2 INJECTION, POWDER, FOR SOLUTION INTRAVENOUS at 21:54

## 2025-01-02 ASSESSMENT — PAIN SCALES - GENERAL
PAINLEVEL_OUTOF10: 0

## 2025-01-02 NOTE — CONSULTS
Critical Care Admit/Consult Note         Patient - Jona Hobbs   MRN -  [id number]   Acct # - [id number]   - 1945      Date of Admission -  2025  7:30 AM  Date of evaluation -  2025   Hospital Day - 1          ADMIT/CONSULT DETAILS     Reason for Admit/Consult   Septic shock/AMS    Consulting Service/Physician   Consulting - Cathie Schneider DO  Primary Care Physician - Sage Easley Jr., MD       HPI   The patient is a 79 y.o. male with significant past medical history of HFrEF (37%) dementia, VHD, HTN, HLD, hypothyroidism, SAH, Hx on PE on Eliquis, Hx AAA, BPH, urinary retention with chronic samuel, ANGUS on CPAP, Pulmonary HTN, recent stroke with hospitalization 24-24. 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 The patient was not a surgical candidate and conservative management was recommended.     The patient was admitted on 2025 for AMS and was brought to the ED via EMS. The patient's daughter was afraid the patient was having another stroke. Per EMS, the patient was vomiting and having diarrhea. The patient was given 1.5 L of NS but continued to have hypotension, requiring vasopressor therapy.  UA with moderate leukocyte esterase, +3 bacteria, 10-20 WBCs. Head CT negative for new acute issues, CT ABD pelvis with mild fluid-filled stomach and fluid-filled distended loops of small bowel suggestive of gastroenteritis, mild bladder wall thickening in which a mild cystitis/UTI cannot be excluded. He was started on cefepime and vancomycin and transferred to MICU for further management.     The patient was evaluated in the ED. He is alert and knows he is in the hospital, but lethargic.  CREATININE 1.2 01/01/2025 08:06 AM    GLUCOSE 137 01/01/2025 08:41 AM    CALCIUM 9.7 01/01/2025 08:06 AM    MG 1.9 01/01/2025 08:06 AM    PHOS 2.9 11/22/2024 05:02 AM       LFTS  Lab Results   Component Value Date/Time    ALKPHOS 83 01/01/2025 08:06 AM    ALT 14 01/01/2025 08:06 AM    AST 22 01/01/2025 08:06 AM    BILITOT 0.4 01/01/2025 08:06 AM    BILIDIR <0.2 03/21/2023 04:54 AM    IBILI see below 03/21/2023 04:54 AM       INR  No results for input(s): \"PROTIME\", \"INR\" in the last 72 hours.    APTT  No results for input(s): \"APTT\" in the last 72 hours.    Lactic Acid  Lab Results   Component Value Date/Time    LACTA 2.1 11/21/2024 05:23 PM    LACTA 2.9 11/21/2024 12:04 PM    LACTA 1.0 12/26/2023 08:38 PM        BNP   No results for input(s): \"BNP\" in the last 72 hours.     Cultures   No results for input(s): \"BC\" in the last 72 hours.  No results for input(s): \"BLOODCULT2\" in the last 72 hours.    No results for input(s): \"LABURIN\" in the last 72 hours.          Radiology   CXR  IMPRESSION 1/1/2025:  Cardiomegaly with underlying chronic changes seen throughout the lung fields  bilaterally. No focal parenchymal opacification present.     CT Scans  CT Head WO Contrast 1/1/2025:  IMPRESSION:  1. There is no acute intracranial abnormality.  Specifically, there is no  intracranial hemorrhage.  2. Atrophy and periventricular microvascular ischemic disease,  3. Recent subacute small infarct within the posterior aspect of the left  temporal lobe.    CT ABD/Pelvis W IV Contrast 1/1/2025:  IMPRESSION:  1. Duong catheter balloon identified within the urethra. Repositioning is  recommended.  2. Airspace disease at the lung bases bilaterally.  3. Infrarenal abdominal aortic aneurysm with largest AP dimension measuring  3.5 cm. Recommend ultrasound surveillance in 3 years.  4. Fluid identified in the colon to suggest clinical presentation of  diarrheal disease.  5. Mild fluid-filled stomach and fluid-filled distended loops of  small bowel  to suggest a gastroenteritis.  No wall thickening or obstruction.  6. Mild bladder wall thickening in which a mild cystitis/UTI cannot be  excluded and correlation to urinalysis is recommended.    SYSTEMS ASSESSMENT AND PLAN:    Neuro   AMS, most likely acute toxic encephalopathy 2/2 urosepsis  CVA, right MCA infarct   Chronic multi-vessel high grade stenosis  Depression  Alzheimer's  Continue to monitor neurovascular status, CT with no new acute process  Currently on Eliquis  Continue ASA and statin  On Remeron as home medication    Respiratory   No acute issues, currently on room air  Hx ANGUS on CPAP  Pulmonary HTN  PE on chronic anticoagulation  Keep O2 sat 90-92%  Will ask family to bring in home CPAP  Continue eliquis    Cardiovascular   Hemodynamic shock, most likely urosepsis with hypovolemia component   VHD (severe AS)  HFrEF 37%, without decompensation, proBNP 8,241  Hx Afib with PVC's, SVT  HTN now with hypotension  HLD, on statin as home medication  Titrate vasopressor therapy for a MAP 65 mmHg  Hold Toprol-Xl and Entresto for now  Obtain nicom and document as a progress note    Gastrointestinal   Gastroenteritis (diarrhea and poor oral intake)  Stool studies ordered, C. Diff r/o  NPO for now  PPI for GI prophylaxis    Renal   Hx urinary retention with chronic samuel  Strict I's and O's  Continue oxybutynin and flomax     Infectious Disease   Septic shock  Urosepsis  Continue empiric antibiotics, pan cultures pending  Lactic 2.3 and procalcitonin 0.09  Obtain cortisol level, start stress dose steroids       Hematology/Oncology   Hx PE  Continue Eliquis    Endocrine   Hypothyroidism  On levothyroxine     Social/Spiritual/DNR/Other   Limited code: No to intubation, CPR, shock or medications    ______________________________________________________________________    Case and plan discussed with Dr. Fontenot    Critical care time: 39 minutes    Kenia Chong, APRN - CNP    1/2/2025, 1:59 AM      I personally saw, examined and provided care for the patient. Radiographs, labs and medication list were reviewed by me independently. I spoke with bedside nursing, therapists and consultants. Critical care services and times documented are independent of procedures and multidisciplinary rounds with Residents. Additionally comprehensive, multidisciplinary rounds were conducted with the MICU team. The case was discussed in detail and plans for care were established. Review of Residents documentation was conducted and revisions were made as appropriate. I agree with the above documented exam, problem list and plan of care with the following additions:    Admitted to the ICU for septic shock and volume depletion d/t gastroenteritis and possible complicated UTI with chronic samuel  Wean vasopressors for MAP >65  IVF  Empiric cefepime based on old cultures  Remainder as above    39 minutes of CCT spent with the patient, reviewing the chart including imaging studies, and discussing the case with other health care professionals.  This time excludes procedures.     During multidisciplinary team rounds the patient was seen, examined and discussed. This is confirmation that I have personally seen and examined the patient and that the key elements of the encounter were performed by me (> 85 % time).  The medications & laboratory data and imagery was discussed and adjusted where necessary. Key issues of the case were discussed among consultants.     This patient has a high probability of sudden clinically significant deterioration. I managed/supervised life or organ supporting interventions that required frequent physician assessment. I devoted my full attention to the direct care of this patient for the period of time indicated below. In addition to above, time was devoted to teaching and to any procedure.     NOTE: This report, in part or full, may have been transcribed using voice recognition software. Every effort was

## 2025-01-02 NOTE — CARE COORDINATION
Care Coordination: LOS 1 day, recent dc to home to home, readmitted 1/1/25 for AMS, Urosepsis. Met with daughter at bedside.  Pt was discharged to home with her and , lives one story home, has FWW, wc, cpap, 02 2 ltr [employer]. Hx of [facility], [facility] at the [facility] and [facility].  Currently active with [facility].  Daughter Stacie asked for referral to [facility] in case it is needed. Once he is able, would like him to work with therapy. He will need to be independent to return home or a short stay at [facility].  Referral made to Damari at [facility] and she will follow. Plan is home with [facility] vs [facility], will need therapy evals when medically appropriate    Electronically signed by Heike Be RN on 1/2/2025 at 12:40 PM     The Plan for Transition of Care is related to the following treatment goals: dc    The Patient and/or patient representative Daughter Stacie was provided with a choice of provider and agrees   with the discharge plan. [x] Yes [] No    Freedom of choice list was provided with basic dialogue that supports the patient's individualized plan of care/goals, treatment preferences and shares the quality data associated with the providers. [x] Yes [] No

## 2025-01-02 NOTE — PROGRESS NOTES
Patient came with two orange bath towels, socks and a blue shirt.  Belongings are here with patient.

## 2025-01-02 NOTE — PROGRESS NOTES
Pharmacy Consultation Note  (Antibiotic Dosing and Monitoring)    Initial consult date: 1/1/25   Consulting physician/provider: Dr. Cathie Schneider  Drug: Vancomycin  Indication: UTI    Age/  Gender Height Weight IBW  Allergy Information   79 y.o./male   83.5 kg (184 lb)     Ideal body weight: 73 kg (160 lb 15 oz)  Adjusted ideal body weight: 77.2 kg (170 lb 2.6 oz)   Hydrocodone-acetaminophen, Amoxicillin-pot clavulanate, Erythromycin, Hydrocodone, Cipro [ciprofloxacin hcl], Quinapril, and Tylenol [acetaminophen]      Renal Function:  Recent Labs     12/30/24  0633 01/01/25  0806   BUN 11 13   CREATININE 0.9 1.2     No intake or output data in the 24 hours ending 01/01/25 1955    Vancomycin Monitoring:  Trough:  No results for input(s): \"VANCOTROUGH\" in the last 72 hours.  Random:  No results for input(s): \"VANCORANDOM\" in the last 72 hours.    Vancomycin Administration Times:  Recent vancomycin administrations        No vancomycin IV orders with administrations found.            Orders not given:            vancomycin (VANCOCIN) 1,500 mg in sodium chloride 0.9 % 250 mL IVPB ([id number])                    Assessment:  Patient is a 79 y.o. male who has been initiated on vancomycin  Estimated Creatinine Clearance: 50 mL/min (based on SCr of 1.2 mg/dL).  Cr up slightly so will give one time dose today and reassess tomorrow    Plan:  Will give Vancomycin 1.5g IV x 1 now  Will check vancomycin random level in AM  Will continue to monitor renal function   Pharmacy to follow    Mami Vaughan PharmD, BCPS 1/1/2025 7:57 PM

## 2025-01-02 NOTE — PLAN OF CARE
Problem: Chronic Conditions and Co-morbidities  Goal: Patient's chronic conditions and co-morbidity symptoms are monitored and maintained or improved  1/2/2025 1238 by Cailin Fernandez, RN  Outcome: Progressing  1/2/2025 0504 by Kim Christian, RN  Outcome: Progressing  Flowsheets (Taken 1/2/2025 0504)  Care Plan - Patient's Chronic Conditions and Co-Morbidity Symptoms are Monitored and Maintained or Improved: Monitor and assess patient's chronic conditions and comorbid symptoms for stability, deterioration, or improvement     Problem: Discharge Planning  Goal: Discharge to home or other facility with appropriate resources  Outcome: Progressing     Problem: Safety - Adult  Goal: Free from fall injury  Outcome: Progressing     Problem: Skin/Tissue Integrity  Goal: Absence of new skin breakdown  Description: 1.  Monitor for areas of redness and/or skin breakdown  2.  Assess vascular access sites hourly  3.  Every 4-6 hours minimum:  Change oxygen saturation probe site  4.  Every 4-6 hours:  If on nasal continuous positive airway pressure, respiratory therapy assess nares and determine need for appliance change or resting period.  Outcome: Progressing     Problem: ABCDS Injury Assessment  Goal: Absence of physical injury  Outcome: Progressing

## 2025-01-02 NOTE — PLAN OF CARE
Problem: Chronic Conditions and Co-morbidities  Goal: Patient's chronic conditions and co-morbidity symptoms are monitored and maintained or improved  Outcome: Progressing  Flowsheets (Taken 1/2/2025 7023)  Care Plan - Patient's Chronic Conditions and Co-Morbidity Symptoms are Monitored and Maintained or Improved: Monitor and assess patient's chronic conditions and comorbid symptoms for stability, deterioration, or improvement

## 2025-01-02 NOTE — PROGRESS NOTES
Pharmacy Consultation Note  (Antibiotic Dosing and Monitoring)    Initial consult date: 1/1/25   Consulting physician/provider: Dr. Cathie Schneider  Drug: Vancomycin  Indication: UTI    Age/  Gender Height Weight IBW  Allergy Information   79 y.o./male 177.8 cm (5' 10\") 83.5 kg (184 lb)     Ideal body weight: 73 kg (160 lb 15 oz)  Adjusted ideal body weight: 73.6 kg (162 lb 2.6 oz)   Hydrocodone-acetaminophen, Amoxicillin-pot clavulanate, Erythromycin, Hydrocodone, Cipro [ciprofloxacin hcl], Quinapril, and Tylenol [acetaminophen]      Renal Function:  Recent Labs     01/01/25  0806 01/02/25  0515   BUN 13 18   CREATININE 1.2 1.0       Intake/Output Summary (Last 24 hours) at 1/2/2025 1015  Last data filed at 1/2/2025 0600  Gross per 24 hour   Intake 350 ml   Output 1400 ml   Net -1050 ml       Vancomycin Monitoring:  Trough:  No results for input(s): \"VANCOTROUGH\" in the last 72 hours.  Random:    Recent Labs     01/02/25  0515   VANCORANDOM 13.4       Vancomycin Administration Times:  Recent vancomycin administrations        No vancomycin IV orders with administrations found.            Orders not given:            vancomycin (VANCOCIN) 1,500 mg in sodium chloride 0.9 % 250 mL IVPB ([id number])                    Assessment:  Patient is a 79 y.o. male who has been initiated on vancomycin  Estimated Creatinine Clearance: 60 mL/min (based on SCr of 1 mg/dL).    Plan:  Vancomycin 1250 mg IV q18h for now    Tania Crandall, PharmD, BCPS, BCCCP 1/2/2025 10:16 AM   Ext [phone]

## 2025-01-02 NOTE — ED NOTES
Deflated balloon and inserted samuel further; then reinflated 30cc balloon; urine draining sediment noted

## 2025-01-02 NOTE — CARE COORDINATION
Case Management Assessment  Initial Evaluation    Date/Time of Evaluation: 1/2/2025 12:44 PM  Assessment Completed by: Heike Be RN    If patient is discharged prior to next notation, then this note serves as note for discharge by case management.    Patient Name: Jona Hobbs                   YOB: 1945  Diagnosis: Gastroenteritis [K52.9]  Septicemia (HCC) [A41.9]  Complicated UTI (urinary tract infection) [N39.0]  Septic shock (HCC) [A41.9, R65.21]  AMS (altered mental status) [R41.82]  Abdominal aortic aneurysm (AAA) without rupture, unspecified part (HCC) [I71.40]                   Date / Time: 1/1/2025  7:30 AM    Patient Admission Status: Inpatient   Readmission Risk (Low < 19, Mod (19-27), High > 27): Readmission Risk Score: 19.8    Current PCP: Sage Easley Jr., MD  PCP verified by CM? Yes    Chart Reviewed: Yes      History Provided by: Child/Family  Patient Orientation: Alert and Oriented    Patient Cognition: Alert    Hospitalization in the last 30 days (Readmission):  Yes    If yes, Readmission Assessment in CM Navigator will be completed.    Advance Directives:      Code Status: Limited   Patient's Primary Decision Maker is: Legal Next of Kin    Primary Decision Maker: Stacie Carroll - Child - [phone]    Discharge Planning:    Patient lives with: Children Type of Home: House  Primary Care Giver: Family  Patient Support Systems include: Family Members   Current Financial resources:    Current community resources:    Current services prior to admission: Home Care            Current DME:              Type of Home Care services:  OT, PT, Nursing Services    ADLS  Prior functional level: Independent in ADLs/IADLs  Current functional level: Other (see comment) (currrently in icu)    PT AM-PAC:   /24  OT AM-PAC:   /24    Family can provide assistance at DC: Yes  Would you like Case Management to discuss the discharge plan with any other family members/significant others, and if so,

## 2025-01-02 NOTE — PROGRESS NOTES
[facility]   IN-PATIENT SERVICE     Progress Note    1/2/2025    1:27 PM    Name:   Jona Hobbs  MRN:     [id number]     Acct:      [id number]   Room:   [address] [place] [address]  IP Day:  1  Admit Date:  1/1/2025  7:30 AM    PCP:   Sage Easley Jr., MD  Code Status:  Limited    Subjective:     C/C:   Chief Complaint   Patient presents with    Altered Mental Status     Pt here for a stroke yesterday and discharged, daughter called EMS because she thought he was having another stroke.  [place] negative for EMS     Diarrhea     Per EMS, just running out of pt.      Vomiting    Hypotension     83/48 in triage      Interval History Status: not changed.     Remains confused  Not verbal today     Brief History:     This is a 79-year-old male with a past medical history of bacteremia, HFrEF, dementia, VHD, HTN, HLD, hypothyroidism, SAH, Hx PE on Eliquis, Hx AAA, BPH who presented due to altered mental status, diarrhea, vomiting, and hypotension.  Patient was recently hospitalized 12/27/2024 - 12/30/2024 for altered mental status.Imaging concerning for stroke. Stable 80 to 90% stenosis of proximal left ICA. Stenosis of approximately 70% involving right internal carotid artery appears worsened compared to prior. Severe stenosis at origin of right and left vertebral arteries. 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 Interventional neurology was consulted, no current plan for intervention and recommending conservation management.  Patient returns today with AMS once again.  EMS was called by patient's daughter she thought he was having another stroke.  Per EMS patient was reportedly having vomiting and diarrhea.  Patient is seen laying on gurney.     Review of Systems:     As per HPI     Medications:     Allergies:    Allergies   Allergen Reactions    Hydrocodone-Acetaminophen Other (See Comments)     UNKNOWN REACTION    Amoxicillin-Pot Clavulanate     Erythromycin     Hydrocodone     Cipro [Ciprofloxacin

## 2025-01-02 NOTE — PROGRESS NOTES
4 Eyes Skin Assessment     NAME:  Jona Hobbs  YOB: 1945  MEDICAL RECORD NUMBER:  [id number]    The patient is being assessed for  Admission    I agree that at least one RN has performed a thorough Head to Toe Skin Assessment on the patient. ALL assessment sites listed below have been assessed.      Areas assessed by both nurses:    Head, Face, Ears, Shoulders, Back, Chest, Arms, Elbows, Hands, Sacrum. Buttock, Coccyx, Ischium, Legs. Feet and Heels, and Under Medical Devices         Does the Patient have a Wound? No noted wound(s)       Lg Prevention initiated by RN: Yes  Wound Care Orders initiated by RN: No    Pressure Injury (Stage 3,4, Unstageable, DTI, NWPT, and Complex wounds) if present, place Wound referral order by RN under : No    New Ostomies, if present place, Ostomy referral order under : No     Nurse 1 eSignature: Electronically signed by Kim Christian RN on 1/2/25 at 4:28 AM EST    **SHARE this note so that the co-signing nurse can place an eSignature**    Nurse 2 eSignature: Electronically signed by Manfred Larry RN on 1/2/25 at 4:51 AM EST

## 2025-01-02 NOTE — ACP (ADVANCE CARE PLANNING)
Advance Care Planning   Healthcare Decision Maker:    Primary Decision Maker: Stacie Carroll [facility] - [phone]    Click here to complete Healthcare Decision Makers including selection of the Healthcare Decision Maker Relationship (ie \"Primary\").

## 2025-01-03 LAB
ACB COMPLEX DNA BLD POS QL NAA+NON-PROBE: NOT DETECTED
ALBUMIN SERPL-MCNC: 2.6 G/DL (ref 3.5–5.2)
ALP SERPL-CCNC: 66 U/L (ref 40–129)
ALT SERPL-CCNC: 20 U/L (ref 0–40)
ANION GAP SERPL CALCULATED.3IONS-SCNC: 7 MMOL/L (ref 7–16)
AST SERPL-CCNC: 44 U/L (ref 0–39)
B FRAGILIS DNA BLD POS QL NAA+NON-PROBE: NOT DETECTED
BASOPHILS # BLD: 0.01 K/UL (ref 0–0.2)
BASOPHILS NFR BLD: 0 % (ref 0–2)
BILIRUB SERPL-MCNC: 0.3 MG/DL (ref 0–1.2)
BIOFIRE TEST COMMENT: ABNORMAL
BUN SERPL-MCNC: 16 MG/DL (ref 6–23)
C ALBICANS DNA BLD POS QL NAA+NON-PROBE: NOT DETECTED
C AURIS DNA BLD POS QL NAA+NON-PROBE: NOT DETECTED
C DIFF GDH + TOXINS A+B STL QL IA.RAPID: NEGATIVE
C GATTII+NEOFOR DNA BLD POS QL NAA+N-PRB: NOT DETECTED
C GLABRATA DNA BLD POS QL NAA+NON-PROBE: NOT DETECTED
C KRUSEI DNA BLD POS QL NAA+NON-PROBE: NOT DETECTED
C PARAP DNA BLD POS QL NAA+NON-PROBE: NOT DETECTED
C TROPICLS DNA BLD POS QL NAA+NON-PROBE: NOT DETECTED
CALCIUM SERPL-MCNC: 8.7 MG/DL (ref 8.6–10.2)
CHLORIDE SERPL-SCNC: 107 MMOL/L (ref 98–107)
CO2 SERPL-SCNC: 24 MMOL/L (ref 22–29)
CREAT SERPL-MCNC: 0.8 MG/DL (ref 0.7–1.2)
E CLOAC COMP DNA BLD POS NAA+NON-PROBE: NOT DETECTED
E COLI DNA BLD POS QL NAA+NON-PROBE: NOT DETECTED
E FAECALIS DNA BLD POS QL NAA+NON-PROBE: NOT DETECTED
E FAECIUM DNA BLD POS QL NAA+NON-PROBE: NOT DETECTED
EKG ATRIAL RATE: 82 BPM
EKG P AXIS: 11 DEGREES
EKG P-R INTERVAL: 150 MS
EKG Q-T INTERVAL: 400 MS
EKG QRS DURATION: 114 MS
EKG QTC CALCULATION (BAZETT): 467 MS
EKG R AXIS: -6 DEGREES
EKG T AXIS: 40 DEGREES
EKG VENTRICULAR RATE: 82 BPM
ENTEROBACTERALES DNA BLD POS NAA+N-PRB: NOT DETECTED
EOSINOPHIL # BLD: 0.15 K/UL (ref 0.05–0.5)
EOSINOPHILS RELATIVE PERCENT: 6 % (ref 0–6)
ERYTHROCYTE [DISTWIDTH] IN BLOOD BY AUTOMATED COUNT: 14.4 % (ref 11.5–15)
G LAMBLIA AG STL QL IA: NEGATIVE
GFR, ESTIMATED: 89 ML/MIN/1.73M2
GLUCOSE SERPL-MCNC: 78 MG/DL (ref 74–99)
GP B STREP DNA BLD POS QL NAA+NON-PROBE: NOT DETECTED
HAEM INFLU DNA BLD POS QL NAA+NON-PROBE: NOT DETECTED
HCT VFR BLD AUTO: 27.9 % (ref 37–54)
HGB BLD-MCNC: 9.1 G/DL (ref 12.5–16.5)
IMM GRANULOCYTES # BLD AUTO: <0.03 K/UL (ref 0–0.58)
IMM GRANULOCYTES NFR BLD: 0 % (ref 0–5)
K OXYTOCA DNA BLD POS QL NAA+NON-PROBE: NOT DETECTED
KLEBSIELLA SP DNA BLD POS QL NAA+NON-PRB: NOT DETECTED
KLEBSIELLA SP DNA BLD POS QL NAA+NON-PRB: NOT DETECTED
L MONOCYTOG DNA BLD POS QL NAA+NON-PROBE: NOT DETECTED
LYMPHOCYTES NFR BLD: 0.39 K/UL (ref 1.5–4)
LYMPHOCYTES RELATIVE PERCENT: 15 % (ref 20–42)
MAGNESIUM SERPL-MCNC: 1.6 MG/DL (ref 1.6–2.6)
MCH RBC QN AUTO: 28.8 PG (ref 26–35)
MCHC RBC AUTO-ENTMCNC: 32.6 G/DL (ref 32–34.5)
MCV RBC AUTO: 88.3 FL (ref 80–99.9)
MECA+MECC ISLT/SPM QL: DETECTED
MICROORGANISM SPEC CULT: ABNORMAL
MICROORGANISM SPEC CULT: NORMAL
MICROORGANISM/AGENT SPEC: ABNORMAL
MONOCYTES NFR BLD: 0.17 K/UL (ref 0.1–0.95)
MONOCYTES NFR BLD: 6 % (ref 2–12)
N MEN DNA BLD POS QL NAA+NON-PROBE: NOT DETECTED
NEUTROPHILS NFR BLD: 73 % (ref 43–80)
NEUTS SEG NFR BLD: 1.94 K/UL (ref 1.8–7.3)
P AERUGINOSA DNA BLD POS NAA+NON-PROBE: NOT DETECTED
PHOSPHATE SERPL-MCNC: 1.6 MG/DL (ref 2.5–4.5)
PLATELET, FLUORESCENCE: 116 K/UL (ref 130–450)
PMV BLD AUTO: 11.4 FL (ref 7–12)
POTASSIUM SERPL-SCNC: 3.9 MMOL/L (ref 3.5–5)
PROT SERPL-MCNC: 5.4 G/DL (ref 6.4–8.3)
PROTEUS SP DNA BLD POS QL NAA+NON-PROBE: NOT DETECTED
RBC # BLD AUTO: 3.16 M/UL (ref 3.8–5.8)
RBC # BLD: ABNORMAL 10*6/UL
ROTAVIRUS ANTIGEN: NEGATIVE
S AUREUS DNA BLD POS QL NAA+NON-PROBE: NOT DETECTED
S AUREUS+CONS DNA BLD POS NAA+NON-PROBE: DETECTED
S EPIDERMIDIS DNA BLD POS QL NAA+NON-PRB: DETECTED
S LUGDUNENSIS DNA BLD POS QL NAA+NON-PRB: NOT DETECTED
S MALTOPHILIA DNA BLD POS QL NAA+NON-PRB: NOT DETECTED
S MARCESCENS DNA BLD POS NAA+NON-PROBE: NOT DETECTED
S PNEUM DNA BLD POS QL NAA+NON-PROBE: NOT DETECTED
S PYO DNA BLD POS QL NAA+NON-PROBE: NOT DETECTED
SALMONELLA DNA BLD POS QL NAA+NON-PROBE: NOT DETECTED
SERVICE CMNT-IMP: ABNORMAL
SODIUM SERPL-SCNC: 138 MMOL/L (ref 132–146)
SOURCE, 60200063: NORMAL
SOURCE: NORMAL
SPECIMEN DESCRIPTION: ABNORMAL
SPECIMEN DESCRIPTION: ABNORMAL
SPECIMEN DESCRIPTION: NORMAL
STREPTOCOCCUS DNA BLD POS NAA+NON-PROBE: NOT DETECTED
WBC OTHER # BLD: 2.7 K/UL (ref 4.5–11.5)

## 2025-01-03 PROCEDURE — 6360000002 HC RX W HCPCS

## 2025-01-03 PROCEDURE — 6370000000 HC RX 637 (ALT 250 FOR IP): Performed by: INTERNAL MEDICINE

## 2025-01-03 PROCEDURE — 97166 OT EVAL MOD COMPLEX 45 MIN: CPT

## 2025-01-03 PROCEDURE — 97530 THERAPEUTIC ACTIVITIES: CPT

## 2025-01-03 PROCEDURE — 6370000000 HC RX 637 (ALT 250 FOR IP): Performed by: NURSE PRACTITIONER

## 2025-01-03 PROCEDURE — 1200000000 HC SEMI PRIVATE

## 2025-01-03 PROCEDURE — 2500000003 HC RX 250 WO HCPCS

## 2025-01-03 PROCEDURE — 2500000003 HC RX 250 WO HCPCS: Performed by: NURSE PRACTITIONER

## 2025-01-03 PROCEDURE — 85025 COMPLETE CBC W/AUTO DIFF WBC: CPT

## 2025-01-03 PROCEDURE — 80053 COMPREHEN METABOLIC PANEL: CPT

## 2025-01-03 PROCEDURE — 93010 ELECTROCARDIOGRAM REPORT: CPT | Performed by: INTERNAL MEDICINE

## 2025-01-03 PROCEDURE — 2580000003 HC RX 258: Performed by: INTERNAL MEDICINE

## 2025-01-03 PROCEDURE — 84100 ASSAY OF PHOSPHORUS: CPT

## 2025-01-03 PROCEDURE — 6360000002 HC RX W HCPCS: Performed by: INTERNAL MEDICINE

## 2025-01-03 PROCEDURE — 83735 ASSAY OF MAGNESIUM: CPT

## 2025-01-03 PROCEDURE — 99232 SBSQ HOSP IP/OBS MODERATE 35: CPT | Performed by: STUDENT IN AN ORGANIZED HEALTH CARE EDUCATION/TRAINING PROGRAM

## 2025-01-03 PROCEDURE — 97162 PT EVAL MOD COMPLEX 30 MIN: CPT

## 2025-01-03 PROCEDURE — 2580000003 HC RX 258

## 2025-01-03 RX ORDER — MAGNESIUM SULFATE IN WATER 40 MG/ML
2000 INJECTION, SOLUTION INTRAVENOUS ONCE
Status: COMPLETED | OUTPATIENT
Start: 2025-01-03 | End: 2025-01-03

## 2025-01-03 RX ADMIN — MIRTAZAPINE 30 MG: 15 TABLET, FILM COATED ORAL at 20:39

## 2025-01-03 RX ADMIN — SODIUM CHLORIDE, POTASSIUM CHLORIDE, SODIUM LACTATE AND CALCIUM CHLORIDE: 600; 310; 30; 20 INJECTION, SOLUTION INTRAVENOUS at 23:23

## 2025-01-03 RX ADMIN — APIXABAN 5 MG: 5 TABLET, FILM COATED ORAL at 20:39

## 2025-01-03 RX ADMIN — ANORECTAL OINTMENT: 15.7; .44; 24; 20.6 OINTMENT TOPICAL at 08:56

## 2025-01-03 RX ADMIN — ROSUVASTATIN 10 MG: 20 TABLET, FILM COATED ORAL at 08:55

## 2025-01-03 RX ADMIN — CEFEPIME 2000 MG: 2 INJECTION, POWDER, FOR SOLUTION INTRAVENOUS at 08:55

## 2025-01-03 RX ADMIN — TAMSULOSIN HYDROCHLORIDE 0.4 MG: 0.4 CAPSULE ORAL at 14:30

## 2025-01-03 RX ADMIN — SODIUM CHLORIDE, POTASSIUM CHLORIDE, SODIUM LACTATE AND CALCIUM CHLORIDE: 600; 310; 30; 20 INJECTION, SOLUTION INTRAVENOUS at 01:46

## 2025-01-03 RX ADMIN — ASPIRIN 81 MG CHEWABLE TABLET 81 MG: 81 TABLET CHEWABLE at 08:55

## 2025-01-03 RX ADMIN — MAGNESIUM SULFATE HEPTAHYDRATE 2000 MG: 40 INJECTION, SOLUTION INTRAVENOUS at 07:02

## 2025-01-03 RX ADMIN — SODIUM CHLORIDE, PRESERVATIVE FREE 10 ML: 5 INJECTION INTRAVENOUS at 08:56

## 2025-01-03 RX ADMIN — VANCOMYCIN HYDROCHLORIDE 1250 MG: 1.25 INJECTION, POWDER, LYOPHILIZED, FOR SOLUTION INTRAVENOUS at 05:55

## 2025-01-03 RX ADMIN — CEFEPIME 2000 MG: 2 INJECTION, POWDER, FOR SOLUTION INTRAVENOUS at 20:44

## 2025-01-03 RX ADMIN — SODIUM CHLORIDE, PRESERVATIVE FREE 10 ML: 5 INJECTION INTRAVENOUS at 20:41

## 2025-01-03 RX ADMIN — LEVOTHYROXINE SODIUM 25 MCG: 0.03 TABLET ORAL at 05:49

## 2025-01-03 RX ADMIN — SODIUM PHOSPHATE, MONOBASIC, MONOHYDRATE AND SODIUM PHOSPHATE, DIBASIC, ANHYDROUS 15 MMOL: 142; 276 INJECTION, SOLUTION INTRAVENOUS at 08:51

## 2025-01-03 RX ADMIN — SODIUM CHLORIDE, POTASSIUM CHLORIDE, SODIUM LACTATE AND CALCIUM CHLORIDE: 600; 310; 30; 20 INJECTION, SOLUTION INTRAVENOUS at 12:00

## 2025-01-03 RX ADMIN — OXYBUTYNIN CHLORIDE 5 MG: 5 TABLET ORAL at 08:55

## 2025-01-03 RX ADMIN — OXYBUTYNIN CHLORIDE 5 MG: 5 TABLET ORAL at 20:39

## 2025-01-03 RX ADMIN — APIXABAN 5 MG: 5 TABLET, FILM COATED ORAL at 08:55

## 2025-01-03 ASSESSMENT — PAIN SCALES - GENERAL: PAINLEVEL_OUTOF10: 0

## 2025-01-03 NOTE — PROGRESS NOTES
Critical Care Team - Daily Progress Note         Date and time: 1/3/2025 11:56 AM  Patient's name:  Jona Hobbs  Medical Record Number: [id number]  Patient's account/billing number: [id number]  Patient's YOB: 1945  Age: 79 y.o.  Date of Admission: 2025  7:30 AM  Length of stay during current admission: 2      Primary Care Physician: Sage Easley Jr., MD  ICU Attending Physician:      Code Status: Limited    Reason for ICU admission: septic shock      SUBJECTIVE:     OVERNIGHT EVENTS:         Appears chronically ill but no issues overnight  Able to say a few words  Off vasopressors       CURRENT VENTILATION STATUS:     [] Ventilator  [] BIPAP  [x] Nasal Cannula [] Room Air      IF INTUBATED, ET TUBE MARKING AT LOWER LIP:       cms    SECRETIONS Amount:  [] Small [] Moderate  [] Large  [x] None  Color:     [] White [] Colored  [] Bloody    SEDATION:  RAAS Score:  [] Propofol gtt  [] Versed gtt  [] Ativan gtt   [x] No Sedation    PARALYZED:  [x] No    [] Yes      VASOPRESSORS:  [x] No    [] Yes    If yes -   [] Levophed       [] Dopamine     [] Vasopressin       [] Dobutamine  [] Phenylephrine         [] Epinephrine    CENTRAL LINES:     [x] No   [] Yes   (Date of Insertion:   )           If yes -     [] Right IJ     [] Left IJ [] Right Femoral [] Left Femoral                   [] Right Subclavian [] Left Subclavian       NGUYEN'S CATHETER:   [] No   [x] Yes  (Date of Insertion:   )     URINE OUTPUT:            [x] Good   [] Low              [] Anuric      OBJECTIVE:     VITAL SIGNS:  /60   Pulse 67   Temp 97.9 °F (36.6 °C) (Temporal)   Resp 23   Ht 1.778 m (5' 10\")   Wt 90.4 kg (199 lb 4.8 oz)   SpO2 98%   BMI 28.60 kg/m²   Tmax over 24 hours:  Temp (24hrs), Av.9 °F (37.2 °C), Min:97.5 °F (36.4 °C), Max:100.5 °F (38.1 °C)      Patient Vitals for the past 6 hrs:   BP Temp Temp src Pulse Resp SpO2 Weight   25 1100 134/60 -- -- 67 23 98 % --   25 1000 (!)  chloride, , PRN  potassium chloride, 40 mEq, PRN   Or  potassium alternative oral replacement, 40 mEq, PRN   Or  potassium chloride, 10 mEq, PRN  magnesium sulfate, 2,000 mg, PRN  ondansetron, 4 mg, Q8H PRN   Or  ondansetron, 4 mg, Q6H PRN  polyethylene glycol, 17 g, Daily PRN  acetaminophen, 650 mg, Q6H PRN   Or  acetaminophen, 650 mg, Q6H PRN          VENT SETTINGS (Comprehensive) (if applicable):     Additional Respiratory Assessments  Pulse: 67  Respirations: 23  SpO2: 98 %    ABGs:   No results for input(s): \"PH\", \"PCO2\", \"PO2\", \"HCO3\", \"BE\", \"O2SAT\" in the last 72 hours.    Laboratory findings:    Complete Blood Count:   Recent Labs     01/01/25  0806 01/02/25  0515 01/03/25  0437   WBC 7.0 4.7 2.7*   HGB 12.2* 10.8* 9.1*   HCT 37.5 32.7* 27.9*    182  --         Last 3 Blood Glucose:   Recent Labs     01/01/25  0841 01/02/25  0515 01/03/25  0437   GLUCOSE 137 102* 78        PT/INR:    Lab Results   Component Value Date/Time    PROTIME 16.1 12/27/2024 11:19 AM    INR 1.5 12/27/2024 11:19 AM     PTT:    Lab Results   Component Value Date/Time    APTT 34.8 12/27/2024 11:19 AM       Comprehensive Metabolic Profile:   Recent Labs     01/01/25  0806 01/01/25  0841 01/02/25  0515 01/03/25  0437     --  137 138   K 3.7  --  3.8 3.9     --  107 107   CO2 22  --  19* 24   BUN 13  --  18 16   CREATININE 1.2  --  1.0 0.8   GLUCOSE 165* 137 102* 78   CALCIUM 9.7  --  8.9 8.7   BILITOT 0.4  --  0.4 0.3   ALKPHOS 83  --  61 66   AST 22  --  23 44*   ALT 14  --  11 20      Magnesium:   Lab Results   Component Value Date/Time    MG 1.6 01/03/2025 04:37 AM     Phosphorus:   Lab Results   Component Value Date/Time    PHOS 1.6 01/03/2025 04:37 AM     Ionized Calcium: No results found for: \"CAION\"     Urinalysis:     Troponin: No results for input(s): \"TROPONINI\" in the last 72 hours.    Microbiology:    Blood culture coag neg staph  Urine culture probable pseudomonas     Radiology/Imaging:     CT

## 2025-01-03 NOTE — PROGRESS NOTES
Pharmacy Consultation Note  (Antibiotic Dosing and Monitoring)    Initial consult date: 1/1/25   Consulting physician/provider: Dr. Cathie Schneider  Drug: Vancomycin  Indication: UTI    Age/  Gender Height Weight IBW  Allergy Information   79 y.o./male 177.8 cm (5' 10\") 83.5 kg (184 lb)     Ideal body weight: 73 kg (160 lb 15 oz)  Adjusted ideal body weight: 80 kg (176 lb 4.5 oz)   Hydrocodone-acetaminophen, Amoxicillin-pot clavulanate, Erythromycin, Hydrocodone, Cipro [ciprofloxacin hcl], Quinapril, and Tylenol [acetaminophen]      Renal Function:  Recent Labs     01/01/25  0806 01/02/25  0515 01/03/25  0437   BUN 13 18 16   CREATININE 1.2 1.0 0.8       Intake/Output Summary (Last 24 hours) at 1/3/2025 0843  Last data filed at 1/3/2025 0629  Gross per 24 hour   Intake 3382.28 ml   Output 1425 ml   Net 1957.28 ml       Vancomycin Monitoring:  Trough:  No results for input(s): \"VANCOTROUGH\" in the last 72 hours.  Random:    Recent Labs     01/02/25  0515   VANCORANDOM 13.4       Vancomycin Administration Times:  Recent vancomycin administrations        No vancomycin IV orders with administrations found.            Orders not given:            vancomycin (VANCOCIN) 1,500 mg in sodium chloride 0.9 % 250 mL IVPB ([id number])                    Assessment:  Patient is a 79 y.o. male who has been initiated on vancomycin  Estimated Creatinine Clearance: 82 mL/min (based on SCr of 0.8 mg/dL).    Plan:  Vancomycin 1250 mg IV q18h for now  Trough at 2300 if vancomycin is to continue    Tania Crandall, PharmD, BCPS, BCCCP 1/3/2025 8:43 AM   Ext [phone]

## 2025-01-03 NOTE — PROGRESS NOTES
[facility]   IN-PATIENT SERVICE     Progress Note    1/3/2025    11:12 AM    Name:   Jona Hobbs  MRN:     [id number]     Acct:      [id number]   Room:   [address] [place] [address]-A  IP Day:  2  Admit Date:  1/1/2025  7:30 AM    PCP:   Sage Easley Jr., MD  Code Status:  Limited    Subjective:     C/C:   Chief Complaint   Patient presents with    Altered Mental Status     Pt here for a stroke yesterday and discharged, daughter called EMS because she thought he was having another stroke.  [place] negative for EMS     Diarrhea     Per EMS, just running out of pt.      Vomiting    Hypotension     83/48 in triage      Interval History Status: not changed.     Not verbal today       Brief History:     This is a 79-year-old male with a past medical history of bacteremia, HFrEF, dementia, VHD, HTN, HLD, hypothyroidism, SAH, Hx PE on Eliquis, Hx AAA, BPH who presented due to altered mental status, diarrhea, vomiting, and hypotension.  Patient was recently hospitalized 12/27/2024 - 12/30/2024 for altered mental status.Imaging concerning for stroke. Stable 80 to 90% stenosis of proximal left ICA. Stenosis of approximately 70% involving right internal carotid artery appears worsened compared to prior. Severe stenosis at origin of right and left vertebral arteries. 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 Interventional neurology was consulted, no current plan for intervention and recommending conservation management.  Patient returns today with AMS once again.  EMS was called by patient's daughter she thought he was having another stroke.  Per EMS patient was reportedly having vomiting and diarrhea.  Patient is seen laying on gurney.     Review of Systems:     As per HPI     Medications:     Allergies:    Allergies   Allergen Reactions    Hydrocodone-Acetaminophen Other (See Comments)     UNKNOWN REACTION    Amoxicillin-Pot Clavulanate     Erythromycin     Hydrocodone     Cipro [Ciprofloxacin Hcl] Other (See  Comments)     UNKNOWN REACTION    Quinapril Other (See Comments)     UNKNOWN REACTION    Tylenol [Acetaminophen] Other (See Comments)     UNKNOWN REACTION       Current Meds:   Scheduled Meds:    sodium phosphate IVPB (PERIPHERAL line)  15 mmol IntraVENous Once    vancomycin  1,250 mg IntraVENous Q18H    sodium chloride flush  5-40 mL IntraVENous 2 times per day    apixaban  5 mg Oral BID    aspirin  81 mg Oral Daily    [Held by provider] furosemide  20 mg Oral Every Other Day    levothyroxine  25 mcg Oral Daily    [Held by provider] metoprolol succinate  12.5 mg Oral Daily    mirtazapine  30 mg Oral Nightly    oxyBUTYnin  5 mg Oral BID    rosuvastatin  10 mg Oral Daily    [Held by provider] sacubitril-valsartan  1 tablet Oral BID    tamsulosin  0.4 mg Oral Daily    cefepime  2,000 mg IntraVENous Q12H     Continuous Infusions:    lactated ringers 100 mL/hr at 01/03/25 0629    sodium chloride      norepinephrine Stopped (01/02/25 1719)     PRN Meds: ipratropium 0.5 mg-albuterol 2.5 mg, menthol-zinc oxide, sodium chloride flush, sodium chloride, potassium chloride **OR** potassium alternative oral replacement **OR** potassium chloride, magnesium sulfate, ondansetron **OR** ondansetron, polyethylene glycol, acetaminophen **OR** acetaminophen    Data:     Past Medical History:   has a past medical history of Bacteremia due to Gram-negative bacteria, CHF (congestive heart failure) ([facility]), Dementia ([facility]), Electrolyte imbalance, Fever, Sepsis ([facility]), and Severe Alzheimer's dementia without behavioral disturbance, psychotic disturbance, mood disturbance, or anxiety ([facility]).    Social History:   reports that he has never smoked. He has never used smokeless tobacco. He reports that he does not drink alcohol and does not use drugs.     Family History: No family history on file.    Vitals:  BP (!) 133/57   Pulse 71   Temp 98.4 °F (36.9 °C) (Temporal)   Resp 20   Ht 1.778 m (5' 10\")   Wt 90.4 kg (199 lb 4.8 oz)   SpO2 100%    Duong catheter balloon identified within the urethra. Repositioning is recommended. 2. Airspace disease at the lung bases bilaterally. 3. Infrarenal abdominal aortic aneurysm with largest AP dimension measuring 3.5 cm. Recommend ultrasound surveillance in 3 years. 4. Fluid identified in the colon to suggest clinical presentation of diarrheal disease. 5. Mild fluid-filled stomach and fluid-filled distended loops of small bowel to suggest a gastroenteritis.  No wall thickening or obstruction. 6. Mild bladder wall thickening in which a mild cystitis/UTI cannot be excluded and correlation to urinalysis is recommended.     CT HEAD WO CONTRAST    Result Date: 1/1/2025  1. There is no acute intracranial abnormality.  Specifically, there is no intracranial hemorrhage. 2. Atrophy and periventricular microvascular ischemic disease, 3. Recent subacute small infarct within the posterior aspect of the left temporal lobe. .     XR CHEST PORTABLE    Result Date: 1/1/2025  Cardiomegaly with underlying chronic changes seen throughout the lung fields bilaterally. No focal parenchymal opacification present.     MRI BRAIN WO CONTRAST    Result Date: 12/28/2024  1. No evidence of acute infarction or acute intracranial hemorrhage. 2. Stable diffuse cerebral volume loss and chronic small vessel disease. 3. Abnormal flow void of the distal left vertebral artery noted, corresponding to occlusion and atherosclerotic calcification noted on the 12/27/2024 CT angiogram. 4. Encephalomalacia on the basis of prior insult/infarction within the posterior inferior left temporal lobe, stable in comparison to prior imaging.     XR CHEST PORTABLE    Result Date: 12/27/2024  No acute process.     CTA HEAD W CONTRAST    Result Date: 12/27/2024  1. Abnormal perfusion is seen involving left frontal, left parietal, and left temporal lobes as well as patchy areas of abnormal perfusion in right frontal and right parietal lobes which may indicate large areas of  retention-chronic Duong.  Strict I's and O's.  Continue Flomax and oxybutynin.  History of A-fib-hold beta-blocker due to low blood pressure.  Continue Eliquis.  History of PE-continue Eliquis  ANGUS-on CPAP  History of CVA-right JUSTIN infarct.  Continue Eliquis, aspirin, and statin.    Greater than 35 minutes spent on physical exam, chart review, assessment and plan.    Kiley Christensen MD  1/3/2025  11:12 AM

## 2025-01-03 NOTE — PROGRESS NOTES
Physical Therapy    Physical Therapy Initial Assessment     Name: Jona Hobbs  : 1945  MRN: [id number]      Date of Service: 1/3/2025    Evaluating PT:  Gabriel Silva, PT, DPT  [id number]     Room #:  4410/4410-A  Diagnosis:  Gastroenteritis [K52.9]  Septicemia (HCC) [A41.9]  Complicated UTI (urinary tract infection) [N39.0]  Septic shock (HCC) [A41.9, R65.21]  AMS (altered mental status) [R41.82]  Abdominal aortic aneurysm (AAA) without rupture, unspecified part (HCC) [I71.40]  PMHx/PSHx:   has a past medical history of Bacteremia due to Gram-negative bacteria, CHF (congestive heart failure) (HCC), Dementia (HCC), Electrolyte imbalance, Fever, Sepsis (HCC), and Severe Alzheimer's dementia without behavioral disturbance, psychotic disturbance, mood disturbance, or anxiety (HCC).   Procedure/Surgery:  None  Precautions:  Falls, Cognition  Equipment Needs:  TBD    SUBJECTIVE:    Pt lives with his dtr in a 1 story home with 3 stairs and 1 rail to enter.  Bedroom and bathroom are on the 1st level.  Pt ambulated with FWW PTA.    OBJECTIVE:   Initial Evaluation  Date: 1/3/25 Treatment Short Term/ Long Term   Goals   AM-PAC 6 Clicks 10/24     Was pt agreeable to Eval/treatment? Yes      Does pt have pain? Reports pain but unable to specify severity or location      Bed Mobility  Rolling: NT  Supine to sit: Min A  Sit to supine: SBA   Scooting: SBA to EOB  Rolling: Supervision   Supine to sit: Supervision   Sit to supine: Supervision   Scooting: Supervision    Transfers Sit to stand: Refused   Stand to sit: Refused  Stand pivot: Refused   Sit to stand: Supervision   Stand to sit: Supervision   Stand pivot: Supervision    Ambulation    Refused   >75 feet with FWW SBA    Stair negotiation: ascended and descended  NT  >1 steps with 1 rail SBA    ROM BUE:  Per OT eval   BLE:  WFL     Strength BUE:  Per OT eval   BLE:  NT     Balance Sitting EOB:  SBA  Dynamic Standing:  Refused   Sitting EOB:  Supervision   Dynamic

## 2025-01-03 NOTE — CARE COORDINATION
Care Coordination:  LOS 2 day.  Call to Damari from [facility], she will continue to follow for clinical readiness and if bed will be available at that time.  therapy evals are in. Updated her on clinical and pt has transfer orders out.  Cefepime until 1/11/25, LR at 75, samuel,room air. Hx of dementia.  Will check with pt daughter as well. Goal is home if able to be independent with adl's closer to discharge , otherwise she would like [facility]. Will follow for transition of care needs.    Electronically signed by Heike Be RN on 1/3/2025 at 3:57 PM

## 2025-01-03 NOTE — PROGRESS NOTES
OCCUPATIONAL THERAPY INITIAL EVALUATION    [facility]  [address] [place], [place]       Date:1/3/2025                                                               Patient Name: Jona Hobbs  MRN: [id number]  : 1945  Room: [address] [place] [address]       Evaluating OT: Sienna Castillo OTR/L; [id number]     Referring Provider: Carrol Fontenot MD    Specific Provider Orders/Date: OT eval and treat (25 0845 )     Diagnosis: Gastroenteritis [K52.9]  Septicemia ([facility]) [A41.9]  Complicated UTI (urinary tract infection) [N39.0]  Septic shock ([facility]) [A41.9, R65.21]  AMS (altered mental status) [R41.82]  Abdominal aortic aneurysm (AAA) without rupture, unspecified part ([facility]) [I71.40]     Reason for admission: Pt admitted for AMS. Pt recently d/c from [facility] for CVA ~1 day PTA. Per chart, pt experiencing diarrhea, emesis, & was hypotensive w/ EMS.    Surgery/Procedures: None this admission.     Pertinent Medical History: .pmhp   Past Medical History:   Diagnosis Date    Bacteremia due to Gram-negative bacteria 2023    CHF (congestive heart failure) ([facility])     Dementia ([facility])     Electrolyte imbalance     Fever 2023    Sepsis ([facility]) 2023    Severe Alzheimer's dementia without behavioral disturbance, psychotic disturbance, mood disturbance, or anxiety ([facility]) 2023    H/o R MCA.    *Precautions:  Fall Risk, h/o Alzheimer's dementia, cognition, +alarms, samuel    Assessment of current deficits   [x] Functional mobility  [x]ADLs  [x] Strength               [x]Cognition   [x] Functional transfers   [x] IADLs         [x] Safety Awareness   [x]Endurance   [x] Fine Coordination        [] ROM     [] Vision/perception   []Sensation    [x]Gross Motor Coordination [x] Balance   [] Delirium                  []Motor Control     [] Communication    OT PLAN OF CARE   OT POC based on physician orders, patient diagnosis and results of clinical assessment.            Dynamic:Stand by Assist             Endurance/Activity Tolerance fair - w/ light activity                      good  w/ light to mod activity   Visual/  Perceptual WFL       Vitals:   HR at rest: 67 bpm HR at end of session: 67 bpm   SpO2 at rest: 99% SpO2 at end of session 99%   BP at rest: 122/49 mmHg BP at end of session 134/60 mmHg     UE Assessment:  Hand Dominance: Right [x]  Left []     ROM Strength STM goal: PRN   RUE  WFL 4-/5  : fair +  FMC: fair    GMC: fair   Improve overall RUE strength WFL for participation in functional tasks       LUE WFL 4-/5  : fair +  FMC: fair    GMC: fair   Improve overall LUE strength WFL for participation in functional tasks         Sensation: No c/o numbness/tingling in extremities.  Tone: WNL  Edema: Unremarkable    Treatment: OT treatment provided this date includes:     Instruction/training on safety and adapted techniques for completion of ADLs: to increase independence and safety in self-care.   Instruction/training on safe bed mobility/repositioning techniques: with focus on safety, body mechanics, and precautions   Instruction/training on energy conservation/work simplification for completion of ADLs:. techniques to increase independence with self-care ADLs and IADLs, work simplification to improve endurance.  Proper Positioning/Alignment for optimal healing, skin integrity, to prevent breakdown, decrease edema, reduce risk of contracture, and encourage functional positioning for interaction with environment.  Skilled Monitoring of Vitals: to include BP, spO2, and HR throughout session to maximize safety.  Sitting Balance/Tolerance:  to increase balance and activity tolerance during ADLs and facilitate proper posture and positioning. Pt seated EOB ~5 mins & returned to supine refusing to participation in OOB activities despite max encouragement/education for participation in therapy session.  Therapeutic activity: to challenge dynamic sitting balance and  have comorbidities that affect occupational performance.    Time In:10:39a             Time Out: 11:02         Total Treatment time: 8 minutes   Min Units   OT Eval Low 14487     OT Eval Medium 00469 X    OT Eval High 26168     OT Re-Eval 41049     Therapeutic Ex 03494     Therapeutic Activities 66561 8 1   ADL/Self Care 01337     Orthotic Management 38470     Neuro Re-Ed 22466     Non-Billable Time        Evaluation time additionally includes thorough review of current medical information, gathering information on PMH/social history & PLOF, administration/interpretation of standardized testing/informal observation of tasks, assessment of data, consultation within the interdisciplinary team, & development of POC/goals.       Evaluating OT: Sienna Castillo OTR/L; [id number]

## 2025-01-04 LAB
ALBUMIN SERPL-MCNC: 2.3 G/DL (ref 3.5–5.2)
ALP SERPL-CCNC: 90 U/L (ref 40–129)
ALT SERPL-CCNC: 27 U/L (ref 0–40)
ANION GAP SERPL CALCULATED.3IONS-SCNC: 8 MMOL/L (ref 7–16)
AST SERPL-CCNC: 57 U/L (ref 0–39)
ATYPICAL LYMPHOCYTE ABSOLUTE COUNT: 0.06 K/UL (ref 0–0.46)
ATYPICAL LYMPHOCYTES: 2 % (ref 0–4)
BASOPHILS # BLD: 0 K/UL (ref 0–0.2)
BASOPHILS NFR BLD: 0 % (ref 0–2)
BILIRUB SERPL-MCNC: 0.4 MG/DL (ref 0–1.2)
BUN SERPL-MCNC: 12 MG/DL (ref 6–23)
CALCIUM SERPL-MCNC: 8.3 MG/DL (ref 8.6–10.2)
CHLORIDE SERPL-SCNC: 104 MMOL/L (ref 98–107)
CO2 SERPL-SCNC: 23 MMOL/L (ref 22–29)
CREAT SERPL-MCNC: 0.6 MG/DL (ref 0.7–1.2)
EOSINOPHIL # BLD: 0.09 K/UL (ref 0.05–0.5)
EOSINOPHILS RELATIVE PERCENT: 3 % (ref 0–6)
ERYTHROCYTE [DISTWIDTH] IN BLOOD BY AUTOMATED COUNT: 14.1 % (ref 11.5–15)
GFR, ESTIMATED: >90 ML/MIN/1.73M2
GLUCOSE SERPL-MCNC: 80 MG/DL (ref 74–99)
HCT VFR BLD AUTO: 28.9 % (ref 37–54)
HGB BLD-MCNC: 9.4 G/DL (ref 12.5–16.5)
LYMPHOCYTES NFR BLD: 0.27 K/UL (ref 1.5–4)
LYMPHOCYTES RELATIVE PERCENT: 8 % (ref 20–42)
MAGNESIUM SERPL-MCNC: 1.8 MG/DL (ref 1.6–2.6)
MCH RBC QN AUTO: 28.3 PG (ref 26–35)
MCHC RBC AUTO-ENTMCNC: 32.5 G/DL (ref 32–34.5)
MCV RBC AUTO: 87 FL (ref 80–99.9)
MICROORGANISM SPEC CULT: NORMAL
MICROORGANISM SPEC CULT: NORMAL
MONOCYTES NFR BLD: 0.12 K/UL (ref 0.1–0.95)
MONOCYTES NFR BLD: 4 % (ref 2–12)
NEUTROPHILS NFR BLD: 84 % (ref 43–80)
NEUTS SEG NFR BLD: 2.95 K/UL (ref 1.8–7.3)
PHOSPHATE SERPL-MCNC: 1.6 MG/DL (ref 2.5–4.5)
PLATELET, FLUORESCENCE: 139 K/UL (ref 130–450)
PMV BLD AUTO: 11.5 FL (ref 7–12)
POTASSIUM SERPL-SCNC: 4.2 MMOL/L (ref 3.5–5)
PROT SERPL-MCNC: 5.3 G/DL (ref 6.4–8.3)
RBC # BLD AUTO: 3.32 M/UL (ref 3.8–5.8)
RBC # BLD: ABNORMAL 10*6/UL
SODIUM SERPL-SCNC: 135 MMOL/L (ref 132–146)
SPECIMEN DESCRIPTION: NORMAL
WBC OTHER # BLD: 3.5 K/UL (ref 4.5–11.5)

## 2025-01-04 PROCEDURE — 2500000003 HC RX 250 WO HCPCS: Performed by: NURSE PRACTITIONER

## 2025-01-04 PROCEDURE — 1200000000 HC SEMI PRIVATE

## 2025-01-04 PROCEDURE — 83735 ASSAY OF MAGNESIUM: CPT

## 2025-01-04 PROCEDURE — 2580000003 HC RX 258: Performed by: INTERNAL MEDICINE

## 2025-01-04 PROCEDURE — 6360000002 HC RX W HCPCS: Performed by: INTERNAL MEDICINE

## 2025-01-04 PROCEDURE — 85025 COMPLETE CBC W/AUTO DIFF WBC: CPT

## 2025-01-04 PROCEDURE — 80053 COMPREHEN METABOLIC PANEL: CPT

## 2025-01-04 PROCEDURE — 6370000000 HC RX 637 (ALT 250 FOR IP): Performed by: NURSE PRACTITIONER

## 2025-01-04 PROCEDURE — 99232 SBSQ HOSP IP/OBS MODERATE 35: CPT

## 2025-01-04 PROCEDURE — 2500000003 HC RX 250 WO HCPCS

## 2025-01-04 PROCEDURE — 84100 ASSAY OF PHOSPHORUS: CPT

## 2025-01-04 PROCEDURE — 2580000003 HC RX 258

## 2025-01-04 RX ADMIN — OXYBUTYNIN CHLORIDE 5 MG: 5 TABLET ORAL at 08:16

## 2025-01-04 RX ADMIN — MIRTAZAPINE 30 MG: 15 TABLET, FILM COATED ORAL at 20:37

## 2025-01-04 RX ADMIN — ASPIRIN 81 MG CHEWABLE TABLET 81 MG: 81 TABLET CHEWABLE at 08:08

## 2025-01-04 RX ADMIN — SODIUM CHLORIDE, PRESERVATIVE FREE 10 ML: 5 INJECTION INTRAVENOUS at 08:09

## 2025-01-04 RX ADMIN — TAMSULOSIN HYDROCHLORIDE 0.4 MG: 0.4 CAPSULE ORAL at 08:14

## 2025-01-04 RX ADMIN — SODIUM CHLORIDE, PRESERVATIVE FREE 10 ML: 5 INJECTION INTRAVENOUS at 20:39

## 2025-01-04 RX ADMIN — CEFEPIME 2000 MG: 2 INJECTION, POWDER, FOR SOLUTION INTRAVENOUS at 08:11

## 2025-01-04 RX ADMIN — OXYBUTYNIN CHLORIDE 5 MG: 5 TABLET ORAL at 20:38

## 2025-01-04 RX ADMIN — ROSUVASTATIN 10 MG: 20 TABLET, FILM COATED ORAL at 08:08

## 2025-01-04 RX ADMIN — SODIUM PHOSPHATE, MONOBASIC, MONOHYDRATE AND SODIUM PHOSPHATE, DIBASIC, ANHYDROUS 15 MMOL: 142; 276 INJECTION, SOLUTION INTRAVENOUS at 08:13

## 2025-01-04 RX ADMIN — LEVOTHYROXINE SODIUM 25 MCG: 0.03 TABLET ORAL at 05:53

## 2025-01-04 RX ADMIN — CEFEPIME 2000 MG: 2 INJECTION, POWDER, FOR SOLUTION INTRAVENOUS at 20:38

## 2025-01-04 RX ADMIN — APIXABAN 5 MG: 5 TABLET, FILM COATED ORAL at 08:08

## 2025-01-04 RX ADMIN — APIXABAN 5 MG: 5 TABLET, FILM COATED ORAL at 20:37

## 2025-01-04 ASSESSMENT — PAIN SCALES - GENERAL
PAINLEVEL_OUTOF10: 0

## 2025-01-04 NOTE — PROGRESS NOTES
Critical Care Team - Daily Progress Note         Date and time: 2025 1:34 PM  Patient's name:  Jona Hobbs  Medical Record Number: [id number]  Patient's account/billing number: [id number]  Patient's YOB: 1945  Age: 79 y.o.  Date of Admission: 2025  7:30 AM  Length of stay during current admission: 3      Primary Care Physician: Sage Easley Jr., MD  ICU Attending Physician:      Code Status: Limited    Reason for ICU admission: septic shock      SUBJECTIVE:     OVERNIGHT EVENTS:         Appears chronically ill but no issues overnight  Able to say a few words  Off vasopressors       CURRENT VENTILATION STATUS:     [] Ventilator  [] BIPAP  [x] Nasal Cannula [] Room Air      IF INTUBATED, ET TUBE MARKING AT LOWER LIP:       cms    SECRETIONS Amount:  [] Small [] Moderate  [] Large  [x] None  Color:     [] White [] Colored  [] Bloody    SEDATION:  RAAS Score:  [] Propofol gtt  [] Versed gtt  [] Ativan gtt   [x] No Sedation    PARALYZED:  [x] No    [] Yes      VASOPRESSORS:  [x] No    [] Yes    If yes -   [] Levophed       [] Dopamine     [] Vasopressin       [] Dobutamine  [] Phenylephrine         [] Epinephrine    CENTRAL LINES:     [x] No   [] Yes   (Date of Insertion:   )           If yes -     [] Right IJ     [] Left IJ [] Right Femoral [] Left Femoral                   [] Right Subclavian [] Left Subclavian       NGUYEN'S CATHETER:   [] No   [x] Yes  (Date of Insertion:   )     URINE OUTPUT:            [x] Good   [] Low              [] Anuric      OBJECTIVE:     VITAL SIGNS:  BP (!) 154/82   Pulse 66   Temp 98 °F (36.7 °C) (Temporal)   Resp 14   Ht 1.778 m (5' 10\")   Wt 90.2 kg (198 lb 13.7 oz)   SpO2 99%   BMI 28.53 kg/m²   Tmax over 24 hours:  Temp (24hrs), Av °F (36.7 °C), Min:97.6 °F (36.4 °C), Max:98.3 °F (36.8 °C)      Patient Vitals for the past 6 hrs:   BP Temp Temp src Pulse Resp SpO2   25 1000 -- -- -- 66 14 99 %   25 09 -- -- -- 69 (!) 7 93  suggest a gastroenteritis.  No wall thickening or obstruction.  6. Mild bladder wall thickening in which a mild cystitis/UTI cannot be  excluded and correlation to urinalysis is recommended.      ASSESSMENT:     Principal Problem:    AMS (altered mental status)  Active Problems:    HTN (hypertension)    BPH (benign prostatic hyperplasia)    Hypothyroidism    Alzheimer's dementia (HCC)    Acute cystitis    Diarrhea    HFrEF (heart failure with reduced ejection fraction) ([facility])    VHD (valvular heart disease)    HLD (hyperlipidemia)    History of pulmonary embolism    History of subdural hematoma    Septic shock ([facility])  Resolved Problems:    * No resolved hospital problems. *      Additional assessment:    Septic shock   Gastroenteritis  Complicated UTI with chronic indwelling samuel catheter  Volume depletion  Dementia  Chronic systolic heart failure  CVA  ANGUS  History of pulmonary embolism  Urinary retention            PLAN:     WEAN PER PROTOCOL:  [] No   [] Yes  [x] N/A    DISCONTINUE ANY LABS:   [x] No   [] Yes    ICU PROPHYLAXIS:  Stress ulcer:  [] PPI Agent  [] U4Xhlov [] Sucralfate  [] Other:  VTE:   [] Enoxaparin  [] Unfract. Heparin Subcut  [x] Eliquis     NUTRITION:  [] NPO [] Tube Feeding (Specify: ) [] TPN  [x] PO (Diet: ADULT DIET; Regular)    HOME MEDICATIONS RECONCILED: [] No  [x] Yes    INSULIN DRIP:   [x] No   [] Yes    CONSULTATION NEEDED:  [x] No   [] Yes    FAMILY UPDATED:    [x] No   [] Yes    TRANSFER OUT OF ICU:   [] No   [x] Yes    ADDITIONAL PLAN:  Off vasopressors  Stop IVF today   Advance diet  Continue Cefepime, await final cultures  Continue eliquis   Chronic samuel  May transfer out of the ICU          Carrol Fontenot MD,   1/4/2025, 1:34 PM      NOTE: This report, in part or full, may have been transcribed using voice recognition software. Every effort was made to ensure accuracy; however, inadvertent computerized transcription errors may be present. Please excuse any transcriptional

## 2025-01-04 NOTE — PLAN OF CARE
Problem: Chronic Conditions and Co-morbidities  Goal: Patient's chronic conditions and co-morbidity symptoms are monitored and maintained or improved  Outcome: Progressing  Flowsheets (Taken 1/3/2025 2000)  Care Plan - Patient's Chronic Conditions and Co-Morbidity Symptoms are Monitored and Maintained or Improved:   Monitor and assess patient's chronic conditions and comorbid symptoms for stability, deterioration, or improvement   Collaborate with multidisciplinary team to address chronic and comorbid conditions and prevent exacerbation or deterioration   Update acute care plan with appropriate goals if chronic or comorbid symptoms are exacerbated and prevent overall improvement and discharge     Problem: Discharge Planning  Goal: Discharge to home or other facility with appropriate resources  Outcome: Progressing  Flowsheets (Taken 1/3/2025 2000)  Discharge to home or other facility with appropriate resources:   Identify barriers to discharge with patient and caregiver   Arrange for needed discharge resources and transportation as appropriate   Identify discharge learning needs (meds, wound care, etc)     Problem: Safety - Adult  Goal: Free from fall injury  Outcome: Progressing  Flowsheets (Taken 1/3/2025 2100)  Free From Fall Injury: Instruct family/caregiver on patient safety     Problem: Skin/Tissue Integrity  Goal: Absence of new skin breakdown  Description: 1.  Monitor for areas of redness and/or skin breakdown  2.  Assess vascular access sites hourly  3.  Every 4-6 hours minimum:  Change oxygen saturation probe site  4.  Every 4-6 hours:  If on nasal continuous positive airway pressure, respiratory therapy assess nares and determine need for appliance change or resting period.  Outcome: Progressing     Problem: ABCDS Injury Assessment  Goal: Absence of physical injury  Outcome: Progressing  Flowsheets (Taken 1/3/2025 2100)  Absence of Physical Injury: Implement safety measures based on patient assessment

## 2025-01-04 NOTE — PROGRESS NOTES
Pharmacy Consultation Note  (Antibiotic Dosing and Monitoring)    Initial consult date: 1/1/25   Consulting physician/provider: Dr. Cathie Schneider  Drug: Vancomycin  Indication: UTI    Vancomycin has been discontinued. Clinical pharmacy will sign off, please reconsult if further assistance is needed.     Tania Crandall, PharmD, BCPS, BCCCP 1/4/2025 6:00 AM   Ext [phone]

## 2025-01-04 NOTE — PROGRESS NOTES
[facility]   IN-PATIENT SERVICE     Progress Note    1/4/2025    1:37 PM    Name:   Jona Hobbs  MRN:     [id number]     Acct:      [id number]   Room:   [address] [place] [address]  IP Day:  3  Admit Date:  1/1/2025  7:30 AM    PCP:   Sage Easley Jr., MD  Code Status:  Limited    Subjective:     C/C:   Chief Complaint   Patient presents with    Altered Mental Status     Pt here for a stroke yesterday and discharged, daughter called EMS because she thought he was having another stroke.  [place] negative for EMS     Diarrhea     Per EMS, just running out of pt.      Vomiting    Hypotension     83/48 in triage      Interval History Status: not changed.     Not verbal today       Brief History:     This is a 79-year-old male with a past medical history of bacteremia, HFrEF, dementia, VHD, HTN, HLD, hypothyroidism, SAH, Hx PE on Eliquis, Hx AAA, BPH who presented due to altered mental status, diarrhea, vomiting, and hypotension.  Patient was recently hospitalized 12/27/2024 - 12/30/2024 for altered mental status.Imaging concerning for stroke. Stable 80 to 90% stenosis of proximal left ICA. Stenosis of approximately 70% involving right internal carotid artery appears worsened compared to prior. Severe stenosis at origin of right and left vertebral arteries. 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 Interventional neurology was consulted, no current plan for intervention and recommending conservation management.  Patient returns today with AMS once again.  EMS was called by patient's daughter she thought he was having another stroke.  Per EMS patient was reportedly having vomiting and diarrhea.  Patient is seen laying on gurney.     Review of Systems:     As per HPI     Medications:     Allergies:    Allergies   Allergen Reactions    Hydrocodone-Acetaminophen Other (See Comments)     UNKNOWN REACTION    Amoxicillin-Pot Clavulanate     Erythromycin     Hydrocodone     Cipro [Ciprofloxacin Hcl] Other (See  12/27/2024  1. Abnormal perfusion is seen involving left frontal, left parietal, and left temporal lobes as well as patchy areas of abnormal perfusion in right frontal and right parietal lobes which may indicate large areas of stroke.  MRI recommended for further evaluation. 2. Stable 80 - 90% stenosis involving proximal left cervical internal carotid artery. 3. Stenosis of approximately 70% involving proximal right internal carotid artery which appears to of worsened compared to prior 4. Severe stenosis at origin of right and left vertebral arteries. 5. 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 6. Stenosis is seen involving cavernous segment of left ICA.     CT BRAIN PERFUSION    Result Date: 12/27/2024  1. Abnormal perfusion is seen involving left frontal, left parietal, and left temporal lobes as well as patchy areas of abnormal perfusion in right frontal and right parietal lobes which may indicate large areas of stroke.  MRI recommended for further evaluation. 2. Stable 80 - 90% stenosis involving proximal left cervical internal carotid artery. 3. Stenosis of approximately 70% involving proximal right internal carotid artery which appears to of worsened compared to prior 4. Severe stenosis at origin of right and left vertebral arteries. 5. 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 6. Stenosis is seen involving cavernous segment of left ICA.     CT HEAD WO CONTRAST    Result Date: 12/27/2024  1.  Interval change from prior.  CT findings in keeping with left MCA distribution acute nonhemorrhagic infarct. 2.  No current findings of midline shift or significant mass effect. STROKE PROTOCOL: Stroke protocol performed and CT findings were telephoned by Dr. Steward to the ER referring service Dr. Hooper, at 1248 hours, Eastern standard time.       Physical Examination:        General appearance:Lying in bed comfortably, no distress,   Mental Status:  disoriented  Lungs:  clear to auscultation bilaterally, normal  effort  Heart:  regular rate and rhythm, no murmur  Abdomen:  soft, nontender, nondistended, normal bowel sounds, no masses, hepatomegaly, splenomegaly  Extremities:  no edema, redness, tenderness in the calves  Skin:  no gross lesions, rashes, induration    Assessment:        Hospital Problems             Last Modified POA    * (Principal) AMS (altered mental status) 1/1/2025 Yes    HTN (hypertension) 1/1/2025 Yes    BPH (benign prostatic hyperplasia) 1/1/2025 Yes    Hypothyroidism 1/1/2025 Yes    Alzheimer's dementia (HCC) 1/1/2025 Yes    Acute cystitis 1/1/2025 Yes    Diarrhea 1/1/2025 Yes    HFrEF (heart failure with reduced ejection fraction) (HCC) 1/1/2025 Yes    VHD (valvular heart disease) 1/1/2025 Yes    HLD (hyperlipidemia) 1/1/2025 Yes    History of pulmonary embolism 1/1/2025 Yes    History of subdural hematoma 1/1/2025 Yes    Septic shock (HCC) 1/1/2025 Yes       Plan:        Acute metabolic cephalopathy-head CT unremarkable.  Has severe dementia at baseline.  Likely multifactorial.  Possibly secondary to septic shock from acute cystitis.  Off pressors and fluids.  Septic shock-continue antibiotics.Continue Cefepime  Off  Pressor support. Monitor cultures.  History of CHF with reduced ejection fraction-hold patient's beta-blocker, Lasix and Entresto due to low blood pressure.  Known EF of 37% with severe AS  Gastroenteritis?-Patient presented with diarrhea poor oral intake.  Rule out C. difficile.  Urinary retention-chronic Duong.  Strict I's and O's.  Continue Flomax and oxybutynin.  History of A-fib-hold beta-blocker due to low blood pressure.  Continue Eliquis.  History of PE-continue Eliquis  ANGUS-on CPAP  History of CVA-right JUSTIN infarct.  Continue Eliquis, aspirin, and statin.    Greater than 35 minutes spent on physical exam, chart review, assessment and plan.    Cristel Easton MD  1/4/2025  1:37 PM

## 2025-01-05 LAB
ALBUMIN SERPL-MCNC: 2.7 G/DL (ref 3.5–5.2)
ALP SERPL-CCNC: 110 U/L (ref 40–129)
ALT SERPL-CCNC: 36 U/L (ref 0–40)
ANION GAP SERPL CALCULATED.3IONS-SCNC: 9 MMOL/L (ref 7–16)
AST SERPL-CCNC: 52 U/L (ref 0–39)
BILIRUB SERPL-MCNC: 0.3 MG/DL (ref 0–1.2)
BUN SERPL-MCNC: 10 MG/DL (ref 6–23)
CALCIUM SERPL-MCNC: 8.7 MG/DL (ref 8.6–10.2)
CHLORIDE SERPL-SCNC: 104 MMOL/L (ref 98–107)
CO2 SERPL-SCNC: 24 MMOL/L (ref 22–29)
CREAT SERPL-MCNC: 0.7 MG/DL (ref 0.7–1.2)
GFR, ESTIMATED: >90 ML/MIN/1.73M2
GLUCOSE SERPL-MCNC: 85 MG/DL (ref 74–99)
MAGNESIUM SERPL-MCNC: 1.7 MG/DL (ref 1.6–2.6)
PHOSPHATE SERPL-MCNC: 1.5 MG/DL (ref 2.5–4.5)
POTASSIUM SERPL-SCNC: 3.4 MMOL/L (ref 3.5–5)
PROT SERPL-MCNC: 5.4 G/DL (ref 6.4–8.3)
SODIUM SERPL-SCNC: 137 MMOL/L (ref 132–146)

## 2025-01-05 PROCEDURE — 6370000000 HC RX 637 (ALT 250 FOR IP): Performed by: NURSE PRACTITIONER

## 2025-01-05 PROCEDURE — 80053 COMPREHEN METABOLIC PANEL: CPT

## 2025-01-05 PROCEDURE — 36415 COLL VENOUS BLD VENIPUNCTURE: CPT

## 2025-01-05 PROCEDURE — 6360000002 HC RX W HCPCS: Performed by: INTERNAL MEDICINE

## 2025-01-05 PROCEDURE — 84100 ASSAY OF PHOSPHORUS: CPT

## 2025-01-05 PROCEDURE — 2580000003 HC RX 258: Performed by: INTERNAL MEDICINE

## 2025-01-05 PROCEDURE — 83735 ASSAY OF MAGNESIUM: CPT

## 2025-01-05 PROCEDURE — 2500000003 HC RX 250 WO HCPCS: Performed by: NURSE PRACTITIONER

## 2025-01-05 PROCEDURE — 99232 SBSQ HOSP IP/OBS MODERATE 35: CPT

## 2025-01-05 PROCEDURE — 1200000000 HC SEMI PRIVATE

## 2025-01-05 RX ADMIN — OXYBUTYNIN CHLORIDE 5 MG: 5 TABLET ORAL at 20:14

## 2025-01-05 RX ADMIN — TAMSULOSIN HYDROCHLORIDE 0.4 MG: 0.4 CAPSULE ORAL at 08:05

## 2025-01-05 RX ADMIN — APIXABAN 5 MG: 5 TABLET, FILM COATED ORAL at 08:05

## 2025-01-05 RX ADMIN — ACETAMINOPHEN 650 MG: 325 TABLET ORAL at 20:14

## 2025-01-05 RX ADMIN — APIXABAN 5 MG: 5 TABLET, FILM COATED ORAL at 20:15

## 2025-01-05 RX ADMIN — SODIUM CHLORIDE, PRESERVATIVE FREE 10 ML: 5 INJECTION INTRAVENOUS at 20:15

## 2025-01-05 RX ADMIN — MIRTAZAPINE 30 MG: 15 TABLET, FILM COATED ORAL at 20:14

## 2025-01-05 RX ADMIN — LEVOTHYROXINE SODIUM 25 MCG: 0.03 TABLET ORAL at 06:43

## 2025-01-05 RX ADMIN — ASPIRIN 81 MG CHEWABLE TABLET 81 MG: 81 TABLET CHEWABLE at 08:05

## 2025-01-05 RX ADMIN — ROSUVASTATIN 10 MG: 20 TABLET, FILM COATED ORAL at 08:05

## 2025-01-05 RX ADMIN — CEFEPIME 2000 MG: 2 INJECTION, POWDER, FOR SOLUTION INTRAVENOUS at 20:19

## 2025-01-05 RX ADMIN — CEFEPIME 2000 MG: 2 INJECTION, POWDER, FOR SOLUTION INTRAVENOUS at 08:11

## 2025-01-05 RX ADMIN — SODIUM CHLORIDE, PRESERVATIVE FREE 10 ML: 5 INJECTION INTRAVENOUS at 08:05

## 2025-01-05 RX ADMIN — OXYBUTYNIN CHLORIDE 5 MG: 5 TABLET ORAL at 08:06

## 2025-01-05 ASSESSMENT — PAIN SCALES - GENERAL
PAINLEVEL_OUTOF10: 4
PAINLEVEL_OUTOF10: 0
PAINLEVEL_OUTOF10: 0

## 2025-01-05 ASSESSMENT — PAIN DESCRIPTION - DESCRIPTORS: DESCRIPTORS: ACHING

## 2025-01-05 ASSESSMENT — PAIN DESCRIPTION - LOCATION: LOCATION: BACK

## 2025-01-05 ASSESSMENT — PAIN DESCRIPTION - ORIENTATION: ORIENTATION: MID;POSTERIOR

## 2025-01-05 NOTE — PROGRESS NOTES
Received into 5204 via bed from [facility]. Assessment per flowsheet. Oriented to environment. Call light within reach. Belongings include 2 bath towels, shirt, and socks.

## 2025-01-05 NOTE — PROGRESS NOTES
[facility]   IN-PATIENT SERVICE     Progress Note    1/5/2025    12:29 PM    Name:   Jona Hobbs  MRN:     [id number]     Acct:      [id number]   Room:   [address] [place] [address]  IP Day:  4  Admit Date:  1/1/2025  7:30 AM    PCP:   Sage Easley Jr., MD  Code Status:  Limited    Subjective:     C/C:   Chief Complaint   Patient presents with    Altered Mental Status     Pt here for a stroke yesterday and discharged, daughter called EMS because she thought he was having another stroke.  [place] negative for EMS     Diarrhea     Per EMS, just running out of pt.      Vomiting    Hypotension     83/48 in triage      Interval History Status: not changed.     Not verbal today       Brief History:     This is a 79-year-old male with a past medical history of bacteremia, HFrEF, dementia, VHD, HTN, HLD, hypothyroidism, SAH, Hx PE on Eliquis, Hx AAA, BPH who presented due to altered mental status, diarrhea, vomiting, and hypotension.  Patient was recently hospitalized 12/27/2024 - 12/30/2024 for altered mental status.Imaging concerning for stroke. Stable 80 to 90% stenosis of proximal left ICA. Stenosis of approximately 70% involving right internal carotid artery appears worsened compared to prior. Severe stenosis at origin of right and left vertebral arteries. 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 Interventional neurology was consulted, no current plan for intervention and recommending conservation management.  Patient returns today with AMS once again.  EMS was called by patient's daughter she thought he was having another stroke.  Per EMS patient was reportedly having vomiting and diarrhea.  Patient is seen laying on gurney.     Review of Systems:     As per HPI     Medications:     Allergies:    Allergies   Allergen Reactions    Hydrocodone-Acetaminophen Other (See Comments)     UNKNOWN REACTION    Amoxicillin-Pot Clavulanate     Erythromycin     Hydrocodone     Cipro [Ciprofloxacin Hcl] Other (See  (Last 24 hours) at 1/5/2025 1229  Last data filed at 1/5/2025 1038  Gross per 24 hour   Intake 2109.56 ml   Output 1930 ml   Net 179.56 ml       Labs:  Hematology:  Recent Labs     01/03/25 0437 01/04/25  0532   WBC 2.7* 3.5*   RBC 3.16* 3.32*   HGB 9.1* 9.4*   HCT 27.9* 28.9*   MCV 88.3 87.0   MCH 28.8 28.3   MCHC 32.6 32.5   RDW 14.4 14.1   MPV 11.4 11.5     Chemistry:  Recent Labs     01/03/25 0437 01/04/25  0532 01/05/25  0715    135 137   K 3.9 4.2 3.4*    104 104   CO2 24 23 24   GLUCOSE 78 80 85   BUN 16 12 10   CREATININE 0.8 0.6* 0.7   MG 1.6 1.8 1.7   ANIONGAP 7 8 9   LABGLOM 89 >90 >90   CALCIUM 8.7 8.3* 8.7   PHOS 1.6* 1.6* 1.5*     Recent Labs     01/03/25 0437 01/04/25  0532 01/05/25  0715   AST 44* 57* 52*   ALT 20 27 36   ALKPHOS 66 90 110   BILITOT 0.3 0.4 0.3     ABG:No results found for: \"POCPH\", \"PHART\", \"PH\", \"POCPCO2\", \"OBO1VNN\", \"PCO2\", \"POCPO2\", \"PO2ART\", \"PO2\", \"POCHCO3\", \"MFI5SWM\", \"HCO3\", \"NBEA\", \"PBEA\", \"BEART\", \"BE\", \"THGBART\", \"THB\", \"KGS9ANE\", \"KLVP3ZAJ\", \"G4ZEUWWV\", \"O2SAT\", \"FIO2\"  Lab Results   Component Value Date/Time    SPECIAL Site: Urine 11/21/2024 05:23 PM     Lab Results   Component Value Date/Time    CULTURE  01/02/2025 04:50 AM     NEGATIVE FOR: METHICILLIN RESISTANT STAPHYLOCOCCUS AUREUS       Radiology:  CT ABDOMEN PELVIS W IV CONTRAST Additional Contrast? None    Result Date: 1/1/2025  1. Duong catheter balloon identified within the urethra. Repositioning is recommended. 2. Airspace disease at the lung bases bilaterally. 3. Infrarenal abdominal aortic aneurysm with largest AP dimension measuring 3.5 cm. Recommend ultrasound surveillance in 3 years. 4. Fluid identified in the colon to suggest clinical presentation of diarrheal disease. 5. Mild fluid-filled stomach and fluid-filled distended loops of small bowel to suggest a gastroenteritis.  No wall thickening or obstruction. 6. Mild bladder wall thickening in which a mild cystitis/UTI cannot be excluded

## 2025-01-05 NOTE — PLAN OF CARE
Problem: Chronic Conditions and Co-morbidities  Goal: Patient's chronic conditions and co-morbidity symptoms are monitored and maintained or improved  Outcome: Progressing     Problem: Discharge Planning  Goal: Discharge to home or other facility with appropriate resources  Outcome: Progressing     Problem: Safety - Adult  Goal: Free from fall injury  Outcome: Progressing     Problem: Skin/Tissue Integrity  Goal: Absence of new skin breakdown  Description: 1.  Monitor for areas of redness and/or skin breakdown  2.  Assess vascular access sites hourly  3.  Every 4-6 hours minimum:  Change oxygen saturation probe site  4.  Every 4-6 hours:  If on nasal continuous positive airway pressure, respiratory therapy assess nares and determine need for appliance change or resting period.  Outcome: Progressing     Problem: ABCDS Injury Assessment  Goal: Absence of physical injury  Outcome: Progressing     Problem: Pain  Goal: Verbalizes/displays adequate comfort level or baseline comfort level  Outcome: Progressing     Problem: Musculoskeletal - Adult  Goal: Return mobility to safest level of function  Outcome: Progressing  Goal: Return ADL status to a safe level of function  Outcome: Progressing     Problem: Gastrointestinal - Adult  Goal: Minimal or absence of nausea and vomiting  Outcome: Progressing  Goal: Maintains or returns to baseline bowel function  Outcome: Progressing  Goal: Maintains adequate nutritional intake  Outcome: Progressing     Problem: Genitourinary - Adult  Goal: Urinary catheter remains patent  Outcome: Progressing     Problem: Metabolic/Fluid and Electrolytes - Adult  Goal: Electrolytes maintained within normal limits  Outcome: Progressing  Goal: Hemodynamic stability and optimal renal function maintained  Outcome: Progressing     Problem: Hematologic - Adult  Goal: Maintains hematologic stability  Outcome: Progressing

## 2025-01-05 NOTE — PROGRESS NOTES
4 Eyes Skin Assessment     NAME:  Jona Hobbs  YOB: 1945  MEDICAL RECORD NUMBER:  [id number]    The patient is being assessed for  Other transfer from MICU    I agree that at least one RN has performed a thorough Head to Toe Skin Assessment on the patient. ALL assessment sites listed below have been assessed.      Areas assessed by both nurses:    Head, Face, Ears, Shoulders, Back, Chest, Arms, Elbows, Hands, Sacrum. Buttock, Coccyx, Ischium, Legs. Feet and Heels, and Under Medical Devices         Does the Patient have a Wound? Yes wound(s) were present on assessment. LDA wound assessment was Initiated and completed by RN       Lg Prevention initiated by RN: Yes  Wound Care Orders initiated by RN: No    Pressure Injury (Stage 3,4, Unstageable, DTI, NWPT, and Complex wounds) if present, place Wound referral order by RN under : Yes    New Ostomies, if present place, Ostomy referral order under : No     Nurse 1 eSignature: Electronically signed by Edith Martinez RN on 1/5/25 at 1:05 AM EST    **SHARE this note so that the co-signing nurse can place an eSignature**    Nurse 2 eSignature: Electronically signed by Lexx Cervantes RN on 1/5/25 at 1:11 AM EST

## 2025-01-05 NOTE — CONSULTS
Department of Internal Medicine  Infectious Diseases   Consult Note      Reason for Consult:  UTI       Requesting Physician:  Dr Easton         HISTORY OF PRESENT ILLNESS:                The patient is a 79 y.o. male  presents with hx of Dementia, CHF, HTN, SAH, PE, AAA, BPH , urinary retention - chronic indwelling Duong catheter  presented to the ER on 1/1 with change in mentals status , fever, (101.2 F ) - he was admitted to MICU - he was hypotensive, requiring levophed - off now   WBC was 4 K, lactic acid was 2.9  He was treated with vancomycin and cefepime   Blood cx - staph epi   Urine cx - mixed daniel, oxidase positive GNR           Past Medical History:      Past Medical History:   Diagnosis Date    Bacteremia due to Gram-negative bacteria 08/17/2023    CHF (congestive heart failure) ([facility])     Dementia ([facility])     Electrolyte imbalance     Fever 08/16/2023    Sepsis ([facility]) 08/16/2023    Severe Alzheimer's dementia without behavioral disturbance, psychotic disturbance, mood disturbance, or anxiety ([facility]) 08/17/2023         Past Surgical History:      Past Surgical History:   Procedure Laterality Date    HERNIA REPAIR Bilateral     TOTAL KNEE ARTHROPLASTY      bilateral    ULNAR NERVE REPAIR           Current Medications:      Current Facility-Administered Medications   Medication Dose Route Frequency Provider Last Rate Last Admin    ipratropium 0.5 mg-albuterol 2.5 mg (DUONEB) nebulizer solution 1 Dose  1 Dose Inhalation Q4H PRN Kenia Chong APRN - CNP        menthol-zinc oxide (CALMOSEPTINE) 0.44-20.6 % ointment   Topical BID PRN Carrol Fontenot MD   Given at 01/03/25 0856    sodium chloride flush 0.9 % injection 5-40 mL  5-40 mL IntraVENous 2 times per day Jannette Rose APRN - CNP   10 mL at 01/05/25 0805    sodium chloride flush 0.9 % injection 5-40 mL  5-40 mL IntraVENous PRN Jannette Rose APRN - CNP        0.9 % sodium chloride infusion   IntraVENous PRN Jannette Rose APRN - CNP      01/04/2025 05:32 AM    MONOSABS 0.12 01/04/2025 05:32 AM    LYMPHSABS 0.27 01/04/2025 05:32 AM    EOSABS 0.09 01/04/2025 05:32 AM    BASOSABS 0.00 01/04/2025 05:32 AM       CMP     Lab Results   Component Value Date/Time     01/05/2025 07:15 AM    K 3.4 01/05/2025 07:15 AM    K 4.0 03/23/2023 04:51 AM     01/05/2025 07:15 AM    CO2 24 01/05/2025 07:15 AM    BUN 10 01/05/2025 07:15 AM    CREATININE 0.7 01/05/2025 07:15 AM    LABGLOM >90 01/05/2025 07:15 AM    LABGLOM >60 01/09/2024 12:45 PM    GLUCOSE 85 01/05/2025 07:15 AM    CALCIUM 8.7 01/05/2025 07:15 AM    BILITOT 0.3 01/05/2025 07:15 AM    ALKPHOS 110 01/05/2025 07:15 AM    AST 52 01/05/2025 07:15 AM    ALT 36 01/05/2025 07:15 AM         Hepatic Function Panel:    Lab Results   Component Value Date/Time    ALKPHOS 110 01/05/2025 07:15 AM    ALT 36 01/05/2025 07:15 AM    AST 52 01/05/2025 07:15 AM    BILITOT 0.3 01/05/2025 07:15 AM    BILIDIR <0.2 03/21/2023 04:54 AM    IBILI see below 03/21/2023 04:54 AM       PT/INR:    Lab Results   Component Value Date/Time    PROTIME 16.1 12/27/2024 11:19 AM    INR 1.5 12/27/2024 11:19 AM       TSH:    Lab Results   Component Value Date/Time    TSH 0.96 12/28/2024 10:11 AM       U/A:    Lab Results   Component Value Date/Time    COLORU Yellow 01/01/2025 08:06 AM    PHUR 6.0 01/01/2025 08:06 AM    PHUR 7.0 12/26/2023 08:38 PM    WBCUA 10 TO 20 01/01/2025 08:06 AM    RBCUA 21 TO 50 01/01/2025 08:06 AM    BACTERIA 3+ 01/01/2025 08:06 AM    CLARITYU Clear 03/21/2023 08:20 AM    LEUKOCYTESUR MODERATE 01/01/2025 08:06 AM    UROBILINOGEN 0.2 01/01/2025 08:06 AM    BILIRUBINUR NEGATIVE 01/01/2025 08:06 AM    BLOODU Negative 03/21/2023 08:20 AM    GLUCOSEU NEGATIVE 01/01/2025 08:06 AM    AMORPHOUS PRESENT 12/26/2023 08:38 PM       ABG:  No results found for: \"JCP4GBZ\", \"BEART\", \"M7AHCPJW\", \"PHART\", \"THGBART\", \"CEU9XAV\", \"PO2ART\", \"CBA1VZC\"    MICROBIOLOGY:    Blood culture -    STAPHYLOCOCCUS EPIDERMIDIS Identification

## 2025-01-06 PROBLEM — R65.21 SEPTIC SHOCK (HCC): Status: RESOLVED | Noted: 2025-01-01 | Resolved: 2025-01-06

## 2025-01-06 PROBLEM — R19.7 DIARRHEA: Status: RESOLVED | Noted: 2025-01-01 | Resolved: 2025-01-06

## 2025-01-06 PROBLEM — R41.82 AMS (ALTERED MENTAL STATUS): Status: RESOLVED | Noted: 2025-01-01 | Resolved: 2025-01-06

## 2025-01-06 PROBLEM — A41.9 SEPTIC SHOCK (HCC): Status: RESOLVED | Noted: 2025-01-01 | Resolved: 2025-01-06

## 2025-01-06 PROBLEM — N30.00 ACUTE CYSTITIS: Status: RESOLVED | Noted: 2023-11-09 | Resolved: 2025-01-06

## 2025-01-06 LAB
ALBUMIN SERPL-MCNC: 2.8 G/DL (ref 3.5–5.2)
ALP SERPL-CCNC: 102 U/L (ref 40–129)
ALT SERPL-CCNC: 36 U/L (ref 0–40)
ANION GAP SERPL CALCULATED.3IONS-SCNC: 7 MMOL/L (ref 7–16)
AST SERPL-CCNC: 46 U/L (ref 0–39)
BILIRUB SERPL-MCNC: 0.2 MG/DL (ref 0–1.2)
BUN SERPL-MCNC: 7 MG/DL (ref 6–23)
CALCIUM SERPL-MCNC: 9.2 MG/DL (ref 8.6–10.2)
CHLORIDE SERPL-SCNC: 110 MMOL/L (ref 98–107)
CO2 SERPL-SCNC: 26 MMOL/L (ref 22–29)
CREAT SERPL-MCNC: 0.7 MG/DL (ref 0.7–1.2)
GFR, ESTIMATED: >90 ML/MIN/1.73M2
GLUCOSE SERPL-MCNC: 86 MG/DL (ref 74–99)
MAGNESIUM SERPL-MCNC: 1.8 MG/DL (ref 1.6–2.6)
MICROORGANISM SPEC CULT: ABNORMAL
MICROORGANISM/AGENT SPEC: ABNORMAL
PHOSPHATE SERPL-MCNC: 2.3 MG/DL (ref 2.5–4.5)
POTASSIUM SERPL-SCNC: 3.4 MMOL/L (ref 3.5–5)
PROT SERPL-MCNC: 5.7 G/DL (ref 6.4–8.3)
SERVICE CMNT-IMP: ABNORMAL
SODIUM SERPL-SCNC: 143 MMOL/L (ref 132–146)
SPECIMEN DESCRIPTION: ABNORMAL

## 2025-01-06 PROCEDURE — 2500000003 HC RX 250 WO HCPCS: Performed by: NURSE PRACTITIONER

## 2025-01-06 PROCEDURE — 84100 ASSAY OF PHOSPHORUS: CPT

## 2025-01-06 PROCEDURE — 6360000002 HC RX W HCPCS: Performed by: INTERNAL MEDICINE

## 2025-01-06 PROCEDURE — 97530 THERAPEUTIC ACTIVITIES: CPT

## 2025-01-06 PROCEDURE — 1200000000 HC SEMI PRIVATE

## 2025-01-06 PROCEDURE — 99232 SBSQ HOSP IP/OBS MODERATE 35: CPT

## 2025-01-06 PROCEDURE — 97535 SELF CARE MNGMENT TRAINING: CPT

## 2025-01-06 PROCEDURE — 80053 COMPREHEN METABOLIC PANEL: CPT

## 2025-01-06 PROCEDURE — 83735 ASSAY OF MAGNESIUM: CPT

## 2025-01-06 PROCEDURE — 36415 COLL VENOUS BLD VENIPUNCTURE: CPT

## 2025-01-06 PROCEDURE — 6370000000 HC RX 637 (ALT 250 FOR IP): Performed by: NURSE PRACTITIONER

## 2025-01-06 PROCEDURE — 2580000003 HC RX 258: Performed by: INTERNAL MEDICINE

## 2025-01-06 RX ORDER — ONDANSETRON 4 MG/1
4 TABLET, ORALLY DISINTEGRATING ORAL EVERY 8 HOURS PRN
Status: ON HOLD | DISCHARGE
Start: 2025-01-06

## 2025-01-06 RX ORDER — POLYETHYLENE GLYCOL 3350 17 G/17G
17 POWDER, FOR SOLUTION ORAL DAILY PRN
Status: ON HOLD | DISCHARGE
Start: 2025-01-06 | End: 2025-02-05

## 2025-01-06 RX ORDER — IPRATROPIUM BROMIDE AND ALBUTEROL SULFATE 2.5; .5 MG/3ML; MG/3ML
3 SOLUTION RESPIRATORY (INHALATION) EVERY 4 HOURS PRN
Status: ON HOLD | DISCHARGE
Start: 2025-01-06

## 2025-01-06 RX ADMIN — SODIUM CHLORIDE, PRESERVATIVE FREE 10 ML: 5 INJECTION INTRAVENOUS at 08:47

## 2025-01-06 RX ADMIN — TAMSULOSIN HYDROCHLORIDE 0.4 MG: 0.4 CAPSULE ORAL at 08:48

## 2025-01-06 RX ADMIN — CEFEPIME 2000 MG: 2 INJECTION, POWDER, FOR SOLUTION INTRAVENOUS at 08:58

## 2025-01-06 RX ADMIN — APIXABAN 5 MG: 5 TABLET, FILM COATED ORAL at 21:15

## 2025-01-06 RX ADMIN — ROSUVASTATIN 10 MG: 20 TABLET, FILM COATED ORAL at 08:49

## 2025-01-06 RX ADMIN — POTASSIUM CHLORIDE 40 MEQ: 1500 TABLET, EXTENDED RELEASE ORAL at 08:51

## 2025-01-06 RX ADMIN — CEFEPIME 2000 MG: 2 INJECTION, POWDER, FOR SOLUTION INTRAVENOUS at 21:22

## 2025-01-06 RX ADMIN — OXYBUTYNIN CHLORIDE 5 MG: 5 TABLET ORAL at 21:29

## 2025-01-06 RX ADMIN — MIRTAZAPINE 30 MG: 15 TABLET, FILM COATED ORAL at 21:29

## 2025-01-06 RX ADMIN — OXYBUTYNIN CHLORIDE 5 MG: 5 TABLET ORAL at 08:49

## 2025-01-06 RX ADMIN — ASPIRIN 81 MG CHEWABLE TABLET 81 MG: 81 TABLET CHEWABLE at 08:49

## 2025-01-06 RX ADMIN — SODIUM CHLORIDE, PRESERVATIVE FREE 10 ML: 5 INJECTION INTRAVENOUS at 21:15

## 2025-01-06 RX ADMIN — APIXABAN 5 MG: 5 TABLET, FILM COATED ORAL at 08:49

## 2025-01-06 RX ADMIN — LEVOTHYROXINE SODIUM 25 MCG: 0.03 TABLET ORAL at 06:39

## 2025-01-06 NOTE — PROGRESS NOTES
Occupational Therapy  OT BEDSIDE TREATMENT NOTE   [facility]  [address] [place], [place]        Date:2025  Patient Name: Jona Hobbs  MRN: [id number]  : 1945  Room: [address] [place] [address]     Per OT Eval:    Evaluating OT: Sienna Castillo OTR/L; [id number]      Referring Provider: Carrol Fontenot MD    Specific Provider Orders/Date: OT eval and treat (25 0845 )     Diagnosis: Gastroenteritis [K52.9]  Septicemia ([facility]) [A41.9]  Complicated UTI (urinary tract infection) [N39.0]  Septic shock ([facility]) [A41.9, R65.21]  AMS (altered mental status) [R41.82]  Abdominal aortic aneurysm (AAA) without rupture, unspecified part ([facility]) [I71.40]      Reason for admission: Pt admitted for AMS. Pt recently d/c from [facility] for CVA ~1 day PTA. Per chart, pt experiencing diarrhea, emesis, & was hypotensive w/ EMS.     Surgery/Procedures: None this admission.      Pertinent Medical History: .pmhp   Past Medical History        Past Medical History:   Diagnosis Date    Bacteremia due to Gram-negative bacteria 2023    CHF (congestive heart failure) ([facility])      Dementia ([facility])      Electrolyte imbalance      Fever 2023    Sepsis ([facility]) 2023    Severe Alzheimer's dementia without behavioral disturbance, psychotic disturbance, mood disturbance, or anxiety ([facility]) 2023       H/o R MCA.     *Precautions:  Fall Risk, h/o Alzheimer's dementia, cognition, +alarms, samuel     Assessment of current deficits   [x] Functional mobility          [x]ADLs           [x] Strength                  [x]Cognition   [x] Functional transfers        [x] IADLs         [x] Safety Awareness   [x]Endurance   [x] Fine Coordination           [] ROM           [] Vision/perception    []Sensation     [x]Gross Motor Coordination [x] Balance    [] Delirium                  []Motor Control     [] Communication     OT PLAN OF CARE   OT POC based on physician orders, patient diagnosis and  modA  Sit to Stand  Stand to Sit    Cueing for hand placement                     Stand by Assist       Functional Mobility NT  Pt refused despite max encouragement  & education for participation.   modA  Pt took of short side steps towards HOB with w.w.                      Stand by Assist w/ use of Appropriate AD         Balance Sitting:     Static:  Stand by Assist     Dynamic:Stand by Assist      Standing:    Static:  NT     Dynamic:NT   Sitting EOB:  SBA    Standing:  modA Sitting:     Static:  Modified [place]     Dynamic:Modified [place]      Standing:    Static:  Stand by Assist     Dynamic:Stand by Assist             Endurance/Activity Tolerance fair - w/ light activity  Fair-                     good  w/ light to mod activity   Visual/  Perceptual WFL            Education:  Pt was educated on role of OT, goals to be reached, importance of OOB activity, safety and hand placement with transfers, safety/balance sitting unsupported EOB and standing upright at EOB and compensatory strategies to assist with ADL tasks.     Comments: Upon arrival pt supine in bed, agreeable to therapy, speaking with nursing okaying pt to be seen this session. Attempted to encourage to ambulate and or sit upright in recliner chair, with pt adamantly declining. At end of session, pt seated upright in bed eating of lunch meal, all lines and tubes intact, call light within reach.     Pt has made slow progress towards set goals.   Continue with current plan of care      Treatment Time In: 11:10am            Treatment Time Out: 11:33am               Treatment Charges: Mins Units   Ther Ex  00430     Manual Therapy 63560     Thera Activities 52354 15 1   ADL/Home Mgt 60756 8 1   Neuro Re-ed 42606     Group Therapy      Orthotic manage/training  16503     Non-Billable Time     Total Timed Treatment 23 2        [place] PANDYA/EV 49242

## 2025-01-06 NOTE — PROGRESS NOTES
Physical Therapy    Treatment Note    Name: Jona Hobbs  : 1945  MRN: [id number]      Date of Service: 2025    Evaluating PT:  Gabriel Silva, PT, DPT  [id number]     Room #:  5204/5204-A  Diagnosis:  Gastroenteritis [K52.9]  Septicemia ([facility]) [A41.9]  Complicated UTI (urinary tract infection) [N39.0]  Septic shock ([facility]CC) [A41.9, R65.21]  AMS (altered mental status) [R41.82]  Abdominal aortic aneurysm (AAA) without rupture, unspecified part ([facility]) [I71.40]  PMHx/PSHx:   has a past medical history of Bacteremia due to Gram-negative bacteria, CHF (congestive heart failure) (HCC), Dementia (HCC), Electrolyte imbalance, Fever, Sepsis ([facility]), and Severe Alzheimer's dementia without behavioral disturbance, psychotic disturbance, mood disturbance, or anxiety ([facility]).   Procedure/Surgery:  None  Precautions:  Falls, Cognition  Equipment Needs:  TBD    SUBJECTIVE:    Pt lives with his dtr in a 1 story home with 3 stairs and 1 rail to enter.  Bedroom and bathroom are on the 1st level.  Pt ambulated with FWW PTA.    OBJECTIVE:   Initial Evaluation  Date: 1/3/25 Treatment Date: 25 Short Term/ Long Term   Goals   AM-PAC 6 Clicks 10/24 11/24    Was pt agreeable to Eval/treatment? Yes  Yes    Does pt have pain? Reports pain but unable to specify severity or location  Generalized pain (could not localize or quantify)    Bed Mobility  Rolling: NT  Supine to sit: Min A  Sit to supine: SBA   Scooting: SBA to EOB Rolling: NT  Supine to sit: Max A  Sit to supine: Max A   Scooting: SBA to EOB Rolling: Supervision   Supine to sit: Supervision   Sit to supine: Supervision   Scooting: Supervision    Transfers Sit to stand: Refused   Stand to sit: Refused  Stand pivot: Refused  Sit to stand: ModA   Stand to sit: ModA  Stand pivot: Refused Sit to stand: Supervision   Stand to sit: Supervision   Stand pivot: Supervision    Ambulation    Refused  3' laterally with FWW and ModA >75 feet with FWW SBA    Stair negotiation: ascended and  provided for posture, safety, physical assist provided as needed.  Skilled positioning - To prevent skin breakdown and contractures.  Skilled assessment of vitals.  Education - Provided for safety, role of PT in acute setting, benefits of upright mobility.    PLAN:    Patient is making Good progress towards established goals.  Will continue with current POC.      Time in  1110  Time out  1133    Total Treatment Time  23 minutes     CPT codes:  [] Gait training 23235 0 minutes  [] Manual therapy 38049 0 minutes  [x] Therapeutic activities 89183 23 minutes  [] Therapeutic exercises 13078 0 minutes  [] Neuromuscular reeducation 70284 0 minutes    Julissa Epp, PT, DPT  [id number]

## 2025-01-06 NOTE — PROGRESS NOTES
Department of Internal Medicine  Infectious Diseases  Progress  Note      C/C :  UTI     Pt is awake and alert  Confused   Afebrile       Current Facility-Administered Medications   Medication Dose Route Frequency Provider Last Rate Last Admin    ipratropium 0.5 mg-albuterol 2.5 mg (DUONEB) nebulizer solution 1 Dose  1 Dose Inhalation Q4H PRN Kenia Chong APRN - CNP        menthol-zinc oxide (CALMOSEPTINE) 0.44-20.6 % ointment   Topical BID PRN Carrol Fontenot MD   Given at 01/03/25 0856    sodium chloride flush 0.9 % injection 5-40 mL  5-40 mL IntraVENous 2 times per day Carrier, MARIA GUDAALUPE Bhatia CNP   10 mL at 01/06/25 0847    sodium chloride flush 0.9 % injection 5-40 mL  5-40 mL IntraVENous PRN Carrier, MARIA GUADALUPE Bhatia CNP        0.9 % sodium chloride infusion   IntraVENous PRN CarrierJannette APRN - CNP        potassium chloride (KLOR-CON M) extended release tablet 40 mEq  40 mEq Oral PRN Carrier, MARIA GUADALUPE Bhatia CNP   40 mEq at 01/06/25 0851    Or    potassium bicarb-citric acid (EFFER-K) effervescent tablet 40 mEq  40 mEq Oral PRN Carrier, MARIA GUADALUPE Bhatia CNP        Or    potassium chloride 10 mEq/100 mL IVPB (Peripheral Line)  10 mEq IntraVENous PRN CarrierJannette APRN - CNP        magnesium sulfate 2000 mg in 50 mL IVPB premix  2,000 mg IntraVENous PRN CarrierJannette APRN - CNP        ondansetron (ZOFRAN-ODT) disintegrating tablet 4 mg  4 mg Oral Q8H PRN CarrierJannette APRN - CNP        Or    ondansetron (ZOFRAN) injection 4 mg  4 mg IntraVENous Q6H PRN CarrierJannette APRN - CNP        polyethylene glycol (GLYCOLAX) packet 17 g  17 g Oral Daily PRN CarrierJannette APRN - CNP        acetaminophen (TYLENOL) tablet 650 mg  650 mg Oral Q6H PRN CarrierJannette APRN - CNP   650 mg at 01/05/25 2014    Or    acetaminophen (TYLENOL) suppository 650 mg  650 mg Rectal Q6H PRN Carrier, MARIA GUADALUPE Bhatia - CNP        apixaban (ELIQUIS) tablet 5 mg  5 mg Oral BID Carrier,  obstruction.   6. Mild bladder wall thickening in which a mild cystitis/UTI cannot be   excluded and correlation to urinalysis is recommended.       IMPRESSION:     Complicated UTI   Sepsis - resolved   Staph epi bacteremia - contamination   Lactic acidosis - resolved       RECOMMENDATIONS:      Cefepime 2 grams IV q 12 hrs ( day 5  )-  no long term IV abx

## 2025-01-06 NOTE — PROGRESS NOTES
[facility]   IN-PATIENT SERVICE     Progress Note    1/6/2025    12:01 PM    Name:   Jona Hobbs  MRN:     [id number]     Acct:      [id number]   Room:   [address] [place] [address]  IP Day:  5  Admit Date:  1/1/2025  7:30 AM    PCP:   Sage Easley Jr., MD  Code Status:  Limited    Subjective:     C/C:   Chief Complaint   Patient presents with    Altered Mental Status     Pt here for a stroke yesterday and discharged, daughter called EMS because she thought he was having another stroke.  [place] negative for EMS     Diarrhea     Per EMS, just running out of pt.      Vomiting    Hypotension     83/48 in triage      Interval History Status: not changed.     Not verbal today       Brief History:     This is a 79-year-old male with a past medical history of bacteremia, HFrEF, dementia, VHD, HTN, HLD, hypothyroidism, SAH, Hx PE on Eliquis, Hx AAA, BPH who presented due to altered mental status, diarrhea, vomiting, and hypotension.  Patient was recently hospitalized 12/27/2024 - 12/30/2024 for altered mental status.Imaging concerning for stroke. Stable 80 to 90% stenosis of proximal left ICA. Stenosis of approximately 70% involving right internal carotid artery appears worsened compared to prior. Severe stenosis at origin of right and left vertebral arteries. 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 Interventional neurology was consulted, no current plan for intervention and recommending conservation management.  Patient returns today with AMS once again.  EMS was called by patient's daughter she thought he was having another stroke.  Per EMS patient was reportedly having vomiting and diarrhea.  Patient is seen laying on gurney.     Review of Systems:     As per HPI     Medications:     Allergies:    Allergies   Allergen Reactions    Hydrocodone-Acetaminophen Other (See Comments)     UNKNOWN REACTION    Amoxicillin-Pot Clavulanate     Erythromycin     Hydrocodone     Cipro [Ciprofloxacin Hcl] Other (See  Comments)     UNKNOWN REACTION    Quinapril Other (See Comments)     UNKNOWN REACTION    Tylenol [Acetaminophen] Other (See Comments)     UNKNOWN REACTION       Current Meds:   Scheduled Meds:    sodium chloride flush  5-40 mL IntraVENous 2 times per day    apixaban  5 mg Oral BID    aspirin  81 mg Oral Daily    furosemide  20 mg Oral Every Other Day    levothyroxine  25 mcg Oral Daily    metoprolol succinate  12.5 mg Oral Daily    mirtazapine  30 mg Oral Nightly    oxyBUTYnin  5 mg Oral BID    rosuvastatin  10 mg Oral Daily    [Held by provider] sacubitril-valsartan  1 tablet Oral BID    tamsulosin  0.4 mg Oral Daily    cefepime  2,000 mg IntraVENous Q12H     Continuous Infusions:    sodium chloride       PRN Meds: ipratropium 0.5 mg-albuterol 2.5 mg, menthol-zinc oxide, sodium chloride flush, sodium chloride, potassium chloride **OR** potassium alternative oral replacement **OR** potassium chloride, magnesium sulfate, ondansetron **OR** ondansetron, polyethylene glycol, acetaminophen **OR** acetaminophen    Data:     Past Medical History:   has a past medical history of Bacteremia due to Gram-negative bacteria, CHF (congestive heart failure) ([facility]), Dementia ([facility]), Electrolyte imbalance, Fever, Sepsis ([facility]), and Severe Alzheimer's dementia without behavioral disturbance, psychotic disturbance, mood disturbance, or anxiety ([facility]).    Social History:   reports that he has never smoked. He has never used smokeless tobacco. He reports that he does not drink alcohol and does not use drugs.     Family History: No family history on file.    Vitals:  BP (!) 140/73   Pulse 69   Temp 97.7 °F (36.5 °C) (Temporal)   Resp 16   Ht 1.778 m (5' 10\")   Wt 86.5 kg (190 lb 11.2 oz)   SpO2 98%   BMI 27.36 kg/m²   Temp (24hrs), Av.5 °F (36.4 °C), Min:97.2 °F (36.2 °C), Max:97.8 °F (36.6 °C)    No results for input(s): \"POCGLU\" in the last 72 hours.      I/O (24Hr):    Intake/Output Summary (Last 24 hours) at 2025  parietal lobes which may indicate large areas of stroke.  MRI recommended for further evaluation. 2. Stable 80 - 90% stenosis involving proximal left cervical internal carotid artery. 3. Stenosis of approximately 70% involving proximal right internal carotid artery which appears to of worsened compared to prior 4. Severe stenosis at origin of right and left vertebral arteries. 5. 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 6. Stenosis is seen involving cavernous segment of left ICA.     CT BRAIN PERFUSION    Result Date: 12/27/2024  1. Abnormal perfusion is seen involving left frontal, left parietal, and left temporal lobes as well as patchy areas of abnormal perfusion in right frontal and right parietal lobes which may indicate large areas of stroke.  MRI recommended for further evaluation. 2. Stable 80 - 90% stenosis involving proximal left cervical internal carotid artery. 3. Stenosis of approximately 70% involving proximal right internal carotid artery which appears to of worsened compared to prior 4. Severe stenosis at origin of right and left vertebral arteries. 5. 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 6. Stenosis is seen involving cavernous segment of left ICA.     CT HEAD WO CONTRAST    Result Date: 12/27/2024  1.  Interval change from prior.  CT findings in keeping with left MCA distribution acute nonhemorrhagic infarct. 2.  No current findings of midline shift or significant mass effect. STROKE PROTOCOL: Stroke protocol performed and CT findings were telephoned by Dr. Steward to the ER referring service Dr. Hooper, at 1248 hours, Eastern standard time.       Physical Examination:        General appearance:Lying in bed comfortably, no distress,   Mental Status:  disoriented  Lungs:  clear to auscultation bilaterally, normal effort  Heart:  regular rate and rhythm, no murmur  Abdomen:  soft, nontender, nondistended, normal bowel sounds, no masses, hepatomegaly, splenomegaly  Extremities:  no edema,

## 2025-01-06 NOTE — DISCHARGE INSTR - COC
Continuity of Care Form    Patient Name: Jona Hobbs   :  1945  MRN:  [id number]    Admit date:  2025  Discharge date:  2025    Code Status Order: Limited   Advance Directives:   Advance Care Flowsheet Documentation             Admitting Physician:  Cathie Schneider DO  PCP: Sage Easley Jr., MD    Discharging Nurse: MARLENY Durbin  Discharging Hospital Unit/Room#: 5204/5204-A  Discharging Unit Phone Number: [phone]    Emergency Contact:   Extended Emergency Contact Information  Primary Emergency Contact: Stacie Carroll  Address: [address] [place] [address]  Home Phone: [phone]  Relation: Child  Preferred language: English    Past Surgical History:  Past Surgical History:   Procedure Laterality Date    HERNIA REPAIR Bilateral     TOTAL KNEE ARTHROPLASTY      bilateral    ULNAR NERVE REPAIR         Immunization History:   Immunization History   Administered Date(s) Administered    TD 5LF, TENIVAC, (age 7y+), IM, 0.5mL 2023       Active Problems:  Patient Active Problem List   Diagnosis Code    SAH (subarachnoid hemorrhage) ([facility]) I60.9    Fall at home W19.XXXA, Y92.009    Urinary tract infection due to Pseudomonas aeruginosa N39.0, B96.5    Pyelonephritis N12    Sepsis ([facility]) A41.9    HTN (hypertension) I10    BPH (benign prostatic hyperplasia) N40.0    Hypothyroidism E03.9    Sleep apnea G47.30    Bacteremia due to Gram-negative bacteria R78.81    Alzheimer's dementia ([facility]) G30.9, F02.80    Severe protein-calorie malnutrition ([facility]) E43    Duong catheter problem ([facility]) T83.9XXA    Urinary retention R33.9    Acute prostatitis N41.0    Confusion R41.0    Acute cystitis N30.00    Chronic diastolic congestive heart failure ([facility]) I50.32    Pulmonary embolism ([facility]) I26.99    COVID-19 U07.1    Failure to thrive in adult R62.7    Acute CVA (cerebrovascular accident) ([facility]) I63.9    Altered mental status R41.82    CAD (coronary artery disease)  READMISSION 2.0             22.1 Total Score        Discharging to Facility/ Agency   Name:   [facility]   Address:  Phone:  Fax:    Dialysis Facility (if applicable)   Name:  Address:  Dialysis Schedule:  Phone:  Fax:    / signature: Electronically signed by Safia Person RN CM DISCHARGE PLANNER  on 1/6/2025 at 10:50 AM      PHYSICIAN SECTION    Prognosis: {Prognosis:1853534949}    Condition at Discharge: { Patient Condition:908806233}    Rehab Potential (if transferring to Rehab): {Prognosis:3579925600}    Recommended Labs or Other Treatments After Discharge: ***    Physician Certification: I certify the above information and transfer of Jona Hobbs  is necessary for the continuing treatment of the diagnosis listed and that he requires {Admit to Appropriate Level of Care:05274} for {GREATER/LESS:499940086} 30 days.     Update Admission H&P: {CHP DME Changes in HandP:716482061}    PHYSICIAN SIGNATURE:  {Esignature:786270334}

## 2025-01-06 NOTE — PROGRESS NOTES
Patient received the Sacrament of the Anointing of the Sick by Father Orlando Swan on Sunday January 5, 2025.    If additional support is requested or needed please reach out to [facility]ual [facility] ([phone]).    Chap. Faustino Ramirez MDIV, BCC

## 2025-01-06 NOTE — PROGRESS NOTES
Patient refused heel boots at this time.  Educated on the importance of them to prevent heel break down.  Patient states understanding.  Offered to elevate feet on pillows. Patient refused.

## 2025-01-06 NOTE — CARE COORDINATION
01/06/25 Update CM Note; patient is now on general medical floor. He continues with iv cefepime q12. Prompt sent to Damari at [facility] to begin precert today. Pass/marcus/destination completed today. Envelope and ambulance form initated and will need completed at discharge. Therapies to see patient again today for update. Will follow. Electronically signed by Safia Person RN CM on 1/6/2025 at 9:47 AM

## 2025-01-06 NOTE — PLAN OF CARE
Problem: Chronic Conditions and Co-morbidities  Goal: Patient's chronic conditions and co-morbidity symptoms are monitored and maintained or improved  Outcome: Progressing  Flowsheets (Taken 1/5/2025 2000)  Care Plan - Patient's Chronic Conditions and Co-Morbidity Symptoms are Monitored and Maintained or Improved:   Monitor and assess patient's chronic conditions and comorbid symptoms for stability, deterioration, or improvement   Collaborate with multidisciplinary team to address chronic and comorbid conditions and prevent exacerbation or deterioration   Update acute care plan with appropriate goals if chronic or comorbid symptoms are exacerbated and prevent overall improvement and discharge     Problem: Discharge Planning  Goal: Discharge to home or other facility with appropriate resources  Outcome: Progressing  Flowsheets (Taken 1/5/2025 2000)  Discharge to home or other facility with appropriate resources:   Identify barriers to discharge with patient and caregiver   Arrange for needed discharge resources and transportation as appropriate   Identify discharge learning needs (meds, wound care, etc)     Problem: Safety - Adult  Goal: Free from fall injury  Outcome: Progressing     Problem: Skin/Tissue Integrity  Goal: Absence of new skin breakdown  Description: 1.  Monitor for areas of redness and/or skin breakdown  2.  Assess vascular access sites hourly  3.  Every 4-6 hours minimum:  Change oxygen saturation probe site  4.  Every 4-6 hours:  If on nasal continuous positive airway pressure, respiratory therapy assess nares and determine need for appliance change or resting period.  Outcome: Progressing     Problem: ABCDS Injury Assessment  Goal: Absence of physical injury  Outcome: Progressing     Problem: Pain  Goal: Verbalizes/displays adequate comfort level or baseline comfort level  Outcome: Progressing     Problem: Musculoskeletal - Adult  Goal: Return mobility to safest level of function  Outcome:

## 2025-01-06 NOTE — PLAN OF CARE
Problem: Chronic Conditions and Co-morbidities  Goal: Patient's chronic conditions and co-morbidity symptoms are monitored and maintained or improved  1/6/2025 0928 by Carmen Francis RN  Outcome: Progressing  1/6/2025 0256 by Britt Castillo RN  Outcome: Progressing  Flowsheets (Taken 1/5/2025 2000)  Care Plan - Patient's Chronic Conditions and Co-Morbidity Symptoms are Monitored and Maintained or Improved:   Monitor and assess patient's chronic conditions and comorbid symptoms for stability, deterioration, or improvement   Collaborate with multidisciplinary team to address chronic and comorbid conditions and prevent exacerbation or deterioration   Update acute care plan with appropriate goals if chronic or comorbid symptoms are exacerbated and prevent overall improvement and discharge     Problem: Discharge Planning  Goal: Discharge to home or other facility with appropriate resources  1/6/2025 0928 by Carmen Francis RN  Outcome: Progressing  1/6/2025 0256 by Britt Castillo RN  Outcome: Progressing  Flowsheets (Taken 1/5/2025 2000)  Discharge to home or other facility with appropriate resources:   Identify barriers to discharge with patient and caregiver   Arrange for needed discharge resources and transportation as appropriate   Identify discharge learning needs (meds, wound care, etc)     Problem: Safety - Adult  Goal: Free from fall injury  1/6/2025 0928 by Carmen Francis RN  Outcome: Progressing  1/6/2025 0256 by Britt Castillo RN  Outcome: Progressing     Problem: Skin/Tissue Integrity  Goal: Absence of new skin breakdown  Description: 1.  Monitor for areas of redness and/or skin breakdown  2.  Assess vascular access sites hourly  3.  Every 4-6 hours minimum:  Change oxygen saturation probe site  4.  Every 4-6 hours:  If on nasal continuous positive airway pressure, respiratory therapy assess nares and determine need for appliance change or resting period.  1/6/2025 0928 by Carmen Francis  signs and symptoms of electrolyte imbalances   Administer electrolyte replacement as ordered   Monitor response to electrolyte replacements, including repeat lab results as appropriate  Goal: Hemodynamic stability and optimal renal function maintained  1/6/2025 0928 by Carmen Francis RN  Outcome: Progressing  1/6/2025 0256 by Britt Castillo, RN  Outcome: Progressing  Flowsheets (Taken 1/5/2025 2000)  Hemodynamic stability and optimal renal function maintained:   Monitor labs and assess for signs and symptoms of volume excess or deficit   Monitor intake, output and patient weight     Problem: Hematologic - Adult  Goal: Maintains hematologic stability  1/6/2025 0928 by Carmen Francis RN  Outcome: Progressing  1/6/2025 0256 by Britt Castillo RN  Outcome: Progressing  Flowsheets (Taken 1/5/2025 2000)  Maintains hematologic stability:   Assess for signs and symptoms of bleeding or hemorrhage   Monitor labs for bleeding or clotting disorders   Administer blood products/factors as ordered

## 2025-01-07 VITALS
WEIGHT: 190.7 LBS | TEMPERATURE: 97.6 F | BODY MASS INDEX: 27.3 KG/M2 | OXYGEN SATURATION: 98 % | RESPIRATION RATE: 18 BRPM | HEART RATE: 67 BPM | DIASTOLIC BLOOD PRESSURE: 77 MMHG | SYSTOLIC BLOOD PRESSURE: 155 MMHG | HEIGHT: 70 IN

## 2025-01-07 LAB
MAGNESIUM SERPL-MCNC: 1.9 MG/DL (ref 1.6–2.6)
PHOSPHATE SERPL-MCNC: 2 MG/DL (ref 2.5–4.5)

## 2025-01-07 PROCEDURE — 99239 HOSP IP/OBS DSCHRG MGMT >30: CPT

## 2025-01-07 PROCEDURE — 36415 COLL VENOUS BLD VENIPUNCTURE: CPT

## 2025-01-07 PROCEDURE — 2500000003 HC RX 250 WO HCPCS: Performed by: NURSE PRACTITIONER

## 2025-01-07 PROCEDURE — 6370000000 HC RX 637 (ALT 250 FOR IP): Performed by: NURSE PRACTITIONER

## 2025-01-07 PROCEDURE — 6360000002 HC RX W HCPCS: Performed by: INTERNAL MEDICINE

## 2025-01-07 PROCEDURE — 6370000000 HC RX 637 (ALT 250 FOR IP)

## 2025-01-07 PROCEDURE — 2580000003 HC RX 258: Performed by: INTERNAL MEDICINE

## 2025-01-07 PROCEDURE — 83735 ASSAY OF MAGNESIUM: CPT

## 2025-01-07 PROCEDURE — 84100 ASSAY OF PHOSPHORUS: CPT

## 2025-01-07 RX ADMIN — FUROSEMIDE 20 MG: 20 TABLET ORAL at 08:46

## 2025-01-07 RX ADMIN — TAMSULOSIN HYDROCHLORIDE 0.4 MG: 0.4 CAPSULE ORAL at 08:39

## 2025-01-07 RX ADMIN — METOPROLOL SUCCINATE 12.5 MG: 25 TABLET, EXTENDED RELEASE ORAL at 08:39

## 2025-01-07 RX ADMIN — LEVOTHYROXINE SODIUM 25 MCG: 0.03 TABLET ORAL at 06:30

## 2025-01-07 RX ADMIN — CEFEPIME 2000 MG: 2 INJECTION, POWDER, FOR SOLUTION INTRAVENOUS at 08:38

## 2025-01-07 RX ADMIN — APIXABAN 5 MG: 5 TABLET, FILM COATED ORAL at 08:39

## 2025-01-07 RX ADMIN — SODIUM CHLORIDE, PRESERVATIVE FREE 10 ML: 5 INJECTION INTRAVENOUS at 08:41

## 2025-01-07 RX ADMIN — OXYBUTYNIN CHLORIDE 5 MG: 5 TABLET ORAL at 08:39

## 2025-01-07 RX ADMIN — ROSUVASTATIN 10 MG: 20 TABLET, FILM COATED ORAL at 08:40

## 2025-01-07 RX ADMIN — ASPIRIN 81 MG CHEWABLE TABLET 81 MG: 81 TABLET CHEWABLE at 08:39

## 2025-01-07 NOTE — PLAN OF CARE
Problem: Chronic Conditions and Co-morbidities  Goal: Patient's chronic conditions and co-morbidity symptoms are monitored and maintained or improved  1/6/2025 2328 by Hina White RN  Outcome: Progressing  1/6/2025 2315 by Hina White RN  Outcome: Progressing  1/6/2025 0928 by Carmen Francis RN  Outcome: Progressing     Problem: Discharge Planning  Goal: Discharge to home or other facility with appropriate resources  1/6/2025 2328 by Hina White RN  Outcome: Progressing  1/6/2025 2315 by Hina White RN  Outcome: Progressing  1/6/2025 0928 by Carmen Francis RN  Outcome: Progressing     Problem: Safety - Adult  Goal: Free from fall injury  1/6/2025 2315 by Hina White RN  Outcome: Progressing  1/6/2025 0928 by Carmen Francis RN  Outcome: Progressing     Problem: Skin/Tissue Integrity  Goal: Absence of new skin breakdown  Description: 1.  Monitor for areas of redness and/or skin breakdown  2.  Assess vascular access sites hourly  3.  Every 4-6 hours minimum:  Change oxygen saturation probe site  4.  Every 4-6 hours:  If on nasal continuous positive airway pressure, respiratory therapy assess nares and determine need for appliance change or resting period.  1/6/2025 2328 by Hina White RN  Outcome: Progressing  1/6/2025 2315 by Hina White RN  Outcome: Progressing  1/6/2025 0928 by Carmen Francis RN  Outcome: Progressing     Problem: ABCDS Injury Assessment  Goal: Absence of physical injury  1/6/2025 0928 by Carmen Francis RN  Outcome: Progressing     Problem: Pain  Goal: Verbalizes/displays adequate comfort level or baseline comfort level  1/6/2025 2315 by Hina White RN  Outcome: Progressing  1/6/2025 0928 by Carmen Francis RN  Outcome: Progressing     Problem: Musculoskeletal - Adult  Goal: Return mobility to safest level of function  1/6/2025 2328 by Hina White RN  Outcome: Progressing  1/6/2025 0928 by Camren Francis RN  Outcome: Progressing  Goal: Return ADL status to a safe  level of function  1/6/2025 2328 by Hina White RN  Outcome: Progressing  1/6/2025 2315 by Hina White RN  Outcome: Progressing  1/6/2025 0928 by Carmen Francis RN  Outcome: Progressing     Problem: Gastrointestinal - Adult  Goal: Minimal or absence of nausea and vomiting  1/6/2025 0928 by Carmen Francis RN  Outcome: Progressing  Goal: Maintains or returns to baseline bowel function  1/6/2025 0928 by Carmen Francis RN  Outcome: Progressing  Goal: Maintains adequate nutritional intake  1/6/2025 2328 by Hina White RN  Outcome: Progressing  1/6/2025 2315 by Hina White RN  Outcome: Progressing  1/6/2025 0928 by Carmen Francis RN  Outcome: Progressing     Problem: Genitourinary - Adult  Goal: Urinary catheter remains patent  1/6/2025 2328 by Hina White RN  Outcome: Progressing  1/6/2025 2315 by Hina White RN  Outcome: Progressing  1/6/2025 0928 by Carmen Francis RN  Outcome: Progressing     Problem: Metabolic/Fluid and Electrolytes - Adult  Goal: Electrolytes maintained within normal limits  1/6/2025 2315 by Hina White RN  Outcome: Progressing  1/6/2025 0928 by Carmen Francis RN  Outcome: Progressing  Goal: Hemodynamic stability and optimal renal function maintained  1/6/2025 2315 by Hina White RN  Outcome: Progressing  1/6/2025 0928 by Carmen Francis RN  Outcome: Progressing     Problem: Hematologic - Adult  Goal: Maintains hematologic stability  1/6/2025 0928 by Carmen Francis RN  Outcome: Progressing

## 2025-01-07 NOTE — CARE COORDINATION
01/07/25 Update CM Note: Patient is set to be picked up by [facility] via stretcher with pickup time 12:00pm. All paperwork completed. Liaison/bedside nurse notified of time of discharge. Electronically signed by Safia Person RN CM on 1/7/2025 at 11:06 AM

## 2025-01-07 NOTE — PROGRESS NOTES
Department of Internal Medicine  Infectious Diseases  Progress  Note      C/C :  UTI     Pt is awake and alert  Confused   Afebrile       Current Facility-Administered Medications   Medication Dose Route Frequency Provider Last Rate Last Admin    ipratropium 0.5 mg-albuterol 2.5 mg (DUONEB) nebulizer solution 1 Dose  1 Dose Inhalation Q4H PRN Kenia Chong APRN - CNP        menthol-zinc oxide (CALMOSEPTINE) 0.44-20.6 % ointment   Topical BID PRN Carrol Fontenot MD   Given at 01/03/25 0856    sodium chloride flush 0.9 % injection 5-40 mL  5-40 mL IntraVENous 2 times per day CarrierJannette APRN - CNP   10 mL at 01/07/25 0841    sodium chloride flush 0.9 % injection 5-40 mL  5-40 mL IntraVENous PRN Carrier, MARIA GUADALUPE Bhatia CNP        0.9 % sodium chloride infusion   IntraVENous PRN CarrierJannette APRN - CNP        potassium chloride (KLOR-CON M) extended release tablet 40 mEq  40 mEq Oral PRN Carrier, MARIA GUADALUPE Bhatia CNP   40 mEq at 01/06/25 0851    Or    potassium bicarb-citric acid (EFFER-K) effervescent tablet 40 mEq  40 mEq Oral PRN Carrier, MARIA GUADALUPE Bhatia CNP        Or    potassium chloride 10 mEq/100 mL IVPB (Peripheral Line)  10 mEq IntraVENous PRN CarrierJannette APRN - CNP        magnesium sulfate 2000 mg in 50 mL IVPB premix  2,000 mg IntraVENous PRN CarrierJannette APRN - CNP        ondansetron (ZOFRAN-ODT) disintegrating tablet 4 mg  4 mg Oral Q8H PRN CarrierJannette APRN - CNP        Or    ondansetron (ZOFRAN) injection 4 mg  4 mg IntraVENous Q6H PRN CarrierJannette APRN - CNP        polyethylene glycol (GLYCOLAX) packet 17 g  17 g Oral Daily PRN CarrierJannette APRN - CNP        acetaminophen (TYLENOL) tablet 650 mg  650 mg Oral Q6H PRN CarrierJannette APRN - CNP   650 mg at 01/05/25 2014    Or    acetaminophen (TYLENOL) suppository 650 mg  650 mg Rectal Q6H PRN Carrier, MARIA GUADALUPE Bhatia - CNP        apixaban (ELIQUIS) tablet 5 mg  5 mg Oral BID Carrier,      Complicated UTI   Sepsis - resolved   Staph epi / Micrococcus bacteremia - contamination   Lactic acidosis - resolved       RECOMMENDATIONS:      Stop cefepime   OK to discharge from ID POV

## 2025-01-07 NOTE — CARE COORDINATION
01/07/25 Update CM Note: Reached out to Dr Kevin regarding the iv cefepime. Will await response to set up discharge.  Electronically signed by Safia Person RN CM on 1/7/2025 at 10:38 AM

## 2025-01-07 NOTE — DISCHARGE SUMMARY
Discharge Summary    Date: 1/7/2025  Patient Name: Jona Hobbs    YOB: 1945     Age: 79 y.o.    Admit Date: 1/1/2025  Discharge Date: 1/7/2025  Discharge Condition: Good    Admission Diagnosis  Gastroenteritis [K52.9];Septicemia (HCC) [A41.9];Complicated UTI (urinary tract infection) [N39.0];Septic shock (HCC) [A41.9, R65.21];AMS (altered mental status) [R41.82];Abdominal aortic aneurysm (AAA) without rupture, unspecified part (HCC) [I71.40]      Discharge Diagnosis  Principal Problem (Resolved):    AMS (altered mental status)  Active Problems:    HTN (hypertension)    BPH (benign prostatic hyperplasia)    Hypothyroidism    Alzheimer's dementia (HCC)    HFrEF (heart failure with reduced ejection fraction) (HCC)    VHD (valvular heart disease)    HLD (hyperlipidemia)    History of pulmonary embolism    History of subdural hematoma  Resolved Problems:    Acute cystitis    Diarrhea    Septic shock (HCC)      Hospital Stay  Narrative of Hospital Course:  This is a 79-year-old male with a past medical history of bacteremia, HFrEF, dementia, VHD, HTN, HLD, hypothyroidism, SAH, Hx PE on Eliquis, Hx AAA, BPH who presented due to altered mental status, diarrhea, vomiting, and hypotension.  Patient was recently hospitalized 12/27/2024 - 12/30/2024 for altered mental status.Imaging concerning for stroke. Stable 80 to 90% stenosis of proximal left ICA. Stenosis of approximately 70% involving right internal carotid artery appears worsened compared to prior. Severe stenosis at origin of right and left vertebral arteries. 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 Interventional neurology was consulted, no current plan for intervention and recommending conservation management.  Patient returns today with AMS once again.  EMS was called by patient's daughter she thought he was having another stroke.  Per EMS patient was reportedly having vomiting and diarrhea.  Patient is seen laying on [facility].     General

## 2025-01-07 NOTE — CARE COORDINATION
01/07/25 Update CM Note; Patient is approved to go today to [facility]. Will reach out to ID for plan with iv cefepime q12. Per ID note long term antibiotics will not be needed. Will follow for discharge order. Electronically signed by Safia Person RN CM on 1/7/2025 at 9:02 AM

## 2025-01-07 NOTE — PLAN OF CARE
Problem: Chronic Conditions and Co-morbidities  Goal: Patient's chronic conditions and co-morbidity symptoms are monitored and maintained or improved  1/7/2025 1233 by Jenna Franklin RN  Outcome: Adequate for Discharge     Problem: Discharge Planning  Goal: Discharge to home or other facility with appropriate resources  1/7/2025 1233 by Jenna Franklin RN  Outcome: Adequate for Discharge     Problem: Safety - Adult  Goal: Free from fall injury  1/7/2025 1233 by Jenna Franklin RN  Outcome: Adequate for Discharge     Problem: Skin/Tissue Integrity  Goal: Absence of new skin breakdown  Description: 1.  Monitor for areas of redness and/or skin breakdown  2.  Assess vascular access sites hourly  3.  Every 4-6 hours minimum:  Change oxygen saturation probe site  4.  Every 4-6 hours:  If on nasal continuous positive airway pressure, respiratory therapy assess nares and determine need for appliance change or resting period.  1/7/2025 1233 by Jenna Franklin RN  Outcome: Adequate for Discharge     Problem: ABCDS Injury Assessment  Goal: Absence of physical injury  Outcome: Adequate for Discharge     Problem: Pain  Goal: Verbalizes/displays adequate comfort level or baseline comfort level  1/7/2025 1233 by Jenna Franklin RN  Outcome: Adequate for Discharge     Problem: Musculoskeletal - Adult  Goal: Return mobility to safest level of function  1/7/2025 1233 by Jenna Franklin RN  Outcome: Adequate for Discharge     Problem: Musculoskeletal - Adult  Goal: Return ADL status to a safe level of function  1/7/2025 1233 by Jenna Franklin RN  Outcome: Adequate for Discharge     Problem: Gastrointestinal - Adult  Goal: Minimal or absence of nausea and vomiting  Outcome: Adequate for Discharge     Problem: Gastrointestinal - Adult  Goal: Maintains or returns to baseline bowel function  Outcome: Adequate for Discharge     Problem: Gastrointestinal - Adult  Goal: Maintains adequate nutritional intake  1/7/2025 1233 by

## 2025-01-07 NOTE — PROGRESS NOTES
Attempted to call nurse to nurse and per  she tried to transfer me twice to a nurse with no answer . I called back again and gave her my number to have the nurse call me when she is ready to get report

## 2025-01-07 NOTE — CARE COORDINATION
01/07/25 Update CM note: Per Dr Kevin cancel Cefepime and ok for discharge today. Electronically signed by Safia Person RN CM on 1/7/2025 at 10:56 AM

## 2025-01-08 NOTE — PROGRESS NOTES
I received a call from patients daughter Stacie and she is upset that his Duong catheter was removed and wants to speak to a supervisor. I called clinical manager Gifty Bustamante and she will call daughter tomorrow morning . . I called Clinical supervisor nelly  and asked him  to call patients  daughter Stacie and he stated he will . I also  called [facility] and spoke to Palmira the nurse for report due to no one every returning my call from the facility .

## 2025-01-10 ENCOUNTER — CLINICAL DOCUMENTATION (OUTPATIENT)
Dept: FAMILY MEDICINE CLINIC | Age: 80
End: 2025-01-10

## 2025-01-10 ASSESSMENT — ENCOUNTER SYMPTOMS
CONSTIPATION: 0
RHINORRHEA: 0
SHORTNESS OF BREATH: 0
SORE THROAT: 0
DIARRHEA: 0
COUGH: 0
BACK PAIN: 0
ABDOMINAL PAIN: 0
VOMITING: 0
WHEEZING: 0
EYE PAIN: 0
NAUSEA: 0

## 2025-01-10 NOTE — PROGRESS NOTES
Progress Note  Date:12/9/2024  Patient Name:Jona Hobbs     YOB: 1945     Age:79 y.o.        Subjective    Subjective:  Symptoms:  No shortness of breath, cough, chest pain or diarrhea.    Diet:  No nausea or vomiting.         Patient was seen and examined sitting up in his bedside having lunch. He stated that he feels fine.  Denies any fever, nausea, vomiting, chest pain, shortness of breath or leg swelling.  Patient is tolerating diet.    Review of Systems   Constitutional:  Negative for activity change, appetite change, fatigue and fever.   HENT:  Negative for congestion, ear discharge, ear pain, hearing loss, postnasal drip, rhinorrhea, sneezing and sore throat.    Eyes:  Negative for pain.   Respiratory:  Negative for cough, shortness of breath and wheezing.    Cardiovascular:  Negative for chest pain, palpitations and leg swelling.   Gastrointestinal:  Negative for abdominal pain, constipation, diarrhea, nausea and vomiting.   Genitourinary:  Negative for dysuria and hematuria.   Musculoskeletal:  Negative for back pain, myalgias and neck pain.   Skin:  Negative for rash.   Neurological:  Negative for dizziness, tremors, syncope and headaches.   Psychiatric/Behavioral:  Negative for agitation and sleep disturbance. The patient is not nervous/anxious.      Objective         Vitals Last 24 Hours:  TEMPERATURE:  @FLOWSTAT(6:24)@  RESPIRATIONS RANGE: @FLOWSTAT(9:24)@  PULSE OXIMETRY RANGE: @FLOWSTAT(10:24)@  PULSE RANGE: @FLOWSTAT(8:24)@  BLOOD PRESSURE RANGE: @SBPMAX(24)@; @DBPMAX(24)@  I/O (24Hr):  No intake or output data in the 24 hours ending 01/10/25 1232  Objective:  Vital signs: (most recent): There were no vitals taken for this visit.  No fever.      PHYSICAL EXAM  General Appearance: Looks sad  Skin: warm and dry  Head: normocephalic and atraumatic  Eyes: pupils equal, round, and reactive to light, extraocular eye movements intact, conjunctivae normal  Neck: neck supple and non tender

## 2025-01-15 ENCOUNTER — OUTSIDE SERVICES (OUTPATIENT)
Dept: FAMILY MEDICINE CLINIC | Age: 80
End: 2025-01-15
Payer: MEDICARE

## 2025-01-15 DIAGNOSIS — G30.9 ALZHEIMER'S DEMENTIA, UNSPECIFIED DEMENTIA SEVERITY, UNSPECIFIED TIMING OF DEMENTIA ONSET, UNSPECIFIED WHETHER BEHAVIORAL, PSYCHOTIC, OR MOOD DISTURBANCE OR ANXIETY (HCC): ICD-10-CM

## 2025-01-15 DIAGNOSIS — T83.9XXS PROBLEM WITH FOLEY CATHETER, SEQUELA: ICD-10-CM

## 2025-01-15 DIAGNOSIS — Z86.73 H/O: CVA (CEREBROVASCULAR ACCIDENT): ICD-10-CM

## 2025-01-15 DIAGNOSIS — F02.80 ALZHEIMER'S DEMENTIA, UNSPECIFIED DEMENTIA SEVERITY, UNSPECIFIED TIMING OF DEMENTIA ONSET, UNSPECIFIED WHETHER BEHAVIORAL, PSYCHOTIC, OR MOOD DISTURBANCE OR ANXIETY (HCC): ICD-10-CM

## 2025-01-15 DIAGNOSIS — I60.9 SAH (SUBARACHNOID HEMORRHAGE) (HCC): Primary | ICD-10-CM

## 2025-01-15 DIAGNOSIS — I50.20 HFREF (HEART FAILURE WITH REDUCED EJECTION FRACTION) (HCC): ICD-10-CM

## 2025-01-15 DIAGNOSIS — I50.32 CHRONIC DIASTOLIC CONGESTIVE HEART FAILURE (HCC): ICD-10-CM

## 2025-01-15 DIAGNOSIS — E43 SEVERE PROTEIN-CALORIE MALNUTRITION (HCC): ICD-10-CM

## 2025-01-15 DIAGNOSIS — I63.239: ICD-10-CM

## 2025-01-15 PROBLEM — I26.99 PULMONARY EMBOLISM (HCC): Status: RESOLVED | Noted: 2023-12-11 | Resolved: 2025-01-15

## 2025-01-15 PROBLEM — A41.9 SEPSIS (HCC): Status: RESOLVED | Noted: 2023-08-16 | Resolved: 2025-01-15

## 2025-01-15 PROBLEM — I63.9 ACUTE CVA (CEREBROVASCULAR ACCIDENT) (HCC): Status: RESOLVED | Noted: 2024-11-21 | Resolved: 2025-01-15

## 2025-01-15 PROCEDURE — 99309 SBSQ NF CARE MODERATE MDM 30: CPT | Performed by: FAMILY MEDICINE

## 2025-01-15 ASSESSMENT — ENCOUNTER SYMPTOMS
SHORTNESS OF BREATH: 0
BACK PAIN: 0
VOMITING: 0
COUGH: 0
DIARRHEA: 0
ABDOMINAL PAIN: 0
SORE THROAT: 0
NAUSEA: 0
WHEEZING: 0
CONSTIPATION: 0
RHINORRHEA: 0
EYE PAIN: 0

## 2025-01-15 NOTE — PROGRESS NOTES
Progress Note  Date:1/9/2025  Patient Name:Jona Hobbs     YOB: 1945     Age:79 y.o.        Subjective    Subjective:  Symptoms:  No shortness of breath, cough, chest pain or diarrhea.    Diet:  No nausea or vomiting.         Patient was seen and examined sitting up in his bedside having lunch. He stated that he feels fine.  Denies any fever, nausea, vomiting, chest pain, shortness of breath or leg swelling.  Patient is tolerating diet.    Review of Systems   Constitutional:  Negative for activity change, appetite change, fatigue and fever.   HENT:  Negative for congestion, ear discharge, ear pain, hearing loss, postnasal drip, rhinorrhea, sneezing and sore throat.    Eyes:  Negative for pain.   Respiratory:  Negative for cough, shortness of breath and wheezing.    Cardiovascular:  Negative for chest pain, palpitations and leg swelling.   Gastrointestinal:  Negative for abdominal pain, constipation, diarrhea, nausea and vomiting.   Genitourinary:  Negative for dysuria and hematuria.   Musculoskeletal:  Negative for back pain, myalgias and neck pain.   Skin:  Negative for rash.   Neurological:  Negative for dizziness, tremors, syncope and headaches.   Psychiatric/Behavioral:  Negative for agitation and sleep disturbance. The patient is not nervous/anxious.      Objective         Vitals Last 24 Hours:  TEMPERATURE:  @FLOWSTAT(6:24)@  RESPIRATIONS RANGE: @FLOWSTAT(9:24)@  PULSE OXIMETRY RANGE: @FLOWSTAT(10:24)@  PULSE RANGE: @FLOWSTAT(8:24)@  BLOOD PRESSURE RANGE: @SBPMAX(24)@; @DBPMAX(24)@  I/O (24Hr):  No intake or output data in the 24 hours ending 01/15/25 0906  Objective:  Vital signs: (most recent): There were no vitals taken for this visit.  No fever.      PHYSICAL EXAM  General Appearance: Looks sad  Skin: warm and dry  Head: normocephalic and atraumatic  Eyes: pupils equal, round, and reactive to light, extraocular eye movements intact, conjunctivae normal  Neck: neck supple and non tender

## 2025-01-17 NOTE — PROGRESS NOTES
Physician Progress Note      PATIENT:               SAMI ULLOA  CSN #:                  [id number]  :                       1945  ADMIT DATE:       2025 7:30 AM  DISCH DATE:        2025 1:11 PM  RESPONDING  PROVIDER #:        Cristel Easton MD          QUERY TEXT:    Pt admitted with complicated UTI.  Pt noted to have chronic indwelling urinary   catheter. If possible, please document in the progress notes and discharge   summary if you are evaluating and/or treating any of the following:    The medical record reflects the following:  Risk Factors: Acute on chronic illness, chronic indwelling urinary catheter.  Clinical Indicators: UA positive nitrite, moderate leukocyte esterase. Lactic   acid 2.7 2.3 HR 82 105 103 104  Treatment: Maxipime 2g IV q 12 hr.  Options provided:  -- UTI due to chronic indwelling urinary catheter  -- UTI not due to indwelling urinary catheter  -- Other - I will add my own diagnosis  -- Disagree - Not applicable / Not valid  -- Disagree - Clinically unable to determine / Unknown  -- Refer to Clinical Documentation Reviewer    PROVIDER RESPONSE TEXT:    UTI is due to the chronic indwelling urinary catheter.    Query created by: Yahaira Styles on 2025 3:38 PM      Electronically signed by:  Cristel Easton MD 2025 9:41 AM

## 2025-01-23 ENCOUNTER — TELEPHONE (OUTPATIENT)
Dept: NEUROLOGY | Age: 80
End: 2025-01-23

## 2025-01-23 NOTE — TELEPHONE ENCOUNTER
Pt's daughter called in to cancel his appt for today, she stated he just got out of the hosp and wants to give him a week before he goes in for the appt, she will call back to r/s

## 2025-01-27 NOTE — PROGRESS NOTES
Physician Progress Note      PATIENT:               SAMI ULLOA  CSN #:                  [id number]  :                       1945  ADMIT DATE:       2025 7:30 AM  DISCH DATE:        2025 1:11 PM  RESPONDING  PROVIDER #:        Cristel Easton MD          QUERY TEXT:    Patient admitted with UTI and sepsis. Noted documentation of \"metabolic   encephalopathy, possibly secondary to septic shock from acute cystitis\" in the   discharge summary. The Critical care consult note documents, \"AMS, most   likely acute toxic encephalopathy 2/2 urosepsis.\" If possible, please document   in progress notes and discharge summary if you are evaluating and /or   treating any of the following:    The medical record reflects the following:  Risk Factors: UTI, sepsis, Alzheimer's dementia, urinary catheter  Clinical Indicators: Presented to ED with AMS, vomiting, diarrhea. Progress   note dated  states the patient is awake and alert, confused. UTI, sepsis   with septic shock, 1 BC positive for MRSA.  Treatment: 0.9% NS 1500 IV bolus, Rocephin and Vanco IV, PT OT  Options provided:  -- Acute metabolic encephalopathy confirmed.  -- Acute toxic encephalopathy confirmed.  -- Acute metabolic encephalopathy and acute toxic encephalopathy confirmed  -- Other - I will add my own diagnosis  -- Disagree - Not applicable / Not valid  -- Disagree - Clinically unable to determine / Unknown  -- Refer to Clinical Documentation Reviewer    PROVIDER RESPONSE TEXT:    After study, acute metabolic encephalopathy confirmed.    Query created by: Yahaira Styles on 2025 9:35 AM      Electronically signed by:  Cristel Easton MD 2025 11:52 AM

## 2025-01-31 ENCOUNTER — APPOINTMENT (OUTPATIENT)
Dept: GENERAL RADIOLOGY | Age: 80
DRG: 699 | End: 2025-01-31
Payer: MEDICARE

## 2025-01-31 ENCOUNTER — HOSPITAL ENCOUNTER (INPATIENT)
Age: 80
LOS: 4 days | Discharge: HOME HEALTH | DRG: 699 | End: 2025-02-04
Attending: EMERGENCY MEDICINE | Admitting: INTERNAL MEDICINE
Payer: MEDICARE

## 2025-01-31 ENCOUNTER — APPOINTMENT (OUTPATIENT)
Dept: CT IMAGING | Age: 80
DRG: 699 | End: 2025-01-31
Attending: STUDENT IN AN ORGANIZED HEALTH CARE EDUCATION/TRAINING PROGRAM
Payer: MEDICARE

## 2025-01-31 DIAGNOSIS — I50.20 HFREF (HEART FAILURE WITH REDUCED EJECTION FRACTION) (HCC): ICD-10-CM

## 2025-01-31 DIAGNOSIS — G93.40 ENCEPHALOPATHY, UNSPECIFIED TYPE: ICD-10-CM

## 2025-01-31 DIAGNOSIS — N39.0 URINARY TRACT INFECTION WITHOUT HEMATURIA, SITE UNSPECIFIED: Primary | ICD-10-CM

## 2025-01-31 LAB
ALBUMIN SERPL-MCNC: 3.5 G/DL (ref 3.5–5.2)
ALP SERPL-CCNC: 82 U/L (ref 40–129)
ALT SERPL-CCNC: 8 U/L (ref 0–40)
ANION GAP SERPL CALCULATED.3IONS-SCNC: 9 MMOL/L (ref 7–16)
AST SERPL-CCNC: 16 U/L (ref 0–39)
B PARAP IS1001 DNA NPH QL NAA+NON-PROBE: NOT DETECTED
B PERT DNA SPEC QL NAA+PROBE: NOT DETECTED
BACTERIA URNS QL MICRO: ABNORMAL
BASOPHILS # BLD: 0.02 K/UL (ref 0–0.2)
BASOPHILS NFR BLD: 0 % (ref 0–2)
BILIRUB SERPL-MCNC: 0.4 MG/DL (ref 0–1.2)
BILIRUB UR QL STRIP: NEGATIVE
BUN SERPL-MCNC: 17 MG/DL (ref 6–23)
C PNEUM DNA NPH QL NAA+NON-PROBE: NOT DETECTED
CALCIUM SERPL-MCNC: 9.6 MG/DL (ref 8.6–10.2)
CHLORIDE SERPL-SCNC: 104 MMOL/L (ref 98–107)
CLARITY UR: ABNORMAL
CO2 SERPL-SCNC: 24 MMOL/L (ref 22–29)
COLOR UR: YELLOW
CREAT SERPL-MCNC: 1.1 MG/DL (ref 0.7–1.2)
EOSINOPHIL # BLD: 0.07 K/UL (ref 0.05–0.5)
EOSINOPHILS RELATIVE PERCENT: 1 % (ref 0–6)
ERYTHROCYTE [DISTWIDTH] IN BLOOD BY AUTOMATED COUNT: 14.8 % (ref 11.5–15)
FLUAV RNA NPH QL NAA+NON-PROBE: NOT DETECTED
FLUBV RNA NPH QL NAA+NON-PROBE: NOT DETECTED
GFR, ESTIMATED: 71 ML/MIN/1.73M2
GLUCOSE BLD-MCNC: 129 MG/DL (ref 74–99)
GLUCOSE SERPL-MCNC: 144 MG/DL (ref 74–99)
GLUCOSE UR STRIP-MCNC: NEGATIVE MG/DL
HADV DNA NPH QL NAA+NON-PROBE: NOT DETECTED
HCOV 229E RNA NPH QL NAA+NON-PROBE: NOT DETECTED
HCOV HKU1 RNA NPH QL NAA+NON-PROBE: NOT DETECTED
HCOV NL63 RNA NPH QL NAA+NON-PROBE: NOT DETECTED
HCOV OC43 RNA NPH QL NAA+NON-PROBE: NOT DETECTED
HCT VFR BLD AUTO: 36.3 % (ref 37–54)
HGB BLD-MCNC: 11.6 G/DL (ref 12.5–16.5)
HGB UR QL STRIP.AUTO: ABNORMAL
HMPV RNA NPH QL NAA+NON-PROBE: NOT DETECTED
HPIV1 RNA NPH QL NAA+NON-PROBE: NOT DETECTED
HPIV2 RNA NPH QL NAA+NON-PROBE: NOT DETECTED
HPIV3 RNA NPH QL NAA+NON-PROBE: NOT DETECTED
HPIV4 RNA NPH QL NAA+NON-PROBE: NOT DETECTED
IMM GRANULOCYTES # BLD AUTO: <0.03 K/UL (ref 0–0.58)
IMM GRANULOCYTES NFR BLD: 0 % (ref 0–5)
KETONES UR STRIP-MCNC: NEGATIVE MG/DL
LACTATE BLDV-SCNC: 1.6 MMOL/L (ref 0.5–1.9)
LEUKOCYTE ESTERASE UR QL STRIP: ABNORMAL
LYMPHOCYTES NFR BLD: 0.93 K/UL (ref 1.5–4)
LYMPHOCYTES RELATIVE PERCENT: 18 % (ref 20–42)
M PNEUMO DNA NPH QL NAA+NON-PROBE: NOT DETECTED
MCH RBC QN AUTO: 28.2 PG (ref 26–35)
MCHC RBC AUTO-ENTMCNC: 32 G/DL (ref 32–34.5)
MCV RBC AUTO: 88.1 FL (ref 80–99.9)
MONOCYTES NFR BLD: 0.53 K/UL (ref 0.1–0.95)
MONOCYTES NFR BLD: 10 % (ref 2–12)
NEUTROPHILS NFR BLD: 70 % (ref 43–80)
NEUTS SEG NFR BLD: 3.61 K/UL (ref 1.8–7.3)
NITRITE UR QL STRIP: NEGATIVE
PH UR STRIP: 7 [PH] (ref 5–8)
PLATELET # BLD AUTO: 202 K/UL (ref 130–450)
PMV BLD AUTO: 11.4 FL (ref 7–12)
POTASSIUM SERPL-SCNC: 4 MMOL/L (ref 3.5–5)
PROT SERPL-MCNC: 6.9 G/DL (ref 6.4–8.3)
PROT UR STRIP-MCNC: 100 MG/DL
RBC # BLD AUTO: 4.12 M/UL (ref 3.8–5.8)
RBC #/AREA URNS HPF: ABNORMAL /HPF
RSV RNA NPH QL NAA+NON-PROBE: NOT DETECTED
RV+EV RNA NPH QL NAA+NON-PROBE: NOT DETECTED
SARS-COV-2 RNA NPH QL NAA+NON-PROBE: NOT DETECTED
SODIUM SERPL-SCNC: 137 MMOL/L (ref 132–146)
SP GR UR STRIP: 1.02 (ref 1–1.03)
SPECIMEN DESCRIPTION: NORMAL
TROPONIN I SERPL HS-MCNC: 31 NG/L (ref 0–11)
TROPONIN I SERPL HS-MCNC: 37 NG/L (ref 0–11)
TROPONIN I SERPL HS-MCNC: NORMAL NG/L (ref 0–22)
UROBILINOGEN UR STRIP-ACNC: 0.2 EU/DL (ref 0–1)
WBC #/AREA URNS HPF: ABNORMAL /HPF
WBC OTHER # BLD: 5.2 K/UL (ref 4.5–11.5)

## 2025-01-31 PROCEDURE — 6360000002 HC RX W HCPCS: Performed by: STUDENT IN AN ORGANIZED HEALTH CARE EDUCATION/TRAINING PROGRAM

## 2025-01-31 PROCEDURE — 2580000003 HC RX 258: Performed by: STUDENT IN AN ORGANIZED HEALTH CARE EDUCATION/TRAINING PROGRAM

## 2025-01-31 PROCEDURE — 87086 URINE CULTURE/COLONY COUNT: CPT

## 2025-01-31 PROCEDURE — 87077 CULTURE AEROBIC IDENTIFY: CPT

## 2025-01-31 PROCEDURE — 36415 COLL VENOUS BLD VENIPUNCTURE: CPT

## 2025-01-31 PROCEDURE — 93005 ELECTROCARDIOGRAM TRACING: CPT | Performed by: STUDENT IN AN ORGANIZED HEALTH CARE EDUCATION/TRAINING PROGRAM

## 2025-01-31 PROCEDURE — 74176 CT ABD & PELVIS W/O CONTRAST: CPT

## 2025-01-31 PROCEDURE — 87040 BLOOD CULTURE FOR BACTERIA: CPT

## 2025-01-31 PROCEDURE — 96361 HYDRATE IV INFUSION ADD-ON: CPT

## 2025-01-31 PROCEDURE — 82962 GLUCOSE BLOOD TEST: CPT

## 2025-01-31 PROCEDURE — 2140000000 HC CCU INTERMEDIATE R&B

## 2025-01-31 PROCEDURE — 71045 X-RAY EXAM CHEST 1 VIEW: CPT

## 2025-01-31 PROCEDURE — 99285 EMERGENCY DEPT VISIT HI MDM: CPT

## 2025-01-31 PROCEDURE — 84484 ASSAY OF TROPONIN QUANT: CPT

## 2025-01-31 PROCEDURE — 80053 COMPREHEN METABOLIC PANEL: CPT

## 2025-01-31 PROCEDURE — 96360 HYDRATION IV INFUSION INIT: CPT

## 2025-01-31 PROCEDURE — 81001 URINALYSIS AUTO W/SCOPE: CPT

## 2025-01-31 PROCEDURE — 70450 CT HEAD/BRAIN W/O DYE: CPT

## 2025-01-31 PROCEDURE — 83605 ASSAY OF LACTIC ACID: CPT

## 2025-01-31 PROCEDURE — 0202U NFCT DS 22 TRGT SARS-COV-2: CPT

## 2025-01-31 PROCEDURE — 85025 COMPLETE CBC W/AUTO DIFF WBC: CPT

## 2025-01-31 PROCEDURE — 99223 1ST HOSP IP/OBS HIGH 75: CPT | Performed by: INTERNAL MEDICINE

## 2025-01-31 PROCEDURE — 2580000003 HC RX 258: Performed by: INTERNAL MEDICINE

## 2025-01-31 RX ORDER — POTASSIUM CHLORIDE 1500 MG/1
20 TABLET, EXTENDED RELEASE ORAL DAILY
Status: DISCONTINUED | OUTPATIENT
Start: 2025-02-01 | End: 2025-02-04 | Stop reason: HOSPADM

## 2025-01-31 RX ORDER — 0.9 % SODIUM CHLORIDE 0.9 %
1000 INTRAVENOUS SOLUTION INTRAVENOUS ONCE
Status: COMPLETED | OUTPATIENT
Start: 2025-01-31 | End: 2025-01-31

## 2025-01-31 RX ORDER — SODIUM CHLORIDE 0.9 % (FLUSH) 0.9 %
5-40 SYRINGE (ML) INJECTION PRN
Status: DISCONTINUED | OUTPATIENT
Start: 2025-01-31 | End: 2025-02-04 | Stop reason: HOSPADM

## 2025-01-31 RX ORDER — SODIUM CHLORIDE 0.9 % (FLUSH) 0.9 %
5-40 SYRINGE (ML) INJECTION EVERY 12 HOURS SCHEDULED
Status: DISCONTINUED | OUTPATIENT
Start: 2025-01-31 | End: 2025-02-04 | Stop reason: HOSPADM

## 2025-01-31 RX ORDER — SODIUM CHLORIDE 9 MG/ML
INJECTION, SOLUTION INTRAVENOUS PRN
Status: DISCONTINUED | OUTPATIENT
Start: 2025-01-31 | End: 2025-02-04 | Stop reason: HOSPADM

## 2025-01-31 RX ORDER — POLYETHYLENE GLYCOL 3350 17 G/17G
17 POWDER, FOR SOLUTION ORAL DAILY
Status: DISCONTINUED | OUTPATIENT
Start: 2025-02-01 | End: 2025-02-04 | Stop reason: HOSPADM

## 2025-01-31 RX ORDER — POTASSIUM CHLORIDE 1500 MG/1
40 TABLET, EXTENDED RELEASE ORAL PRN
Status: DISCONTINUED | OUTPATIENT
Start: 2025-01-31 | End: 2025-02-04 | Stop reason: HOSPADM

## 2025-01-31 RX ORDER — OXYBUTYNIN CHLORIDE 5 MG/1
5 TABLET ORAL 2 TIMES DAILY
Status: DISCONTINUED | OUTPATIENT
Start: 2025-01-31 | End: 2025-02-04 | Stop reason: HOSPADM

## 2025-01-31 RX ORDER — LEVOTHYROXINE SODIUM 50 UG/1
25 TABLET ORAL DAILY
Status: DISCONTINUED | OUTPATIENT
Start: 2025-02-01 | End: 2025-02-04 | Stop reason: HOSPADM

## 2025-01-31 RX ORDER — BENZONATATE 100 MG/1
100 CAPSULE ORAL 3 TIMES DAILY PRN
Status: DISCONTINUED | OUTPATIENT
Start: 2025-01-31 | End: 2025-02-04 | Stop reason: HOSPADM

## 2025-01-31 RX ORDER — HYDRALAZINE HYDROCHLORIDE 20 MG/ML
10 INJECTION INTRAMUSCULAR; INTRAVENOUS EVERY 6 HOURS PRN
Status: DISCONTINUED | OUTPATIENT
Start: 2025-01-31 | End: 2025-02-04 | Stop reason: HOSPADM

## 2025-01-31 RX ORDER — CALCIUM CARBONATE 500 MG/1
500 TABLET, CHEWABLE ORAL 3 TIMES DAILY PRN
Status: DISCONTINUED | OUTPATIENT
Start: 2025-01-31 | End: 2025-02-04 | Stop reason: HOSPADM

## 2025-01-31 RX ORDER — ONDANSETRON 2 MG/ML
4 INJECTION INTRAMUSCULAR; INTRAVENOUS EVERY 6 HOURS PRN
Status: DISCONTINUED | OUTPATIENT
Start: 2025-01-31 | End: 2025-02-04 | Stop reason: HOSPADM

## 2025-01-31 RX ORDER — FUROSEMIDE 20 MG/1
20 TABLET ORAL DAILY
Status: DISCONTINUED | OUTPATIENT
Start: 2025-02-01 | End: 2025-02-04 | Stop reason: HOSPADM

## 2025-01-31 RX ORDER — POTASSIUM CHLORIDE 7.45 MG/ML
10 INJECTION INTRAVENOUS PRN
Status: DISCONTINUED | OUTPATIENT
Start: 2025-01-31 | End: 2025-02-04 | Stop reason: HOSPADM

## 2025-01-31 RX ORDER — MIRTAZAPINE 15 MG/1
30 TABLET, FILM COATED ORAL NIGHTLY
Status: DISCONTINUED | OUTPATIENT
Start: 2025-01-31 | End: 2025-02-04 | Stop reason: HOSPADM

## 2025-01-31 RX ORDER — ONDANSETRON 4 MG/1
4 TABLET, ORALLY DISINTEGRATING ORAL EVERY 8 HOURS PRN
Status: DISCONTINUED | OUTPATIENT
Start: 2025-01-31 | End: 2025-02-04 | Stop reason: HOSPADM

## 2025-01-31 RX ORDER — TAMSULOSIN HYDROCHLORIDE 0.4 MG/1
0.4 CAPSULE ORAL DAILY
Status: DISCONTINUED | OUTPATIENT
Start: 2025-02-01 | End: 2025-02-04 | Stop reason: HOSPADM

## 2025-01-31 RX ORDER — ROSUVASTATIN CALCIUM 5 MG/1
10 TABLET, COATED ORAL NIGHTLY
Status: DISCONTINUED | OUTPATIENT
Start: 2025-02-01 | End: 2025-02-04 | Stop reason: HOSPADM

## 2025-01-31 RX ORDER — METOPROLOL SUCCINATE 25 MG/1
12.5 TABLET, EXTENDED RELEASE ORAL DAILY
Status: DISCONTINUED | OUTPATIENT
Start: 2025-02-01 | End: 2025-02-04 | Stop reason: HOSPADM

## 2025-01-31 RX ORDER — IPRATROPIUM BROMIDE AND ALBUTEROL SULFATE 2.5; .5 MG/3ML; MG/3ML
1 SOLUTION RESPIRATORY (INHALATION) EVERY 4 HOURS PRN
Status: DISCONTINUED | OUTPATIENT
Start: 2025-01-31 | End: 2025-02-04 | Stop reason: HOSPADM

## 2025-01-31 RX ORDER — POLYETHYLENE GLYCOL 3350 17 G/17G
17 POWDER, FOR SOLUTION ORAL DAILY PRN
Status: DISCONTINUED | OUTPATIENT
Start: 2025-01-31 | End: 2025-02-04 | Stop reason: HOSPADM

## 2025-01-31 RX ORDER — ENOXAPARIN SODIUM 100 MG/ML
40 INJECTION SUBCUTANEOUS DAILY
Status: DISCONTINUED | OUTPATIENT
Start: 2025-02-01 | End: 2025-01-31

## 2025-01-31 RX ORDER — MAGNESIUM SULFATE IN WATER 40 MG/ML
2000 INJECTION, SOLUTION INTRAVENOUS PRN
Status: DISCONTINUED | OUTPATIENT
Start: 2025-01-31 | End: 2025-02-04 | Stop reason: HOSPADM

## 2025-01-31 RX ORDER — SODIUM CHLORIDE 9 MG/ML
INJECTION, SOLUTION INTRAVENOUS CONTINUOUS
Status: ACTIVE | OUTPATIENT
Start: 2025-01-31 | End: 2025-02-01

## 2025-01-31 RX ADMIN — SODIUM CHLORIDE: 9 INJECTION, SOLUTION INTRAVENOUS at 19:12

## 2025-01-31 RX ADMIN — CEFEPIME 2000 MG: 2 INJECTION, POWDER, FOR SOLUTION INTRAVENOUS at 19:11

## 2025-01-31 RX ADMIN — SODIUM CHLORIDE 1000 ML: 9 INJECTION, SOLUTION INTRAVENOUS at 11:58

## 2025-01-31 ASSESSMENT — PAIN - FUNCTIONAL ASSESSMENT: PAIN_FUNCTIONAL_ASSESSMENT: NONE - DENIES PAIN

## 2025-01-31 NOTE — H&P
Inpatient H&P      PCP:  Sage Easley Jr., MD  Admitting Physician:  No admitting provider for patient encounter.  Consultants:  None at this time   Chief Complaint:    Chief Complaint   Patient presents with    Altered Mental Status       History of Present Illness  Jona Hobbs is a 80 y.o. male who presents to [facility] ER complaining of AMS.    Jona Hobbs has a past medical history that includes HFrEF, dementia, VHD, hypertension, hyperlipidemia, hypothyroidism, SAH, history of PE on Eliquis, history AAA, BPH    Jona presents to ER with altered mental status and concern for hypotension.  He does have a history of dementia.  Yesterday he reported he was not feeling well.  In the ER, he was found to have blood pressure 72/34.  He was found to have malpositioned samuel catheter and UTI. Samuel was replaced in ER and he was started on antibiotics.  During my examination he is now answering questions appropriately.   Discussed patient's case with ED physician.    ER Course  Upon presentation to the ER, routine labwork was performed which revealed troponin 37, glucose 144.  Imaging results are as outlined below in the Imaging section of this note.    Upon arrival to the ER, patient was 72/34.  The patient received 1 L normal salin, cefepime in the emergency room and was admitted to [facility].    Last Hospital Admission -I personally reviewed previous admission from 1/1/2025  AMS (altered mental status)  Active Problems:  HTN (hypertension)  BPH (benign prostatic hyperplasia)  Hypothyroidism  Alzheimer's dementia (HCC)  HFrEF (heart failure with reduced ejection fraction) ([facility])  VHD (valvular heart disease)  HLD (hyperlipidemia)  History of pulmonary embolism  History of subdural hematoma  Resolved Problems:  Acute cystitis  Diarrhea  Septic shock ([facility])     Last Echocardiogram -I personally reviewed previous echocardiogram results from 11/23/2024    Left Ventricle: The left ventricle is moderately dilated

## 2025-01-31 NOTE — ED PROVIDER NOTES
Metapneumovirus PCR Not Detected Not Detected    Rhino/Enterovirus PCR Not Detected Not Detected    Influenza A by PCR Not Detected Not Detected    Influenza B by PCR Not Detected Not Detected    Parainfluenza 1 PCR Not Detected Not Detected    Parainfluenza 2 PCR Not Detected Not Detected    Parainfluenza 3 PCR Not Detected Not Detected    Parainfluenza 4 PCR Not Detected Not Detected    Resp Syncytial Virus PCR Not Detected Not Detected    Bordetella parapertussis by PCR Not Detected Not Detected    B Pertussis by PCR Not Detected Not Detected    Chlamydia pneumoniae By PCR Not Detected Not Detected    Mycoplasma pneumo by PCR Not Detected Not Detected   Lactate, Sepsis   Result Value Ref Range    Lactic Acid, Sepsis 1.6 0.5 - 1.9 mmol/L   CBC with Auto Differential   Result Value Ref Range    WBC 5.2 4.5 - 11.5 k/uL    RBC 4.12 3.80 - 5.80 m/uL    Hemoglobin 11.6 (L) 12.5 - 16.5 g/dL    Hematocrit 36.3 (L) 37.0 - 54.0 %    MCV 88.1 80.0 - 99.9 fL    MCH 28.2 26.0 - 35.0 pg    MCHC 32.0 32.0 - 34.5 g/dL    RDW 14.8 11.5 - 15.0 %    Platelets 202 130 - 450 k/uL    MPV 11.4 7.0 - 12.0 fL    Neutrophils % 70 43.0 - 80.0 %    Lymphocytes % 18 (L) 20.0 - 42.0 %    Monocytes % 10 2.0 - 12.0 %    Eosinophils % 1 0 - 6 %    Basophils % 0 0.0 - 2.0 %    Immature Granulocytes % 0 0.0 - 5.0 %    Neutrophils Absolute 3.61 1.80 - 7.30 k/uL    Lymphocytes Absolute 0.93 (L) 1.50 - 4.00 k/uL    Monocytes Absolute 0.53 0.10 - 0.95 k/uL    Eosinophils Absolute 0.07 0.05 - 0.50 k/uL    Basophils Absolute 0.02 0.00 - 0.20 k/uL    Immature Granulocytes Absolute <0.03 0.00 - 0.58 k/uL   Comprehensive Metabolic Panel w/ Reflex to MG   Result Value Ref Range    Sodium 137 132 - 146 mmol/L    Potassium 4.0 3.5 - 5.0 mmol/L    Chloride 104 98 - 107 mmol/L    CO2 24 22 - 29 mmol/L    Anion Gap 9 7 - 16 mmol/L    Glucose 144 (H) 74 - 99 mg/dL    BUN 17 6 - 23 mg/dL    Creatinine 1.1 0.70 - 1.20 mg/dL    Est, Glom Filt Rate 71 >60

## 2025-01-31 NOTE — CARE COORDINATION
01/31/25 Case Management note: Chart reviewed as patient is a return to the ER within 30 days of discharge. Electronically signed by Safia Person RN CM on 1/31/2025 at 3:30 PM

## 2025-02-01 ENCOUNTER — APPOINTMENT (OUTPATIENT)
Dept: MRI IMAGING | Age: 80
DRG: 699 | End: 2025-02-01
Payer: MEDICARE

## 2025-02-01 LAB
ALBUMIN SERPL-MCNC: 3.1 G/DL (ref 3.5–5.2)
ALP SERPL-CCNC: 73 U/L (ref 40–129)
ALT SERPL-CCNC: 9 U/L (ref 0–40)
AMMONIA PLAS-SCNC: 10 UMOL/L (ref 16–60)
ANION GAP SERPL CALCULATED.3IONS-SCNC: 10 MMOL/L (ref 7–16)
AST SERPL-CCNC: 18 U/L (ref 0–39)
B.E.: -1.3 MMOL/L (ref -3–3)
BILIRUB SERPL-MCNC: 0.5 MG/DL (ref 0–1.2)
BUN SERPL-MCNC: 20 MG/DL (ref 6–23)
CALCIUM SERPL-MCNC: 9.1 MG/DL (ref 8.6–10.2)
CHLORIDE SERPL-SCNC: 106 MMOL/L (ref 98–107)
CHOLEST SERPL-MCNC: 89 MG/DL
CO2 SERPL-SCNC: 23 MMOL/L (ref 22–29)
COHB: 0.3 % (ref 0–1.5)
CREAT SERPL-MCNC: 1 MG/DL (ref 0.7–1.2)
CRITICAL: ABNORMAL
DATE ANALYZED: ABNORMAL
DATE OF COLLECTION: ABNORMAL
ERYTHROCYTE [DISTWIDTH] IN BLOOD BY AUTOMATED COUNT: 15 % (ref 11.5–15)
GFR, ESTIMATED: 76 ML/MIN/1.73M2
GLUCOSE SERPL-MCNC: 111 MG/DL (ref 74–99)
HBA1C MFR BLD: 4.8 % (ref 4–5.6)
HCO3: 21.6 MMOL/L (ref 22–26)
HCT VFR BLD AUTO: 31.6 % (ref 37–54)
HDLC SERPL-MCNC: 35 MG/DL
HGB BLD-MCNC: 10.3 G/DL (ref 12.5–16.5)
HHB: 2.1 % (ref 0–5)
LAB: ABNORMAL
LDLC SERPL CALC-MCNC: 44 MG/DL
Lab: 1050
MAGNESIUM SERPL-MCNC: 1.9 MG/DL (ref 1.6–2.6)
MCH RBC QN AUTO: 28.8 PG (ref 26–35)
MCHC RBC AUTO-ENTMCNC: 32.6 G/DL (ref 32–34.5)
MCV RBC AUTO: 88.3 FL (ref 80–99.9)
METHB: 0.3 % (ref 0–1.5)
MODE: ABNORMAL
O2 SATURATION: 97.9 % (ref 92–98.5)
O2HB: 97.3 % (ref 94–97)
OPERATOR ID: 2067
PATIENT TEMP: 37 C
PCO2: 30.2 MMHG (ref 35–45)
PH BLOOD GAS: 7.47 (ref 7.35–7.45)
PHOSPHATE SERPL-MCNC: 2.5 MG/DL (ref 2.5–4.5)
PLATELET # BLD AUTO: 154 K/UL (ref 130–450)
PMV BLD AUTO: 11.4 FL (ref 7–12)
PO2: 106.6 MMHG (ref 75–100)
POTASSIUM SERPL-SCNC: 4.1 MMOL/L (ref 3.5–5)
PROT SERPL-MCNC: 6.4 G/DL (ref 6.4–8.3)
RBC # BLD AUTO: 3.58 M/UL (ref 3.8–5.8)
SODIUM SERPL-SCNC: 139 MMOL/L (ref 132–146)
SOURCE, BLOOD GAS: ABNORMAL
THB: 10.7 G/DL (ref 11.5–16.5)
TIME ANALYZED: 1104
TRIGL SERPL-MCNC: 50 MG/DL
TSH SERPL DL<=0.05 MIU/L-ACNC: 0.45 UIU/ML (ref 0.27–4.2)
VLDLC SERPL CALC-MCNC: 10 MG/DL
WBC OTHER # BLD: 6.4 K/UL (ref 4.5–11.5)

## 2025-02-01 PROCEDURE — 6370000000 HC RX 637 (ALT 250 FOR IP): Performed by: STUDENT IN AN ORGANIZED HEALTH CARE EDUCATION/TRAINING PROGRAM

## 2025-02-01 PROCEDURE — 2140000000 HC CCU INTERMEDIATE R&B

## 2025-02-01 PROCEDURE — 83735 ASSAY OF MAGNESIUM: CPT

## 2025-02-01 PROCEDURE — 2580000003 HC RX 258: Performed by: STUDENT IN AN ORGANIZED HEALTH CARE EDUCATION/TRAINING PROGRAM

## 2025-02-01 PROCEDURE — 82746 ASSAY OF FOLIC ACID SERUM: CPT

## 2025-02-01 PROCEDURE — 70551 MRI BRAIN STEM W/O DYE: CPT

## 2025-02-01 PROCEDURE — 2500000003 HC RX 250 WO HCPCS: Performed by: INTERNAL MEDICINE

## 2025-02-01 PROCEDURE — 82607 VITAMIN B-12: CPT

## 2025-02-01 PROCEDURE — 2580000003 HC RX 258: Performed by: INTERNAL MEDICINE

## 2025-02-01 PROCEDURE — 99232 SBSQ HOSP IP/OBS MODERATE 35: CPT | Performed by: STUDENT IN AN ORGANIZED HEALTH CARE EDUCATION/TRAINING PROGRAM

## 2025-02-01 PROCEDURE — 83036 HEMOGLOBIN GLYCOSYLATED A1C: CPT

## 2025-02-01 PROCEDURE — 80053 COMPREHEN METABOLIC PANEL: CPT

## 2025-02-01 PROCEDURE — 84100 ASSAY OF PHOSPHORUS: CPT

## 2025-02-01 PROCEDURE — 6360000002 HC RX W HCPCS: Performed by: INTERNAL MEDICINE

## 2025-02-01 PROCEDURE — 82140 ASSAY OF AMMONIA: CPT

## 2025-02-01 PROCEDURE — 84443 ASSAY THYROID STIM HORMONE: CPT

## 2025-02-01 PROCEDURE — 6370000000 HC RX 637 (ALT 250 FOR IP): Performed by: INTERNAL MEDICINE

## 2025-02-01 PROCEDURE — 80061 LIPID PANEL: CPT

## 2025-02-01 PROCEDURE — 85027 COMPLETE CBC AUTOMATED: CPT

## 2025-02-01 PROCEDURE — 82805 BLOOD GASES W/O2 SATURATION: CPT

## 2025-02-01 PROCEDURE — 2700000000 HC OXYGEN THERAPY PER DAY

## 2025-02-01 RX ORDER — SENNA AND DOCUSATE SODIUM 50; 8.6 MG/1; MG/1
2 TABLET, FILM COATED ORAL 2 TIMES DAILY
Status: DISCONTINUED | OUTPATIENT
Start: 2025-02-01 | End: 2025-02-04 | Stop reason: HOSPADM

## 2025-02-01 RX ORDER — SODIUM CHLORIDE 9 MG/ML
INJECTION, SOLUTION INTRAVENOUS CONTINUOUS
Status: ACTIVE | OUTPATIENT
Start: 2025-02-01 | End: 2025-02-01

## 2025-02-01 RX ADMIN — SENNOSIDES AND DOCUSATE SODIUM 2 TABLET: 50; 8.6 TABLET ORAL at 08:05

## 2025-02-01 RX ADMIN — SODIUM CHLORIDE, PRESERVATIVE FREE 10 ML: 5 INJECTION INTRAVENOUS at 09:51

## 2025-02-01 RX ADMIN — OXYBUTYNIN CHLORIDE 5 MG: 5 TABLET ORAL at 01:43

## 2025-02-01 RX ADMIN — SODIUM CHLORIDE, PRESERVATIVE FREE 10 ML: 5 INJECTION INTRAVENOUS at 21:01

## 2025-02-01 RX ADMIN — CEFEPIME 2000 MG: 2 INJECTION, POWDER, FOR SOLUTION INTRAVENOUS at 20:59

## 2025-02-01 RX ADMIN — TAMSULOSIN HYDROCHLORIDE 0.4 MG: 0.4 CAPSULE ORAL at 08:05

## 2025-02-01 RX ADMIN — POLYETHYLENE GLYCOL 3350 17 G: 17 POWDER, FOR SOLUTION ORAL at 08:10

## 2025-02-01 RX ADMIN — OXYBUTYNIN CHLORIDE 5 MG: 5 TABLET ORAL at 23:05

## 2025-02-01 RX ADMIN — SODIUM CHLORIDE: 9 INJECTION, SOLUTION INTRAVENOUS at 10:16

## 2025-02-01 RX ADMIN — LEVOTHYROXINE SODIUM 25 MCG: 0.05 TABLET ORAL at 06:50

## 2025-02-01 RX ADMIN — Medication 3 MG: at 21:00

## 2025-02-01 RX ADMIN — POTASSIUM CHLORIDE 20 MEQ: 1500 TABLET, EXTENDED RELEASE ORAL at 08:05

## 2025-02-01 RX ADMIN — SENNOSIDES AND DOCUSATE SODIUM 2 TABLET: 50; 8.6 TABLET ORAL at 21:01

## 2025-02-01 RX ADMIN — MIRTAZAPINE 30 MG: 15 TABLET, FILM COATED ORAL at 01:43

## 2025-02-01 RX ADMIN — APIXABAN 5 MG: 5 TABLET, FILM COATED ORAL at 01:43

## 2025-02-01 RX ADMIN — OXYBUTYNIN CHLORIDE 5 MG: 5 TABLET ORAL at 08:05

## 2025-02-01 RX ADMIN — MIRTAZAPINE 30 MG: 15 TABLET, FILM COATED ORAL at 21:00

## 2025-02-01 RX ADMIN — SACUBITRIL AND VALSARTAN 1 TABLET: 24; 26 TABLET, FILM COATED ORAL at 01:43

## 2025-02-01 RX ADMIN — APIXABAN 5 MG: 5 TABLET, FILM COATED ORAL at 21:00

## 2025-02-01 RX ADMIN — APIXABAN 5 MG: 5 TABLET, FILM COATED ORAL at 08:05

## 2025-02-01 RX ADMIN — ROSUVASTATIN CALCIUM 10 MG: 5 TABLET, FILM COATED ORAL at 21:00

## 2025-02-02 LAB
ANION GAP SERPL CALCULATED.3IONS-SCNC: 10 MMOL/L (ref 7–16)
BASOPHILS # BLD: 0.01 K/UL (ref 0–0.2)
BASOPHILS NFR BLD: 0 % (ref 0–2)
BUN SERPL-MCNC: 21 MG/DL (ref 6–23)
CALCIUM SERPL-MCNC: 9.2 MG/DL (ref 8.6–10.2)
CHLORIDE SERPL-SCNC: 107 MMOL/L (ref 98–107)
CO2 SERPL-SCNC: 22 MMOL/L (ref 22–29)
CREAT SERPL-MCNC: 0.8 MG/DL (ref 0.7–1.2)
EOSINOPHIL # BLD: 0.21 K/UL (ref 0.05–0.5)
EOSINOPHILS RELATIVE PERCENT: 4 % (ref 0–6)
ERYTHROCYTE [DISTWIDTH] IN BLOOD BY AUTOMATED COUNT: 15 % (ref 11.5–15)
FOLATE SERPL-MCNC: 9.8 NG/ML (ref 4.8–24.2)
GFR, ESTIMATED: 88 ML/MIN/1.73M2
GLUCOSE SERPL-MCNC: 86 MG/DL (ref 74–99)
HCT VFR BLD AUTO: 32.5 % (ref 37–54)
HGB BLD-MCNC: 10.2 G/DL (ref 12.5–16.5)
IMM GRANULOCYTES # BLD AUTO: 0.03 K/UL (ref 0–0.58)
IMM GRANULOCYTES NFR BLD: 1 % (ref 0–5)
LYMPHOCYTES NFR BLD: 0.55 K/UL (ref 1.5–4)
LYMPHOCYTES RELATIVE PERCENT: 9 % (ref 20–42)
MCH RBC QN AUTO: 27.9 PG (ref 26–35)
MCHC RBC AUTO-ENTMCNC: 31.4 G/DL (ref 32–34.5)
MCV RBC AUTO: 89 FL (ref 80–99.9)
MONOCYTES NFR BLD: 0.48 K/UL (ref 0.1–0.95)
MONOCYTES NFR BLD: 8 % (ref 2–12)
NEUTROPHILS NFR BLD: 78 % (ref 43–80)
NEUTS SEG NFR BLD: 4.64 K/UL (ref 1.8–7.3)
PLATELET, FLUORESCENCE: 128 K/UL (ref 130–450)
PMV BLD AUTO: 11.5 FL (ref 7–12)
POTASSIUM SERPL-SCNC: 4.2 MMOL/L (ref 3.5–5)
RBC # BLD AUTO: 3.65 M/UL (ref 3.8–5.8)
RBC # BLD: ABNORMAL 10*6/UL
SODIUM SERPL-SCNC: 139 MMOL/L (ref 132–146)
VIT B12 SERPL-MCNC: 321 PG/ML (ref 211–946)
WBC OTHER # BLD: 5.9 K/UL (ref 4.5–11.5)

## 2025-02-02 PROCEDURE — 2140000000 HC CCU INTERMEDIATE R&B

## 2025-02-02 PROCEDURE — 2700000000 HC OXYGEN THERAPY PER DAY

## 2025-02-02 PROCEDURE — 6370000000 HC RX 637 (ALT 250 FOR IP)

## 2025-02-02 PROCEDURE — 85025 COMPLETE CBC W/AUTO DIFF WBC: CPT

## 2025-02-02 PROCEDURE — 6370000000 HC RX 637 (ALT 250 FOR IP): Performed by: INTERNAL MEDICINE

## 2025-02-02 PROCEDURE — 80048 BASIC METABOLIC PNL TOTAL CA: CPT

## 2025-02-02 PROCEDURE — 99221 1ST HOSP IP/OBS SF/LOW 40: CPT | Performed by: NURSE PRACTITIONER

## 2025-02-02 PROCEDURE — 2580000003 HC RX 258: Performed by: INTERNAL MEDICINE

## 2025-02-02 PROCEDURE — 36415 COLL VENOUS BLD VENIPUNCTURE: CPT

## 2025-02-02 PROCEDURE — 2500000003 HC RX 250 WO HCPCS: Performed by: INTERNAL MEDICINE

## 2025-02-02 PROCEDURE — 6370000000 HC RX 637 (ALT 250 FOR IP): Performed by: STUDENT IN AN ORGANIZED HEALTH CARE EDUCATION/TRAINING PROGRAM

## 2025-02-02 PROCEDURE — 6360000002 HC RX W HCPCS: Performed by: INTERNAL MEDICINE

## 2025-02-02 RX ORDER — LIDOCAINE HYDROCHLORIDE 20 MG/ML
JELLY TOPICAL
Status: COMPLETED
Start: 2025-02-02 | End: 2025-02-02

## 2025-02-02 RX ADMIN — LEVOTHYROXINE SODIUM 25 MCG: 0.05 TABLET ORAL at 05:29

## 2025-02-02 RX ADMIN — APIXABAN 5 MG: 5 TABLET, FILM COATED ORAL at 20:13

## 2025-02-02 RX ADMIN — ROSUVASTATIN CALCIUM 10 MG: 5 TABLET, FILM COATED ORAL at 20:13

## 2025-02-02 RX ADMIN — SODIUM CHLORIDE, PRESERVATIVE FREE 10 ML: 5 INJECTION INTRAVENOUS at 20:13

## 2025-02-02 RX ADMIN — TAMSULOSIN HYDROCHLORIDE 0.4 MG: 0.4 CAPSULE ORAL at 09:24

## 2025-02-02 RX ADMIN — SENNOSIDES AND DOCUSATE SODIUM 2 TABLET: 50; 8.6 TABLET ORAL at 20:13

## 2025-02-02 RX ADMIN — APIXABAN 5 MG: 5 TABLET, FILM COATED ORAL at 09:24

## 2025-02-02 RX ADMIN — SENNOSIDES AND DOCUSATE SODIUM 2 TABLET: 50; 8.6 TABLET ORAL at 09:24

## 2025-02-02 RX ADMIN — POLYETHYLENE GLYCOL 3350 17 G: 17 POWDER, FOR SOLUTION ORAL at 09:24

## 2025-02-02 RX ADMIN — CEFEPIME 2000 MG: 2 INJECTION, POWDER, FOR SOLUTION INTRAVENOUS at 05:29

## 2025-02-02 RX ADMIN — METOPROLOL SUCCINATE 12.5 MG: 25 TABLET, EXTENDED RELEASE ORAL at 09:24

## 2025-02-02 RX ADMIN — OXYBUTYNIN CHLORIDE 5 MG: 5 TABLET ORAL at 20:13

## 2025-02-02 RX ADMIN — SODIUM CHLORIDE, PRESERVATIVE FREE 10 ML: 5 INJECTION INTRAVENOUS at 09:24

## 2025-02-02 RX ADMIN — CEFEPIME 2000 MG: 2 INJECTION, POWDER, FOR SOLUTION INTRAVENOUS at 16:43

## 2025-02-02 RX ADMIN — OXYBUTYNIN CHLORIDE 5 MG: 5 TABLET ORAL at 09:25

## 2025-02-02 RX ADMIN — POTASSIUM CHLORIDE 20 MEQ: 1500 TABLET, EXTENDED RELEASE ORAL at 09:24

## 2025-02-02 RX ADMIN — LIDOCAINE HYDROCHLORIDE: 20 JELLY TOPICAL at 16:01

## 2025-02-02 RX ADMIN — MIRTAZAPINE 30 MG: 15 TABLET, FILM COATED ORAL at 20:13

## 2025-02-02 RX ADMIN — Medication 3 MG: at 20:13

## 2025-02-02 NOTE — CONSULTS
2/2/2025 3:25 PM  Jona Hobbs  [id number]     Chief Complaint:    Malpositioned samuel      History of Present Illness:      The patient is a 80 y.o. male patient who presented to the hospital with complaints of altered mental status. He was found to have a malpositioned samuel catheter in the ER. Per review of their notes this was replaced. Today it was noticed by nursing that he was leaking around the samuel catheter. Attempts to advanced this were unsuccessful and the samuel was removed.   His daughter is present. She states he has had trouble with malpositioned samuel catheters at least 3 times in the last month. She is very frustrated about this. This is the first we are hearing about this. He is known to our practice and a patient of Dr. Jona Harris. He has BPH with chronic retention. He has not been able to get a TURP due to cardiac issues.       Past Medical History:   Diagnosis Date    Bacteremia due to Gram-negative bacteria 08/17/2023    CHF (congestive heart failure) ([facility])     Dementia ([facility])     Electrolyte imbalance     Fever 08/16/2023    Sepsis ([facility]) 08/16/2023    Severe Alzheimer's dementia without behavioral disturbance, psychotic disturbance, mood disturbance, or anxiety ([facility]) 08/17/2023         Past Surgical History:   Procedure Laterality Date    HERNIA REPAIR Bilateral     TOTAL KNEE ARTHROPLASTY      bilateral    ULNAR NERVE REPAIR         Medications Prior to Admission:    Medications Prior to Admission: ipratropium 0.5 mg-albuterol 2.5 mg (DUONEB) 0.5-2.5 (3) MG/3ML SOLN nebulizer solution, Inhale 3 mLs into the lungs every 4 hours as needed for Shortness of Breath  ondansetron (ZOFRAN-ODT) 4 MG disintegrating tablet, Take 1 tablet by mouth every 8 hours as needed for Nausea or Vomiting  polyethylene glycol (GLYCOLAX) 17 g packet, Take 1 packet by mouth daily as needed for Constipation  oxyBUTYnin (DITROPAN) 5 MG tablet, Take 1 tablet by mouth in the morning and at bedtime  ibuprofen 
Department of Internal Medicine  Infectious Diseases   Consult Note      Reason for Consult:  UTI       Requesting Physician:  Dr Beltre         HISTORY OF PRESENT ILLNESS:      Pt is known to me . Discussed with pt's daughter . This is an 79 y.o. male  presents with hx of Dementia, CHF, HTN, SAH, PE, AAA, BPH , urinary retention - chronic indwelling Duong catheter  , UTI presented to the ER with change in mentals status and weakness . Pt 's family stated that the Duong catheter was in the urethra and had to be replaced ( 2 days ago)(  Pt was hypotensive in the ER ( 72/34)  WBC was 5.2 K   UA - cloudy   Pt was started on IV cefepime          Past Medical History:      Past Medical History:   Diagnosis Date    Bacteremia due to Gram-negative bacteria 08/17/2023    CHF (congestive heart failure) ([facility])     Dementia ([facility])     Electrolyte imbalance     Fever 08/16/2023    Sepsis ([facility]) 08/16/2023    Severe Alzheimer's dementia without behavioral disturbance, psychotic disturbance, mood disturbance, or anxiety ([facility]) 08/17/2023         Past Surgical History:      Past Surgical History:   Procedure Laterality Date    HERNIA REPAIR Bilateral     TOTAL KNEE ARTHROPLASTY      bilateral    ULNAR NERVE REPAIR           Current Medications:      Current Facility-Administered Medications   Medication Dose Route Frequency Provider Last Rate Last Admin    sennosides-docusate sodium (SENOKOT-S) 8.6-50 MG tablet 2 tablet  2 tablet Oral BID Anyi Beltre MD   2 tablet at 02/01/25 0805    0.9 % sodium chloride infusion   IntraVENous Continuous Anyi Beltre MD 75 mL/hr at 02/01/25 1016 New Bag at 02/01/25 1016    benzocaine-menthol (CEPACOL SORE THROAT) lozenge 1 lozenge  1 lozenge Oral Q2H PRN Cathie Schneider, DO        benzonatate (TESSALON) capsule 100 mg  100 mg Oral TID PRN Cathie Schneider, DO        calcium carbonate (TUMS) chewable tablet 500 mg  500 mg Oral TID PRN Cathie Schneider, DO        hydrALAZINE 
plan of care with the other IDT members: Palliative Medicine IDT Team, Primary Team, Floor Nurse, Patient, and Family    Time/Communication  Greater than 50% of time spent, total 40 minutes in counseling and coordination of care at the bedside regarding goals of care and diagnosis and prognosis.    Thank you for allowing Palliative Medicine to participate in the care of Jona Hobbs.

## 2025-02-02 NOTE — PLAN OF CARE
Problem: Safety - Adult  Goal: Free from fall injury  Outcome: Progressing     Problem: Chronic Conditions and Co-morbidities  Goal: Patient's chronic conditions and co-morbidity symptoms are monitored and maintained or improved  Outcome: Progressing     Problem: Discharge Planning  Goal: Discharge to home or other facility with appropriate resources  Outcome: Progressing     Problem: Skin/Tissue Integrity  Goal: Skin integrity remains intact  Description: 1.  Monitor for areas of redness and/or skin breakdown  2.  Assess vascular access sites hourly  3.  Every 4-6 hours minimum:  Change oxygen saturation probe site  4.  Every 4-6 hours:  If on nasal continuous positive airway pressure, respiratory therapy assess nares and determine need for appliance change or resting period  Outcome: Progressing     Problem: ABCDS Injury Assessment  Goal: Absence of physical injury  Outcome: Progressing

## 2025-02-03 LAB
ANION GAP SERPL CALCULATED.3IONS-SCNC: 13 MMOL/L (ref 7–16)
BASOPHILS # BLD: 0 K/UL (ref 0–0.2)
BASOPHILS NFR BLD: 0 % (ref 0–2)
BUN SERPL-MCNC: 20 MG/DL (ref 6–23)
CALCIUM SERPL-MCNC: 9.4 MG/DL (ref 8.6–10.2)
CHLORIDE SERPL-SCNC: 110 MMOL/L (ref 98–107)
CO2 SERPL-SCNC: 18 MMOL/L (ref 22–29)
CREAT SERPL-MCNC: 0.8 MG/DL (ref 0.7–1.2)
EKG ATRIAL RATE: 84 BPM
EKG P AXIS: -12 DEGREES
EKG P-R INTERVAL: 154 MS
EKG Q-T INTERVAL: 388 MS
EKG QRS DURATION: 110 MS
EKG QTC CALCULATION (BAZETT): 458 MS
EKG R AXIS: -21 DEGREES
EKG T AXIS: 49 DEGREES
EKG VENTRICULAR RATE: 84 BPM
EOSINOPHIL # BLD: 0.13 K/UL (ref 0.05–0.5)
EOSINOPHILS RELATIVE PERCENT: 3 % (ref 0–6)
ERYTHROCYTE [DISTWIDTH] IN BLOOD BY AUTOMATED COUNT: 14.8 % (ref 11.5–15)
GFR, ESTIMATED: 88 ML/MIN/1.73M2
GLUCOSE SERPL-MCNC: 85 MG/DL (ref 74–99)
HCT VFR BLD AUTO: 29.5 % (ref 37–54)
HGB BLD-MCNC: 9.4 G/DL (ref 12.5–16.5)
LYMPHOCYTES NFR BLD: 0.31 K/UL (ref 1.5–4)
LYMPHOCYTES RELATIVE PERCENT: 6 % (ref 20–42)
MCH RBC QN AUTO: 28.3 PG (ref 26–35)
MCHC RBC AUTO-ENTMCNC: 31.9 G/DL (ref 32–34.5)
MCV RBC AUTO: 88.9 FL (ref 80–99.9)
MICROORGANISM SPEC CULT: ABNORMAL
MONOCYTES NFR BLD: 0.31 K/UL (ref 0.1–0.95)
MONOCYTES NFR BLD: 6 % (ref 2–12)
NEUTROPHILS NFR BLD: 85 % (ref 43–80)
NEUTS SEG NFR BLD: 4.35 K/UL (ref 1.8–7.3)
PLATELET # BLD AUTO: 137 K/UL (ref 130–450)
PMV BLD AUTO: 11.6 FL (ref 7–12)
POTASSIUM SERPL-SCNC: 4 MMOL/L (ref 3.5–5)
RBC # BLD AUTO: 3.32 M/UL (ref 3.8–5.8)
RBC # BLD: ABNORMAL 10*6/UL
RBC # BLD: ABNORMAL 10*6/UL
SERVICE CMNT-IMP: ABNORMAL
SODIUM SERPL-SCNC: 141 MMOL/L (ref 132–146)
SPECIMEN DESCRIPTION: ABNORMAL
WBC OTHER # BLD: 5.1 K/UL (ref 4.5–11.5)

## 2025-02-03 PROCEDURE — 93010 ELECTROCARDIOGRAM REPORT: CPT | Performed by: INTERNAL MEDICINE

## 2025-02-03 PROCEDURE — 6360000002 HC RX W HCPCS: Performed by: INTERNAL MEDICINE

## 2025-02-03 PROCEDURE — 97530 THERAPEUTIC ACTIVITIES: CPT

## 2025-02-03 PROCEDURE — 2500000003 HC RX 250 WO HCPCS: Performed by: INTERNAL MEDICINE

## 2025-02-03 PROCEDURE — 97535 SELF CARE MNGMENT TRAINING: CPT

## 2025-02-03 PROCEDURE — 6370000000 HC RX 637 (ALT 250 FOR IP): Performed by: STUDENT IN AN ORGANIZED HEALTH CARE EDUCATION/TRAINING PROGRAM

## 2025-02-03 PROCEDURE — 36415 COLL VENOUS BLD VENIPUNCTURE: CPT

## 2025-02-03 PROCEDURE — 92610 EVALUATE SWALLOWING FUNCTION: CPT

## 2025-02-03 PROCEDURE — 85025 COMPLETE CBC W/AUTO DIFF WBC: CPT

## 2025-02-03 PROCEDURE — 6370000000 HC RX 637 (ALT 250 FOR IP): Performed by: INTERNAL MEDICINE

## 2025-02-03 PROCEDURE — 97161 PT EVAL LOW COMPLEX 20 MIN: CPT

## 2025-02-03 PROCEDURE — 80048 BASIC METABOLIC PNL TOTAL CA: CPT

## 2025-02-03 PROCEDURE — 2140000000 HC CCU INTERMEDIATE R&B

## 2025-02-03 PROCEDURE — 99232 SBSQ HOSP IP/OBS MODERATE 35: CPT | Performed by: STUDENT IN AN ORGANIZED HEALTH CARE EDUCATION/TRAINING PROGRAM

## 2025-02-03 PROCEDURE — 2580000003 HC RX 258: Performed by: INTERNAL MEDICINE

## 2025-02-03 PROCEDURE — 97165 OT EVAL LOW COMPLEX 30 MIN: CPT

## 2025-02-03 RX ORDER — CEFDINIR 300 MG/1
300 CAPSULE ORAL EVERY 12 HOURS SCHEDULED
Status: DISCONTINUED | OUTPATIENT
Start: 2025-02-03 | End: 2025-02-04 | Stop reason: HOSPADM

## 2025-02-03 RX ADMIN — OXYBUTYNIN CHLORIDE 5 MG: 5 TABLET ORAL at 08:20

## 2025-02-03 RX ADMIN — POLYETHYLENE GLYCOL 3350 17 G: 17 POWDER, FOR SOLUTION ORAL at 08:20

## 2025-02-03 RX ADMIN — OXYBUTYNIN CHLORIDE 5 MG: 5 TABLET ORAL at 20:49

## 2025-02-03 RX ADMIN — LEVOTHYROXINE SODIUM 25 MCG: 0.05 TABLET ORAL at 05:20

## 2025-02-03 RX ADMIN — APIXABAN 5 MG: 5 TABLET, FILM COATED ORAL at 08:19

## 2025-02-03 RX ADMIN — SODIUM CHLORIDE, PRESERVATIVE FREE 10 ML: 5 INJECTION INTRAVENOUS at 08:20

## 2025-02-03 RX ADMIN — SENNOSIDES AND DOCUSATE SODIUM 2 TABLET: 50; 8.6 TABLET ORAL at 08:20

## 2025-02-03 RX ADMIN — POTASSIUM CHLORIDE 20 MEQ: 1500 TABLET, EXTENDED RELEASE ORAL at 08:19

## 2025-02-03 RX ADMIN — CEFEPIME 2000 MG: 2 INJECTION, POWDER, FOR SOLUTION INTRAVENOUS at 05:23

## 2025-02-03 RX ADMIN — METOPROLOL SUCCINATE 12.5 MG: 25 TABLET, EXTENDED RELEASE ORAL at 08:19

## 2025-02-03 RX ADMIN — SODIUM CHLORIDE, PRESERVATIVE FREE 10 ML: 5 INJECTION INTRAVENOUS at 20:49

## 2025-02-03 RX ADMIN — CEFDINIR 300 MG: 300 CAPSULE ORAL at 20:49

## 2025-02-03 RX ADMIN — TAMSULOSIN HYDROCHLORIDE 0.4 MG: 0.4 CAPSULE ORAL at 08:19

## 2025-02-03 RX ADMIN — ROSUVASTATIN CALCIUM 10 MG: 5 TABLET, FILM COATED ORAL at 20:49

## 2025-02-03 RX ADMIN — APIXABAN 5 MG: 5 TABLET, FILM COATED ORAL at 20:49

## 2025-02-03 RX ADMIN — MIRTAZAPINE 30 MG: 15 TABLET, FILM COATED ORAL at 20:49

## 2025-02-03 RX ADMIN — SENNOSIDES AND DOCUSATE SODIUM 2 TABLET: 50; 8.6 TABLET ORAL at 20:49

## 2025-02-03 ASSESSMENT — PAIN SCALES - GENERAL
PAINLEVEL_OUTOF10: 0
PAINLEVEL_OUTOF10: 0

## 2025-02-03 NOTE — PLAN OF CARE
Problem: Safety - Adult  Goal: Free from fall injury  2/3/2025 0947 by Danisha Trujillo RN  Outcome: Progressing  2/2/2025 2309 by Alayna Wood RN  Outcome: Progressing     Problem: Chronic Conditions and Co-morbidities  Goal: Patient's chronic conditions and co-morbidity symptoms are monitored and maintained or improved  Outcome: Progressing     Problem: Discharge Planning  Goal: Discharge to home or other facility with appropriate resources  Outcome: Progressing     Problem: Skin/Tissue Integrity  Goal: Skin integrity remains intact  Description: 1.  Monitor for areas of redness and/or skin breakdown  2.  Assess vascular access sites hourly  3.  Every 4-6 hours minimum:  Change oxygen saturation probe site  4.  Every 4-6 hours:  If on nasal continuous positive airway pressure, respiratory therapy assess nares and determine need for appliance change or resting period  2/2/2025 2309 by Alayna Wood, RN  Outcome: Progressing     Problem: ABCDS Injury Assessment  Goal: Absence of physical injury  2/2/2025 2309 by Alayna Wood, RN  Outcome: Progressing

## 2025-02-03 NOTE — PLAN OF CARE
Problem: Safety - Adult  Goal: Free from fall injury  Outcome: Progressing     Problem: Skin/Tissue Integrity  Goal: Skin integrity remains intact  Description: 1.  Monitor for areas of redness and/or skin breakdown  2.  Assess vascular access sites hourly  3.  Every 4-6 hours minimum:  Change oxygen saturation probe site  4.  Every 4-6 hours:  If on nasal continuous positive airway pressure, respiratory therapy assess nares and determine need for appliance change or resting period  Outcome: Progressing     Problem: ABCDS Injury Assessment  Goal: Absence of physical injury  Outcome: Progressing

## 2025-02-04 VITALS
TEMPERATURE: 97.8 F | WEIGHT: 198 LBS | HEART RATE: 67 BPM | SYSTOLIC BLOOD PRESSURE: 132 MMHG | RESPIRATION RATE: 16 BRPM | BODY MASS INDEX: 28.35 KG/M2 | DIASTOLIC BLOOD PRESSURE: 63 MMHG | HEIGHT: 70 IN | OXYGEN SATURATION: 99 %

## 2025-02-04 PROCEDURE — 6370000000 HC RX 637 (ALT 250 FOR IP): Performed by: STUDENT IN AN ORGANIZED HEALTH CARE EDUCATION/TRAINING PROGRAM

## 2025-02-04 PROCEDURE — 99285 EMERGENCY DEPT VISIT HI MDM: CPT

## 2025-02-04 PROCEDURE — 2500000003 HC RX 250 WO HCPCS: Performed by: INTERNAL MEDICINE

## 2025-02-04 PROCEDURE — 97535 SELF CARE MNGMENT TRAINING: CPT

## 2025-02-04 PROCEDURE — 6370000000 HC RX 637 (ALT 250 FOR IP): Performed by: INTERNAL MEDICINE

## 2025-02-04 PROCEDURE — 92610 EVALUATE SWALLOWING FUNCTION: CPT

## 2025-02-04 PROCEDURE — 2700000000 HC OXYGEN THERAPY PER DAY

## 2025-02-04 PROCEDURE — 97530 THERAPEUTIC ACTIVITIES: CPT

## 2025-02-04 PROCEDURE — 99232 SBSQ HOSP IP/OBS MODERATE 35: CPT | Performed by: STUDENT IN AN ORGANIZED HEALTH CARE EDUCATION/TRAINING PROGRAM

## 2025-02-04 RX ORDER — CEFDINIR 300 MG/1
300 CAPSULE ORAL EVERY 12 HOURS SCHEDULED
Qty: 8 CAPSULE | Refills: 0 | Status: SHIPPED | OUTPATIENT
Start: 2025-02-04 | End: 2025-02-08

## 2025-02-04 RX ORDER — PSYLLIUM HUSK/CALCIUM CARB 1 G-60 MG
1 CAPSULE ORAL DAILY
Qty: 15 CAPSULE | Refills: 0 | Status: SHIPPED | OUTPATIENT
Start: 2025-02-04 | End: 2025-02-04 | Stop reason: HOSPADM

## 2025-02-04 RX ADMIN — LEVOTHYROXINE SODIUM 25 MCG: 0.05 TABLET ORAL at 08:37

## 2025-02-04 RX ADMIN — OXYBUTYNIN CHLORIDE 5 MG: 5 TABLET ORAL at 20:32

## 2025-02-04 RX ADMIN — OXYBUTYNIN CHLORIDE 5 MG: 5 TABLET ORAL at 08:37

## 2025-02-04 RX ADMIN — TAMSULOSIN HYDROCHLORIDE 0.4 MG: 0.4 CAPSULE ORAL at 08:36

## 2025-02-04 RX ADMIN — CEFDINIR 300 MG: 300 CAPSULE ORAL at 20:32

## 2025-02-04 RX ADMIN — POLYETHYLENE GLYCOL 3350 17 G: 17 POWDER, FOR SOLUTION ORAL at 08:44

## 2025-02-04 RX ADMIN — APIXABAN 5 MG: 5 TABLET, FILM COATED ORAL at 20:32

## 2025-02-04 RX ADMIN — MIRTAZAPINE 30 MG: 15 TABLET, FILM COATED ORAL at 20:32

## 2025-02-04 RX ADMIN — ROSUVASTATIN CALCIUM 10 MG: 5 TABLET, FILM COATED ORAL at 20:32

## 2025-02-04 RX ADMIN — SENNOSIDES AND DOCUSATE SODIUM 2 TABLET: 50; 8.6 TABLET ORAL at 08:44

## 2025-02-04 RX ADMIN — APIXABAN 5 MG: 5 TABLET, FILM COATED ORAL at 08:36

## 2025-02-04 RX ADMIN — METOPROLOL SUCCINATE 12.5 MG: 25 TABLET, EXTENDED RELEASE ORAL at 08:36

## 2025-02-04 RX ADMIN — CEFDINIR 300 MG: 300 CAPSULE ORAL at 08:37

## 2025-02-04 RX ADMIN — POTASSIUM CHLORIDE 20 MEQ: 1500 TABLET, EXTENDED RELEASE ORAL at 08:36

## 2025-02-04 RX ADMIN — SODIUM CHLORIDE, PRESERVATIVE FREE 10 ML: 5 INJECTION INTRAVENOUS at 08:37

## 2025-02-04 NOTE — ACP (ADVANCE CARE PLANNING)
Advance Care Planning   The patient has the following advanced directives on file:  Advance Directives       Power of  Living Will ACP-Advance Directive ACP-Power of     Filed on 11/21/24 Filed on 11/21/24 Filed Filed            The patient has appointed the following active healthcare agents:    Primary Decision Maker: Stacie Carroll - Child - [phone]    The Patient has the following current code status:    Code Status: DNR-CCA    FENG Garcia  2/4/2025

## 2025-02-04 NOTE — DISCHARGE SUMMARY
every 12 hours for 8 doses  Qty: 8 capsule, Refills: 0                Details   sacubitril-valsartan (ENTRESTO) 24-26 MG per tablet Take 0.5 tablets by mouth daily  Qty: 60 tablet, Refills: 3                Details   ipratropium 0.5 mg-albuterol 2.5 mg (DUONEB) 0.5-2.5 (3) MG/3ML SOLN nebulizer solution Inhale 3 mLs into the lungs every 4 hours as needed for Shortness of Breath      ondansetron (ZOFRAN-ODT) 4 MG disintegrating tablet Take 1 tablet by mouth every 8 hours as needed for Nausea or Vomiting      polyethylene glycol (GLYCOLAX) 17 g packet Take 1 packet by mouth daily as needed for Constipation      oxyBUTYnin (DITROPAN) 5 MG tablet Take 1 tablet by mouth in the morning and at bedtime      mirtazapine (REMERON) 30 MG tablet Take 1 tablet by mouth nightly      metoprolol succinate (TOPROL XL) 25 MG extended release tablet Take 0.5 tablets by mouth daily      rosuvastatin (CRESTOR) 10 MG tablet Take 1 tablet by mouth daily      apixaban (ELIQUIS) 5 MG TABS tablet Take 1 tablet by mouth 2 times daily  Qty: 60 tablet, Refills: 0      levothyroxine (SYNTHROID) 25 MCG tablet Take 1 tablet by mouth Daily      tamsulosin (FLOMAX) 0.4 MG capsule Take 1 capsule by mouth daily             Time Spent on discharge [id number] in the examination, evaluation, counseling and review of medications and discharge plan.      Signed:    Kike Cruz MD   2/4/2025

## 2025-02-04 NOTE — PLAN OF CARE
Problem: Safety - Adult  Goal: Free from fall injury  2/3/2025 2219 by Edith Martinez RN  Outcome: Progressing  2/3/2025 0947 by Danisha Trujillo RN  Outcome: Progressing     Problem: Chronic Conditions and Co-morbidities  Goal: Patient's chronic conditions and co-morbidity symptoms are monitored and maintained or improved  2/3/2025 2219 by Edith Martinez RN  Outcome: Progressing  2/3/2025 0947 by Danisha Trujillo RN  Outcome: Progressing     Problem: Discharge Planning  Goal: Discharge to home or other facility with appropriate resources  2/3/2025 2219 by Edith Martinez RN  Outcome: Progressing  2/3/2025 0947 by Danisha Trujillo RN  Outcome: Progressing     Problem: Skin/Tissue Integrity  Goal: Skin integrity remains intact  Outcome: Progressing     Problem: ABCDS Injury Assessment  Goal: Absence of physical injury  Outcome: Progressing

## 2025-02-04 NOTE — CARE COORDINATION
Called patient's daughter to confirm discharge plan. She reports the patient ambulates with a walker at home. Discussed PT/OT evals in depth, informed her patient is not ambulating at this time. She states patient was previously at [facility], was in co-pay days and she is unable to afford a nursing facility right now, so the patient will have to return home. Discussed home care services and needed DME; she would like a referral made to [facility], no DME needed; she has a walker and wheelchair at home. Ambulance transport set up for 6p via [facility]. Call Dia at [facility] to provide referral, no answer; left referral on confidential voicemail; pending acceptance.    The Plan for Transition of Care is related to the following treatment goals: discharge planning    The Patient and/or patient representative Jona Hobbs was provided with a choice of provider and agrees with the discharge plan. [x] Yes [] No    Freedom of choice list was provided with basic dialogue that supports the patient's individualized plan of care/goals, treatment preferences and shares the quality data associated with the providers. [x] Yes [] No     Electronically signed by DANIEL Ramírez on 2/4/2025 at 3:31 PM

## 2025-02-04 NOTE — CARE COORDINATION
Social Work/ Case Management Transition of Care Planning (Mary Power, [phone]):     Pt presented to the hospital for AMS. SW met with Pt at bedside who was confused and not communicating well verbally. SW called Pt's daughter, no answer, VM left requesting return call to discuss discharge plan. SW spoke with Pt's RN who stated she has not seen/spoken to Pt's daughter. Per chart Pt lives in a one story house with 3 steps to enter with his daughter, Stacie. Pts PCP is Dr. Easley and pharmacy is [facility] in [place]. Pt has FWW, WC, Cpap and O2 through [facility]. Pt has hx with [facility] and SNF hx with [facility] the [facility]. Pt is on 3L NC. Pt pending SLP. UNA/MARTHA will follow.   Mary Power, FENG  2/4/2025

## 2025-02-05 ENCOUNTER — HOSPITAL ENCOUNTER (INPATIENT)
Age: 80
LOS: 1 days | Discharge: NURSING, REHAB OR LONG-TERM CARE | DRG: 690 | End: 2025-02-06
Attending: EMERGENCY MEDICINE | Admitting: FAMILY MEDICINE
Payer: MEDICARE

## 2025-02-05 DIAGNOSIS — R62.7 FAILURE TO THRIVE IN ADULT: Primary | ICD-10-CM

## 2025-02-05 PROBLEM — R26.2 AMBULATORY DYSFUNCTION: Status: ACTIVE | Noted: 2025-02-05

## 2025-02-05 LAB
GLUCOSE BLD-MCNC: 94 MG/DL (ref 74–99)
MICROORGANISM SPEC CULT: NORMAL
MICROORGANISM SPEC CULT: NORMAL
SERVICE CMNT-IMP: NORMAL
SERVICE CMNT-IMP: NORMAL
SPECIMEN DESCRIPTION: NORMAL
SPECIMEN DESCRIPTION: NORMAL

## 2025-02-05 PROCEDURE — G0378 HOSPITAL OBSERVATION PER HR: HCPCS

## 2025-02-05 PROCEDURE — 97161 PT EVAL LOW COMPLEX 20 MIN: CPT

## 2025-02-05 PROCEDURE — 2500000003 HC RX 250 WO HCPCS: Performed by: NURSE PRACTITIONER

## 2025-02-05 PROCEDURE — 1200000000 HC SEMI PRIVATE

## 2025-02-05 PROCEDURE — 97535 SELF CARE MNGMENT TRAINING: CPT

## 2025-02-05 PROCEDURE — 97165 OT EVAL LOW COMPLEX 30 MIN: CPT

## 2025-02-05 PROCEDURE — 82962 GLUCOSE BLOOD TEST: CPT

## 2025-02-05 PROCEDURE — 6370000000 HC RX 637 (ALT 250 FOR IP): Performed by: NURSE PRACTITIONER

## 2025-02-05 PROCEDURE — 99222 1ST HOSP IP/OBS MODERATE 55: CPT | Performed by: NURSE PRACTITIONER

## 2025-02-05 PROCEDURE — 97530 THERAPEUTIC ACTIVITIES: CPT

## 2025-02-05 RX ORDER — ONDANSETRON 4 MG/1
4 TABLET, ORALLY DISINTEGRATING ORAL EVERY 8 HOURS PRN
Status: DISCONTINUED | OUTPATIENT
Start: 2025-02-05 | End: 2025-02-06 | Stop reason: HOSPADM

## 2025-02-05 RX ORDER — LEVOTHYROXINE SODIUM 50 UG/1
25 TABLET ORAL DAILY
Status: DISCONTINUED | OUTPATIENT
Start: 2025-02-05 | End: 2025-02-06 | Stop reason: HOSPADM

## 2025-02-05 RX ORDER — TAMSULOSIN HYDROCHLORIDE 0.4 MG/1
0.4 CAPSULE ORAL DAILY
Status: DISCONTINUED | OUTPATIENT
Start: 2025-02-05 | End: 2025-02-06 | Stop reason: HOSPADM

## 2025-02-05 RX ORDER — POTASSIUM CHLORIDE 7.45 MG/ML
10 INJECTION INTRAVENOUS PRN
Status: DISCONTINUED | OUTPATIENT
Start: 2025-02-05 | End: 2025-02-06 | Stop reason: HOSPADM

## 2025-02-05 RX ORDER — POTASSIUM CHLORIDE 1500 MG/1
40 TABLET, EXTENDED RELEASE ORAL PRN
Status: DISCONTINUED | OUTPATIENT
Start: 2025-02-05 | End: 2025-02-06 | Stop reason: HOSPADM

## 2025-02-05 RX ORDER — METOPROLOL SUCCINATE 25 MG/1
12.5 TABLET, EXTENDED RELEASE ORAL DAILY
Status: DISCONTINUED | OUTPATIENT
Start: 2025-02-05 | End: 2025-02-06 | Stop reason: HOSPADM

## 2025-02-05 RX ORDER — ROSUVASTATIN CALCIUM 10 MG/1
10 TABLET, COATED ORAL DAILY
Status: DISCONTINUED | OUTPATIENT
Start: 2025-02-05 | End: 2025-02-06 | Stop reason: HOSPADM

## 2025-02-05 RX ORDER — SODIUM CHLORIDE 9 MG/ML
INJECTION, SOLUTION INTRAVENOUS PRN
Status: DISCONTINUED | OUTPATIENT
Start: 2025-02-05 | End: 2025-02-06 | Stop reason: HOSPADM

## 2025-02-05 RX ORDER — IPRATROPIUM BROMIDE AND ALBUTEROL SULFATE 2.5; .5 MG/3ML; MG/3ML
1 SOLUTION RESPIRATORY (INHALATION) EVERY 4 HOURS PRN
Status: DISCONTINUED | OUTPATIENT
Start: 2025-02-05 | End: 2025-02-06 | Stop reason: HOSPADM

## 2025-02-05 RX ORDER — ONDANSETRON 2 MG/ML
4 INJECTION INTRAMUSCULAR; INTRAVENOUS EVERY 6 HOURS PRN
Status: DISCONTINUED | OUTPATIENT
Start: 2025-02-05 | End: 2025-02-06 | Stop reason: HOSPADM

## 2025-02-05 RX ORDER — SODIUM CHLORIDE 0.9 % (FLUSH) 0.9 %
5-40 SYRINGE (ML) INJECTION PRN
Status: DISCONTINUED | OUTPATIENT
Start: 2025-02-05 | End: 2025-02-06 | Stop reason: HOSPADM

## 2025-02-05 RX ORDER — OXYBUTYNIN CHLORIDE 5 MG/1
5 TABLET ORAL 2 TIMES DAILY
Status: DISCONTINUED | OUTPATIENT
Start: 2025-02-05 | End: 2025-02-06 | Stop reason: HOSPADM

## 2025-02-05 RX ORDER — ACETAMINOPHEN 325 MG/1
650 TABLET ORAL EVERY 6 HOURS PRN
Status: DISCONTINUED | OUTPATIENT
Start: 2025-02-05 | End: 2025-02-06 | Stop reason: HOSPADM

## 2025-02-05 RX ORDER — SODIUM CHLORIDE 0.9 % (FLUSH) 0.9 %
5-40 SYRINGE (ML) INJECTION EVERY 12 HOURS SCHEDULED
Status: DISCONTINUED | OUTPATIENT
Start: 2025-02-05 | End: 2025-02-06 | Stop reason: HOSPADM

## 2025-02-05 RX ORDER — MIRTAZAPINE 15 MG/1
30 TABLET, FILM COATED ORAL NIGHTLY
Status: DISCONTINUED | OUTPATIENT
Start: 2025-02-05 | End: 2025-02-06 | Stop reason: HOSPADM

## 2025-02-05 RX ORDER — ACETAMINOPHEN 650 MG/1
650 SUPPOSITORY RECTAL EVERY 6 HOURS PRN
Status: DISCONTINUED | OUTPATIENT
Start: 2025-02-05 | End: 2025-02-06 | Stop reason: HOSPADM

## 2025-02-05 RX ORDER — CEFDINIR 300 MG/1
300 CAPSULE ORAL EVERY 12 HOURS SCHEDULED
Status: DISCONTINUED | OUTPATIENT
Start: 2025-02-05 | End: 2025-02-06 | Stop reason: HOSPADM

## 2025-02-05 RX ORDER — MAGNESIUM SULFATE IN WATER 40 MG/ML
2000 INJECTION, SOLUTION INTRAVENOUS PRN
Status: DISCONTINUED | OUTPATIENT
Start: 2025-02-05 | End: 2025-02-06 | Stop reason: HOSPADM

## 2025-02-05 RX ORDER — POLYETHYLENE GLYCOL 3350 17 G/17G
17 POWDER, FOR SOLUTION ORAL DAILY PRN
Status: DISCONTINUED | OUTPATIENT
Start: 2025-02-05 | End: 2025-02-06 | Stop reason: HOSPADM

## 2025-02-05 RX ADMIN — SODIUM CHLORIDE, PRESERVATIVE FREE 10 ML: 5 INJECTION INTRAVENOUS at 09:24

## 2025-02-05 RX ADMIN — Medication 3 MG: at 21:28

## 2025-02-05 RX ADMIN — ACETAMINOPHEN 650 MG: 325 TABLET ORAL at 21:28

## 2025-02-05 RX ADMIN — CEFDINIR 300 MG: 300 CAPSULE ORAL at 09:19

## 2025-02-05 RX ADMIN — APIXABAN 5 MG: 5 TABLET, FILM COATED ORAL at 21:29

## 2025-02-05 RX ADMIN — ACETAMINOPHEN 650 MG: 325 TABLET ORAL at 02:05

## 2025-02-05 RX ADMIN — SODIUM CHLORIDE, PRESERVATIVE FREE 10 ML: 5 INJECTION INTRAVENOUS at 21:29

## 2025-02-05 RX ADMIN — MIRTAZAPINE 30 MG: 15 TABLET, FILM COATED ORAL at 21:30

## 2025-02-05 RX ADMIN — ROSUVASTATIN 10 MG: 10 TABLET, FILM COATED ORAL at 09:19

## 2025-02-05 RX ADMIN — LEVOTHYROXINE SODIUM 25 MCG: 0.05 TABLET ORAL at 06:50

## 2025-02-05 RX ADMIN — OXYBUTYNIN CHLORIDE 5 MG: 5 TABLET ORAL at 22:18

## 2025-02-05 RX ADMIN — CEFDINIR 300 MG: 300 CAPSULE ORAL at 21:29

## 2025-02-05 RX ADMIN — Medication 3 MG: at 02:05

## 2025-02-05 RX ADMIN — OXYBUTYNIN CHLORIDE 5 MG: 5 TABLET ORAL at 09:20

## 2025-02-05 RX ADMIN — APIXABAN 5 MG: 5 TABLET, FILM COATED ORAL at 09:19

## 2025-02-05 RX ADMIN — TAMSULOSIN HYDROCHLORIDE 0.4 MG: 0.4 CAPSULE ORAL at 09:19

## 2025-02-05 RX ADMIN — METOPROLOL SUCCINATE 12.5 MG: 25 TABLET, EXTENDED RELEASE ORAL at 09:18

## 2025-02-05 RX ADMIN — SACUBITRIL AND VALSARTAN 0.5 TABLET: 24; 26 TABLET, FILM COATED ORAL at 09:19

## 2025-02-05 RX ADMIN — PSYLLIUM HUSK 1 PACKET: 3.4 POWDER ORAL at 09:19

## 2025-02-05 ASSESSMENT — PAIN DESCRIPTION - FREQUENCY
FREQUENCY: CONTINUOUS
FREQUENCY: CONTINUOUS

## 2025-02-05 ASSESSMENT — PAIN DESCRIPTION - PAIN TYPE
TYPE: CHRONIC PAIN
TYPE: CHRONIC PAIN

## 2025-02-05 ASSESSMENT — PAIN DESCRIPTION - LOCATION
LOCATION: GENERALIZED

## 2025-02-05 ASSESSMENT — PAIN DESCRIPTION - DESCRIPTORS
DESCRIPTORS: ACHING;DISCOMFORT
DESCRIPTORS: ACHING;HEAVINESS
DESCRIPTORS: ACHING;DISCOMFORT

## 2025-02-05 ASSESSMENT — PAIN SCALES - GENERAL
PAINLEVEL_OUTOF10: 6
PAINLEVEL_OUTOF10: 3
PAINLEVEL_OUTOF10: 5
PAINLEVEL_OUTOF10: 5
PAINLEVEL_OUTOF10: 3

## 2025-02-05 ASSESSMENT — PAIN - FUNCTIONAL ASSESSMENT
PAIN_FUNCTIONAL_ASSESSMENT: PREVENTS OR INTERFERES SOME ACTIVE ACTIVITIES AND ADLS
PAIN_FUNCTIONAL_ASSESSMENT: PREVENTS OR INTERFERES SOME ACTIVE ACTIVITIES AND ADLS
PAIN_FUNCTIONAL_ASSESSMENT: NONE - DENIES PAIN

## 2025-02-05 ASSESSMENT — PAIN DESCRIPTION - ORIENTATION
ORIENTATION: RIGHT;LEFT;ANTERIOR
ORIENTATION: RIGHT;LEFT;ANTERIOR;DISTAL;INNER
ORIENTATION: RIGHT;LEFT;ANTERIOR

## 2025-02-05 ASSESSMENT — PAIN DESCRIPTION - ONSET
ONSET: ON-GOING
ONSET: ON-GOING

## 2025-02-05 ASSESSMENT — PAIN SCALES - WONG BAKER
WONGBAKER_NUMERICALRESPONSE: NO HURT
WONGBAKER_NUMERICALRESPONSE: NO HURT

## 2025-02-05 ASSESSMENT — LIFESTYLE VARIABLES: HOW OFTEN DO YOU HAVE A DRINK CONTAINING ALCOHOL: NEVER

## 2025-02-05 NOTE — PROGRESS NOTES
Brief internal medicine progress note:     Patient seen and examined, H&P and billing done on this calendar day by admitting service.     I did also see and examine patient.  Readmitted for placement.      Assessment:  Failure to thrive, needs placement  UTI, on [employer]mn[employer]  HTN  Severe AS, MR, MS - deemed poor surgical candidate for TAVR previously  Chronic HFrEF      Plan:  Continue current medications  Awaiting placement  Stable for discharge when placement obtained      Electronically signed by Jona Dominique MD on 2/5/2025 at 11:38 AM

## 2025-02-05 NOTE — PLAN OF CARE
Problem: Chronic Conditions and Co-morbidities  Goal: Patient's chronic conditions and co-morbidity symptoms are monitored and maintained or improved  Outcome: Progressing     Problem: Discharge Planning  Goal: Discharge to home or other facility with appropriate resources  Outcome: Progressing  Flowsheets (Taken 2/5/2025 0233)  Discharge to home or other facility with appropriate resources: Identify barriers to discharge with patient and caregiver     Problem: Pain  Goal: Verbalizes/displays adequate comfort level or baseline comfort level  Outcome: Progressing  Flowsheets (Taken 2/5/2025 0145)  Verbalizes/displays adequate comfort level or baseline comfort level: Encourage patient to monitor pain and request assistance     Problem: Skin/Tissue Integrity  Goal: Skin integrity remains intact  Description: 1.  Monitor for areas of redness and/or skin breakdown  2.  Assess vascular access sites hourly  3.  Every 4-6 hours minimum:  Change oxygen saturation probe site  4.  Every 4-6 hours:  If on nasal continuous positive airway pressure, respiratory therapy assess nares and determine need for appliance change or resting period  Outcome: Progressing  Flowsheets (Taken 2/5/2025 0217)  Skin Integrity Remains Intact: Monitor for areas of redness and/or skin breakdown     Problem: ABCDS Injury Assessment  Goal: Absence of physical injury  Outcome: Progressing  Flowsheets (Taken 2/5/2025 0217)  Absence of Physical Injury: Implement safety measures based on patient assessment     Problem: Safety - Adult  Goal: Free from fall injury  Outcome: Progressing  Flowsheets (Taken 2/5/2025 0224)  Free From Fall Injury: Instruct family/caregiver on patient safety

## 2025-02-05 NOTE — DISCHARGE INSTR - COC
Continuity of Care Form    Patient Name: Jona Hobbs   :  1945  MRN:  [id number]    Admit date:  2025  Discharge date:  25    Code Status Order: DNR-CCA   Advance Directives:   Advance Care Flowsheet Documentation             Admitting Physician:  Jona Nagel DO  PCP: Sage Easley Jr., MD    Discharging Nurse: ***  Discharging Hospital Unit/Room#: 5407/5407-B  Discharging Unit Phone Number: [phone]    Emergency Contact:   Extended Emergency Contact Information  Primary Emergency Contact: Stacie Carroll  Address: [address] [place] [address]  Home Phone: [phone]  Relation: Child  Preferred language: English   needed? No    Past Surgical History:  Past Surgical History:   Procedure Laterality Date    HERNIA REPAIR Bilateral     TOTAL KNEE ARTHROPLASTY      bilateral    ULNAR NERVE REPAIR         Immunization History:   Immunization History   Administered Date(s) Administered    TD 5LF, TENIVAC, (age 7y+), IM, 0.5mL 2023       Active Problems:  Patient Active Problem List   Diagnosis Code    Fall at home W19.XXXA, Y92.009    Urinary tract infection due to Pseudomonas aeruginosa N39.0, B96.5    Pyelonephritis N12    HTN (hypertension) I10    BPH (benign prostatic hyperplasia) N40.0    Hypothyroidism E03.9    Sleep apnea G47.30    Bacteremia due to Gram-negative bacteria R78.81    Alzheimer's dementia (HCC) G30.9, F02.80    Severe protein-calorie malnutrition (HCC) E43    Duong catheter problem (HCC) T83.9XXA    Urinary retention R33.9    Acute prostatitis N41.0    Confusion R41.0    Chronic diastolic congestive heart failure (HCC) I50.32    COVID-19 U07.1    Failure to thrive in adult R62.7    Altered mental status R41.82    CAD (coronary artery disease) I25.10    Ischemic cardiomyopathy I25.5    Cognitive deficit S/P CVA (cerebrovascular accident) I69.319    Left carotid artery stenosis I65.22    Aphasia determined by  examination R47.01    Bilateral leg weakness R29.898    TIA (transient ischemic attack) G45.9    Stenosis of internal carotid artery with cerebral infarction ([facility]) I63.239    HFrEF (heart failure with reduced ejection fraction) ([facility]) I50.20    VHD (valvular heart disease) I38    HLD (hyperlipidemia) E78.5    History of pulmonary embolism Z86.711    H/O subarachnoid hemorrhage Z86.79    H/O: CVA (cerebrovascular accident) Z86.73    UTI (urinary tract infection) N39.0    Ambulatory dysfunction R26.2       Isolation/Infection:   Isolation            No Isolation          Patient Infection Status       Infection Onset Added Last Indicated Last Indicated By Review Planned Expiration Resolved Resolved By    None active    Resolved    COVID-19 23 COVID-19, Rapid   24 Infection     COVID-19 23 COVID-19, Rapid   23 Infection                        Nurse Assessment:  Last Vital Signs: /65   Pulse 73   Temp 97.2 °F (36.2 °C) (Temporal)   Resp 18   Ht 1.778 m (5' 10\")   Wt 89.8 kg (198 lb)   SpO2 95%   BMI 28.41 kg/m²     Last documented pain score (0-10 scale): Pain Level: 6  Last Weight:   Wt Readings from Last 1 Encounters:   25 89.8 kg (198 lb)     Mental Status:  disoriented and alert    IV Access:  - None    Nursing Mobility/ADLs:  Walking   Dependent  Transfer  Dependent  Bathing  Assisted  Dressing  Assisted  Toileting  Assisted  Feeding  Assisted  Med Admin  Assisted  Med Delivery   whole    Wound Care Documentation and Therapy:  Wound 25 Buttocks Medial;Right (Active)   Dressing Status Clean;Dry 25 0815   Wound Cleansed Not Cleansed 25 0815   Dressing/Treatment Open to air 25 0815   Wound Length (cm) 1 cm 25 08   Wound Width (cm) 1 cm 25 08   Wound Surface Area (cm^2) 1 cm^2 25 08   Wound Assessment Non-blanchable erythema 25 0800   Drainage Amount None (dry) 25 0815

## 2025-02-05 NOTE — CARE COORDINATION
Transition of Care:   Admit for placement.  2/5 Discharged Home with [facility].  According to the daughter, pt could not get off the [facility].  So, transport brought him back to the hospital.  Labs noted.  Met with pt in room & spoke with the pt's daughter Stacie over the phone.  On RA.  The daughter reports that she cannot care for him at home; she is agreeable to [facility].  1st [facility], 2nd [facility] in [place], 3rd [facility] in [place], 4th [facility].  Referral made to Liaison at [facility].  Would require a pre-Cert, HENS, & transportation at discharge.   CM/SW to follow.    10:41  Left a VM for the Liaison at [facility] in [place] regarding referral.   Referral also made to [facility] in [place].  Spoke with Liaison at [facility] who will review the chart.       11:02  [facility] in [place] is not IN-Network, declined.     12:12  Face-sheet & Ambulance Form in Transport Envelope placed on Soft Chart.    14:26  Message left for Liaison at [facility] regarding referral.  Referral made to [facility], Liaison to review.    15:29  Spoke with the pt's daughter.  Pt has already had 7 [facility] stays this past year, according to the Liaison at [facility].  Checo Boyd at [facility] in [place] is willing to try for a pre-Cert, but pt is already into his co-pay days.  The daughter is willing to pay, Liaison to start the pre-Cert.      15:47  HENS completed, placed in Transport Envelope on Soft Chart.          Case Management Assessment  Initial Evaluation    Date/Time of Evaluation: 2/5/2025 10:36 AM  Assessment Completed by: Nguyen Bautista    If patient is discharged prior to next notation, then this note serves as note for discharge by case management.    Patient Name: Jona Hobbs                   YOB: 1945  Diagnosis: Failure to thrive in adult [R62.7]  Ambulatory dysfunction [R26.2]                   Date / Time: 2/5/2025 12:17 AM    Patient Admission Status: Inpatient   Readmission Risk

## 2025-02-05 NOTE — PROGRESS NOTES
CLINICAL PHARMACY NOTE: MEDS TO BEDS    Total # of Prescriptions Filled: 2   The following medications were delivered to the patient:  Cefdinir 300 mg  Entresto 24-26 mg    Additional Documentation:     Delivered meds to Madeleine FARMER to put in the med room

## 2025-02-05 NOTE — PROGRESS NOTES
4 Eyes Skin Assessment     NAME:  Jona Hobbs  YOB: 1945  MEDICAL RECORD NUMBER:  [id number]    The patient is being assessed for  Admission    I agree that at least one RN has performed a thorough Head to Toe Skin Assessment on the patient. ALL assessment sites listed below have been assessed.      Areas assessed by both nurses:    Head, Face, Ears, Shoulders, Back, Chest, Arms, Elbows, Hands, Sacrum. Buttock, Coccyx, Ischium, Legs. Feet and Heels, and Under Medical Devices         Does the Patient have a Wound? Yes wound(s) were present on assessment. LDA wound assessment was Initiated and completed by RN       Lg Prevention initiated by RN: Yes  Wound Care Orders initiated by RN: No    Pressure Injury (Stage 3,4, Unstageable, DTI, NWPT, and Complex wounds) if present, place Wound referral order by RN under : Yes    New Ostomies, if present place, Ostomy referral order under : No     Nurse 1 eSignature: Electronically signed by Suleman Infante RN on 2/5/25 at 3:11 AM EST    **SHARE this note so that the co-signing nurse can place an eSignature**    Nurse 2 eSignature: Electronically signed by Carlos Colon RN on 2/5/25 at 3:12 AM EST

## 2025-02-05 NOTE — PROGRESS NOTES
SUBJECTIVE:    Afebrile  Urine clear  Family wishes to continue with samuel rather than having s/p    OBJECTIVE:    /60   Pulse 65   Temp 97.8 °F (36.6 °C) (Temporal)   Resp 16   Ht 1.778 m (5' 10\")   Wt 89.8 kg (198 lb)   SpO2 100%   BMI 28.41 kg/m²     PHYSICAL EXAMINATION:  Skin: dry, without rashes  Respirations: non-labored, intact  Abdomen: soft, non-tender, non-distended      Lab Results   Component Value Date    WBC 5.1 02/03/2025    HGB 9.4 (L) 02/03/2025    HCT 29.5 (L) 02/03/2025    MCV 88.9 02/03/2025     02/03/2025       Lab Results   Component Value Date    CREATININE 0.8 02/03/2025       No results found for: \"PSA\"    REVIEW OF SYSTEMS:    [unfilled]    PAST FAMILY HISTORY:  No family history on file.  PAST SOCIAL HISTORY:    Social History     Socioeconomic History    Marital status:    Tobacco Use    Smoking status: Never    Smokeless tobacco: Never   Vaping Use    Vaping status: Never Used   Substance and Sexual Activity    Alcohol use: Never    Drug use: Never     Social Determinants of Health     Food Insecurity: Patient Unable To Answer (2/1/2025)    Hunger Vital Sign     Worried About Running Out of Food in the Last Year: Patient unable to answer     Ran Out of Food in the Last Year: Patient unable to answer   Transportation Needs: Patient Unable To Answer (2/1/2025)    PRAPARE - Transportation     Lack of Transportation (Medical): Patient unable to answer     Lack of Transportation (Non-Medical): Patient unable to answer   Housing Stability: Patient Unable To Answer (2/1/2025)    Housing Stability Vital Sign     Unable to Pay for Housing in the Last Year: Patient unable to answer     Number of Times Moved in the Last Year: 1     Homeless in the Last Year: Patient unable to answer       Scheduled Meds:   cefdinir  300 mg Oral 2 times per day    sennosides-docusate sodium  2 tablet Oral BID    
       [facility]    SYNOPSIS: Patient admitted on 2025 for UTI (urinary tract infection)  Jona Hobbs has a past medical history that includes HFrEF, dementia, VHD, hypertension, hyperlipidemia, hypothyroidism, SAH, history of PE on Eliquis, history AAA, BPH     Jona presents to ER with altered mental status and concern for hypotension.  He does have a history of dementia.  Yesterday he reported he was not feeling well.  In the ER, he was found to have blood pressure 72/34.  He was found to have malpositioned samuel catheter and UTI. Samuel was replaced in ER and he was started on antibiotics.  During my examination he is now answering questions appropriately.   Upon presentation to the ER, routine labwork was performed which revealed troponin 37, glucose 144.  Imaging results are as outlined below in the Imaging section of this note.    Upon arrival to the ER, patient was 72/34.  The patient received 1 L normal salin, cefepime in the emergency room and was admitted to [facility].         2/1  Pt is drowsy; follows commands but does not answer the questions; d/w bedside RN who stated pt was alert in the morning    Normal  ammonia and b12, abg  Mri head- no acute findings  ID consult for complicated UTI; on cefepime currently   swallow evaluation    2/2-AAOx3 today; complains of generalized weakness    2/3-pt follows commands but not communicating; d/w RN; we will re-evaluate in the pm; PT score is 9  SUBJECTIVE:  Stable overnight. No other overnight issues reported.   Patient seen and examined  Records reviewed.           Temp (24hrs), Av °F (36.7 °C), Min:97.5 °F (36.4 °C), Max:98.7 °F (37.1 °C)    DIET: ADULT DIET; Regular  CODE: DNR-CCA    Intake/Output Summary (Last 24 hours) at 2/3/2025 1257  Last data filed at 2/3/2025 0851  Gross per 24 hour   Intake 1140 ml   Output 1975 ml   Net -835 ml       Review of Systems  All bolded are positive; please see HPI  General: 
       [facility]    SYNOPSIS: Patient admitted on 2025 for UTI (urinary tract infection)  Jona Hobbs has a past medical history that includes HFrEF, dementia, VHD, hypertension, hyperlipidemia, hypothyroidism, SAH, history of PE on Eliquis, history AAA, BPH     Jona presents to ER with altered mental status and concern for hypotension.  He does have a history of dementia.  Yesterday he reported he was not feeling well.  In the ER, he was found to have blood pressure 72/34.  He was found to have malpositioned samuel catheter and UTI. Samuel was replaced in ER and he was started on antibiotics.  During my examination he is now answering questions appropriately.   Upon presentation to the ER, routine labwork was performed which revealed troponin 37, glucose 144.  Imaging results are as outlined below in the Imaging section of this note.    Upon arrival to the ER, patient was 72/34.  The patient received 1 L normal salin, cefepime in the emergency room and was admitted to [facility].           Pt is drowsy; follows commands but does not answer the questions; d/w bedside RN who stated pt was alert in the morning    Check ammonia and b12, abg  Mri head  ID consult for complicated UTI; on cefepime currently   swallow evaluation  SUBJECTIVE:  Stable overnight. No other overnight issues reported.   Patient seen and examined  Records reviewed.           Temp (24hrs), Av.8 °F (37.1 °C), Min:97.6 °F (36.4 °C), Max:99.5 °F (37.5 °C)    DIET: ADULT DIET; Regular  CODE: DNR-CCA    Intake/Output Summary (Last 24 hours) at 2025 1049  Last data filed at 2025 0708  Gross per 24 hour   Intake --   Output 610 ml   Net -610 ml       Review of Systems  All bolded are positive; please see HPI  General:  Fever, chills, diaphoresis, fatigue, malaise, night sweats, weight loss  Psychological:  Anxiety, disorientation, hallucinations.  ENT:  Epistaxis, headaches, vertigo, visual 
    2/3/2025 4:57 PM  Jona Hobbs  [id number]    Subjective:    Sleeping   No distress  Samuel with franca urine     Review of Systems  Unable to obtain due to confusion       Scheduled Meds:   cefdinir  300 mg Oral 2 times per day    sennosides-docusate sodium  2 tablet Oral BID    sodium chloride flush  5-40 mL IntraVENous 2 times per day    apixaban  5 mg Oral BID    [Held by provider] furosemide  20 mg Oral Daily    levothyroxine  25 mcg Oral Daily    metoprolol succinate  12.5 mg Oral Daily    mirtazapine  30 mg Oral Nightly    oxyBUTYnin  5 mg Oral BID    potassium chloride  20 mEq Oral Daily    rosuvastatin  10 mg Oral Nightly    [Held by provider] sacubitril-valsartan  1 tablet Oral BID    tamsulosin  0.4 mg Oral Daily    polyethylene glycol  17 g Oral Daily       Objective:  Vitals:    02/03/25 1552   BP: (!) 126/52   Pulse: 67   Resp:    Temp: 98.4 °F (36.9 °C)   SpO2: 100%         Allergies: Hydrocodone-acetaminophen, Amoxicillin-pot clavulanate, Erythromycin, Hydrocodone, Cipro [ciprofloxacin hcl], Quinapril, and Tylenol [acetaminophen]    General Appearance: sleeping, no distress   Skin: no rash or erythema  Head: normocephalic and atraumatic  Pulmonary/Chest: normal air movement, no respiratory distress  Abdomen: soft, non-tender, non-distended  Genitourinary: samuel with franca urine   Extremities: no cyanosis, clubbing or edema         Labs:     Recent Labs     02/03/25  0523      K 4.0   *   CO2 18*   BUN 20   CREATININE 0.8   GLUCOSE 85   CALCIUM 9.4       Lab Results   Component Value Date/Time    HGB 9.4 02/03/2025 05:23 AM    HCT 29.5 02/03/2025 05:23 AM       No results found for: \"PSA\"      Assessment/Plan:  BPH  Chronic urinary retention with chronic Samuel  Malpositioned Samuel catheter  UTI     Urine culture positive for E. Coli  Continue the antibiotics per ID  Continue the samuel   Change every 4 weeks  Discussed with daughter about potential SPT in the future. At this time the 
  Physician Progress Note      PATIENT:               SAMI ULLOA  CSN #:                  [id number]  :                       1945  ADMIT DATE:       2025 11:15 AM  DISCH DATE:        2025 10:25 PM  RESPONDING  PROVIDER #:        Kike Cruz MD          QUERY TEXT:    Pt admitted with UTI and has a chronic samuel catheter.  Noted documentation of   CAUTI on -2/3 by ordered ID consultant.  If possible, please document in   progress notes and discharge summary:    The medical record reflects the following:  Risk Factors: Chronic samuel catheter, malpositioned samuel catheter, age 80  Clinical Indicators: Per ID progress note on  \"...CAUTI - E coli -   misplaced Samuel catheter...\"  Treatment: IV Cefepime then PO Omnicef, samuel replaced, ID and urology   consults  Options provided:  -- CAUTI confirmed present on admission  -- UTI not due to samuel catheter  -- Other - I will add my own diagnosis  -- Disagree - Not applicable / Not valid  -- Disagree - Clinically unable to determine / Unknown  -- Refer to Clinical Documentation Reviewer    PROVIDER RESPONSE TEXT:    The diagnosis of CAUTI was confirmed as present on admission.    Query created by: Miryam Dhillon on 2/3/2025 3:34 PM      Electronically signed by:  Kike Cruz MD 2025 8:44 AM          
  SPEECH/LANGUAGE PATHOLOGY  CLINICAL ASSESSMENT OF SWALLOWING FUNCTION   and PLAN OF CARE      PATIENT NAME:  Jona Hobbs  (male)     MRN:  [id number]    :  1945  (80 y.o.)  STATUS:  Inpatient: Room 6411/6411-B    TODAY'S DATE:  2/3/2025  ORDER DATE, DESCRIPTION AND REFERRING PROVIDER:   25 Mansi  SLP eval and treat  Start:  25 1100,   End:  25 1100,   ONE TIME,   Standing Count:  1 Occurrences,   Anyi Givens,      REASON FOR REFERRAL: dysphagia   EVALUATING THERAPIST: BRI Pichardo                 RESULTS:    DYSPHAGIA DIAGNOSIS:   Oral dysphagia due to cognitive deficit      DIET RECOMMENDATIONS:  Easy to chew consistency solids (IDDSI level 7, transitional) with  thin liquids (IDDSI level 0)     FEEDING RECOMMENDATIONS:     Assistance level:  Set-up is required for all oral intake      Compensatory strategies recommended: No strategies are recommended at this time      Discussed recommendations with:   floor nurse (patient nurse and charge nurse unavailable)    SPEECH THERAPY  PLAN OF CARE   The dysphagia POC is established based on physician order, dysphagia diagnosis and results of clinical assessment     Dysphagia therapy is not recommended     Conditions Requiring Skilled Therapeutic Intervention for dysphagia:    Not applicable    Specific dysphagia interventions to include:     Not applicable no therapy warranted     Specific instructions for next treatment:  not applicable   Patient Treatment Goals:    Short Term Goals:  Not applicable no therapy warranted     Long Term Goals:   Not applicable no therapy warranted      Patient/family Goal:    not applicable                      ADMITTING DIAGNOSIS: UTI (urinary tract infection) [N39.0]  Urinary tract infection without hematuria, site unspecified [N39.0]  Encephalopathy, unspecified type [G93.40]    VISIT DIAGNOSIS:   Visit Diagnoses         Codes    Encephalopathy, unspecified type     G93.40      
Antibiotic Extended Infusion Policy     This patient is on medication that requires renal, weight, and/or indication dose adjustment.      Date Body Weight IBW  Adjusted BW SCr  CrCl Dialysis status BMI   1/31/2025 83.9 kg (185 lb) Ideal body weight: 73 kg (160 lb 15 oz)  Adjusted ideal body weight: 77.4 kg (170 lb 9 oz) Serum creatinine: 1.1 mg/dL 01/31/25 1137  Estimated creatinine clearance: 53 mL/min N/a Body mass index is 26.54 kg/m².       Pharmacy has dose-adjusted the following medication(s):    Ordered Medication: Cefepime 2000mg q12h     Order Changed/converted to: Cefepime 2000mg q24h    These changes were made per protocol according to the [facility]   Automatic Extended Infusion Dose Adjustment Policy.     *Please note this dose may need readjusted if patient's condition changes.    Please contact pharmacy with any questions regarding these changes.    Miryam Monteiro RPH  1/31/2025  6:33 PM    
Bathed pt and provided samuel cath care, during which it was noted that the pt was in considerable discomfort r/t to samuel placement.   Closer review of CT of abd/pelvis shows that the catheter placed in the ER is in the urethra rather than the bladder. Dr. eBltre was phoned immediately who provided phone orders to consult Dr Jona Paulino for urology concerns and to discontinue the samuel.   
Department of Internal Medicine  Infectious Diseases  Progress Note      C/C: CA  UTI       Denies fever or chills   Pain +  Afebrile       Current Facility-Administered Medications   Medication Dose Route Frequency Provider Last Rate Last Admin    cefdinir (OMNICEF) capsule 300 mg  300 mg Oral 2 times per day Arley Kevin MD   300 mg at 02/04/25 0837    sennosides-docusate sodium (SENOKOT-S) 8.6-50 MG tablet 2 tablet  2 tablet Oral BID Anyi Beltre MD   2 tablet at 02/04/25 0844    benzocaine-menthol (CEPACOL SORE THROAT) lozenge 1 lozenge  1 lozenge Oral Q2H PRN Cathie Schneider, DO        benzonatate (TESSALON) capsule 100 mg  100 mg Oral TID PRN Cathie Schneider, DO        calcium carbonate (TUMS) chewable tablet 500 mg  500 mg Oral TID PRN Cathie Schneider, DO        hydrALAZINE (APRESOLINE) injection 10 mg  10 mg IntraVENous Q6H PRN Cathie Schneider, DO        melatonin tablet 3 mg  3 mg Oral Nightly PRN Cathie Schneider, DO   3 mg at 02/02/25 2013    Polyvinyl Alcohol-Povidone PF (REFRESH) 1.4-0.6 % ophthalmic solution 1 drop  1 drop Both Eyes PRN Cathie Schneider, DO        sodium chloride (OCEAN, BABY AYR) 0.65 % nasal spray 1 spray  1 spray Each Nostril PRN Cathie Schneider,         sodium chloride flush 0.9 % injection 5-40 mL  5-40 mL IntraVENous 2 times per day Cathie Schneider, DO   10 mL at 02/04/25 0837    sodium chloride flush 0.9 % injection 5-40 mL  5-40 mL IntraVENous PRN Cathie Schneider, DO        0.9 % sodium chloride infusion   IntraVENous PRN Cathie Schneider, DO        potassium chloride (KLOR-CON M) extended release tablet 40 mEq  40 mEq Oral PRN Cathie Schneider, DO        Or    potassium bicarb-citric acid (EFFER-K) effervescent tablet 40 mEq  40 mEq Oral PRN Cathie Schneider, DO        Or    potassium chloride 10 mEq/100 mL IVPB (Peripheral Line)  10 mEq IntraVENous PRN Cathie Schneider DO        magnesium sulfate 2000 mg in 50 mL IVPB premix  2,000 
Department of Internal Medicine  Infectious Diseases  Progress Note      C/C: CA  UTI       Denies fever or chills   Pain +  Afebrile       Current Facility-Administered Medications   Medication Dose Route Frequency Provider Last Rate Last Admin    sennosides-docusate sodium (SENOKOT-S) 8.6-50 MG tablet 2 tablet  2 tablet Oral BID Anyi Beltre MD   2 tablet at 02/03/25 0820    ceFEPIme (MAXIPIME) 2,000 mg in sodium chloride 0.9 % 100 mL IVPB ([id number])  2,000 mg IntraVENous Q12H Arley Kevin MD   Stopped at 02/03/25 0909    benzocaine-menthol (CEPACOL SORE THROAT) lozenge 1 lozenge  1 lozenge Oral Q2H PRN Cathie Schneider, DO        benzonatate (TESSALON) capsule 100 mg  100 mg Oral TID PRN Cathie Schneider, DO        calcium carbonate (TUMS) chewable tablet 500 mg  500 mg Oral TID PRN Cathie Schneider, DO        hydrALAZINE (APRESOLINE) injection 10 mg  10 mg IntraVENous Q6H PRN Cathie Schneider, DO        melatonin tablet 3 mg  3 mg Oral Nightly PRN Cathie Schneider, DO   3 mg at 02/02/25 2013    Polyvinyl Alcohol-Povidone PF (REFRESH) 1.4-0.6 % ophthalmic solution 1 drop  1 drop Both Eyes PRN Cathie Schneider, DO        sodium chloride (OCEAN, BABY AYR) 0.65 % nasal spray 1 spray  1 spray Each Nostril PRN Cathie Schneider,         sodium chloride flush 0.9 % injection 5-40 mL  5-40 mL IntraVENous 2 times per day Cathie Schneider, DO   10 mL at 02/03/25 0820    sodium chloride flush 0.9 % injection 5-40 mL  5-40 mL IntraVENous PRN Cathie Schneider, DO        0.9 % sodium chloride infusion   IntraVENous PRN Cathie Schneider, DO        potassium chloride (KLOR-CON M) extended release tablet 40 mEq  40 mEq Oral PRN Cathie Schneider, DO        Or    potassium bicarb-citric acid (EFFER-K) effervescent tablet 40 mEq  40 mEq Oral PRN Kori Schneidertany M, DO        Or    potassium chloride 10 mEq/100 mL IVPB (Peripheral Line)  10 mEq IntraVENous PRN Baljinderher, Cathie M, DO        magnesium 
Home meds sent with transport   
MRI screening form needs filled out, thank you!  
Occupational Therapy  OCCUPATIONAL THERAPY INITIAL EVALUATION    [facility]  [address] [place], [place]      Date:2/3/2025                                                Patient Name: Jona Hobbs  MRN: [id number]  : 1945  Room: [address] [place] [address]    Evaluating OT: Buster Mendez OTR/L #[id number]     Referring Provider: Cathie Schneider DO   Specific Provider Orders/Date: OT eval and treat 25    Diagnosis: UTI (urinary tract infection) [N39.0]  Urinary tract infection without hematuria, site unspecified [N39.0]  Encephalopathy, unspecified type [G93.40]   Pt admitted to hospital with AMS and hypotension from home. + UTI    Pertinent Medical History:  has a past medical history of Bacteremia due to Gram-negative bacteria, CHF (congestive heart failure) ([facility]), Dementia ([facility]), Electrolyte imbalance, Fever, Sepsis ([facility]), and Severe Alzheimer's dementia without behavioral disturbance, psychotic disturbance, mood disturbance, or anxiety ([facility]).       Precautions:  Fall Risk, cognition, O2, continuous pulse ox, bed alarm, TAPS    Assessment of current deficits    [x] Functional mobility  [x]ADLs  [x] Strength               [x]Cognition    [x] Functional transfers   [x] IADLs         [x] Safety Awareness   [x]Endurance    [] Fine Coordination              [x] Balance      [] Vision/perception   []Sensation     []Gross Motor Coordination  [] ROM  [] Delirium                   [] Motor Control     OT PLAN OF CARE   OT POC based on physician orders, patient diagnosis and results of clinical assessment    Frequency/Duration 1-5 days/wk for 2 weeks PRN   Specific OT Treatment Interventions to include:   * Instruction/training on adapted ADL techniques and AE recommendations to increase functional independence within precautions       * Training on energy conservation strategies, correct breathing pattern and techniques to improve independence/tolerance for self-care 
Occupational Therapy  OT BEDSIDE TREATMENT NOTE   [facility]  [address] [place], [place]      Date:2025  Patient Name: Jona Hobbs  MRN: [id number]  : 1945  Room: [address] [place] [address]     Evaluating OT: Buster Mendez OTR/L #[id number]      Referring Provider: Cathie Schneider DO   Specific Provider Orders/Date: OT eval and treat 25     Diagnosis: UTI (urinary tract infection) [N39.0]  Urinary tract infection without hematuria, site unspecified [N39.0]  Encephalopathy, unspecified type [G93.40]   Pt admitted to hospital with AMS and hypotension from home. + UTI     Pertinent Medical History:  has a past medical history of Bacteremia due to Gram-negative bacteria, CHF (congestive heart failure) ([facility]), Dementia ([facility]), Electrolyte imbalance, Fever, Sepsis ([facility]), and Severe Alzheimer's dementia without behavioral disturbance, psychotic disturbance, mood disturbance, or anxiety ([facility]).         Precautions:  Fall Risk, cognition, O2, continuous pulse ox, bed alarm, TAPS     Assessment of current deficits    [x] Functional mobility          [x]ADLs           [x] Strength                  [x]Cognition    [x] Functional transfers        [x] IADLs         [x] Safety Awareness   [x]Endurance    [] Fine Coordination                        [x] Balance      [] Vision/perception   []Sensation      []Gross Motor Coordination            [] ROM           [] Delirium                   [] Motor Control      OT PLAN OF CARE   OT POC based on physician orders, patient diagnosis and results of clinical assessment     Frequency/Duration 1-5 days/wk for 2 weeks PRN   Specific OT Treatment Interventions to include:   * Instruction/training on adapted ADL techniques and AE recommendations to increase functional independence within precautions       * Training on energy conservation strategies, correct breathing pattern and techniques to improve independence/tolerance for 
Patient received the anointing and communion today. by father Orlando Morrow  
Physical Therapy  Physical Therapy Initial Assessment     Name: Jona Hobbs  : 1945  MRN: [id number]      Date of Service: 2/3/2025    Evaluating PT:  Marley Wood PT, DPT [id number]    Room #:  6411/6411-B  Diagnosis:  UTI (urinary tract infection) [N39.0]  Urinary tract infection without hematuria, site unspecified [N39.0]  Encephalopathy, unspecified type [G93.40]  PMHx/PSHx:    Past Medical History:   Diagnosis Date    Bacteremia due to Gram-negative bacteria 2023    CHF (congestive heart failure) ([facility])     Dementia ([facility])     Electrolyte imbalance     Fever 2023    Sepsis ([facility]) 2023    Severe Alzheimer's dementia without behavioral disturbance, psychotic disturbance, mood disturbance, or anxiety ([facility]) 2023      Procedure/Surgery:  none this admission  Precautions:  Falls, cognition  Equipment Needs:  TBD    SUBJECTIVE:    Pt minimally verbal this date - unable to obtain PLOF. Per chart, pt lives with daughter in a 1 story home with 3 ROSLYN and 1 HR. Pt used WW for ambulation PTA.    OBJECTIVE:   Initial Evaluation  Date: 2/3/25 Treatment Short Term/ Long Term   Goals   AM-PAC 6 Clicks      Was pt agreeable to Eval/treatment? yes     Does pt have pain? States, \"Ow\" with BLE movement     Bed Mobility  Rolling: MaxA  Supine to sit: ModA x2  Sit to supine: ModA x2  Scooting: ModA x2  Rolling: SBA  Supine to sit: SBA  Sit to supine: SBA  Scooting: SBA   Transfers Sit to stand: ModA  Stand to sit: ModA  Stand pivot: NT  Sit to stand: SBA  Stand to sit: SBA  Stand pivot: SBA with WW   Ambulation    NT, pt unable to maintain static standing >10 seconds  >50 feet with WW SBA   Stair negotiation: ascended and descended  NT  3 steps with 1 rail [place]   ROM BUE:  Defer to OT note  BLE:  grossly WFL     Strength BUE:  Defer to OT note  BLE:  grossly 3/5, unable to formally assess d/t limited command follow  WFL   Balance Sitting EOB:  SBA  Dynamic Standing:  NT  Sitting EOB:  Supervision 
Spoke with Patients daughter and updated on status.     Laine Lucio RN    
Urology consult called to office. Pt is known to Dr Jona Clark. Dr Dixon Harris is on for [facility] today.     Specified to ans [facility] that a call back is desired.   
disorientation, hallucinations.  ENT:  Epistaxis, headaches, vertigo, visual changes.  Cardiovascular:  Chest pain, irregular heartbeats, palpitations, paroxysmal nocturnal dyspnea.  Respiratory:  Shortness of breath, coughing, sputum production, hemoptysis, wheezing, orthopnea.  Gastrointestinal:  Nausea, vomiting, diarrhea, heartburn, constipation, abdominal pain, hematemesis, hematochezia, melena, acholic stools  Genito-Urinary:  Dysuria, urgency, frequency, hematuria  Musculoskeletal:  Joint pain, joint stiffness, joint swelling, muscle pain  Neurology:  Headache, focal neurological deficits, weakness, numbness, paresthesia  Derm:  Rashes, ulcers, excoriations, bruising  Extremities:  Decreased ROM, peripheral edema, mottling      OBJECTIVE:    BP (!) 150/77   Pulse 83   Temp 97.8 °F (36.6 °C) (Temporal)   Resp 21   Ht 1.778 m (5' 10\")   Wt 89 kg (196 lb 3.4 oz)   SpO2 100%   BMI 28.15 kg/m²     General appearance:  awake, alert, and oriented to person, place, time, and purpose; appears stated age and cooperative; no apparent distress no labored breathing  HEENT:  Conjunctivae/corneas clear.   Neck: Supple. No jugular venous distention.   Respiratory: symmetrical; clear to auscultation bilaterally; no wheezes; no rhonchi; no rales  Cardiovascular: rhythm regular; rate controlled; no murmurs  Abdomen: Soft, nontender, nondistended  Extremities:  peripheral pulses present; no peripheral edema; no ulcers  Musculoskeletal: No clubbing, cyanosis, no bilateral lower extremity edema. Brisk capillary refill.   Skin:  No rashes  on visible skin  Neurologic: awake, alert and following commands     ASSESSMENT and PLAN:    Altered mental status- resolving  2/2 E coli UTI  Normal abg b12 and ammonia  MRI head - no acute findings  Neuro checks  NPO for now; swallow eval  Continue ivf  Continue cefepime    E coli UTI,   complicated-   with samuel catheter.   Samuel repositioned.   On  cefepime.   Follow cultures  Consult 
      DVT Prophylaxis [] Lovenox, []  Heparin, [] SCDs, [] Ambulation   GI Prophylaxis [] PPI,  [] H2 Blocker,  [] Carafate,  [] Diet/Tube Feeds   Disposition Patient requires continued admission due to awaiting PT OT and placement   MDM [] Low, [] Moderate,[]  High  Patient's risk as above due to        Medications:  REVIEWED DAILY    Infusion Medications    sodium chloride       Scheduled Medications    cefdinir  300 mg Oral 2 times per day    sennosides-docusate sodium  2 tablet Oral BID    sodium chloride flush  5-40 mL IntraVENous 2 times per day    apixaban  5 mg Oral BID    [Held by provider] furosemide  20 mg Oral Daily    levothyroxine  25 mcg Oral Daily    metoprolol succinate  12.5 mg Oral Daily    mirtazapine  30 mg Oral Nightly    oxyBUTYnin  5 mg Oral BID    potassium chloride  20 mEq Oral Daily    rosuvastatin  10 mg Oral Nightly    [Held by provider] sacubitril-valsartan  1 tablet Oral BID    tamsulosin  0.4 mg Oral Daily    polyethylene glycol  17 g Oral Daily     PRN Meds: benzocaine-menthol, benzonatate, calcium carbonate, hydrALAZINE, melatonin, Polyvinyl Alcohol-Povidone PF, sodium chloride, sodium chloride flush, sodium chloride, potassium chloride **OR** potassium alternative oral replacement **OR** potassium chloride, magnesium sulfate, ondansetron **OR** ondansetron, polyethylene glycol, ipratropium 0.5 mg-albuterol 2.5 mg    Labs:     Recent Labs     02/02/25  0651 02/03/25  0523   WBC 5.9 5.1   HGB 10.2* 9.4*   HCT 32.5* 29.5*   PLT  --  137       Recent Labs     02/02/25  0651 02/03/25  0523    141   K 4.2 4.0    110*   CO2 22 18*   BUN 21 20   CREATININE 0.8 0.8   CALCIUM 9.2 9.4       No results for input(s): \"ALKPHOS\", \"ALT\", \"AST\", \"BILITOT\", \"AMYLASE\", \"LIPASE\" in the last 72 hours.    Invalid input(s): \"PROT\", \"ALB\"    No results for input(s): \"INR\" in the last 72 hours.    No results for input(s): \"CKTOTAL\", \"TROPONINI\" in the last 72 hours.    Chronic labs:    Lab 
List   Diagnosis    Fall at home    Urinary tract infection due to Pseudomonas aeruginosa    Pyelonephritis    HTN (hypertension)    BPH (benign prostatic hyperplasia)    Hypothyroidism    Sleep apnea    Bacteremia due to Gram-negative bacteria    Alzheimer's dementia ([facility])    Severe protein-calorie malnutrition ([facility])    Duong catheter problem ([facility])    Urinary retention    Acute prostatitis    Confusion    Chronic diastolic congestive heart failure ([facility])    COVID-19    Failure to thrive in adult    Altered mental status    CAD (coronary artery disease)    Ischemic cardiomyopathy    Cognitive deficit S/P CVA (cerebrovascular accident)    Left carotid artery stenosis    Aphasia determined by examination    Bilateral leg weakness    TIA (transient ischemic attack)    Stenosis of internal carotid artery with cerebral infarction ([facility])    HFrEF (heart failure with reduced ejection fraction) ([facility])    VHD (valvular heart disease)    HLD (hyperlipidemia)    History of pulmonary embolism    H/O subarachnoid hemorrhage    H/O: CVA (cerebrovascular accident)    UTI (urinary tract infection)               
AM    BILIDIR <0.2 03/21/2023 04:54 AM    IBILI see below 03/21/2023 04:54 AM       PT/INR:    Lab Results   Component Value Date/Time    PROTIME 16.1 12/27/2024 11:19 AM    INR 1.5 12/27/2024 11:19 AM       TSH:    Lab Results   Component Value Date/Time    TSH 0.45 02/01/2025 07:59 AM       U/A:    Lab Results   Component Value Date/Time    COLORU Yellow 01/31/2025 05:21 PM    PHUR 7.0 01/31/2025 05:21 PM    PHUR 7.0 12/26/2023 08:38 PM    WBCUA GREATER THAN 100 01/31/2025 05:21 PM    RBCUA 10 TO 20 01/31/2025 05:21 PM    BACTERIA 4+ 01/31/2025 05:21 PM    CLARITYU Clear 03/21/2023 08:20 AM    LEUKOCYTESUR LARGE 01/31/2025 05:21 PM    UROBILINOGEN 0.2 01/31/2025 05:21 PM    BILIRUBINUR NEGATIVE 01/31/2025 05:21 PM    BLOODU Negative 03/21/2023 08:20 AM    GLUCOSEU NEGATIVE 01/31/2025 05:21 PM    AMORPHOUS PRESENT 12/26/2023 08:38 PM       ABG:  No results found for: \"GYN2STK\", \"BEART\", \"A0MRUZMM\", \"PHART\", \"THGBART\", \"OCW7DSB\", \"PO2ART\", \"DCN6UKR\"    I personally reviewed the labs, blood cx and urine cx, CT scan of abdomen and pelvis - thickening of bladder wall ,Duong balloon in the urethra     MICROBIOLOGY:    Blood culture - neg to date     Urine Culture -      Specimen Description .CLEAN CATCH URINE     Special Requests Site: Urine    Culture ESCHERICHIA COLI >100,000 CFU/ML Susceptibility to follow. Abnormal          Radiology :    CT scan of abdomen and pelvis -  1. Abnormal wall thickening of the bladder with surrounding stranding to   suggest inflammation. Multiple diverticulum identified containing air in   fluid. There is air is well within the bladder possibly from instrumentation.   Correlation to urinalysis is recommended for further evaluation of   cystitis/UTI. Duong catheter balloon seen within the prostatic urethra.   Repositioning is recommended.   2. Moderate stool burden seen diffusely throughout the colon to suggest   clinical presentation of constipation.   3. Mild enteritis with no

## 2025-02-05 NOTE — ACP (ADVANCE CARE PLANNING)
Advance Care Planning   The patient has the following advanced directives on file:  Advance Directives       Power of  Living Will ACP-Advance Directive ACP-Power of     Filed on 11/21/24 Filed on 11/21/24 Filed Filed            Contacts:    Primary Decision Maker: Stacie Carroll - Child - [phone]    The Patient has the following current code status:    Code Status: DNR-CCA

## 2025-02-05 NOTE — H&P
Hospitalist History & Physical      PCP: Sage Easley Jr., MD    Date of Service: Pt seen/examined on 2/5/2025 and is admitted to Inpatient with expected LOS greater than two midnights due to medical therapy.        Chief Complaint:  had concerns including Failure To Thrive (Patient was d/c today got home and daughter refused to take care of him states he needs placed).    History of Present Illness:    Mr. Jona Hobbs, a 80 y.o. year old male  who  has a past medical history of Bacteremia due to Gram-negative bacteria, CHF (congestive heart failure) ([facility]), Dementia ([facility]), Electrolyte imbalance, Fever, Sepsis ([facility]), and Severe Alzheimer's dementia without behavioral disturbance, psychotic disturbance, mood disturbance, or anxiety ([facility]).     ER COURSE:  Patient was discharged home this evening by [facility] and once at his daughters house he was unable to stand to get to a wheelchair to get into the house. His daughter sent the ambulance service back to the ED as she can't take care of him at home. He was seen by therapy and scored a 9/24 and was recommended to go to a SNF but she insisted on that he could ambulate with a walker and refused placement. He has a chronic samuel and was found to have a UTI and malpositioned samuel, he was seen by ID and recommended to take omnicef for 4 more days.       Past Medical History:        Diagnosis Date    Bacteremia due to Gram-negative bacteria 08/17/2023    CHF (congestive heart failure) ([facility])     Dementia ([facility])     Electrolyte imbalance     Fever 08/16/2023    Sepsis ([facility]) 08/16/2023    Severe Alzheimer's dementia without behavioral disturbance, psychotic disturbance, mood disturbance, or anxiety ([facility]) 08/17/2023       Past Surgical History:        Procedure Laterality Date    HERNIA REPAIR Bilateral     TOTAL KNEE ARTHROPLASTY      bilateral    ULNAR NERVE REPAIR         Medications Prior to Admission:      Prior to Admission medications    Medication Sig

## 2025-02-05 NOTE — PROGRESS NOTES
OCCUPATIONAL THERAPY INITIAL EVALUATION    [facility]  [address], [place]        Date:2025                                                  Patient Name: Jona Hobbs    MRN: [id number]    : 1945    Room: [facility]407      Evaluating OT: Sienna Castillo OTR/L; [id number]       Referring Provider: Radha Nichols APRN - CNP     Specific Provider Orders/Date: OT Eval and Treat 25 0015       Diagnosis: Ambulatory dysfunction      Surgery: None this admission.     Pertinent Medical History:  has a past medical history of Bacteremia due to Gram-negative bacteria, CHF (congestive heart failure) ([facility]), Dementia ([facility]), Electrolyte imbalance, Fever, Sepsis ([facility]), and Severe Alzheimer's dementia without behavioral disturbance, psychotic disturbance, mood disturbance, or anxiety ([facility]).     Recommended Adaptive Equipment: TBD pending progress.     Precautions:  Fall Risk, cognition, +alarms, skin integrity, TAPS, incontinence, samuel, h/o Alzheimer's dementia     Assessment of current deficits    [x] Functional mobility  [x]ADLs  [x] Strength               [x]Cognition    [x] Functional transfers   [x] IADLs         [x] Safety Awareness   [x]Endurance    [x] Fine Coordination              [x] Balance      [] Vision/perception   []Sensation     []Gross Motor Coordination  [x] ROM  [] Delirium                   [] Motor Control     OT PLAN OF CARE   OT POC based on physician orders, patient diagnosis and results of clinical assessment    Frequency/Duration 1-3 days/wk for 2 weeks PRN   Specific OT Treatment Interventions to include:   * Instruction/training on adapted ADL techniques and AE recommendations to increase functional independence within precautions       * Training on energy conservation strategies, correct breathing pattern and techniques to improve independence/tolerance for self-care routine  * Functional transfer/mobility

## 2025-02-05 NOTE — PROGRESS NOTES
Physical Therapy Initial Evaluation    Name: Jona Hobbs  : 1945  MRN: [id number]      Date of Service: 2025    Evaluating PT:  Micha Joy, PT [id number]    Referring provider/PT Order:  PT Eval and Treat   25  PT evaluation and treat  Start:  25,   End:  25,   ONE TIME,   Standing Count:  1 Occurrences,   R         Radha Nichols, MARIA GUADALUPE - CNP     Room #:  5407/5407-B  Diagnosis:  Failure to thrive in adult [R62.7]  Ambulatory dysfunction [R26.2]  PMHx/PSHx:     has a past medical history of Bacteremia due to Gram-negative bacteria, CHF (congestive heart failure) ([facility]), Dementia ([facility]), Electrolyte imbalance, Fever, Sepsis ([facility]), and Severe Alzheimer's dementia without behavioral disturbance, psychotic disturbance, mood disturbance, or anxiety ([facility]).    has a past surgical history that includes Total knee arthroplasty; hernia repair (Bilateral); and Ulnar nerve repair.     Procedure/Surgery:  none  Precautions:  Falls, alarm, FWB (full weight bearing) Bilateral LE,   Equipment Needs: To be determined,    SUBJECTIVE:    Pt minimally verbal this date - unable to obtain PLOF. Per chart, pt lives with daughter in a 1 story home with 3 ROSLYN and 1 HR. Pt used WW for ambulation PTA.     OBJECTIVE:   Initial Evaluation  Date: 25 Treatment Short Term/ Long Term   Goals   AM-PAC 6 Clicks      Was pt agreeable to Eval/treatment? Yes      Does pt have pain? Discomfort.      Bed Mobility  Rolling: Max x 2  Supine to sit:   Max x 2   Sit to supine: Max x 2   Scooting: Max x 2   Rolling: Min  Supine to sit: Min  Sit to supine: Min  Scooting: Min   Transfers Sit to stand: NT t  Stand to sit: NT  Stand pivot: NT   Sit to stand: Min to Wheeled Walker  Stand to sit: Min   Stand pivot: Min with Wheeled Walker   Ambulation    NT  TBD   Stair negotiation: ascended and descended  NT       Strength/ROM:   See OT note for BUE ROM and strength  RLE grossly 3+/5  LLE grossly 3+/5  RLE AROM

## 2025-02-05 NOTE — ED PROVIDER NOTES
HPI:  2/4/25, Time: 11:43 PM EST         Jona Hobbs is a 80 y.o. male history of bacteremia history of CHF dementia history of electrolyte imbalance fever sepsis Alzheimer's anxiety presenting to the ED for weakness, beginning a short time ago.  The complaint has been persistent, moderate in severity, and worsened by nothing.  Patient was reportedly just discharged today.  Patient unable to ambulate and family unable to care for him at home.  Patient was sent here for admission.  Patient reporting no chest pain or difficulty breathing is only oriented to person place.  Patient has history dementia.  Patient has indwelling Duong catheter in place.  He was recently admitted for UTI encephalopathy    ROS:   Pertinent positives and negatives are stated within HPI, all other systems reviewed and are negative.  --------------------------------------------- PAST HISTORY ---------------------------------------------  Past Medical History:  has a past medical history of Bacteremia due to Gram-negative bacteria, CHF (congestive heart failure) ([facility]), Dementia ([facility]), Electrolyte imbalance, Fever, Sepsis ([facility]), and Severe Alzheimer's dementia without behavioral disturbance, psychotic disturbance, mood disturbance, or anxiety ([facility]).    Past Surgical History:  has a past surgical history that includes Total knee arthroplasty; hernia repair (Bilateral); and Ulnar nerve repair.    Social History:  reports that he has never smoked. He has never used smokeless tobacco. He reports that he does not drink alcohol and does not use drugs.    Family History: family history is not on file.     The patient’s home medications have been reviewed.    Allergies: Hydrocodone-acetaminophen, Amoxicillin-pot clavulanate, Erythromycin, Hydrocodone, Cipro [ciprofloxacin hcl], Quinapril, and Tylenol [acetaminophen]    ---------------------------------------------------PHYSICAL EXAM--------------------------------------    Constitutional/General:

## 2025-02-06 VITALS
HEIGHT: 70 IN | RESPIRATION RATE: 16 BRPM | SYSTOLIC BLOOD PRESSURE: 128 MMHG | OXYGEN SATURATION: 96 % | WEIGHT: 198 LBS | BODY MASS INDEX: 28.35 KG/M2 | HEART RATE: 71 BPM | TEMPERATURE: 97.9 F | DIASTOLIC BLOOD PRESSURE: 60 MMHG

## 2025-02-06 LAB
ANION GAP SERPL CALCULATED.3IONS-SCNC: 12 MMOL/L (ref 7–16)
BASOPHILS # BLD: 0 K/UL (ref 0–0.2)
BASOPHILS NFR BLD: 0 % (ref 0–2)
BUN SERPL-MCNC: 15 MG/DL (ref 6–23)
CALCIUM SERPL-MCNC: 9.4 MG/DL (ref 8.6–10.2)
CHLORIDE SERPL-SCNC: 106 MMOL/L (ref 98–107)
CO2 SERPL-SCNC: 22 MMOL/L (ref 22–29)
CREAT SERPL-MCNC: 0.7 MG/DL (ref 0.7–1.2)
EOSINOPHIL # BLD: 0.13 K/UL (ref 0.05–0.5)
EOSINOPHILS RELATIVE PERCENT: 4 % (ref 0–6)
ERYTHROCYTE [DISTWIDTH] IN BLOOD BY AUTOMATED COUNT: 14.6 % (ref 11.5–15)
GFR, ESTIMATED: >90 ML/MIN/1.73M2
GLUCOSE BLD-MCNC: 92 MG/DL (ref 74–99)
GLUCOSE SERPL-MCNC: 86 MG/DL (ref 74–99)
HCT VFR BLD AUTO: 28 % (ref 37–54)
HGB BLD-MCNC: 9.1 G/DL (ref 12.5–16.5)
LYMPHOCYTES NFR BLD: 0.65 K/UL (ref 1.5–4)
LYMPHOCYTES RELATIVE PERCENT: 21 % (ref 20–42)
MCH RBC QN AUTO: 28.5 PG (ref 26–35)
MCHC RBC AUTO-ENTMCNC: 32.5 G/DL (ref 32–34.5)
MCV RBC AUTO: 87.8 FL (ref 80–99.9)
MONOCYTES NFR BLD: 0.16 K/UL (ref 0.1–0.95)
MONOCYTES NFR BLD: 5 % (ref 2–12)
NEUTROPHILS NFR BLD: 70 % (ref 43–80)
NEUTS SEG NFR BLD: 2.16 K/UL (ref 1.8–7.3)
PLATELET # BLD AUTO: 166 K/UL (ref 130–450)
PMV BLD AUTO: 11.1 FL (ref 7–12)
POTASSIUM SERPL-SCNC: 3.9 MMOL/L (ref 3.5–5)
RBC # BLD AUTO: 3.19 M/UL (ref 3.8–5.8)
RBC # BLD: ABNORMAL 10*6/UL
SODIUM SERPL-SCNC: 140 MMOL/L (ref 132–146)
WBC OTHER # BLD: 3.1 K/UL (ref 4.5–11.5)

## 2025-02-06 PROCEDURE — 82962 GLUCOSE BLOOD TEST: CPT

## 2025-02-06 PROCEDURE — 97535 SELF CARE MNGMENT TRAINING: CPT

## 2025-02-06 PROCEDURE — G0378 HOSPITAL OBSERVATION PER HR: HCPCS

## 2025-02-06 PROCEDURE — 85025 COMPLETE CBC W/AUTO DIFF WBC: CPT

## 2025-02-06 PROCEDURE — 36415 COLL VENOUS BLD VENIPUNCTURE: CPT

## 2025-02-06 PROCEDURE — 97112 NEUROMUSCULAR REEDUCATION: CPT

## 2025-02-06 PROCEDURE — 6370000000 HC RX 637 (ALT 250 FOR IP): Performed by: NURSE PRACTITIONER

## 2025-02-06 PROCEDURE — 80048 BASIC METABOLIC PNL TOTAL CA: CPT

## 2025-02-06 PROCEDURE — 99239 HOSP IP/OBS DSCHRG MGMT >30: CPT | Performed by: INTERNAL MEDICINE

## 2025-02-06 RX ADMIN — SACUBITRIL AND VALSARTAN 0.5 TABLET: 24; 26 TABLET, FILM COATED ORAL at 09:03

## 2025-02-06 RX ADMIN — TAMSULOSIN HYDROCHLORIDE 0.4 MG: 0.4 CAPSULE ORAL at 09:02

## 2025-02-06 RX ADMIN — OXYBUTYNIN CHLORIDE 5 MG: 5 TABLET ORAL at 20:36

## 2025-02-06 RX ADMIN — CEFDINIR 300 MG: 300 CAPSULE ORAL at 20:35

## 2025-02-06 RX ADMIN — Medication 3 MG: at 20:46

## 2025-02-06 RX ADMIN — APIXABAN 5 MG: 5 TABLET, FILM COATED ORAL at 09:03

## 2025-02-06 RX ADMIN — METOPROLOL SUCCINATE 12.5 MG: 25 TABLET, EXTENDED RELEASE ORAL at 09:02

## 2025-02-06 RX ADMIN — ROSUVASTATIN 10 MG: 10 TABLET, FILM COATED ORAL at 09:02

## 2025-02-06 RX ADMIN — OXYBUTYNIN CHLORIDE 5 MG: 5 TABLET ORAL at 09:03

## 2025-02-06 RX ADMIN — LEVOTHYROXINE SODIUM 25 MCG: 0.05 TABLET ORAL at 06:42

## 2025-02-06 RX ADMIN — MIRTAZAPINE 30 MG: 15 TABLET, FILM COATED ORAL at 20:36

## 2025-02-06 RX ADMIN — CEFDINIR 300 MG: 300 CAPSULE ORAL at 09:02

## 2025-02-06 RX ADMIN — APIXABAN 5 MG: 5 TABLET, FILM COATED ORAL at 20:35

## 2025-02-06 RX ADMIN — PSYLLIUM HUSK 1 PACKET: 3.4 POWDER ORAL at 09:02

## 2025-02-06 NOTE — PROGRESS NOTES
Occupational Therapy  OT BEDSIDE TREATMENT NOTE   [facility]  [address], [place]       Date:2025  Patient Name: Jona Hobbs  MRN: [id number]  : 1945  Room: [facility]407-     Per OT Eval:  Evaluating OT: Sienna Castillo OTR/L; [id number]        Referring Provider: Radha Nichols APRN - CNP     Specific Provider Orders/Date: OT Eval and Treat 25 0015        Diagnosis: Ambulatory dysfunction      Surgery: None this admission.     Pertinent Medical History:  has a past medical history of Bacteremia due to Gram-negative bacteria, CHF (congestive heart failure) ([facility]), Dementia ([facility]), Electrolyte imbalance, Fever, Sepsis ([facility]), and Severe Alzheimer's dementia without behavioral disturbance, psychotic disturbance, mood disturbance, or anxiety ([facility]).      Recommended Adaptive Equipment: TBD pending progress.      Precautions:  Fall Risk, cognition, +alarms, skin integrity, TAPS, incontinence, samuel, h/o Alzheimer's dementia      Assessment of current deficits    [x] Functional mobility            [x]ADLs           [x] Strength                  [x]Cognition    [x] Functional transfers          [x] IADLs         [x] Safety Awareness   [x]Endurance    [x] Fine Coordination                         [x] Balance      [] Vision/perception   []Sensation      []Gross Motor Coordination             [x] ROM           [] Delirium                   [] Motor Control      OT PLAN OF CARE   OT POC based on physician orders, patient diagnosis and results of clinical assessment     Frequency/Duration 1-3 days/wk for 2 weeks PRN   Specific OT Treatment Interventions to include:   * Instruction/training on adapted ADL techniques and AE recommendations to increase functional independence within precautions       * Training on energy conservation strategies, correct breathing pattern and techniques to improve independence/tolerance for self-care routine  *

## 2025-02-06 NOTE — PLAN OF CARE
Problem: Chronic Conditions and Co-morbidities  Goal: Patient's chronic conditions and co-morbidity symptoms are monitored and maintained or improved  Outcome: Progressing  Flowsheets (Taken 2/5/2025 2000)  Care Plan - Patient's Chronic Conditions and Co-Morbidity Symptoms are Monitored and Maintained or Improved: Monitor and assess patient's chronic conditions and comorbid symptoms for stability, deterioration, or improvement     Problem: Discharge Planning  Goal: Discharge to home or other facility with appropriate resources  Outcome: Progressing  Flowsheets (Taken 2/5/2025 2000)  Discharge to home or other facility with appropriate resources: Identify barriers to discharge with patient and caregiver     Problem: Pain  Goal: Verbalizes/displays adequate comfort level or baseline comfort level  Outcome: Progressing  Flowsheets (Taken 2/5/2025 2000)  Verbalizes/displays adequate comfort level or baseline comfort level: Encourage patient to monitor pain and request assistance     Problem: Skin/Tissue Integrity  Goal: Skin integrity remains intact  Description: 1.  Monitor for areas of redness and/or skin breakdown  2.  Assess vascular access sites hourly  3.  Every 4-6 hours minimum:  Change oxygen saturation probe site  4.  Every 4-6 hours:  If on nasal continuous positive airway pressure, respiratory therapy assess nares and determine need for appliance change or resting period  Outcome: Progressing  Flowsheets (Taken 2/5/2025 2000)  Skin Integrity Remains Intact: Monitor for areas of redness and/or skin breakdown     Problem: ABCDS Injury Assessment  Goal: Absence of physical injury  Outcome: Progressing  Flowsheets (Taken 2/5/2025 2000)  Absence of Physical Injury: Implement safety measures based on patient assessment     Problem: Safety - Adult  Goal: Free from fall injury  Outcome: Progressing  Flowsheets (Taken 2/5/2025 2000)  Free From Fall Injury: Instruct family/caregiver on patient safety

## 2025-02-06 NOTE — CARE COORDINATION
Care Coordination  [facility] has started precert and it remains pending this am.  The patient is in his copay days and will have to pay a lump sum of money up front for him to go there. If denied plan will be to return home and he is being followed by [facility] care. He is on room air this am.            Addendum-  Received the precert for the patient to go to [facility]. The patient is set up to be picked up [facility]. Notified the patients daughter. Have perfect served the physician for the discharge order.

## 2025-02-06 NOTE — PROGRESS NOTES
Nurse to nurse called to Annie at [facility]. All questions answered.   Electronically signed by Denise Stewart RN on 2/6/2025 at 3:44 PM

## 2025-02-06 NOTE — DISCHARGE SUMMARY
[facility] Hospitalist Physician Discharge Summary     Patient ID:  Jona Hobbs  [id number]  80 y.o.  1945    Admit date: 2/5/2025    Discharge date and time:  2/6/2025  3:21 PM    Hospital Course:   Patient Jona Hobbs is a 80 y.o. presented with Failure to thrive in adult [R62.7]  Ambulatory dysfunction [R26.2]    Mr. Jona Hobbs, a 80 y.o. year old male  who  has a past medical history of Bacteremia due to Gram-negative bacteria, CHF (congestive heart failure) ([facility]), Dementia ([facility]), Electrolyte imbalance, Fever, Sepsis ([facility]), and Severe Alzheimer's dementia without behavioral disturbance, psychotic disturbance, mood disturbance, or anxiety ([facility]).      Patient was discharged home this evening by [facility] and once at his daughters house he was unable to stand to get to a wheelchair to get into the house. His daughter sent the ambulance service back to the ED as she can't take care of him at home. He was seen by therapy and scored a 9/24 and was recommended to go to a SNF but she insisted on that he could ambulate with a walker and refused placement. He has a chronic samuel and was found to have a UTI and malpositioned samuel, he was seen by ID and recommended to take omnicef for 4 more days.      2/6: Placement obtained for [facility]. Discharge today.      Follow Up:    [address]  [place] [address]  [phone]            Disposition:    Activity level: As tolerated     Dispo: Home      Condition on discharge: Stable       Discharge Exam:    General Appearance: alert, confused at baseline, and in no acute distress  Skin: warm and dry  Head: normocephalic and atraumatic  Eyes: pupils equal, round, and reactive to light, extraocular eye movements intact, conjunctivae normal  Neck: neck supple and non tender without mass   Pulmonary/Chest: clear to auscultation bilaterally- no wheezes, rales or rhonchi, normal air movement, no respiratory  distress  Cardiovascular: normal rate, normal S1 and S2 and no carotid bruits  Abdomen: soft, non-tender, non-distended, normal bowel sounds, no masses or organomegaly  Extremities: no cyanosis, no clubbing and no edema  Neurologic: no cranial nerve deficit and speech normal      Admission Diagnoses: Principal Problem:    Ambulatory dysfunction  Resolved Problems:    * No resolved hospital problems. *      Discharge Diagnoses: Principal Problem:    Ambulatory dysfunction  Resolved Problems:    * No resolved hospital problems. *      Consults:  IP CONSULT TO INTERNAL MEDICINE  IP CONSULT TO SOCIAL WORK      I/O last 3 completed shifts:  In: 560 [P.O.:560]  Out: 1950 [Urine:1950]  I/O this shift:  In: 240 [P.O.:240]  Out: -       LABS:  Recent Labs     02/06/25  0917      K 3.9      CO2 22   BUN 15   CREATININE 0.7   GLUCOSE 86   CALCIUM 9.4       Recent Labs     02/06/25  0917   WBC 3.1*   RBC 3.19*   HGB 9.1*   HCT 28.0*   MCV 87.8   MCH 28.5   MCHC 32.5   RDW 14.6      MPV 11.1       Recent Labs     02/05/25 1953 02/06/25  0644   POCGLU 94 92       Imaging:  No results found.    Patient Instructions:      Medication List        CONTINUE taking these medications      apixaban 5 MG Tabs tablet  Commonly known as: ELIQUIS  Take 1 tablet by mouth 2 times daily     cefdinir 300 MG capsule  Commonly known as: OMNICEF  Take 1 capsule by mouth every 12 hours for 8 doses     ipratropium 0.5 mg-albuterol 2.5 mg 0.5-2.5 (3) MG/3ML Soln nebulizer solution  Commonly known as: DUONEB  Inhale 3 mLs into the lungs every 4 hours as needed for Shortness of Breath     levothyroxine 25 MCG tablet  Commonly known as: SYNTHROID     metoprolol succinate 25 MG extended release tablet  Commonly known as: TOPROL XL     mirtazapine 30 MG tablet  Commonly known as: REMERON     ondansetron 4 MG disintegrating tablet  Commonly known as: ZOFRAN-ODT  Take 1 tablet by mouth every 8 hours as needed for Nausea or Vomiting

## 2025-02-06 NOTE — PROGRESS NOTES
[facility] Hospitalist Progress Note    Admitting Date and Time: 2/5/2025 12:17 AM  Admit Dx: Failure to thrive in adult [R62.7]  Ambulatory dysfunction [R26.2]    Synopsis:    Mr. Jona Hobbs, a 80 y.o. year old male  who  has a past medical history of Bacteremia due to Gram-negative bacteria, CHF (congestive heart failure) ([facility]), Dementia ([facility]), Electrolyte imbalance, Fever, Sepsis ([facility]), and Severe Alzheimer's dementia without behavioral disturbance, psychotic disturbance, mood disturbance, or anxiety ([facility]).      Patient was discharged home this evening by [facility] and once at his daughters house he was unable to stand to get to a wheelchair to get into the house. His daughter sent the ambulance service back to the ED as she can't take care of him at home. He was seen by therapy and scored a 9/24 and was recommended to go to a SNF but she insisted on that he could ambulate with a walker and refused placement. He has a chronic samuel and was found to have a UTI and malpositioned samuel, he was seen by ID and recommended to take omnicef for 4 more days.     2/6: Awaiting placement    Subjective:  Patient is being followed for Failure to thrive in adult [R62.7]  Ambulatory dysfunction [R26.2]     Patient seen and examined at bedside this morning.  No complaints    ROS: denies fever, chills, cp, sob, n/v, HA unless stated above.      sodium chloride flush  5-40 mL IntraVENous 2 times per day    melatonin  3 mg Oral Nightly    apixaban  5 mg Oral BID    cefdinir  300 mg Oral 2 times per day    levothyroxine  25 mcg Oral Daily    metoprolol succinate  12.5 mg Oral Daily    mirtazapine  30 mg Oral Nightly    oxyBUTYnin  5 mg Oral BID    psyllium husk-aspartame  1 packet Oral Daily    rosuvastatin  10 mg Oral Daily    sacubitril-valsartan  0.5 tablet Oral Daily    tamsulosin  0.4 mg Oral Daily     sodium chloride flush, 5-40 mL, PRN  sodium chloride, , PRN  potassium chloride, 40 mEq, PRN    follow-up as outpatient for removal.      DC planning: Pending placement    NOTE: This report was transcribed using voice recognition software. Every effort was made to ensure accuracy; however, inadvertent computerized transcription errors may be present.    Electronically signed by Jona Dominique MD on 2/6/2025 at 11:34 AM

## 2025-02-12 ENCOUNTER — TELEPHONE (OUTPATIENT)
Dept: OTHER | Age: 80
End: 2025-02-12

## 2025-02-12 NOTE — TELEPHONE ENCOUNTER
Received a referral for this patient. He is an existing patient but has not been here since January 2024. I spoke with his daughter Stacie and he is currently in a nursing home. She does not want him to come until after he's released. She will call to make an appointment at that time.

## 2025-02-28 ENCOUNTER — TELEPHONE (OUTPATIENT)
Dept: OTHER | Age: 80
End: 2025-02-28

## 2025-02-28 NOTE — TELEPHONE ENCOUNTER
Spoke to Dr. Quinones to reschedule an appointment. Per pedro patient is under hospice services now.

## 2025-03-02 PROBLEM — N39.0 UTI (URINARY TRACT INFECTION): Status: RESOLVED | Noted: 2025-01-31 | Resolved: 2025-03-02
